# Patient Record
Sex: MALE | Race: WHITE | URBAN - METROPOLITAN AREA
[De-identification: names, ages, dates, MRNs, and addresses within clinical notes are randomized per-mention and may not be internally consistent; named-entity substitution may affect disease eponyms.]

---

## 2017-08-31 ENCOUNTER — INPATIENT (INPATIENT)
Facility: HOSPITAL | Age: 77
LOS: 7 days | Discharge: NURSING, REHAB OR LONG-TERM CARE | End: 2017-09-08
Attending: FAMILY MEDICINE | Admitting: FAMILY MEDICINE
Payer: MEDICARE

## 2017-08-31 VITALS
HEART RATE: 60 BPM | DIASTOLIC BLOOD PRESSURE: 50 MMHG | SYSTOLIC BLOOD PRESSURE: 111 MMHG | OXYGEN SATURATION: 99 % | RESPIRATION RATE: 18 BRPM | WEIGHT: 149.91 LBS | HEIGHT: 68 IN

## 2017-08-31 DIAGNOSIS — E11.9 TYPE 2 DIABETES MELLITUS WITHOUT COMPLICATIONS: ICD-10-CM

## 2017-08-31 DIAGNOSIS — L03.313 CELLULITIS OF CHEST WALL: ICD-10-CM

## 2017-08-31 DIAGNOSIS — J44.9 CHRONIC OBSTRUCTIVE PULMONARY DISEASE, UNSPECIFIED: ICD-10-CM

## 2017-08-31 DIAGNOSIS — M86.60 OTHER CHRONIC OSTEOMYELITIS, UNSPECIFIED SITE: ICD-10-CM

## 2017-08-31 DIAGNOSIS — I10 ESSENTIAL (PRIMARY) HYPERTENSION: ICD-10-CM

## 2017-08-31 LAB
ALBUMIN SERPL ELPH-MCNC: 3.2 G/DL — LOW (ref 3.3–5)
ALP SERPL-CCNC: 84 U/L — SIGNIFICANT CHANGE UP (ref 40–120)
ALT FLD-CCNC: 34 U/L — SIGNIFICANT CHANGE UP (ref 12–78)
ANION GAP SERPL CALC-SCNC: 6 MMOL/L — SIGNIFICANT CHANGE UP (ref 5–17)
APTT BLD: 38.8 SEC — HIGH (ref 27.5–37.4)
AST SERPL-CCNC: 20 U/L — SIGNIFICANT CHANGE UP (ref 15–37)
BASOPHILS # BLD AUTO: 0.1 K/UL — SIGNIFICANT CHANGE UP (ref 0–0.2)
BASOPHILS NFR BLD AUTO: 1.2 % — SIGNIFICANT CHANGE UP (ref 0–2)
BILIRUB SERPL-MCNC: 0.3 MG/DL — SIGNIFICANT CHANGE UP (ref 0.2–1.2)
BUN SERPL-MCNC: 23 MG/DL — SIGNIFICANT CHANGE UP (ref 7–23)
CALCIUM SERPL-MCNC: 9.2 MG/DL — SIGNIFICANT CHANGE UP (ref 8.5–10.1)
CHLORIDE SERPL-SCNC: 105 MMOL/L — SIGNIFICANT CHANGE UP (ref 96–108)
CO2 SERPL-SCNC: 29 MMOL/L — SIGNIFICANT CHANGE UP (ref 22–31)
CREAT SERPL-MCNC: 1.04 MG/DL — SIGNIFICANT CHANGE UP (ref 0.5–1.3)
EOSINOPHIL # BLD AUTO: 0.8 K/UL — HIGH (ref 0–0.5)
EOSINOPHIL NFR BLD AUTO: 8 % — HIGH (ref 0–6)
GLUCOSE SERPL-MCNC: 98 MG/DL — SIGNIFICANT CHANGE UP (ref 70–99)
HCT VFR BLD CALC: 44 % — SIGNIFICANT CHANGE UP (ref 39–50)
HGB BLD-MCNC: 14.1 G/DL — SIGNIFICANT CHANGE UP (ref 13–17)
INR BLD: 2.35 RATIO — HIGH (ref 0.88–1.16)
LACTATE SERPL-SCNC: 1.2 MMOL/L — SIGNIFICANT CHANGE UP (ref 0.7–2)
LYMPHOCYTES # BLD AUTO: 2.2 K/UL — SIGNIFICANT CHANGE UP (ref 1–3.3)
LYMPHOCYTES # BLD AUTO: 23.3 % — SIGNIFICANT CHANGE UP (ref 13–44)
MCHC RBC-ENTMCNC: 24.2 PG — LOW (ref 27–34)
MCHC RBC-ENTMCNC: 32 GM/DL — SIGNIFICANT CHANGE UP (ref 32–36)
MCV RBC AUTO: 75.6 FL — LOW (ref 80–100)
MONOCYTES # BLD AUTO: 0.9 K/UL — SIGNIFICANT CHANGE UP (ref 0–0.9)
MONOCYTES NFR BLD AUTO: 9.9 % — SIGNIFICANT CHANGE UP (ref 2–14)
NEUTROPHILS # BLD AUTO: 5.4 K/UL — SIGNIFICANT CHANGE UP (ref 1.8–7.4)
NEUTROPHILS NFR BLD AUTO: 57.5 % — SIGNIFICANT CHANGE UP (ref 43–77)
PLATELET # BLD AUTO: 223 K/UL — SIGNIFICANT CHANGE UP (ref 150–400)
POTASSIUM SERPL-MCNC: 4.4 MMOL/L — SIGNIFICANT CHANGE UP (ref 3.5–5.3)
POTASSIUM SERPL-SCNC: 4.4 MMOL/L — SIGNIFICANT CHANGE UP (ref 3.5–5.3)
PROT SERPL-MCNC: 7.7 GM/DL — SIGNIFICANT CHANGE UP (ref 6–8.3)
PROTHROM AB SERPL-ACNC: 25.8 SEC — HIGH (ref 9.8–12.7)
RBC # BLD: 5.83 M/UL — HIGH (ref 4.2–5.8)
RBC # FLD: 15.5 % — HIGH (ref 10.3–14.5)
SODIUM SERPL-SCNC: 140 MMOL/L — SIGNIFICANT CHANGE UP (ref 135–145)
WBC # BLD: 9.4 K/UL — SIGNIFICANT CHANGE UP (ref 3.8–10.5)
WBC # FLD AUTO: 9.4 K/UL — SIGNIFICANT CHANGE UP (ref 3.8–10.5)

## 2017-08-31 PROCEDURE — 93010 ELECTROCARDIOGRAM REPORT: CPT

## 2017-08-31 PROCEDURE — 99285 EMERGENCY DEPT VISIT HI MDM: CPT

## 2017-08-31 RX ORDER — SODIUM HYPOCHLORITE 0.125 %
1 SOLUTION, NON-ORAL MISCELLANEOUS DAILY
Qty: 0 | Refills: 0 | Status: DISCONTINUED | OUTPATIENT
Start: 2017-08-31 | End: 2017-09-08

## 2017-08-31 RX ORDER — POLYETHYLENE GLYCOL 3350 17 G/17G
17 POWDER, FOR SOLUTION ORAL DAILY
Qty: 0 | Refills: 0 | Status: DISCONTINUED | OUTPATIENT
Start: 2017-08-31 | End: 2017-09-08

## 2017-08-31 RX ORDER — SENNA PLUS 8.6 MG/1
2 TABLET ORAL AT BEDTIME
Qty: 0 | Refills: 0 | Status: DISCONTINUED | OUTPATIENT
Start: 2017-08-31 | End: 2017-09-08

## 2017-08-31 RX ORDER — PANTOPRAZOLE SODIUM 20 MG/1
40 TABLET, DELAYED RELEASE ORAL
Qty: 0 | Refills: 0 | Status: DISCONTINUED | OUTPATIENT
Start: 2017-08-31 | End: 2017-09-01

## 2017-08-31 RX ORDER — SODIUM CHLORIDE 9 MG/ML
500 INJECTION INTRAMUSCULAR; INTRAVENOUS; SUBCUTANEOUS ONCE
Qty: 0 | Refills: 0 | Status: COMPLETED | OUTPATIENT
Start: 2017-08-31 | End: 2017-08-31

## 2017-08-31 RX ORDER — HEPARIN SODIUM 5000 [USP'U]/ML
5000 INJECTION INTRAVENOUS; SUBCUTANEOUS EVERY 12 HOURS
Qty: 0 | Refills: 0 | Status: DISCONTINUED | OUTPATIENT
Start: 2017-08-31 | End: 2017-08-31

## 2017-08-31 RX ORDER — PIPERACILLIN AND TAZOBACTAM 4; .5 G/20ML; G/20ML
3.38 INJECTION, POWDER, LYOPHILIZED, FOR SOLUTION INTRAVENOUS ONCE
Qty: 0 | Refills: 0 | Status: COMPLETED | OUTPATIENT
Start: 2017-08-31 | End: 2017-08-31

## 2017-08-31 RX ORDER — LACTOBACILLUS ACIDOPHILUS 100MM CELL
1 CAPSULE ORAL DAILY
Qty: 0 | Refills: 0 | Status: DISCONTINUED | OUTPATIENT
Start: 2017-08-31 | End: 2017-09-08

## 2017-08-31 RX ORDER — VANCOMYCIN HCL 1 G
1000 VIAL (EA) INTRAVENOUS ONCE
Qty: 0 | Refills: 0 | Status: COMPLETED | OUTPATIENT
Start: 2017-08-31 | End: 2017-08-31

## 2017-08-31 RX ORDER — WARFARIN SODIUM 2.5 MG/1
5 TABLET ORAL ONCE
Qty: 0 | Refills: 0 | Status: COMPLETED | OUTPATIENT
Start: 2017-08-31 | End: 2017-08-31

## 2017-08-31 RX ORDER — CHOLECALCIFEROL (VITAMIN D3) 125 MCG
2000 CAPSULE ORAL DAILY
Qty: 0 | Refills: 0 | Status: DISCONTINUED | OUTPATIENT
Start: 2017-08-31 | End: 2017-09-08

## 2017-08-31 RX ORDER — VANCOMYCIN HCL 1 G
1000 VIAL (EA) INTRAVENOUS EVERY 12 HOURS
Qty: 0 | Refills: 0 | Status: DISCONTINUED | OUTPATIENT
Start: 2017-08-31 | End: 2017-09-03

## 2017-08-31 RX ORDER — FERROUS SULFATE 325(65) MG
300 TABLET ORAL DAILY
Qty: 0 | Refills: 0 | Status: DISCONTINUED | OUTPATIENT
Start: 2017-08-31 | End: 2017-09-08

## 2017-08-31 RX ORDER — WARFARIN SODIUM 2.5 MG/1
0.5 TABLET ORAL ONCE
Qty: 0 | Refills: 0 | Status: COMPLETED | OUTPATIENT
Start: 2017-08-31 | End: 2017-08-31

## 2017-08-31 RX ORDER — PIPERACILLIN AND TAZOBACTAM 4; .5 G/20ML; G/20ML
3.38 INJECTION, POWDER, LYOPHILIZED, FOR SOLUTION INTRAVENOUS EVERY 8 HOURS
Qty: 0 | Refills: 0 | Status: DISCONTINUED | OUTPATIENT
Start: 2017-08-31 | End: 2017-09-08

## 2017-08-31 RX ADMIN — SODIUM CHLORIDE 500 MILLILITER(S): 9 INJECTION INTRAMUSCULAR; INTRAVENOUS; SUBCUTANEOUS at 13:21

## 2017-08-31 RX ADMIN — WARFARIN SODIUM 5 MILLIGRAM(S): 2.5 TABLET ORAL at 21:16

## 2017-08-31 RX ADMIN — PIPERACILLIN AND TAZOBACTAM 25 GRAM(S): 4; .5 INJECTION, POWDER, LYOPHILIZED, FOR SOLUTION INTRAVENOUS at 21:15

## 2017-08-31 RX ADMIN — PIPERACILLIN AND TAZOBACTAM 200 GRAM(S): 4; .5 INJECTION, POWDER, LYOPHILIZED, FOR SOLUTION INTRAVENOUS at 13:21

## 2017-08-31 RX ADMIN — SENNA PLUS 2 TABLET(S): 8.6 TABLET ORAL at 21:17

## 2017-08-31 RX ADMIN — WARFARIN SODIUM 0.5 MILLIGRAM(S): 2.5 TABLET ORAL at 21:16

## 2017-08-31 RX ADMIN — Medication 250 MILLIGRAM(S): at 13:45

## 2017-08-31 NOTE — H&P ADULT - HISTORY OF PRESENT ILLNESS
77 y/o male with PMHx of PAF, Anemia, GERD, Left Hip Fistula, COPD, NIDDM, PVD, HTN, Dementia, and Chronic Left Hip Osteomyelitis.  Patient is sent from [facility] for acute onset of left chest redness over the site of his Mediport.  Per patient records, the patient had left Dixon catheter placed Jan 2017 after a chronic post surgical left hip osteomyelitis for which he had previously received IV antibiotics for.  Per records the port has not been used since February but has been flushed every month.  Last flush was 8/29/17 and patient was noted to have increasing erythema and warmth over a 4 cm area over the site of the Dixon port.  There have been no reported episodes of fever, chills, or malaise.      ED course: Patient has cbc, lactate, and Vital signs wnl

## 2017-08-31 NOTE — ED ADULT NURSE REASSESSMENT NOTE - NS ED NURSE REASSESS COMMENT FT1
Patient received from Camille SANFORD RN. Patient resting comfortably in bed. No acute signs of distress, reddened area noted to left chest wall, previously outlined with no advancing redness.  All needs meet at this time. Safety and comfort maintained.

## 2017-08-31 NOTE — PROVIDER CONTACT NOTE (OTHER) - SITUATION
Consult made spoke with Jenny from answering service
Consult made spoke with Lurdes from answering service

## 2017-08-31 NOTE — H&P ADULT - PMH
Anemia    COPD (chronic obstructive pulmonary disease)    Dementia    DM (diabetes mellitus)    DVT (deep venous thrombosis)    HTN (hypertension)    Osteoarthritis    Osteomyelitis of left leg    PVD (peripheral vascular disease)

## 2017-08-31 NOTE — H&P ADULT - ASSESSMENT
77 y/o male with PMHx of PAF, Anemia, GERD, Left Hip Fistula, COPD, NIDDM, PVD, HTN, Dementia, and Chronic Left Hip Osteomyelitis that presents with new onset left chest cellulitis    Admit to Med/Surg  OOB to chair with assistance  Vitals per routine

## 2017-08-31 NOTE — H&P ADULT - NSHPPHYSICALEXAM_GEN_ALL_CORE
T(C): 36.8 (08-31-17 @ 15:25), Max: 37.2 (08-31-17 @ 15:23)  HR: 983 (08-31-17 @ 15:25) (60 - 983)  BP: 130/91 (08-31-17 @ 15:25) (110/54 - 130/91)  RR: 18 (08-31-17 @ 15:25) (16 - 18)  SpO2: 100% (08-31-17 @ 15:25) (99% - 100%)

## 2017-08-31 NOTE — ED ADULT NURSE NOTE - OBJECTIVE STATEMENT
Pt presents to ED from [facility] via [facility], pt alert and oriented to self, VSS afebrile, as per EMS pt had his left upper chest wall port accessed yesterday to flush and today the port appeared reddened and swollen with warmth to touch, concern for infection, no pain with palpation, no frainage, vitals stable, safety maintained will continue to monitor pressure ulcer to left hip bandaged by [facility]

## 2017-08-31 NOTE — ED PROVIDER NOTE - OBJECTIVE STATEMENT
75 y/o male with PMHx of dementia, anemia, COPD, DM, HTN, PVD, DVT, osteoarthritis, chronic osteomyelitis of left thigh BIBEMS from [facility] presents to the ED for redness and swelling noted this morning to Dixon catheter to left chest, last flush on 8/29. On 12/22/2016 pt had new medi-port catheter placed. Completed course of Rocephin in January. Unable to obtain hx from pt. Hx obtained from nursing home records.

## 2017-08-31 NOTE — ED PROVIDER NOTE - ATTENDING CONTRIBUTION TO CARE
77 y/o male with multiple medical problems p/w cellulitis around Dixon catheter.  Sepsis protocol initiated, IV abx started and patient to be admitted for sepsis treatement. Govan

## 2017-08-31 NOTE — ED PROVIDER NOTE - SKIN, MLM
+Left lateral upper chest 7cm in diameter of erythema, warm to touch, blanching overlying medi-port. Left lateral hip with chronic ulcer, surrounding erythema and stage 2 sacral ulcer. No discharge noted.

## 2017-08-31 NOTE — ED PROVIDER NOTE - MEDICAL DECISION MAKING DETAILS
77 y/o male with hx of dementia, anemia, COPD, DM, HTN, PVD, DVT, osteoarthritis, chronic osteomyelitis of left thigh with cellulitis over medi-port. Sepsis workup, abx, admit.

## 2017-09-01 LAB
ANION GAP SERPL CALC-SCNC: 5 MMOL/L — SIGNIFICANT CHANGE UP (ref 5–17)
BASOPHILS # BLD AUTO: 0.1 K/UL — SIGNIFICANT CHANGE UP (ref 0–0.2)
BASOPHILS NFR BLD AUTO: 1 % — SIGNIFICANT CHANGE UP (ref 0–2)
BUN SERPL-MCNC: 19 MG/DL — SIGNIFICANT CHANGE UP (ref 7–23)
CALCIUM SERPL-MCNC: 8.6 MG/DL — SIGNIFICANT CHANGE UP (ref 8.5–10.1)
CHLORIDE SERPL-SCNC: 104 MMOL/L — SIGNIFICANT CHANGE UP (ref 96–108)
CO2 SERPL-SCNC: 29 MMOL/L — SIGNIFICANT CHANGE UP (ref 22–31)
CREAT SERPL-MCNC: 0.93 MG/DL — SIGNIFICANT CHANGE UP (ref 0.5–1.3)
EOSINOPHIL # BLD AUTO: 0.6 K/UL — HIGH (ref 0–0.5)
EOSINOPHIL NFR BLD AUTO: 7.3 % — HIGH (ref 0–6)
GLUCOSE SERPL-MCNC: 114 MG/DL — HIGH (ref 70–99)
HCT VFR BLD CALC: 40.2 % — SIGNIFICANT CHANGE UP (ref 39–50)
HGB BLD-MCNC: 13 G/DL — SIGNIFICANT CHANGE UP (ref 13–17)
INR BLD: 2.43 RATIO — HIGH (ref 0.88–1.16)
LYMPHOCYTES # BLD AUTO: 1.7 K/UL — SIGNIFICANT CHANGE UP (ref 1–3.3)
LYMPHOCYTES # BLD AUTO: 22 % — SIGNIFICANT CHANGE UP (ref 13–44)
MAGNESIUM SERPL-MCNC: 2.3 MG/DL — SIGNIFICANT CHANGE UP (ref 1.6–2.6)
MCHC RBC-ENTMCNC: 24.5 PG — LOW (ref 27–34)
MCHC RBC-ENTMCNC: 32.3 GM/DL — SIGNIFICANT CHANGE UP (ref 32–36)
MCV RBC AUTO: 75.9 FL — LOW (ref 80–100)
MONOCYTES # BLD AUTO: 0.6 K/UL — SIGNIFICANT CHANGE UP (ref 0–0.9)
MONOCYTES NFR BLD AUTO: 7.5 % — SIGNIFICANT CHANGE UP (ref 2–14)
NEUTROPHILS # BLD AUTO: 4.8 K/UL — SIGNIFICANT CHANGE UP (ref 1.8–7.4)
NEUTROPHILS NFR BLD AUTO: 62.2 % — SIGNIFICANT CHANGE UP (ref 43–77)
PHOSPHATE SERPL-MCNC: 2.2 MG/DL — LOW (ref 2.5–4.5)
PLATELET # BLD AUTO: 196 K/UL — SIGNIFICANT CHANGE UP (ref 150–400)
POTASSIUM SERPL-MCNC: 3.7 MMOL/L — SIGNIFICANT CHANGE UP (ref 3.5–5.3)
POTASSIUM SERPL-SCNC: 3.7 MMOL/L — SIGNIFICANT CHANGE UP (ref 3.5–5.3)
PROTHROM AB SERPL-ACNC: 26.7 SEC — HIGH (ref 9.8–12.7)
RBC # BLD: 5.3 M/UL — SIGNIFICANT CHANGE UP (ref 4.2–5.8)
RBC # FLD: 15.6 % — HIGH (ref 10.3–14.5)
SODIUM SERPL-SCNC: 138 MMOL/L — SIGNIFICANT CHANGE UP (ref 135–145)
WBC # BLD: 7.7 K/UL — SIGNIFICANT CHANGE UP (ref 3.8–10.5)
WBC # FLD AUTO: 7.7 K/UL — SIGNIFICANT CHANGE UP (ref 3.8–10.5)

## 2017-09-01 RX ORDER — PANTOPRAZOLE SODIUM 20 MG/1
40 TABLET, DELAYED RELEASE ORAL ONCE
Qty: 0 | Refills: 0 | Status: COMPLETED | OUTPATIENT
Start: 2017-09-01 | End: 2017-09-01

## 2017-09-01 RX ADMIN — PIPERACILLIN AND TAZOBACTAM 25 GRAM(S): 4; .5 INJECTION, POWDER, LYOPHILIZED, FOR SOLUTION INTRAVENOUS at 22:29

## 2017-09-01 RX ADMIN — PIPERACILLIN AND TAZOBACTAM 25 GRAM(S): 4; .5 INJECTION, POWDER, LYOPHILIZED, FOR SOLUTION INTRAVENOUS at 06:26

## 2017-09-01 RX ADMIN — Medication 300 MILLIGRAM(S): at 12:21

## 2017-09-01 RX ADMIN — Medication 63.75 MILLIMOLE(S): at 12:19

## 2017-09-01 RX ADMIN — Medication 250 MILLIGRAM(S): at 17:51

## 2017-09-01 RX ADMIN — PIPERACILLIN AND TAZOBACTAM 25 GRAM(S): 4; .5 INJECTION, POWDER, LYOPHILIZED, FOR SOLUTION INTRAVENOUS at 16:14

## 2017-09-01 RX ADMIN — PANTOPRAZOLE SODIUM 40 MILLIGRAM(S): 20 TABLET, DELAYED RELEASE ORAL at 07:25

## 2017-09-01 RX ADMIN — Medication 1 TABLET(S): at 12:21

## 2017-09-01 RX ADMIN — Medication 250 MILLIGRAM(S): at 06:25

## 2017-09-01 RX ADMIN — Medication 2000 UNIT(S): at 12:21

## 2017-09-01 RX ADMIN — Medication 1 APPLICATION(S): at 12:19

## 2017-09-01 RX ADMIN — SENNA PLUS 2 TABLET(S): 8.6 TABLET ORAL at 22:29

## 2017-09-01 NOTE — PROGRESS NOTE ADULT - SUBJECTIVE AND OBJECTIVE BOX
Patient is a 76y old  Male who presents with a chief complaint of left chest erythema (31 Aug 2017 16:22)      HPI:  77 y/o male with PMHx of PAF, Anemia, GERD, Left Hip Fistula, COPD, NIDDM, PVD, HTN, Dementia, and Chronic Left Hip Osteomyelitis.  Patient is sent from [facility] for acute onset of left chest redness over the site of his Mediport.  Per patient records, the patient had left Dixon catheter placed Jan 2017 after a chronic post surgical left hip osteomyelitis for which he had previously received IV antibiotics for.  Per records the port has not been used since February but has been flushed every month.  Last flush was 8/29/17 and patient was noted to have increasing erythema and warmth over a 4 cm area over the site of the Dixon port.  There have been no reported episodes of fever, chills, or malaise.      ED course: Patient has cbc, lactate, and Vital signs wnl (31 Aug 2017 16:22)    9/1: Comfortable, lying in bed. Pleasantly confused. Offers no complaints.      Review of system- Rest of the review of system are normal except mentioned in HPI    Vital Signs Last 24 Hrs  T(C): 36.3 (01 Sep 2017 10:48), Max: 36.6 (31 Aug 2017 17:32)  T(F): 97.4 (01 Sep 2017 10:48), Max: 97.9 (31 Aug 2017 17:32)  HR: 48 (01 Sep 2017 10:48) (48 - 78)  BP: 137/58 (01 Sep 2017 10:48) (111/69 - 137/67)  BP(mean): --  RR: 20 (01 Sep 2017 10:48) (18 - 20)  SpO2: 100% (01 Sep 2017 10:48) (97% - 100%)    PHYSICAL EXAM:    Constitutional: NAD, awake and alert, well-developed  HEENT: PERR, EOMI, Normal Hearing, MMM  Neck: Soft and supple  Respiratory: Breath sounds are clear bilaterally, No wheezing, rales or rhonchi  Cardiovascular: S1 and S2, regular rate and rhythm, no Murmurs, gallops or rubs, chest wall erythematous, distinctly demarcated above chest wall port.  Gastrointestinal: Bowel Sounds present, soft, nontender, nondistended, no guarding, no rebound  Extremities: No peripheral edema  Neurological: A/O x 3, no focal deficits  Skin: No rashes    MEDICATIONS  (STANDING):  Dakins Solution - 1/2 Strength 1 Application(s) Topical daily  ferrous    sulfate Liquid 300 milliGRAM(s) Oral daily  senna 2 Tablet(s) Oral at bedtime  lactobacillus acidophilus 1 Tablet(s) Oral daily  cholecalciferol 2000 Unit(s) Oral daily  vancomycin  IVPB 1000 milliGRAM(s) IV Intermittent every 12 hours  piperacillin/tazobactam IVPB. 3.375 Gram(s) IV Intermittent every 8 hours    MEDICATIONS  (PRN):  polyethylene glycol 3350 17 Gram(s) Oral daily PRN Constipation                              13.0   7.7   )-----------( 196      ( 01 Sep 2017 07:04 )             40.2     09-01    138  |  104  |  19  ----------------------------<  114<H>  3.7   |  29  |  0.93    Ca    8.6      01 Sep 2017 07:04  Phos  2.2     09-01  Mg     2.3     09-01    TPro  7.7  /  Alb  3.2<L>  /  TBili  0.3  /  DBili  x   /  AST  20  /  ALT  34  /  AlkPhos  84  08-31    CAPILLARY BLOOD GLUCOSE        LIVER FUNCTIONS - ( 31 Aug 2017 12:43 )  Alb: 3.2 g/dL / Pro: 7.7 gm/dL / ALK PHOS: 84 U/L / ALT: 34 U/L / AST: 20 U/L / GGT: x           PT/INR - ( 01 Sep 2017 07:04 )   PT: 26.7 sec;   INR: 2.43 ratio         PTT - ( 31 Aug 2017 12:43 )  PTT:38.8 sec    Assessment and Plan: 77y/o male with PMHx of PAF, Anemia, GERD, Left Hip Fistula, COPD, NIDDM, PVD, HTN, Dementia, and Chronic Left Hip Osteomyelitis that presents with new onset left chest cellulitis.      Problem/Plan - 1:  ·  Problem: Cellulitis of chest wall.   -cbc wnl, lactate wnl,   -c/w Vanco/Zosyn per ID  -Surgical Consult for removal of mediport pending.    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   -will need lifelong suppressive abx if prosthetic remains in place    Problem/Plan - 3:  ·  Problem: COPD mixed type.  Plan: stable, no acute management.     Problem/Plan - 4:  ·  Problem: Type 2 diabetes mellitus without complication, without long-term current use of insulin.  Plan: accucheck QAC and HS.     Problem/Plan - 5:  ·  Problem: Essential hypertension.  Plan: hold BP meds for now.       Attending Statement:  40 minutes spent on total encounter; more than 50% of the visit was spent counseling and/or coordinating care by the attending physician.

## 2017-09-01 NOTE — DIETITIAN INITIAL EVALUATION ADULT. - ENERGY NEEDS
Ht.   68   "        Wt.    68    kg               BMI   22.8               IBW    68   kg               Pt is at   100 %  IBW

## 2017-09-01 NOTE — DIETITIAN INITIAL EVALUATION ADULT. - OTHER INFO
Consult for Pressure ulcer st 2 or >.  Pt from [facility], has dementia.  Dx of DM, HTN, COPD.

## 2017-09-01 NOTE — CONSULT NOTE ADULT - ASSESSMENT
75 y/o male with PMHx of PAF, Anemia, GERD, Left Hip Fistula, COPD, NIDDM, PVD, HTN, Dementia, and s/p L hip replacement c/b chronic Left Hip OM/wound s/p long antibiotic course previously sent from [facility] 8/31 for acute onset of left chest redness over the site of his Mediport.  Per patient records, the patient had left Idxon catheter placed Jan 2017 after a chronic post surgical left hip osteomyelitis for which he had previously received IV antibiotics for. Per records the port has not been used since February but has been flushed every month.  Last flush was 8/29/17 and patient was noted to have increasing erythema and warmth over a 4 cm area over the site of the Dixon port.  There have been no reported episodes of fever, chills, or malaise. Here, afebrile, normal wbc ct, was given IV vancomycin/zosyn d/t concern for infection.     1. L chest wall cellulitis/port infection/chronic L hip wound w/ L hip fistula/prosthesis  - agree with IV vancomycin 1gm q12h for mrsa coverage, check trough prior to 4th dose #2  - continue with IV zosyn 3.3758qh for strep/mssa/gnr/anaroebic coverage #2  - f/u cultures  - recommend removal of port   - L hip wound with areas of drainage, does not appear overtly infected, pt has had chronic om/wound and has completed long course of antibiotics in past most recently  12-1/2017, has prosthesis in place  - wound care eval  - continue with antimicrobial coverage  - should be long term suppressive therapy with doxycycline as long as prothesis stays in place  - f/u cbc  - monitor temps  -tolerating abx well so far; no side effects noted  -reason for abx use and side effects reviewed with patient  - supportive care    2. other issues - PAF, Anemia, GERD, Left Hip Fistula, COPD, NIDDM, PVD, HTN, Dementia - care per medicine

## 2017-09-01 NOTE — CONSULT NOTE ADULT - SUBJECTIVE AND OBJECTIVE BOX
Patient is a 76y old  Male who presents with a chief complaint of left chest erythema (31 Aug 2017 16:22)      HPI:  75 y/o male with PMHx of PAF, Anemia, GERD, Left Hip Fistula, COPD, NIDDM, PVD, HTN, Dementia, and Chronic Left Hip Osteomyelitis sent from [facility] 8/31 for acute onset of left chest redness over the site of his Mediport.  Per patient records, the patient had left Dixon catheter placed Jan 2017 after a chronic post surgical left hip osteomyelitis for which he had previously received IV antibiotics for.  Per records the port has not been used since February but has been flushed every month.  Last flush was 8/29/17 and patient was noted to have increasing erythema and warmth over a 4 cm area over the site of the Dixon port.  There have been no reported episodes of fever, chills, or malaise. Here, afebrile, normal wbc ct, was given IV vancomycin/zosyn d/t concern for infection.          PMH: as above    PSH: as above    Meds: per reconciliation sheet, noted below    MEDICATIONS  (STANDING):  Dakins Solution - 1/2 Strength 1 Application(s) Topical daily  ferrous    sulfate Liquid 300 milliGRAM(s) Oral daily  senna 2 Tablet(s) Oral at bedtime  lactobacillus acidophilus 1 Tablet(s) Oral daily  cholecalciferol 2000 Unit(s) Oral daily  vancomycin  IVPB 1000 milliGRAM(s) IV Intermittent every 12 hours  piperacillin/tazobactam IVPB. 3.375 Gram(s) IV Intermittent every 8 hours  sodium phosphate IVPB 15 milliMole(s) IV Intermittent once      Allergies    No Known Allergies    Intolerances        Social: no smoking, no alcohol, no illegal drugs; no recent travel, no exposure to TB    Family history:  No pertinent family history in first degree relatives      ROS: unable to obtain d/t medical condition    Vital Signs Last 24 Hrs  T(C): 36.6 (31 Aug 2017 21:23), Max: 37.2 (31 Aug 2017 15:23)  T(F): 97.9 (31 Aug 2017 21:23), Max: 98.9 (31 Aug 2017 15:23)  HR: 67 (01 Sep 2017 05:32) (60 - 983)  BP: 111/69 (01 Sep 2017 05:32) (110/54 - 137/67)  BP(mean): --  RR: 18 (01 Sep 2017 05:32) (16 - 19)  SpO2: 97% (31 Aug 2017 21:23) (97% - 100%)      PE:  Constitutional: frail looking  HEENT: NC/AT, EOMI, PERRLA  Neck: supple  Back: no tenderness  Respiratory: decreased breath sounds  Cardiovascular: S1S2 regular, no murmurs  Abdomen: soft, not tender, not distended, positive BS  Genitourinary: deferred  Rectal: deferred  Musculoskeletal: no muscle tenderness, no joint swelling or tenderness  Extremities: no pedal edema  Neurological: no focal deficits  Skin: L chest wall with erythema, edema, warmth surrounding port, no drainage  L hip wound with some thick drainage along posterior/inferior incision sites, no erythema, no fluctuance    Labs:                        13.0   7.7   )-----------( 196      ( 01 Sep 2017 07:04 )             40.2     09-01    138  |  104  |  19  ----------------------------<  114<H>  3.7   |  29  |  0.93    Ca    8.6      01 Sep 2017 07:04  Phos  2.2     09-01  Mg     2.3     09-01    TPro  7.7  /  Alb  3.2<L>  /  TBili  0.3  /  DBili  x   /  AST  20  /  ALT  34  /  AlkPhos  84  08-31     LIVER FUNCTIONS - ( 31 Aug 2017 12:43 )  Alb: 3.2 g/dL / Pro: 7.7 gm/dL / ALK PHOS: 84 U/L / ALT: 34 U/L / AST: 20 U/L / GGT: x                   Radiology:   reviewed  Advanced directives addressed: full resuscitation

## 2017-09-02 LAB
INR BLD: 2.41 RATIO — HIGH (ref 0.88–1.16)
PROTHROM AB SERPL-ACNC: 26.5 SEC — HIGH (ref 9.8–12.7)
VANCOMYCIN TROUGH SERPL-MCNC: 19.9 UG/ML — SIGNIFICANT CHANGE UP (ref 10–20)

## 2017-09-02 RX ORDER — PHYTONADIONE (VIT K1) 5 MG
2.5 TABLET ORAL ONCE
Qty: 0 | Refills: 0 | Status: COMPLETED | OUTPATIENT
Start: 2017-09-02 | End: 2017-09-02

## 2017-09-02 RX ADMIN — Medication 1 APPLICATION(S): at 12:09

## 2017-09-02 RX ADMIN — Medication 250 MILLIGRAM(S): at 18:49

## 2017-09-02 RX ADMIN — SENNA PLUS 2 TABLET(S): 8.6 TABLET ORAL at 21:17

## 2017-09-02 RX ADMIN — Medication 2.5 MILLIGRAM(S): at 14:49

## 2017-09-02 RX ADMIN — PIPERACILLIN AND TAZOBACTAM 25 GRAM(S): 4; .5 INJECTION, POWDER, LYOPHILIZED, FOR SOLUTION INTRAVENOUS at 21:17

## 2017-09-02 RX ADMIN — Medication 1 TABLET(S): at 12:09

## 2017-09-02 RX ADMIN — PIPERACILLIN AND TAZOBACTAM 25 GRAM(S): 4; .5 INJECTION, POWDER, LYOPHILIZED, FOR SOLUTION INTRAVENOUS at 14:49

## 2017-09-02 RX ADMIN — Medication 250 MILLIGRAM(S): at 05:11

## 2017-09-02 RX ADMIN — PIPERACILLIN AND TAZOBACTAM 25 GRAM(S): 4; .5 INJECTION, POWDER, LYOPHILIZED, FOR SOLUTION INTRAVENOUS at 05:10

## 2017-09-02 RX ADMIN — Medication 2000 UNIT(S): at 12:09

## 2017-09-02 RX ADMIN — Medication 300 MILLIGRAM(S): at 12:09

## 2017-09-02 NOTE — PROGRESS NOTE ADULT - SUBJECTIVE AND OBJECTIVE BOX
Pt and case discussed with Dr Lynch    Consult called in error, vascular surgery consultation pending. Pt is coagulopathic, advise reversal of coagulopathy for any planned surgical intervention of mediport removal as warranted    Medical care per primary service  Reconsult prn needs

## 2017-09-02 NOTE — CONSULT NOTE ADULT - SUBJECTIVE AND OBJECTIVE BOX
Reqested by Dr. Paulino to see the pt coagulopathy if INR is <1.5 then Vascular Sx will remove the catheter. Case discussed with Dr. Lynch and will reverse INR tomorrow. Will Follow.

## 2017-09-02 NOTE — PROGRESS NOTE ADULT - SUBJECTIVE AND OBJECTIVE BOX
Patient is a 76y old  Male who presents with a chief complaint of left chest erythema (31 Aug 2017 16:22)      HPI:  77 y/o male with PMHx of PAF, Anemia, GERD, Left Hip Fistula, COPD, NIDDM, PVD, HTN, Dementia, and Chronic Left Hip Osteomyelitis.  Patient is sent from [facility] for acute onset of left chest redness over the site of his Mediport.  Per patient records, the patient had left Dixon catheter placed Jan 2017 after a chronic post surgical left hip osteomyelitis for which he had previously received IV antibiotics for.  Per records the port has not been used since February but has been flushed every month.  Last flush was 8/29/17 and patient was noted to have increasing erythema and warmth over a 4 cm area over the site of the Dixon port.  There have been no reported episodes of fever, chills, or malaise.      ED course: Patient has cbc, lactate, and Vital signs wnl (31 Aug 2017 16:22)    9/1: Comfortable, lying in bed. Pleasantly confused. Offers no complaints.  9/2: No complaints, status quo.     Review of system: Unable to obtain secondary to dementia.    Vital Signs Last 24 Hrs  T(C): 36.8 (02 Sep 2017 10:51), Max: 36.9 (02 Sep 2017 05:40)  T(F): 98.3 (02 Sep 2017 10:51), Max: 98.4 (02 Sep 2017 05:40)  HR: 65 (02 Sep 2017 10:51) (62 - 70)  BP: 129/54 (02 Sep 2017 10:51) (96/78 - 132/64)  BP(mean): --  RR: 18 (02 Sep 2017 10:51) (18 - 20)  SpO2: 95% (02 Sep 2017 10:51) (95% - 100%)    PHYSICAL EXAM:    Constitutional: NAD, awake and alert, well-developed  HEENT: PERR, EOMI, Normal Hearing, MMM  Neck: Soft and supple  Respiratory: Breath sounds are clear bilaterally, No wheezing, rales or rhonchi  Cardiovascular: S1 and S2, regular rate and rhythm, no Murmurs, gallops or rubs, chest wall erythematous, distinctly demarcated above chest wall port.  Gastrointestinal: Bowel Sounds present, soft, nontender, nondistended, no guarding, no rebound  Extremities: No peripheral edema  Neurological: A/O x 3, no focal deficits  Skin: No rashes    MEDICATIONS  (STANDING):  Dakins Solution - 1/2 Strength 1 Application(s) Topical daily  ferrous    sulfate Liquid 300 milliGRAM(s) Oral daily  senna 2 Tablet(s) Oral at bedtime  lactobacillus acidophilus 1 Tablet(s) Oral daily  cholecalciferol 2000 Unit(s) Oral daily  vancomycin  IVPB 1000 milliGRAM(s) IV Intermittent every 12 hours  piperacillin/tazobactam IVPB. 3.375 Gram(s) IV Intermittent every 8 hours  phytonadione   Solution 2.5 milliGRAM(s) Oral once    MEDICATIONS  (PRN):  polyethylene glycol 3350 17 Gram(s) Oral daily PRN Constipation                              13.0   7.7   )-----------( 196      ( 01 Sep 2017 07:04 )             40.2     09-01    138  |  104  |  19  ----------------------------<  114<H>  3.7   |  29  |  0.93    Ca    8.6      01 Sep 2017 07:04  Phos  2.2     09-01  Mg     2.3     09-01      CAPILLARY BLOOD GLUCOSE          PT/INR - ( 02 Sep 2017 06:16 )   PT: 26.5 sec;   INR: 2.41 ratio        Assessment and Plan: 77y/o male with PMHx of PAF, Anemia, GERD, Left Hip Fistula, COPD, NIDDM, PVD, HTN, Dementia, and Chronic Left Hip Osteomyelitis that presents with new onset left chest cellulitis.      Problem/Plan - 1:  ·  Problem: Cellulitis of chest wall.   -cbc wnl, lactate wnl,   -c/w Vanco/Zosyn per ID  -Surgical Consult for removal of mediport once INR < 2. Will give vitamin K 2.5mg x 1 today and repeat INR in AM.    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   -will need lifelong suppressive abx if prosthetic remains in place    Problem/Plan - 3:  ·  Problem: COPD mixed type.  Plan: stable, no acute management.     Problem/Plan - 4:  ·  Problem: Type 2 diabetes mellitus without complication, without long-term current use of insulin.  Plan: accucheck QAC and HS.     Problem/Plan - 5:  ·  Problem: Essential hypertension.  Plan: hold BP meds for now.     PAF: STABLE  -on coumadin for goal INR 2-3, currently therapeutic but will need it to be < 2 for surgical removal of port.      Attending Statement:  40 minutes spent on total encounter; more than 50% of the visit was spent counseling and/or coordinating care by the attending physician.

## 2017-09-03 LAB
APTT BLD: 32.3 SEC — SIGNIFICANT CHANGE UP (ref 27.5–37.4)
INR BLD: 1.45 RATIO — HIGH (ref 0.88–1.16)
INR BLD: 1.69 RATIO — HIGH (ref 0.88–1.16)
PROTHROM AB SERPL-ACNC: 15.8 SEC — HIGH (ref 9.8–12.7)
PROTHROM AB SERPL-ACNC: 18.5 SEC — HIGH (ref 9.8–12.7)

## 2017-09-03 RX ORDER — HEPARIN SODIUM 5000 [USP'U]/ML
5000 INJECTION INTRAVENOUS; SUBCUTANEOUS EVERY 12 HOURS
Qty: 0 | Refills: 0 | Status: DISCONTINUED | OUTPATIENT
Start: 2017-09-03 | End: 2017-09-08

## 2017-09-03 RX ORDER — PHYTONADIONE (VIT K1) 5 MG
2.5 TABLET ORAL ONCE
Qty: 0 | Refills: 0 | Status: COMPLETED | OUTPATIENT
Start: 2017-09-03 | End: 2017-09-03

## 2017-09-03 RX ORDER — VANCOMYCIN HCL 1 G
750 VIAL (EA) INTRAVENOUS EVERY 12 HOURS
Qty: 0 | Refills: 0 | Status: DISCONTINUED | OUTPATIENT
Start: 2017-09-03 | End: 2017-09-08

## 2017-09-03 RX ADMIN — PIPERACILLIN AND TAZOBACTAM 25 GRAM(S): 4; .5 INJECTION, POWDER, LYOPHILIZED, FOR SOLUTION INTRAVENOUS at 06:39

## 2017-09-03 RX ADMIN — PIPERACILLIN AND TAZOBACTAM 25 GRAM(S): 4; .5 INJECTION, POWDER, LYOPHILIZED, FOR SOLUTION INTRAVENOUS at 12:42

## 2017-09-03 RX ADMIN — HEPARIN SODIUM 5000 UNIT(S): 5000 INJECTION INTRAVENOUS; SUBCUTANEOUS at 17:41

## 2017-09-03 RX ADMIN — Medication 2.5 MILLIGRAM(S): at 10:03

## 2017-09-03 RX ADMIN — PIPERACILLIN AND TAZOBACTAM 25 GRAM(S): 4; .5 INJECTION, POWDER, LYOPHILIZED, FOR SOLUTION INTRAVENOUS at 21:02

## 2017-09-03 RX ADMIN — Medication 1 APPLICATION(S): at 10:06

## 2017-09-03 RX ADMIN — SENNA PLUS 2 TABLET(S): 8.6 TABLET ORAL at 21:02

## 2017-09-03 RX ADMIN — Medication 250 MILLIGRAM(S): at 05:02

## 2017-09-03 RX ADMIN — Medication 150 MILLIGRAM(S): at 17:41

## 2017-09-03 NOTE — PROGRESS NOTE ADULT - ASSESSMENT
77 y/o male with PMHx of PAF, Anemia, GERD, Left Hip Fistula, COPD, NIDDM, PVD, HTN, Dementia, and s/p L hip replacement c/b chronic Left Hip OM/wound s/p long antibiotic course previously sent from [facility] 8/31 for acute onset of left chest redness over the site of his Mediport.  Per patient records, the patient had left Dixon catheter placed Jan 2017 after a chronic post surgical left hip osteomyelitis for which he had previously received IV antibiotics for. Per records the port has not been used since February but has been flushed every month.  Last flush was 8/29/17 and patient was noted to have increasing erythema and warmth over a 4 cm area over the site of the Dixon port.  There have been no reported episodes of fever, chills, or malaise. Here, afebrile, normal wbc ct, was given IV vancomycin/zosyn d/t concern for infection.     1. L chest wall cellulitis/port infection/chronic L hip wound w/ L hip fistula/prosthesis  - improving  - plan for removal of port today  - continue with IV vancomycin, trough 19.9, decrease dose to [id number] #4  - continue with IV zosyn 3.3758qh for strep/mssa/gnr/anaroebic coverage #4  - blood cx no growth, wound cx with proteus, f/u final cx  - L hip wound with areas of drainage, does not appear overtly infected, pt has had chronic om/wound and has completed long course of antibiotics in past most recently  12-1/2017, has prosthesis in place  - wound care f/u  - continue with antimicrobial coverage  - should be long term suppressive therapy with doxycycline as long as prothesis stays in place  - f/u cbc  - monitor temps  -tolerating abx well so far; no side effects noted  -reason for abx use and side effects reviewed with patient  - supportive care    2. other issues - PAF, Anemia, GERD, Left Hip Fistula, COPD, NIDDM, PVD, HTN, Dementia - care per medicine

## 2017-09-03 NOTE — PROGRESS NOTE ADULT - SUBJECTIVE AND OBJECTIVE BOX
75 y/o male with PMHx of PAF, Anemia, GERD, Left Hip Fistula, COPD, NIDDM, PVD, HTN, Dementia, and Chronic Left Hip Osteomyelitis sent from [facility] 8/31 for acute onset of left chest redness over the site of his Mediport.  Per patient records, the patient had left Dixon catheter placed Jan 2017 after a chronic post surgical left hip osteomyelitis for which he had previously received IV antibiotics for.  Per records the port has not been used since February but has been flushed every month.  Last flush was 8/29/17 and patient was noted to have increasing erythema and warmth over a 4 cm area over the site of the Dixon port.  There have been no reported episodes of fever, chills, or malaise. Here, afebrile, normal wbc ct, was given IV vancomycin/zosyn d/t concern for infection.      resting   no fevers  no overnight events      Vital Signs Last 24 Hrs  T(C): 36.3 (03 Sep 2017 10:53), Max: 36.7 (02 Sep 2017 16:49)  T(F): 97.3 (03 Sep 2017 10:53), Max: 98.1 (02 Sep 2017 16:49)  HR: 70 (03 Sep 2017 10:53) (70 - 83)  BP: 120/54 (03 Sep 2017 10:53) (115/67 - 152/92)  BP(mean): --  RR: 18 (03 Sep 2017 10:53) (17 - 18)  SpO2: 100% (03 Sep 2017 10:53) (96% - 100%)      PE:  Constitutional: frail looking  HEENT: NC/AT, EOMI, PERRLA  Neck: supple  Back: no tenderness  Respiratory: decreased breath sounds  Cardiovascular: S1S2 regular, no murmurs  Abdomen: soft, not tender, not distended, positive BS  Genitourinary: deferred  Rectal: deferred  Musculoskeletal: no muscle tenderness, no joint swelling or tenderness  Extremities: no pedal edema  Neurological: no focal deficits  Skin: L chest wall with decreased erythema, edema, warmth surrounding port, no drainage  L hip wound with some decreased drainage along posterior/inferior incision sites, no erythema, no fluctuance    Labs:             reviewed     hip cx - proteus  blood cx no growth            Radiology:   reviewed  Advanced directives addressed: full resuscitation

## 2017-09-03 NOTE — PROGRESS NOTE ADULT - SUBJECTIVE AND OBJECTIVE BOX
Patient is a 76y old  Male who presents with a chief complaint of left chest erythema (31 Aug 2017 16:22)      HPI:  75 y/o male with PMHx of PAF, Anemia, GERD, Left Hip Fistula, COPD, NIDDM, PVD, HTN, Dementia, and Chronic Left Hip Osteomyelitis.  Patient is sent from [facility] for acute onset of left chest redness over the site of his Mediport.  Per patient records, the patient had left Dixon catheter placed Jan 2017 after a chronic post surgical left hip osteomyelitis for which he had previously received IV antibiotics for.  Per records the port has not been used since February but has been flushed every month.  Last flush was 8/29/17 and patient was noted to have increasing erythema and warmth over a 4 cm area over the site of the Dixon port.  There have been no reported episodes of fever, chills, or malaise.      ED course: Patient has cbc, lactate, and Vital signs wnl (31 Aug 2017 16:22)    9/1: Comfortable, lying in bed. Pleasantly confused. Offers no complaints.  9/2: No complaints, status quo.   9/3: Lying in bed, pleasantly confused. Remains HD stable.    Review of system: Unable to obtain secondary to dementia.    Vital Signs Last 24 Hrs  T(C): 36.3 (03 Sep 2017 10:53), Max: 36.7 (02 Sep 2017 16:49)  T(F): 97.3 (03 Sep 2017 10:53), Max: 98.1 (02 Sep 2017 16:49)  HR: 70 (03 Sep 2017 10:53) (70 - 83)  BP: 120/54 (03 Sep 2017 10:53) (115/67 - 152/92)  BP(mean): --  RR: 18 (03 Sep 2017 10:53) (17 - 18)  SpO2: 100% (03 Sep 2017 10:53) (96% - 100%)    PHYSICAL EXAM:    Constitutional: NAD, awake and alert, well-developed  HEENT: PERR, EOMI, Normal Hearing, MMM  Neck: Soft and supple  Respiratory: Breath sounds are clear bilaterally, No wheezing, rales or rhonchi  Cardiovascular: S1 and S2, regular rate and rhythm, no Murmurs, gallops or rubs, chest wall erythematous, distinctly demarcated above chest wall port.  Gastrointestinal: Bowel Sounds present, soft, nontender, nondistended, no guarding, no rebound  Extremities: No peripheral edema  Neurological: A/O x 3, no focal deficits  Skin: No rashes    MEDICATIONS  (STANDING):  Dakins Solution - 1/2 Strength 1 Application(s) Topical daily  ferrous    sulfate Liquid 300 milliGRAM(s) Oral daily  senna 2 Tablet(s) Oral at bedtime  lactobacillus acidophilus 1 Tablet(s) Oral daily  cholecalciferol 2000 Unit(s) Oral daily  piperacillin/tazobactam IVPB. 3.375 Gram(s) IV Intermittent every 8 hours  vancomycin  IVPB 750 milliGRAM(s) IV Intermittent every 12 hours    MEDICATIONS  (PRN):  polyethylene glycol 3350 17 Gram(s) Oral daily PRN Constipation                CAPILLARY BLOOD GLUCOSE          PT/INR - ( 03 Sep 2017 07:07 )   PT: 18.5 sec;   INR: 1.69 ratio                     Assessment and Plan: 75y/o male with PMHx of PAF, Anemia, GERD, Left Hip Fistula, COPD, NIDDM, PVD, HTN, Dementia, and Chronic Left Hip Osteomyelitis that presents with new onset left chest cellulitis.      Problem/Plan - 1:  ·  Problem: Cellulitis of chest wall.   -cbc wnl, lactate wnl,   -c/w Vanco/Zosyn per ID  -Surgical Consult for removal of mediport once INR < 1/5. s/p 2 doses of vit k 2.5.  To go for removal later today after another INR check.    Problem/Plan - 2:  ·  Problem: Chronic L hip wound w/ L hip fistula/OM/prosthesis    -ID consult for ABX therapy  -culture of wound shows rare proteus mirabilis.  -c/w Dakin's solution.   -will need lifelong suppressive abx if prosthetic remains in place    Problem/Plan - 3:  ·  Problem: COPD mixed type.  Plan: stable, no acute management.     Problem/Plan - 4:  ·  Problem: Type 2 diabetes mellitus without complication, without long-term current use of insulin.  Plan: accucheck QAC and HS.     Problem/Plan - 5:  ·  Problem: Essential hypertension.  Plan: hold BP meds for now.     PAF: STABLE  -hold coumadin until after surgical procedure then restart for goal INR 2-3.      Attending Statement:  40 minutes spent on total encounter; more than 50% of the visit was spent counseling and/or coordinating care by the attending physician.

## 2017-09-04 LAB
-  AMIKACIN: SIGNIFICANT CHANGE UP
-  AMIKACIN: SIGNIFICANT CHANGE UP
-  AMPICILLIN/SULBACTAM: SIGNIFICANT CHANGE UP
-  AMPICILLIN/SULBACTAM: SIGNIFICANT CHANGE UP
-  AMPICILLIN: SIGNIFICANT CHANGE UP
-  AMPICILLIN: SIGNIFICANT CHANGE UP
-  AZTREONAM: SIGNIFICANT CHANGE UP
-  AZTREONAM: SIGNIFICANT CHANGE UP
-  CEFAZOLIN: SIGNIFICANT CHANGE UP
-  CEFAZOLIN: SIGNIFICANT CHANGE UP
-  CEFEPIME: SIGNIFICANT CHANGE UP
-  CEFEPIME: SIGNIFICANT CHANGE UP
-  CEFOXITIN: SIGNIFICANT CHANGE UP
-  CEFOXITIN: SIGNIFICANT CHANGE UP
-  CEFTAZIDIME: SIGNIFICANT CHANGE UP
-  CEFTAZIDIME: SIGNIFICANT CHANGE UP
-  CEFTRIAXONE: SIGNIFICANT CHANGE UP
-  CEFTRIAXONE: SIGNIFICANT CHANGE UP
-  CIPROFLOXACIN: SIGNIFICANT CHANGE UP
-  CIPROFLOXACIN: SIGNIFICANT CHANGE UP
-  ERTAPENEM: SIGNIFICANT CHANGE UP
-  ERTAPENEM: SIGNIFICANT CHANGE UP
-  GENTAMICIN: SIGNIFICANT CHANGE UP
-  GENTAMICIN: SIGNIFICANT CHANGE UP
-  IMIPENEM: SIGNIFICANT CHANGE UP
-  LEVOFLOXACIN: SIGNIFICANT CHANGE UP
-  LEVOFLOXACIN: SIGNIFICANT CHANGE UP
-  MEROPENEM: SIGNIFICANT CHANGE UP
-  MEROPENEM: SIGNIFICANT CHANGE UP
-  PIPERACILLIN/TAZOBACTAM: SIGNIFICANT CHANGE UP
-  PIPERACILLIN/TAZOBACTAM: SIGNIFICANT CHANGE UP
-  TOBRAMYCIN: SIGNIFICANT CHANGE UP
-  TOBRAMYCIN: SIGNIFICANT CHANGE UP
-  TRIMETHOPRIM/SULFAMETHOXAZOLE: SIGNIFICANT CHANGE UP
-  TRIMETHOPRIM/SULFAMETHOXAZOLE: SIGNIFICANT CHANGE UP
CULTURE RESULTS: SIGNIFICANT CHANGE UP
CULTURE RESULTS: SIGNIFICANT CHANGE UP
INR BLD: 1.35 RATIO — HIGH (ref 0.88–1.16)
METHOD TYPE: SIGNIFICANT CHANGE UP
METHOD TYPE: SIGNIFICANT CHANGE UP
ORGANISM # SPEC MICROSCOPIC CNT: SIGNIFICANT CHANGE UP
PROTHROM AB SERPL-ACNC: 14.7 SEC — HIGH (ref 9.8–12.7)
SPECIMEN SOURCE: SIGNIFICANT CHANGE UP
SPECIMEN SOURCE: SIGNIFICANT CHANGE UP

## 2017-09-04 RX ORDER — WARFARIN SODIUM 2.5 MG/1
5 TABLET ORAL DAILY
Qty: 0 | Refills: 0 | Status: COMPLETED | OUTPATIENT
Start: 2017-09-04 | End: 2017-09-06

## 2017-09-04 RX ADMIN — PIPERACILLIN AND TAZOBACTAM 25 GRAM(S): 4; .5 INJECTION, POWDER, LYOPHILIZED, FOR SOLUTION INTRAVENOUS at 13:22

## 2017-09-04 RX ADMIN — PIPERACILLIN AND TAZOBACTAM 25 GRAM(S): 4; .5 INJECTION, POWDER, LYOPHILIZED, FOR SOLUTION INTRAVENOUS at 06:12

## 2017-09-04 RX ADMIN — Medication 1 TABLET(S): at 12:39

## 2017-09-04 RX ADMIN — Medication 2000 UNIT(S): at 12:39

## 2017-09-04 RX ADMIN — HEPARIN SODIUM 5000 UNIT(S): 5000 INJECTION INTRAVENOUS; SUBCUTANEOUS at 18:01

## 2017-09-04 RX ADMIN — WARFARIN SODIUM 5 MILLIGRAM(S): 2.5 TABLET ORAL at 21:26

## 2017-09-04 RX ADMIN — Medication 300 MILLIGRAM(S): at 12:39

## 2017-09-04 RX ADMIN — PIPERACILLIN AND TAZOBACTAM 25 GRAM(S): 4; .5 INJECTION, POWDER, LYOPHILIZED, FOR SOLUTION INTRAVENOUS at 22:10

## 2017-09-04 RX ADMIN — SENNA PLUS 2 TABLET(S): 8.6 TABLET ORAL at 21:26

## 2017-09-04 RX ADMIN — Medication 150 MILLIGRAM(S): at 05:07

## 2017-09-04 RX ADMIN — Medication 150 MILLIGRAM(S): at 18:00

## 2017-09-04 RX ADMIN — HEPARIN SODIUM 5000 UNIT(S): 5000 INJECTION INTRAVENOUS; SUBCUTANEOUS at 05:07

## 2017-09-04 RX ADMIN — Medication 1 APPLICATION(S): at 12:39

## 2017-09-04 NOTE — CONSULT NOTE ADULT - SUBJECTIVE AND OBJECTIVE BOX
Consult Note:   · Provider Specialty	Infectious Disease	    Referral/Consultation:   Initial Consult:  · Requested by Name:	Jl Simental	  · Date/Time:	01-Sep-2017 10:33	  · Reason for Referral/Consultation:	port infection/ chest wall cellulitis/hip wound	      · Subjective and Objective: 	  Patient is a 76y old  Male who presents with a chief complaint of left chest erythema (31 Aug 2017 16:22)      HPI:  77 y/o male with PMHx of PAF, Anemia, GERD, Left Hip Fistula, COPD, NIDDM, PVD, HTN, Dementia, and Chronic Left Hip Osteomyelitis sent from [facility] 8/31 for acute onset of left chest redness over the site of his Mediport.  Per patient records, the patient had left Dixon catheter placed Jan 2017 after a chronic post surgical left hip osteomyelitis for which he had previously received IV antibiotics for.  Per records the port has not been used since February but has been flushed every month.  Last flush was 8/29/17 and patient was noted to have increasing erythema and warmth over a 4 cm area over the site of the Dixon port.  There have been no reported episodes of fever, chills, or malaise. Here, afebrile, normal wbc ct, was given IV vancomycin/zosyn d/t concern for infection.      infected port L chest    PMH: as above    PSH: as above    Meds: per reconciliation sheet, noted below    MEDICATIONS  (STANDING):  Dakins Solution - 1/2 Strength 1 Application(s) Topical daily  ferrous    sulfate Liquid 300 milliGRAM(s) Oral daily  senna 2 Tablet(s) Oral at bedtime  lactobacillus acidophilus 1 Tablet(s) Oral daily  cholecalciferol 2000 Unit(s) Oral daily  vancomycin  IVPB 1000 milliGRAM(s) IV Intermittent every 12 hours  piperacillin/tazobactam IVPB. 3.375 Gram(s) IV Intermittent every 8 hours  sodium phosphate IVPB 15 milliMole(s) IV Intermittent once      Allergies    No Known Allergies    Intolerances        Social: no smoking, no alcohol, no illegal drugs; no recent travel, no exposure to TB    Family history:  No pertinent family history in first degree relatives      ROS: unable to obtain d/t medical condition    Vital Signs Last 24 Hrs  T(C): 36.6 (31 Aug 2017 21:23), Max: 37.2 (31 Aug 2017 15:23)  T(F): 97.9 (31 Aug 2017 21:23), Max: 98.9 (31 Aug 2017 15:23)  HR: 67 (01 Sep 2017 05:32) (60 - 983)  BP: 111/69 (01 Sep 2017 05:32) (110/54 - 137/67)  BP(mean): --  RR: 18 (01 Sep 2017 05:32) (16 - 19)  SpO2: 97% (31 Aug 2017 21:23) (97% - 100%)      PE:  Constitutional: frail looking  HEENT: NC/AT, EOMI, PERRLA  Neck: supple  Back: no tenderness  Respiratory: decreased breath sounds  Cardiovascular: S1S2 regular, no murmurs  Abdomen: soft, not tender, not distended, positive BS  Genitourinary: deferred  Rectal: deferred  Musculoskeletal: no muscle tenderness, no joint swelling or tenderness  Extremities: no pedal edema  Neurological: no focal deficits  Skin: L chest wall with erythema, edema, warmth surrounding port, no drainage  L hip wound with some thick drainage along posterior/inferior incision sites, no erythema, no fluctuance    Labs:                        13.0   7.7   )-----------( 196      ( 01 Sep 2017 07:04 )             40.2     09-01    138  |  104  |  19  ----------------------------<  114<H>  3.7   |  29  |  0.93    Ca    8.6      01 Sep 2017 07:04  Phos  2.2     09-01  Mg     2.3     09-01    TPro  7.7  /  Alb  3.2<L>  /  TBili  0.3  /  DBili  x   /  AST  20  /  ALT  34  /  AlkPhos  84  08-31     LIVER FUNCTIONS - ( 31 Aug 2017 12:43 )  Alb: 3.2 g/dL / Pro: 7.7 gm/dL / ALK PHOS: 84 U/L / ALT: 34 U/L / AST: 20 U/L / GGT: x                   Radiology:   reviewed  Advanced directives addressed: full resuscitation    Assessment and Recommendation:   · Assessment		  77 y/o male with PMHx of PAF, Anemia, GERD, Left Hip Fistula, COPD, NIDDM, PVD, HTN, Dementia, and s/p L hip replacement c/b chronic Left Hip OM/wound s/p long antibiotic course previously sent from [facility] 8/31 for acute onset of left chest redness over the site of his Mediport.  Per patient records, the patient had left Dixon catheter placed Jan 2017 after a chronic post surgical left hip osteomyelitis for which he had previously received IV antibiotics for. Per records the port has not been used since February but has been flushed every month.  Last flush was 8/29/17 and patient was noted to have increasing erythema and warmth over a 4 cm area over the site of the Dixon port.  There have been no reported episodes of fever, chills, or malaise. Here, afebrile, normal wbc ct, was given IV vancomycin/zosyn d/t concern for infection.     1. L chest wall cellulitis/port infection/chronic L hip wound w/ L hip fistula/prosthesis  - agree with IV vancomycin 1gm q12h for mrsa coverage, check trough prior to 4th dose #2  - continue with IV zosyn 3.3758qh for strep/mssa/gnr/anaroebic coverage #2  - f/u cultures  - recommend removal of port   - L hip wound with areas of drainage, does not appear overtly infected, pt has had chronic om/wound and has completed long course of antibiotics in past most recently  12-1/2017, has prosthesis in place  - wound care eval  - continue with antimicrobial coverage  - should be long term suppressive therapy with doxycycline as long as prothesis stays in place  - f/u cbc  - monitor temps  -tolerating abx well so far; no side effects noted  -reason for abx use and side effects reviewed with patient  - supportive care    2. other issues - PAF, Anemia, GERD, Left Hip Fistula, COPD, NIDDM, PVD, HTN, Dementia - care per medicine

## 2017-09-04 NOTE — PROGRESS NOTE ADULT - SUBJECTIVE AND OBJECTIVE BOX
Patient is a 76y old  Male who presents with a chief complaint of left chest erythema (31 Aug 2017 16:22)      HPI:  77 y/o male with PMHx of PAF, Anemia, GERD, Left Hip Fistula, COPD, NIDDM, PVD, HTN, Dementia, and Chronic Left Hip Osteomyelitis.  Patient is sent from [facility] for acute onset of left chest redness over the site of his Mediport.  Per patient records, the patient had left Dixon catheter placed Jan 2017 after a chronic post surgical left hip osteomyelitis for which he had previously received IV antibiotics for.  Per records the port has not been used since February but has been flushed every month.  Last flush was 8/29/17 and patient was noted to have increasing erythema and warmth over a 4 cm area over the site of the Dixon port.  There have been no reported episodes of fever, chills, or malaise.      ED course: Patient has cbc, lactate, and Vital signs wnl (31 Aug 2017 16:22)    9/1: Comfortable, lying in bed. Pleasantly confused. Offers no complaints.  9/2: No complaints, status quo.   9/3: Lying in bed, pleasantly confused. Remains HD stable.  9/4: s/p port removal. Tolerated procedure well. No complaints at bedside.    Review of system: Unable to obtain secondary to dementia.    Vital Signs Last 24 Hrs  T(C): 36.9 (04 Sep 2017 05:34), Max: 36.9 (04 Sep 2017 05:34)  T(F): 98.4 (04 Sep 2017 05:34), Max: 98.4 (04 Sep 2017 05:34)  HR: 59 (04 Sep 2017 05:34) (59 - 85)  BP: 131/49 (04 Sep 2017 05:34) (111/50 - 131/49)  BP(mean): --  RR: 17 (03 Sep 2017 16:38) (17 - 17)  SpO2: 100% (04 Sep 2017 05:34) (96% - 100%)    PHYSICAL EXAM:    Constitutional: NAD, awake and alert, well-developed  HEENT: PERR, EOMI, Normal Hearing, MMM  Neck: Soft and supple  Respiratory: Breath sounds are clear bilaterally, No wheezing, rales or rhonchi  Cardiovascular: S1 and S2, regular rate and rhythm, no Murmurs, gallops or rubs, chest wall erythematous s/p port removal with bandage in place.  Gastrointestinal: Bowel Sounds present, soft, nontender, nondistended, no guarding, no rebound  Extremities: No peripheral edema  Neurological: A/O x 3, no focal deficits  Skin: No rashes    MEDICATIONS  (STANDING):  Dakins Solution - 1/2 Strength 1 Application(s) Topical daily  ferrous    sulfate Liquid 300 milliGRAM(s) Oral daily  senna 2 Tablet(s) Oral at bedtime  lactobacillus acidophilus 1 Tablet(s) Oral daily  cholecalciferol 2000 Unit(s) Oral daily  piperacillin/tazobactam IVPB. 3.375 Gram(s) IV Intermittent every 8 hours  vancomycin  IVPB 750 milliGRAM(s) IV Intermittent every 12 hours  lidocaine 2%/EPINEPHrine 1:100,000 Inj 20 milliLiter(s) Local Injection once  heparin  Injectable 5000 Unit(s) SubCutaneous every 12 hours  warfarin 5 milliGRAM(s) Oral daily    MEDICATIONS  (PRN):  polyethylene glycol 3350 17 Gram(s) Oral daily PRN Constipation                CAPILLARY BLOOD GLUCOSE          PT/INR - ( 04 Sep 2017 06:49 )   PT: 14.7 sec;   INR: 1.35 ratio         PTT - ( 03 Sep 2017 14:44 )  PTT:32.3 sec                Assessment and Plan: 77y/o male with PMHx of PAF, Anemia, GERD, Left Hip Fistula, COPD, NIDDM, PVD, HTN, Dementia, and Chronic Left Hip Osteomyelitis that presents with new onset left chest cellulitis.      Problem/Plan - 1:  ·  Problem: Cellulitis of chest wall secondary to infected mediport.  -s/p removal of port day # 0  -cbc wnl, lactate wnl,   -c/w Vanco/Zosyn per ID    Problem/Plan - 2:  ·  Problem: Chronic L hip wound w/ L hip fistula/OM/prosthesis    -ID consult for ABX therapy  -culture of wound shows mixed microorganisms.  -c/w Dakin's solution.   -will need lifelong/prolonged suppressive abx if prosthetic remains in place    Problem/Plan - 3:  ·  Problem: COPD mixed type.  Plan: stable, no acute management.     Problem/Plan - 4:  ·  Problem: Type 2 diabetes mellitus without complication, without long-term current use of insulin.  Plan: accucheck QAC and HS.     Problem/Plan - 5:  ·  Problem: Essential hypertension.  Plan: hold BP meds for now.     PAF: STABLE  -restart coumadin for goal INR 2-3. subc heparin in the meantime.      Attending Statement:  40 minutes spent on total encounter; more than 50% of the visit was spent counseling and/or coordinating care by the attending physician.

## 2017-09-05 LAB
ANION GAP SERPL CALC-SCNC: 7 MMOL/L — SIGNIFICANT CHANGE UP (ref 5–17)
BUN SERPL-MCNC: 13 MG/DL — SIGNIFICANT CHANGE UP (ref 7–23)
CALCIUM SERPL-MCNC: 8.7 MG/DL — SIGNIFICANT CHANGE UP (ref 8.5–10.1)
CHLORIDE SERPL-SCNC: 104 MMOL/L — SIGNIFICANT CHANGE UP (ref 96–108)
CO2 SERPL-SCNC: 26 MMOL/L — SIGNIFICANT CHANGE UP (ref 22–31)
CREAT SERPL-MCNC: 1.12 MG/DL — SIGNIFICANT CHANGE UP (ref 0.5–1.3)
CULTURE RESULTS: SIGNIFICANT CHANGE UP
CULTURE RESULTS: SIGNIFICANT CHANGE UP
GLUCOSE SERPL-MCNC: 113 MG/DL — HIGH (ref 70–99)
HCT VFR BLD CALC: 42.4 % — SIGNIFICANT CHANGE UP (ref 39–50)
HGB BLD-MCNC: 13.6 G/DL — SIGNIFICANT CHANGE UP (ref 13–17)
INR BLD: 1.24 RATIO — HIGH (ref 0.88–1.16)
MCHC RBC-ENTMCNC: 24.4 PG — LOW (ref 27–34)
MCHC RBC-ENTMCNC: 32.1 GM/DL — SIGNIFICANT CHANGE UP (ref 32–36)
MCV RBC AUTO: 76 FL — LOW (ref 80–100)
PLATELET # BLD AUTO: 223 K/UL — SIGNIFICANT CHANGE UP (ref 150–400)
POTASSIUM SERPL-MCNC: 3.7 MMOL/L — SIGNIFICANT CHANGE UP (ref 3.5–5.3)
POTASSIUM SERPL-SCNC: 3.7 MMOL/L — SIGNIFICANT CHANGE UP (ref 3.5–5.3)
PROTHROM AB SERPL-ACNC: 13.5 SEC — HIGH (ref 9.8–12.7)
RBC # BLD: 5.57 M/UL — SIGNIFICANT CHANGE UP (ref 4.2–5.8)
RBC # FLD: 15.8 % — HIGH (ref 10.3–14.5)
SODIUM SERPL-SCNC: 137 MMOL/L — SIGNIFICANT CHANGE UP (ref 135–145)
SPECIMEN SOURCE: SIGNIFICANT CHANGE UP
SPECIMEN SOURCE: SIGNIFICANT CHANGE UP
WBC # BLD: 10 K/UL — SIGNIFICANT CHANGE UP (ref 3.8–10.5)
WBC # FLD AUTO: 10 K/UL — SIGNIFICANT CHANGE UP (ref 3.8–10.5)

## 2017-09-05 RX ADMIN — Medication 1 APPLICATION(S): at 13:06

## 2017-09-05 RX ADMIN — Medication 2000 UNIT(S): at 13:04

## 2017-09-05 RX ADMIN — HEPARIN SODIUM 5000 UNIT(S): 5000 INJECTION INTRAVENOUS; SUBCUTANEOUS at 05:02

## 2017-09-05 RX ADMIN — Medication 150 MILLIGRAM(S): at 17:12

## 2017-09-05 RX ADMIN — PIPERACILLIN AND TAZOBACTAM 25 GRAM(S): 4; .5 INJECTION, POWDER, LYOPHILIZED, FOR SOLUTION INTRAVENOUS at 21:04

## 2017-09-05 RX ADMIN — WARFARIN SODIUM 5 MILLIGRAM(S): 2.5 TABLET ORAL at 21:04

## 2017-09-05 RX ADMIN — HEPARIN SODIUM 5000 UNIT(S): 5000 INJECTION INTRAVENOUS; SUBCUTANEOUS at 17:12

## 2017-09-05 RX ADMIN — Medication 300 MILLIGRAM(S): at 13:04

## 2017-09-05 RX ADMIN — Medication 150 MILLIGRAM(S): at 05:01

## 2017-09-05 RX ADMIN — Medication 1 TABLET(S): at 13:05

## 2017-09-05 RX ADMIN — PIPERACILLIN AND TAZOBACTAM 25 GRAM(S): 4; .5 INJECTION, POWDER, LYOPHILIZED, FOR SOLUTION INTRAVENOUS at 13:04

## 2017-09-05 RX ADMIN — PIPERACILLIN AND TAZOBACTAM 25 GRAM(S): 4; .5 INJECTION, POWDER, LYOPHILIZED, FOR SOLUTION INTRAVENOUS at 05:02

## 2017-09-05 NOTE — PROGRESS NOTE ADULT - ASSESSMENT
75 y/o male with PMHx of PAF, Anemia, GERD, Left Hip Fistula, COPD, NIDDM, PVD, HTN, Dementia, and s/p L hip replacement c/b chronic Left Hip OM/wound s/p long antibiotic course previously sent from [facility] 8/31 for acute onset of left chest redness over the site of his Mediport.  Per patient records, the patient had left Dixon catheter placed Jan 2017 after a chronic post surgical left hip osteomyelitis for which he had previously received IV antibiotics for. Per records the port has not been used since February but has been flushed every month.  Last flush was 8/29/17 and patient was noted to have increasing erythema and warmth over a 4 cm area over the site of the Dixon port.  There have been no reported episodes of fever, chills, or malaise. Here, afebrile, normal wbc ct, was given IV vancomycin/zosyn d/t concern for infection.     1. L chest wall cellulitis/port infection/chronic L hip wound w/ L hip fistula/prosthesis  - improving  - s/p port removal  - continue with IV vancomycin [id number] #6  - continue with IV zosyn 3.3758qh for strep/mssa/gnr/anaroebic coverage #6  - blood cx no growth, wound cx with proteus, morganella, corynebacterium skin contaminant/colonization  - L hip wound with areas of drainage, does not appear overtly infected, pt has had chronic om/wound and has completed long course of antibiotics in past most recently  12-1/2017, has prosthesis in place  - wound care f/u  - continue with antimicrobial coverage  - once improves further in next 1-2 days, will switch to po regimen i.e doxy  - should be long term suppressive therapy with doxycycline as long as prothesis stays in place  - f/u cbc  - monitor temps  -tolerating abx well so far; no side effects noted  -reason for abx use and side effects reviewed with patient  - supportive care    2. other issues - PAF, Anemia, GERD, Left Hip Fistula, COPD, NIDDM, PVD, HTN, Dementia - care per medicine

## 2017-09-05 NOTE — PROGRESS NOTE ADULT - SUBJECTIVE AND OBJECTIVE BOX
Patient is a 76y old  Male who presents with a chief complaint of left chest erythema (31 Aug 2017 16:22)      HPI:  75 y/o male with PMHx of PAF, Anemia, GERD, Left Hip Fistula, COPD, NIDDM, PVD, HTN, Dementia, and Chronic Left Hip Osteomyelitis.  Patient is sent from [facility] for acute onset of left chest redness over the site of his Mediport.  Per patient records, the patient had left Dixon catheter placed Jan 2017 after a chronic post surgical left hip osteomyelitis for which he had previously received IV antibiotics for.  Per records the port has not been used since February but has been flushed every month.  Last flush was 8/29/17 and patient was noted to have increasing erythema and warmth over a 4 cm area over the site of the Dixon port.  There have been no reported episodes of fever, chills, or malaise.      ED course: Patient has cbc, lactate, and Vital signs wnl (31 Aug 2017 16:22)    9/1: Comfortable, lying in bed. Pleasantly confused. Offers no complaints.  9/2: No complaints, status quo.   9/3: Lying in bed, pleasantly confused. Remains HD stable.  9/4: s/p port removal. Tolerated procedure well. No complaints at bedside.  9/5: Status quo. HD stable. Site of port erythematous.    Review of system: Unable to obtain secondary to dementia.    Vital Signs Last 24 Hrs  T(C): 36.7 (05 Sep 2017 05:55), Max: 37.1 (04 Sep 2017 21:29)  T(F): 98 (05 Sep 2017 05:55), Max: 98.7 (04 Sep 2017 21:29)  HR: 76 (05 Sep 2017 05:55) (58 - 76)  BP: 130/98 (05 Sep 2017 05:55) (110/80 - 130/98)  BP(mean): --  RR: 16 (04 Sep 2017 21:29) (16 - 16)  SpO2: 98% (05 Sep 2017 05:55) (95% - 100%)    MEDICATIONS  (STANDING):  Dakins Solution - 1/2 Strength 1 Application(s) Topical daily  ferrous    sulfate Liquid 300 milliGRAM(s) Oral daily  senna 2 Tablet(s) Oral at bedtime  lactobacillus acidophilus 1 Tablet(s) Oral daily  cholecalciferol 2000 Unit(s) Oral daily  piperacillin/tazobactam IVPB. 3.375 Gram(s) IV Intermittent every 8 hours  vancomycin  IVPB 750 milliGRAM(s) IV Intermittent every 12 hours  heparin  Injectable 5000 Unit(s) SubCutaneous every 12 hours  warfarin 5 milliGRAM(s) Oral daily    MEDICATIONS  (PRN):  polyethylene glycol 3350 17 Gram(s) Oral daily PRN Constipation    PHYSICAL EXAM:    Constitutional: NAD, awake and alert, well-developed  HEENT: PERR, EOMI, Normal Hearing, MMM  Neck: Soft and supple  Respiratory: Breath sounds are clear bilaterally, No wheezing, rales or rhonchi  Cardiovascular: S1 and S2, regular rate and rhythm, no Murmurs, gallops or rubs, chest wall erythematous s/p port removal with bandage in place.  Gastrointestinal: Bowel Sounds present, soft, nontender, nondistended, no guarding, no rebound  Extremities: No peripheral edema  Neurological: A/O x 3, no focal deficits  Skin: No rashes                          13.6   10.0  )-----------( 223      ( 05 Sep 2017 06:14 )             42.4     09-05    137  |  104  |  13  ----------------------------<  113<H>  3.7   |  26  |  1.12    Ca    8.7      05 Sep 2017 06:13      CAPILLARY BLOOD GLUCOSE          PT/INR - ( 05 Sep 2017 06:13 )   PT: 13.5 sec;   INR: 1.24 ratio         PTT - ( 03 Sep 2017 14:44 )  PTT:32.3 sec      Assessment and Plan: 75y/o male with PMHx of PAF, Anemia, GERD, Left Hip Fistula, COPD, NIDDM, PVD, HTN, Dementia, and Chronic Left Hip Osteomyelitis that presents with new onset left chest cellulitis.      Problem/Plan - 1:  ·  Problem: Cellulitis of chest wall secondary to infected mediport.  -s/p removal of port day # 1  -cbc wnl, lactate wnl,   -c/w Vanco/Zosyn per ID    Problem/Plan - 2:  ·  Problem: Chronic L hip wound w/ L hip fistula/OM/prosthesis    -ID consult for ABX therapy  -culture of wound shows mixed microorganisms.  -c/w Dakin's solution.   -will need lifelong/prolonged suppressive abx if prosthetic remains in place    Problem/Plan - 3:  ·  Problem: COPD mixed type.  Plan: stable, no acute management.     Problem/Plan - 4:  ·  Problem: Type 2 diabetes mellitus without complication, without long-term current use of insulin.  Plan: accucheck QAC and HS.     Problem/Plan - 5:  ·  Problem: Essential hypertension.  Plan: hold BP meds for now.     PAF: STABLE  -continue coumadin for goal INR 2-3. subc heparin in the meantime.      Attending Statement:  40 minutes spent on total encounter; more than 50% of the visit was spent counseling and/or coordinating care by the attending physician.

## 2017-09-05 NOTE — PROGRESS NOTE ADULT - SUBJECTIVE AND OBJECTIVE BOX
75 y/o male with PMHx of PAF, Anemia, GERD, Left Hip Fistula, COPD, NIDDM, PVD, HTN, Dementia, and Chronic Left Hip Osteomyelitis sent from [facility] 8/31 for acute onset of left chest redness over the site of his Mediport.  Per patient records, the patient had left Dixon catheter placed Jan 2017 after a chronic post surgical left hip osteomyelitis for which he had previously received IV antibiotics for.  Per records the port has not been used since February but has been flushed every month.  Last flush was 8/29/17 and patient was noted to have increasing erythema and warmth over a 4 cm area over the site of the Dixon port.  There have been no reported episodes of fever, chills, or malaise. Here, afebrile, normal wbc ct, was given IV vancomycin/zosyn d/t concern for infection.      resting   no fevers  no overnight events      MEDICATIONS  (STANDING):  Dakins Solution - 1/2 Strength 1 Application(s) Topical daily  ferrous    sulfate Liquid 300 milliGRAM(s) Oral daily  senna 2 Tablet(s) Oral at bedtime  lactobacillus acidophilus 1 Tablet(s) Oral daily  cholecalciferol 2000 Unit(s) Oral daily  piperacillin/tazobactam IVPB. 3.375 Gram(s) IV Intermittent every 8 hours  vancomycin  IVPB 750 milliGRAM(s) IV Intermittent every 12 hours  heparin  Injectable 5000 Unit(s) SubCutaneous every 12 hours  warfarin 5 milliGRAM(s) Oral daily      Vital Signs Last 24 Hrs  T(C): 36.2 (05 Sep 2017 11:10), Max: 37.1 (04 Sep 2017 21:29)  T(F): 97.2 (05 Sep 2017 11:10), Max: 98.7 (04 Sep 2017 21:29)  HR: 73 (05 Sep 2017 11:10) (58 - 76)  BP: 135/98 (05 Sep 2017 11:10) (110/80 - 135/98)  BP(mean): --  RR: 16 (04 Sep 2017 21:29) (16 - 16)  SpO2: 100% (05 Sep 2017 11:10) (95% - 100%)          PE:  Constitutional: frail looking  HEENT: NC/AT, EOMI, PERRLA  Neck: supple  Back: no tenderness  Respiratory: decreased breath sounds  Cardiovascular: S1S2 regular, no murmurs  Abdomen: soft, not tender, not distended, positive BS  Genitourinary: deferred  Rectal: deferred  Musculoskeletal: no muscle tenderness, no joint swelling or tenderness  Extremities: no pedal edema  Neurological: no focal deficits  Skin: L chest wall with decreased erythema, edema, warmth, port removed, no drainage  L hip wound with some decreased drainage along posterior/inferior incision sites, no erythema, no fluctuance    Labs:             reviewed     Culture - Other (09.01.17 @ 12:55)    -  Gentamicin: S <=4    -  Levofloxacin: R >4    -  Trimethoprim/Sulfamethoxazole: R >2/38    -  Cefazolin: R >16    -  Cefepime: S <=4    -  Ertapenem: S <=1    -  Imipenem: S <=1    -  Meropenem: S <=1    -  Tobramycin: S <=4    -  Amikacin: S <=16    -  Aztreonam: S <=4    -  Cefoxitin: S <=8    -  Ceftazidime: S 4    -  Ceftriaxone: S <=1    -  Ciprofloxacin: R >2    -  Ampicillin: R >16    -  Ampicillin/Sulbactam: R >16/8    -  Piperacillin/Tazobactam: S <=16    Specimen Source: .Other Superior Left Hip    Culture Results:   Rare Corynebacterium species  Rare Morganella morganii    Organism Identification: Morganella morganii    Organism: Morganella morganii    Method Type: HOWIE      blood cx no growth            Radiology:   reviewed  Advanced directives addressed: full resuscitation

## 2017-09-06 LAB
INR BLD: 1.33 RATIO — HIGH (ref 0.88–1.16)
PROTHROM AB SERPL-ACNC: 14.5 SEC — HIGH (ref 9.8–12.7)

## 2017-09-06 RX ADMIN — PIPERACILLIN AND TAZOBACTAM 25 GRAM(S): 4; .5 INJECTION, POWDER, LYOPHILIZED, FOR SOLUTION INTRAVENOUS at 23:01

## 2017-09-06 RX ADMIN — SENNA PLUS 2 TABLET(S): 8.6 TABLET ORAL at 23:01

## 2017-09-06 RX ADMIN — Medication 150 MILLIGRAM(S): at 17:48

## 2017-09-06 RX ADMIN — Medication 1 APPLICATION(S): at 13:00

## 2017-09-06 RX ADMIN — Medication 300 MILLIGRAM(S): at 13:00

## 2017-09-06 RX ADMIN — Medication 1 TABLET(S): at 12:59

## 2017-09-06 RX ADMIN — Medication 2000 UNIT(S): at 12:59

## 2017-09-06 RX ADMIN — WARFARIN SODIUM 5 MILLIGRAM(S): 2.5 TABLET ORAL at 23:01

## 2017-09-06 RX ADMIN — HEPARIN SODIUM 5000 UNIT(S): 5000 INJECTION INTRAVENOUS; SUBCUTANEOUS at 05:21

## 2017-09-06 RX ADMIN — PIPERACILLIN AND TAZOBACTAM 25 GRAM(S): 4; .5 INJECTION, POWDER, LYOPHILIZED, FOR SOLUTION INTRAVENOUS at 13:00

## 2017-09-06 RX ADMIN — PIPERACILLIN AND TAZOBACTAM 25 GRAM(S): 4; .5 INJECTION, POWDER, LYOPHILIZED, FOR SOLUTION INTRAVENOUS at 06:21

## 2017-09-06 RX ADMIN — Medication 150 MILLIGRAM(S): at 05:21

## 2017-09-06 RX ADMIN — HEPARIN SODIUM 5000 UNIT(S): 5000 INJECTION INTRAVENOUS; SUBCUTANEOUS at 17:49

## 2017-09-06 NOTE — PROGRESS NOTE ADULT - ATTENDING COMMENTS
Pt. seen and examined with DENNISE Palm. Agree with assessment and plan as above. Changes made where appropriate.

## 2017-09-06 NOTE — PROGRESS NOTE ADULT - SUBJECTIVE AND OBJECTIVE BOX
afebrile, comfortable    MEDICATIONS  (STANDING):  Dakins Solution - 1/2 Strength 1 Application(s) Topical daily  ferrous    sulfate Liquid 300 milliGRAM(s) Oral daily  senna 2 Tablet(s) Oral at bedtime  lactobacillus acidophilus 1 Tablet(s) Oral daily  cholecalciferol 2000 Unit(s) Oral daily  piperacillin/tazobactam IVPB. 3.375 Gram(s) IV Intermittent every 8 hours  vancomycin  IVPB 750 milliGRAM(s) IV Intermittent every 12 hours  heparin  Injectable 5000 Unit(s) SubCutaneous every 12 hours  warfarin 5 milliGRAM(s) Oral daily    MEDICATIONS  (PRN):  polyethylene glycol 3350 17 Gram(s) Oral daily PRN Constipation      Allergies    No Known Allergies    Intolerances        Flatus: [ ] YES [ ] NO             Bowel Movement: [ ] YES [ ] NO  Pain (0-10):            Pain Control Adequate: [ ] YES [ ] NO  Nausea: [ ] YES [ ] NO            Vomiting: [ ] YES [ ] NO  Diarrhea: [ ] YES [ ] NO         Constipation: [ ] YES [ ] NO     Chest Pain: [ ] YES [ ] NO    SOB:  [ ] YES [ ] NO    Vital Signs Last 24 Hrs  T(C): 36.8 (06 Sep 2017 05:29), Max: 36.8 (06 Sep 2017 05:29)  T(F): 98.3 (06 Sep 2017 05:29), Max: 98.3 (06 Sep 2017 05:29)  HR: 67 (06 Sep 2017 05:29) (61 - 73)  BP: 110/44 (06 Sep 2017 05:29) (110/44 - 135/98)  BP(mean): --  RR: 18 (06 Sep 2017 05:29) (18 - 18)  SpO2: 98% (06 Sep 2017 05:29) (98% - 100%)    I&O's Summary    05 Sep 2017 07:01  -  06 Sep 2017 07:00  --------------------------------------------------------  IN: 100 mL / OUT: 0 mL / NET: 100 mL        Physical Exam:  General: NAD, resting comfortably  Pulmonary: normal resp effort, CTA-B  Cardiovascular: NSR  Abdominal: soft, NT/ND  Extremities: WWP, normal strength  Neuro: A/O x 3, CNs II-XII grossly intact, normal motor/sensation, no focal deficits  Pulses:   Right:                                                                          Left:  FEM [ ]2+ [ ]1+ [ ]doppler                                             FEM [ ]2+ [ ]1+ [ ]doppler    POP [ ]2+ [ ]1+ [ ]doppler                                             POP [ ]2+ [ ]1+ [ ]doppler    DP [ ]2+ [ ]1+ [ ]doppler                                                DP [ ]2+ [ ]1+ [ ]doppler  PT[ ]2+ [ ]1+ [ ]doppler                                                  PT [ ]2+ [ ]1+ [ ]doppler    port pocket wound opened; no gross purulence    LABS:                        13.6   10.0  )-----------( 223      ( 05 Sep 2017 06:14 )             42.4     09-05    137  |  104  |  13  ----------------------------<  113<H>  3.7   |  26  |  1.12    Ca    8.7      05 Sep 2017 06:13      PT/INR - ( 06 Sep 2017 07:32 )   PT: 14.5 sec;   INR: 1.33 ratio               CAPILLARY BLOOD GLUCOSE          RADIOLOGY & ADDITIONAL TESTS:

## 2017-09-07 LAB
ANION GAP SERPL CALC-SCNC: 5 MMOL/L — SIGNIFICANT CHANGE UP (ref 5–17)
BUN SERPL-MCNC: 11 MG/DL — SIGNIFICANT CHANGE UP (ref 7–23)
CALCIUM SERPL-MCNC: 8.5 MG/DL — SIGNIFICANT CHANGE UP (ref 8.5–10.1)
CHLORIDE SERPL-SCNC: 107 MMOL/L — SIGNIFICANT CHANGE UP (ref 96–108)
CO2 SERPL-SCNC: 28 MMOL/L — SIGNIFICANT CHANGE UP (ref 22–31)
CREAT SERPL-MCNC: 1.11 MG/DL — SIGNIFICANT CHANGE UP (ref 0.5–1.3)
GLUCOSE SERPL-MCNC: 90 MG/DL — SIGNIFICANT CHANGE UP (ref 70–99)
HCT VFR BLD CALC: 38.6 % — LOW (ref 39–50)
HGB BLD-MCNC: 12.6 G/DL — LOW (ref 13–17)
INR BLD: 1.53 RATIO — HIGH (ref 0.88–1.16)
MCHC RBC-ENTMCNC: 24.4 PG — LOW (ref 27–34)
MCHC RBC-ENTMCNC: 32.8 GM/DL — SIGNIFICANT CHANGE UP (ref 32–36)
MCV RBC AUTO: 74.3 FL — LOW (ref 80–100)
PLATELET # BLD AUTO: 207 K/UL — SIGNIFICANT CHANGE UP (ref 150–400)
POTASSIUM SERPL-MCNC: 3.9 MMOL/L — SIGNIFICANT CHANGE UP (ref 3.5–5.3)
POTASSIUM SERPL-SCNC: 3.9 MMOL/L — SIGNIFICANT CHANGE UP (ref 3.5–5.3)
PROTHROM AB SERPL-ACNC: 16.7 SEC — HIGH (ref 9.8–12.7)
RBC # BLD: 5.19 M/UL — SIGNIFICANT CHANGE UP (ref 4.2–5.8)
RBC # FLD: 16.5 % — HIGH (ref 10.3–14.5)
SODIUM SERPL-SCNC: 140 MMOL/L — SIGNIFICANT CHANGE UP (ref 135–145)
WBC # BLD: 9.6 K/UL — SIGNIFICANT CHANGE UP (ref 3.8–10.5)
WBC # FLD AUTO: 9.6 K/UL — SIGNIFICANT CHANGE UP (ref 3.8–10.5)

## 2017-09-07 RX ORDER — WARFARIN SODIUM 2.5 MG/1
5 TABLET ORAL DAILY
Qty: 0 | Refills: 0 | Status: DISCONTINUED | OUTPATIENT
Start: 2017-09-07 | End: 2017-09-08

## 2017-09-07 RX ADMIN — HEPARIN SODIUM 5000 UNIT(S): 5000 INJECTION INTRAVENOUS; SUBCUTANEOUS at 17:20

## 2017-09-07 RX ADMIN — Medication 2000 UNIT(S): at 11:39

## 2017-09-07 RX ADMIN — PIPERACILLIN AND TAZOBACTAM 25 GRAM(S): 4; .5 INJECTION, POWDER, LYOPHILIZED, FOR SOLUTION INTRAVENOUS at 23:01

## 2017-09-07 RX ADMIN — SENNA PLUS 2 TABLET(S): 8.6 TABLET ORAL at 23:00

## 2017-09-07 RX ADMIN — Medication 150 MILLIGRAM(S): at 06:04

## 2017-09-07 RX ADMIN — Medication 1 APPLICATION(S): at 11:40

## 2017-09-07 RX ADMIN — Medication 1 TABLET(S): at 11:39

## 2017-09-07 RX ADMIN — HEPARIN SODIUM 5000 UNIT(S): 5000 INJECTION INTRAVENOUS; SUBCUTANEOUS at 06:11

## 2017-09-07 RX ADMIN — PIPERACILLIN AND TAZOBACTAM 25 GRAM(S): 4; .5 INJECTION, POWDER, LYOPHILIZED, FOR SOLUTION INTRAVENOUS at 13:47

## 2017-09-07 RX ADMIN — Medication 150 MILLIGRAM(S): at 18:57

## 2017-09-07 RX ADMIN — Medication 300 MILLIGRAM(S): at 11:39

## 2017-09-07 RX ADMIN — PIPERACILLIN AND TAZOBACTAM 25 GRAM(S): 4; .5 INJECTION, POWDER, LYOPHILIZED, FOR SOLUTION INTRAVENOUS at 07:37

## 2017-09-07 RX ADMIN — WARFARIN SODIUM 5 MILLIGRAM(S): 2.5 TABLET ORAL at 23:00

## 2017-09-07 NOTE — PROGRESS NOTE ADULT - SUBJECTIVE AND OBJECTIVE BOX
77 y/o male with PMHx of PAF, Anemia, GERD, Left Hip Fistula, COPD, NIDDM, PVD, HTN, Dementia and Chronic Left Hip Osteomyelitis. Patient is sent from [facility] for acute onset of left chest redness over the site of his Mediport. Per patient records, the patient had left Dixon catheter placed Jan 2017 after a chronic post surgical left hip osteomyelitis for which he had previously received IV antibiotics for.  Per records the port has not been used since February but has been flushed every month.  Last flush was 8/29/17 and patient was noted to have increasing erythema and warmth over a 4 cm area over the site of the Dixon port.  There have been no reported episodes of fever, chills, or malaise.      9/1: Comfortable, lying in bed. Pleasantly confused. Offers no complaints.  9/2: No complaints, status quo.   9/3: Lying in bed, pleasantly confused. Remains HD stable.  9/4: s/p port removal. Tolerated procedure well. No complaints at bedside.  9/5: Status quo. HD stable. Site of port erythematous.  9/6 : Status quo. HD stable. Dr. Paulino changed port removal dressing/packing.  9/7 - Status quo. Pleasantly confused, NAD.    REVIEW OF SYSTEMS: all negative except for comments above.    Vital Signs Last 24 Hrs  T(C): 36.5 (07 Sep 2017 05:30), Max: 36.9 (06 Sep 2017 17:18)  T(F): 97.7 (07 Sep 2017 05:30), Max: 98.4 (06 Sep 2017 17:18)  HR: 93 (07 Sep 2017 05:30) (78 - 93)  BP: 117/91 (07 Sep 2017 05:30) (117/91 - 142/63)  BP(mean): --  RR: 18 (07 Sep 2017 05:30) (17 - 18)  SpO2: 99% (07 Sep 2017 05:30) (99% - 100%)    PHYSICAL EXAM:    Constitutional: confused, NAD, awake and alert, well-developed  HEENT: PERR, EOMI, Normal Hearing, MMM  Neck: Soft and supple  Respiratory: Breath sounds are clear bilaterally, No wheezing, rales or rhonchi  Cardiovascular: S1 and S2, regular rate and rhythm, no Murmurs, gallops or rubs  Gastrointestinal: Bowel Sounds present, soft, nontender, nondistended, no guarding, no rebound  Extremities: No peripheral edema  Neurological: A/O x 3, no focal deficits  Skin: No rashes, L chest wall s/p port removal with bandage in place, clean and dry.                          12.6   9.6   )-----------( 207      ( 07 Sep 2017 07:37 )             38.6   09-07    140  |  107  |  11  ----------------------------<  90  3.9   |  28  |  1.11    Ca    8.5      07 Sep 2017 07:37    PT/INR - ( 07 Sep 2017 07:37 )   PT: 16.7 sec;   INR: 1.53 ratio         MEDICATIONS  (STANDING):  Dakins Solution - 1/2 Strength 1 Application(s) Topical daily  ferrous    sulfate Liquid 300 milliGRAM(s) Oral daily  senna 2 Tablet(s) Oral at bedtime  lactobacillus acidophilus 1 Tablet(s) Oral daily  cholecalciferol 2000 Unit(s) Oral daily  piperacillin/tazobactam IVPB. 3.375 Gram(s) IV Intermittent every 8 hours  vancomycin  IVPB 750 milliGRAM(s) IV Intermittent every 12 hours  heparin  Injectable 5000 Unit(s) SubCutaneous every 12 hours  warfarin 5 milliGRAM(s) Oral daily    MEDICATIONS  (PRN):  polyethylene glycol 3350 17 Gram(s) Oral daily PRN Constipation       Assessment and Plan:     77y/o male with PMHx as above that presents with new onset left chest cellulitis.      # Cellulitis of chest wall secondary to infected mediport.  - s/p removal of port day #3  - dressing changed by Dr. 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  - c/w Dakin's solution, wound care  - will need lifelong/prolonged suppressive abx if prosthetic remains in place    # COPD  - stable, no acute management.     # DM2  - accucheck QAC and HS     # Essential hypertension: STABLE  - hold BP meds for now.     # PAF: STABLE  - continue coumadin for goal INR 2-3  - subc heparin 77 y/o male with PMHx of PAF, Anemia, GERD, Left Hip Fistula, COPD, NIDDM, PVD, HTN, Dementia and Chronic Left Hip Osteomyelitis. Patient is sent from [facility] for acute onset of left chest redness over the site of his Mediport. Per patient records, the patient had left Dixon catheter placed Jan 2017 after a chronic post surgical left hip osteomyelitis for which he had previously received IV antibiotics for.  Per records the port has not been used since February but has been flushed every month.  Last flush was 8/29/17 and patient was noted to have increasing erythema and warmth over a 4 cm area over the site of the Dixon port.  There have been no reported episodes of fever, chills, or malaise.      9/1: Comfortable, lying in bed. Pleasantly confused. Offers no complaints.  9/2: No complaints, status quo.   9/3: Lying in bed, pleasantly confused. Remains HD stable.  9/4: s/p port removal. Tolerated procedure well. No complaints at bedside.  9/5: Status quo. HD stable. Site of port erythematous.  9/6 : Status quo. HD stable. Dr. Paulino changed port removal dressing/packing.  9/7 - Status quo. Pleasantly confused, NAD.    REVIEW OF SYSTEMS: all negative except for comments above.    Vital Signs Last 24 Hrs  T(C): 36.5 (07 Sep 2017 05:30), Max: 36.9 (06 Sep 2017 17:18)  T(F): 97.7 (07 Sep 2017 05:30), Max: 98.4 (06 Sep 2017 17:18)  HR: 93 (07 Sep 2017 05:30) (78 - 93)  BP: 117/91 (07 Sep 2017 05:30) (117/91 - 142/63)  BP(mean): --  RR: 18 (07 Sep 2017 05:30) (17 - 18)  SpO2: 99% (07 Sep 2017 05:30) (99% - 100%)    PHYSICAL EXAM:    Constitutional: confused, NAD, awake and alert, well-developed  HEENT: PERR, EOMI, Normal Hearing, MMM  Neck: Soft and supple  Respiratory: Breath sounds are clear bilaterally, No wheezing, rales or rhonchi  Cardiovascular: S1 and S2, regular rate and rhythm, no Murmurs, gallops or rubs  Gastrointestinal: Bowel Sounds present, soft, nontender, nondistended, no guarding, no rebound  Extremities: No peripheral edema  Neurological: A/O x 3, no focal deficits  Skin: No rashes, L chest wall s/p port removal with bandage in place, clean and dry.                          12.6   9.6   )-----------( 207      ( 07 Sep 2017 07:37 )             38.6   09-07    140  |  107  |  11  ----------------------------<  90  3.9   |  28  |  1.11    Ca    8.5      07 Sep 2017 07:37    PT/INR - ( 07 Sep 2017 07:37 )   PT: 16.7 sec;   INR: 1.53 ratio         MEDICATIONS  (STANDING):  Dakins Solution - 1/2 Strength 1 Application(s) Topical daily  ferrous    sulfate Liquid 300 milliGRAM(s) Oral daily  senna 2 Tablet(s) Oral at bedtime  lactobacillus acidophilus 1 Tablet(s) Oral daily  cholecalciferol 2000 Unit(s) Oral daily  piperacillin/tazobactam IVPB. 3.375 Gram(s) IV Intermittent every 8 hours  vancomycin  IVPB 750 milliGRAM(s) IV Intermittent every 12 hours  heparin  Injectable 5000 Unit(s) SubCutaneous every 12 hours  warfarin 5 milliGRAM(s) Oral daily    MEDICATIONS  (PRN):  polyethylene glycol 3350 17 Gram(s) Oral daily PRN Constipation       Assessment and Plan:     77y/o male with PMHx as above that presents with new onset left chest cellulitis.      # Cellulitis of chest wall secondary to infected mediport - Improving.  - s/p removal of port day #3  - dressing changed by Dr. 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  - c/w Dakin's solution, wound care  - will need lifelong/prolonged suppressive abx if prosthetic remains in place    # COPD  - stable, no acute management.     # DM2  - accucheck QAC and HS     # Essential hypertension: STABLE  - hold BP meds for now.     # PAF: STABLE  - continue coumadin for goal INR 2-3  - subc heparin

## 2017-09-07 NOTE — PROGRESS NOTE ADULT - SUBJECTIVE AND OBJECTIVE BOX
75 y/o male with PMHx of PAF, Anemia, GERD, Left Hip Fistula, COPD, NIDDM, PVD, HTN, Dementia, and Chronic Left Hip Osteomyelitis sent from [facility] 8/31 for acute onset of left chest redness over the site of his Mediport.  Per patient records, the patient had left Dixon catheter placed Jan 2017 after a chronic post surgical left hip osteomyelitis for which he had previously received IV antibiotics for.  Per records the port has not been used since February but has been flushed every month.  Last flush was 8/29/17 and patient was noted to have increasing erythema and warmth over a 4 cm area over the site of the Dixon port.  There have been no reported episodes of fever, chills, or malaise. Here, afebrile, normal wbc ct, was given IV vancomycin/zosyn d/t concern for infection.      resting   no fevers  no overnight events      MEDICATIONS  (STANDING):  Dakins Solution - 1/2 Strength 1 Application(s) Topical daily  ferrous    sulfate Liquid 300 milliGRAM(s) Oral daily  senna 2 Tablet(s) Oral at bedtime  lactobacillus acidophilus 1 Tablet(s) Oral daily  cholecalciferol 2000 Unit(s) Oral daily  piperacillin/tazobactam IVPB. 3.375 Gram(s) IV Intermittent every 8 hours  vancomycin  IVPB 750 milliGRAM(s) IV Intermittent every 12 hours  heparin  Injectable 5000 Unit(s) SubCutaneous every 12 hours  warfarin 5 milliGRAM(s) Oral daily      Vital Signs Last 24 Hrs  T(C): 36.9 (07 Sep 2017 10:45), Max: 36.9 (06 Sep 2017 17:18)  T(F): 98.4 (07 Sep 2017 10:45), Max: 98.4 (06 Sep 2017 17:18)  HR: 65 (07 Sep 2017 10:45) (65 - 93)  BP: 109/53 (07 Sep 2017 10:45) (109/53 - 128/60)  BP(mean): --  RR: 18 (07 Sep 2017 05:30) (17 - 18)  SpO2: 100% (07 Sep 2017 10:45) (99% - 100%)          PE:  Constitutional: frail looking  HEENT: NC/AT, EOMI, PERRLA  Neck: supple  Back: no tenderness  Respiratory: decreased breath sounds  Cardiovascular: S1S2 regular, no murmurs  Abdomen: soft, not tender, not distended, positive BS  Genitourinary: deferred  Rectal: deferred  Musculoskeletal: no muscle tenderness, no joint swelling or tenderness  Extremities: no pedal edema  Neurological: no focal deficits  Skin: L chest wall with decreased erythema, edema, warmth, port removed, no drainage  L hip wound with some decreased drainage along posterior/inferior incision sites, no erythema, no fluctuance    Labs:             reviewed     Culture - Other (09.01.17 @ 12:55)    -  Gentamicin: S <=4    -  Levofloxacin: R >4    -  Trimethoprim/Sulfamethoxazole: R >2/38    -  Cefazolin: R >16    -  Cefepime: S <=4    -  Ertapenem: S <=1    -  Imipenem: S <=1    -  Meropenem: S <=1    -  Tobramycin: S <=4    -  Amikacin: S <=16    -  Aztreonam: S <=4    -  Cefoxitin: S <=8    -  Ceftazidime: S 4    -  Ceftriaxone: S <=1    -  Ciprofloxacin: R >2    -  Ampicillin: R >16    -  Ampicillin/Sulbactam: R >16/8    -  Piperacillin/Tazobactam: S <=16    Specimen Source: .Other Superior Left Hip    Culture Results:   Rare Corynebacterium species  Rare Morganella morganii    Organism Identification: Morganella morganii    Organism: Morganella morganii    Method Type: HOWIE      blood cx no growth            Radiology:   reviewed  Advanced directives addressed: full resuscitation

## 2017-09-07 NOTE — PROGRESS NOTE ADULT - ASSESSMENT
77 y/o male with PMHx of PAF, Anemia, GERD, Left Hip Fistula, COPD, NIDDM, PVD, HTN, Dementia, and s/p L hip replacement c/b chronic Left Hip OM/wound s/p long antibiotic course previously sent from [facility] 8/31 for acute onset of left chest redness over the site of his Mediport.  Per patient records, the patient had left Dixon catheter placed Jan 2017 after a chronic post surgical left hip osteomyelitis for which he had previously received IV antibiotics for. Per records the port has not been used since February but has been flushed every month.  Last flush was 8/29/17 and patient was noted to have increasing erythema and warmth over a 4 cm area over the site of the Dixon port.  There have been no reported episodes of fever, chills, or malaise. Here, afebrile, normal wbc ct, was given IV vancomycin/zosyn d/t concern for infection.     1. L chest wall cellulitis/port infection/chronic L hip wound w/ L hip fistula/prosthesis  - improving  - s/p port removal 9/4  - continue with IV vancomycin [id number] #8  - continue with IV zosyn [id number] for strep/mssa/gnr/anaroebic coverage #8  - blood cx no growth, wound cx with proteus, morganella, corynebacterium skin contaminant/colonization  - L hip wound with areas of drainage, does not appear overtly infected, pt has had chronic om/wound and has completed long course of antibiotics in past most recently  12-1/2017, has prosthesis in place  - wound care f/u  - continue with antimicrobial coverage  - once improves further in next 24hrs, will switch to po regimen i.e doxy  - should be long term suppressive therapy with doxycycline as long as prothesis stays in place  - f/u cbc  - monitor temps  -tolerating abx well so far; no side effects noted  -reason for abx use and side effects reviewed with patient  - supportive care    2. other issues - PAF, Anemia, GERD, Left Hip Fistula, COPD, NIDDM, PVD, HTN, Dementia - care per medicine

## 2017-09-08 ENCOUNTER — TRANSCRIPTION ENCOUNTER (OUTPATIENT)
Age: 77
End: 2017-09-08

## 2017-09-08 VITALS
SYSTOLIC BLOOD PRESSURE: 146 MMHG | DIASTOLIC BLOOD PRESSURE: 49 MMHG | RESPIRATION RATE: 18 BRPM | TEMPERATURE: 98 F | HEART RATE: 84 BPM

## 2017-09-08 LAB
INR BLD: 1.83 RATIO — HIGH (ref 0.88–1.16)
PROTHROM AB SERPL-ACNC: 20 SEC — HIGH (ref 9.8–12.7)

## 2017-09-08 RX ORDER — WARFARIN SODIUM 2.5 MG/1
1 TABLET ORAL
Qty: 0 | Refills: 0 | COMMUNITY
Start: 2017-09-08

## 2017-09-08 RX ORDER — WARFARIN SODIUM 2.5 MG/1
1 TABLET ORAL
Qty: 0 | Refills: 0 | COMMUNITY

## 2017-09-08 RX ORDER — WARFARIN SODIUM 2.5 MG/1
0.5 TABLET ORAL
Qty: 0 | Refills: 0 | COMMUNITY

## 2017-09-08 RX ADMIN — HEPARIN SODIUM 5000 UNIT(S): 5000 INJECTION INTRAVENOUS; SUBCUTANEOUS at 05:59

## 2017-09-08 RX ADMIN — Medication 300 MILLIGRAM(S): at 11:09

## 2017-09-08 RX ADMIN — Medication 150 MILLIGRAM(S): at 05:59

## 2017-09-08 RX ADMIN — Medication 1 TABLET(S): at 11:08

## 2017-09-08 RX ADMIN — Medication 1 APPLICATION(S): at 11:10

## 2017-09-08 RX ADMIN — PIPERACILLIN AND TAZOBACTAM 25 GRAM(S): 4; .5 INJECTION, POWDER, LYOPHILIZED, FOR SOLUTION INTRAVENOUS at 06:51

## 2017-09-08 RX ADMIN — Medication 2000 UNIT(S): at 11:09

## 2017-09-08 NOTE — DISCHARGE NOTE ADULT - PATIENT PORTAL LINK FT
“You can access the [facility] Patient Portal, offered by [facility], by registering with the following website: [email or web address]”

## 2017-09-08 NOTE — DISCHARGE NOTE ADULT - MEDICATION SUMMARY - MEDICATIONS TO TAKE
I will START or STAY ON the medications listed below when I get home from the hospital:    acetaminophen 500 mg oral tablet  -- 1 tab(s) by mouth every 4 hours, As Needed for pain/elevated temp  -- Indication: For for pain    calcium carbonate 450 mg oral gum  -- 5 milliliter(s) by mouth once a day for supplement  -- Indication: For vitamin    warfarin 3 mg oral tablet  -- 1 tab(s) by mouth once a day  -- Indication: For Anticoagulant    doxycycline hyclate 100 mg oral capsule  -- 1 cap(s) by mouth 2 times a day  -- Indication: For Antibioitcs    Dakins Half Strength 0.25% topical solution  -- Apply on skin to left hip posterior and distal incisions once per day shift  -- Indication: For for wound dressing    Desitin Rapid Relief 13% topical cream  -- Apply on skin to both buttocks every shift for impaired skin  -- Indication: For for buttocks    ferrous sulfate 220 mg/5 mL (44 mg elemental iron) oral elixir  -- 5 milliliter(s) by mouth once a day for anemia  -- Indication: For iron    polyethylene glycol 3350 oral powder for reconstitution  -- 17 gram(s) by mouth once a day for constipation  -- Indication: For laxative    Senna Plus 50 mg-8.6 mg oral tablet  -- 2 tab(s) by mouth once a day (at bedtime) for constipation  -- Indication: For for constipation    Florastor 250 mg oral capsule  -- 1 cap(s) by mouth once a day for prevention of diarrhea  -- Indication: For Probiotic    omeprazole 40 mg oral delayed release capsule  -- 1 cap(s) by mouth once a day for GERD **Open and give in applesauce**  -- Indication: For for your stomach    cholecalciferol 2000 intl units oral tablet  -- 1 tab(s) by mouth once a day for supplement  -- Indication: For vitamin

## 2017-09-08 NOTE — PROGRESS NOTE ADULT - PROVIDER SPECIALTY LIST ADULT
Hospitalist
Infectious Disease
Surgery
Vascular Surgery
Hospitalist

## 2017-09-08 NOTE — DISCHARGE NOTE ADULT - HOSPITAL COURSE
77 y/o male with PMHx of PAF, Anemia, GERD, Left Hip Fistula, COPD, NIDDM, PVD, HTN, Dementia and Chronic Left Hip Osteomyelitis. Patient is sent from [facility] for acute onset of left chest redness over the site of his Mediport. Per patient records, the patient had left Dixon catheter placed Jan 2017 after a chronic post surgical left hip osteomyelitis for which he had previously received IV antibiotics for.  Per records the port has not been used since February but has been flushed every month.  Last flush was 8/29/17 and patient was noted to have increasing erythema and warmth over a 4 cm area over the site of the Dixon port.  There have been no reported episodes of fever, chills, or malaise.  Pt. had port removed on 9/4 by Dr. Paulino. Pt. treated with IV Vanco/Zosyn per ID. Site erythema improved, afebrile, WBCs normal. Will be discharged on Doxycycline 100mg PO BID. Pt. has chronic L hip wound w/ L hip fistula/OM/prosthesis and will need lifelong ABX therapy if prosthetic stays in place. Left hip dressing changes daily with Dakins solution. Patient will be discharged today back to [facility] on Coumadin 3mg PO. Patient will have INR check in 1-2 days for INR goal of 2-3.    Vital Signs Last 24 Hrs  T(C): 36.9 (08 Sep 2017 11:07), Max: 36.9 (08 Sep 2017 11:07)  T(F): 98.4 (08 Sep 2017 11:07), Max: 98.4 (08 Sep 2017 11:07)  HR: 74 (08 Sep 2017 11:07) (69 - 74)  BP: 134/57 (08 Sep 2017 11:07) (119/86 - 135/49)  BP(mean): --  RR: 17 (08 Sep 2017 11:07) (17 - 17)  SpO2: 100% (08 Sep 2017 11:07) (100% - 100%)    PHYSICAL EXAM:    Constitutional: confused, NAD, awake and alert, well-developed  HEENT: PERR, EOMI, Normal Hearing, MMM  Neck: Soft and supple  Respiratory: Breath sounds are clear bilaterally, No wheezing, rales or rhonchi  Cardiovascular: S1 and S2, regular rate and rhythm, no Murmurs, gallops or rubs  Gastrointestinal: Bowel Sounds present, soft, nontender, nondistended, no guarding, no rebound  Extremities: No peripheral edema  Neurological: A/O x 3, no focal deficits  Skin: No rashes, L chest wall s/p port removal with bandage in place, clean and dry. Left hip dressing in place, C/D/I. 77 y/o male with PMHx of PAF, Anemia, GERD, Left Hip Fistula, COPD, NIDDM, PVD, HTN, Dementia and Chronic Left Hip Osteomyelitis. Patient is sent from [facility] for acute onset of left chest redness over the site of his Mediport. Per patient records, the patient had left Dixon catheter placed Jan 2017 after a chronic post surgical left hip osteomyelitis for which he had previously received IV antibiotics for.  Per records the port has not been used since February but has been flushed every month.  Last flush was 8/29/17 and patient was noted to have increasing erythema and warmth over a 4 cm area over the site of the Dixon port.  There have been no reported episodes of fever, chills, or malaise.  Pt. had port removed on 9/4 by Dr. Paulino. Pt. treated with IV Vanco/Zosyn per ID. Site erythema improved, afebrile, WBCs normal. Will be discharged on Doxycycline 100mg PO BID. Pt. has chronic L hip wound w/ L hip fistula/OM/prosthesis and will need lifelong ABX therapy if prosthetic stays in place. Left hip dressing changes daily with Dakins solution. Patient will be discharged today back to [facility] on Coumadin 3mg PO. Patient will have INR check in 1-2 days for INR goal of 2-3. Please adjust coumadin accordingly.    Vital Signs Last 24 Hrs  T(C): 36.9 (08 Sep 2017 11:07), Max: 36.9 (08 Sep 2017 11:07)  T(F): 98.4 (08 Sep 2017 11:07), Max: 98.4 (08 Sep 2017 11:07)  HR: 74 (08 Sep 2017 11:07) (69 - 74)  BP: 134/57 (08 Sep 2017 11:07) (119/86 - 135/49)  BP(mean): --  RR: 17 (08 Sep 2017 11:07) (17 - 17)  SpO2: 100% (08 Sep 2017 11:07) (100% - 100%)    PHYSICAL EXAM:    Constitutional: confused, NAD, awake and alert, well-developed  HEENT: PERR, EOMI, Normal Hearing, MMM  Neck: Soft and supple  Respiratory: Breath sounds are clear bilaterally, No wheezing, rales or rhonchi  Cardiovascular: S1 and S2, regular rate and rhythm, no Murmurs, gallops or rubs  Gastrointestinal: Bowel Sounds present, soft, nontender, nondistended, no guarding, no rebound  Extremities: No peripheral edema  Neurological: A/O x 3, no focal deficits  Skin: No rashes, L chest wall s/p port removal with bandage in place, clean and dry. Left hip dressing in place, C/D/I.

## 2017-09-08 NOTE — PROGRESS NOTE ADULT - ASSESSMENT
77 y/o male with PMHx of PAF, Anemia, GERD, Left Hip Fistula, COPD, NIDDM, PVD, HTN, Dementia, and s/p L hip replacement c/b chronic Left Hip OM/wound s/p long antibiotic course previously sent from [facility] 8/31 for acute onset of left chest redness over the site of his Mediport.  Per patient records, the patient had left Dixon catheter placed Jan 2017 after a chronic post surgical left hip osteomyelitis for which he had previously received IV antibiotics for. Per records the port has not been used since February but has been flushed every month.  Last flush was 8/29/17 and patient was noted to have increasing erythema and warmth over a 4 cm area over the site of the Dixon port.  There have been no reported episodes of fever, chills, or malaise. Here, afebrile, normal wbc ct, was given IV vancomycin/zosyn d/t concern for infection.     1. L chest wall cellulitis/port infection/chronic L hip wound w/ L hip fistula/prosthesis  - improving  - s/p port removal 9/4  - completed 8 days of IV vancomycin/zosyn  - blood cx no growth, wound cx with proteus, morganella, corynebacterium skin contaminant/colonization  - L hip wound with areas of drainage, does not appear overtly infected, pt has had chronic om/wound and has completed long course of antibiotics in past most recently  12-1/2017, has prosthesis in place  - wound care f/u  - continue with antimicrobial coverage  - switch to po regimen i.e doxycycline 100mg BID   - should be long term suppressive therapy with doxycycline as long as prothesis stays in place  - f/u cbc  - monitor temps  -tolerating abx well so far; no side effects noted  -reason for abx use and side effects reviewed with patient  - supportive care    2. other issues - PAF, Anemia, GERD, Left Hip Fistula, COPD, NIDDM, PVD, HTN, Dementia - care per medicine

## 2017-09-08 NOTE — DISCHARGE NOTE ADULT - PLAN OF CARE
To continue with course of oral antibiotics - Doxycycline 100mg BID. - Patient will need INR check in 1-2 days for goal of INR 2-3.  - Pt. needs Left hip dressing changed once a day. Dakins solution and clean dressing on top.  - Pt. needs Left chest wall port dressing changed once a day. NS soaked gauze to pack and clean dressing on top.

## 2017-09-08 NOTE — DISCHARGE NOTE ADULT - CARE PLAN
Principal Discharge DX:	Cellulitis of chest wall  Goal:	To continue with course of oral antibiotics - Doxycycline 100mg BID.  Instructions for follow-up, activity and diet:	- Patient will need INR check in 1-2 days for goal of INR 2-3.  - Pt. needs Left hip dressing changed once a day. Dakins solution and clean dressing on top.  - Pt. needs Left chest wall port dressing changed once a day. NS soaked gauze to pack and clean dressing on top.

## 2017-09-08 NOTE — DISCHARGE NOTE ADULT - MEDICATION SUMMARY - MEDICATIONS TO STOP TAKING
I will STOP taking the medications listed below when I get home from the hospital:    doxycycline 25 mg/5 mL oral liquid  -- 20 milliliter(s) by mouth every 12 hours for prophylaxis    warfarin 1 mg oral tablet  -- 0.5 tab(s) by mouth once a day (at bedtime) **Total daily dose 5.5mg**    warfarin 5 mg oral tablet  -- 1 tab(s) by mouth once a day (at bedtime) **Total daily dose 5.5mg**

## 2017-09-13 DIAGNOSIS — K21.9 GASTRO-ESOPHAGEAL REFLUX DISEASE WITHOUT ESOPHAGITIS: ICD-10-CM

## 2017-09-13 DIAGNOSIS — J44.9 CHRONIC OBSTRUCTIVE PULMONARY DISEASE, UNSPECIFIED: ICD-10-CM

## 2017-09-13 DIAGNOSIS — I48.0 PAROXYSMAL ATRIAL FIBRILLATION: ICD-10-CM

## 2017-09-13 DIAGNOSIS — D64.9 ANEMIA, UNSPECIFIED: ICD-10-CM

## 2017-09-13 DIAGNOSIS — M86.652 OTHER CHRONIC OSTEOMYELITIS, LEFT THIGH: ICD-10-CM

## 2017-09-13 DIAGNOSIS — F03.90 UNSPECIFIED DEMENTIA WITHOUT BEHAVIORAL DISTURBANCE: ICD-10-CM

## 2017-09-13 DIAGNOSIS — I10 ESSENTIAL (PRIMARY) HYPERTENSION: ICD-10-CM

## 2017-09-13 DIAGNOSIS — E11.9 TYPE 2 DIABETES MELLITUS WITHOUT COMPLICATIONS: ICD-10-CM

## 2017-09-13 DIAGNOSIS — Z79.899 OTHER LONG TERM (CURRENT) DRUG THERAPY: ICD-10-CM

## 2017-09-13 DIAGNOSIS — T80.218A OTHER INFECTION DUE TO CENTRAL VENOUS CATHETER, INITIAL ENCOUNTER: ICD-10-CM

## 2017-09-13 DIAGNOSIS — I73.9 PERIPHERAL VASCULAR DISEASE, UNSPECIFIED: ICD-10-CM
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
## 2017-09-13 DIAGNOSIS — L03.313 CELLULITIS OF CHEST WALL: ICD-10-CM

## 2017-09-13 DIAGNOSIS — Z79.01 LONG TERM (CURRENT) USE OF ANTICOAGULANTS: ICD-10-CM

## 2017-09-13 DIAGNOSIS — L53.9 ERYTHEMATOUS CONDITION, UNSPECIFIED: ICD-10-CM

## 2018-04-27 NOTE — DISCHARGE NOTE ADULT - BLOOD LEVEL. WHEN WARFARIN/COUMADIN IS TAKEN WITH OTHER MEDICINES IT CAN CHANGE THE WAY OTHER MEDICINES WORK. OTHER MEDICINES CAN ALSO CHANGE THE WAY WARFARIN/COUMADIN WORKS. IT IS VERY
Problem: Safety  Goal: Will remain free from falls  Outcome: PROGRESSING AS EXPECTED  Has not fallen this hospital stay    Problem: Bowel/Gastric:  Goal: Normal bowel function is maintained or improved  Outcome: PROGRESSING AS EXPECTED  [facility] 4/26       Statement Selected

## 2019-04-22 ENCOUNTER — INPATIENT (INPATIENT)
Facility: HOSPITAL | Age: 79
LOS: 17 days | Discharge: HOME | End: 2019-05-10
Attending: HOSPITALIST | Admitting: HOSPITALIST
Payer: MEDICARE

## 2019-04-22 VITALS
HEART RATE: 122 BPM | RESPIRATION RATE: 30 BRPM | HEIGHT: 68 IN | TEMPERATURE: 101 F | DIASTOLIC BLOOD PRESSURE: 59 MMHG | WEIGHT: 130.07 LBS | SYSTOLIC BLOOD PRESSURE: 93 MMHG

## 2019-04-22 LAB
ALBUMIN SERPL ELPH-MCNC: 2.1 G/DL — LOW (ref 3.3–5)
ALP SERPL-CCNC: 110 U/L — SIGNIFICANT CHANGE UP (ref 40–120)
ALT FLD-CCNC: 25 U/L — SIGNIFICANT CHANGE UP (ref 12–78)
ANION GAP SERPL CALC-SCNC: 8 MMOL/L — SIGNIFICANT CHANGE UP (ref 5–17)
ANISOCYTOSIS BLD QL: SIGNIFICANT CHANGE UP
APPEARANCE UR: ABNORMAL
APTT BLD: 38.9 SEC — HIGH (ref 27.5–36.3)
AST SERPL-CCNC: 22 U/L — SIGNIFICANT CHANGE UP (ref 15–37)
BACTERIA # UR AUTO: ABNORMAL
BASOPHILS # BLD AUTO: 0.01 K/UL — SIGNIFICANT CHANGE UP (ref 0–0.2)
BASOPHILS NFR BLD AUTO: 0.2 % — SIGNIFICANT CHANGE UP (ref 0–2)
BILIRUB SERPL-MCNC: 0.4 MG/DL — SIGNIFICANT CHANGE UP (ref 0.2–1.2)
BILIRUB UR-MCNC: NEGATIVE — SIGNIFICANT CHANGE UP
BUN SERPL-MCNC: 42 MG/DL — HIGH (ref 7–23)
CALCIUM SERPL-MCNC: 8.2 MG/DL — LOW (ref 8.5–10.1)
CHLORIDE SERPL-SCNC: 124 MMOL/L — HIGH (ref 96–108)
CO2 SERPL-SCNC: 19 MMOL/L — LOW (ref 22–31)
COLOR SPEC: YELLOW — SIGNIFICANT CHANGE UP
CREAT SERPL-MCNC: 1.49 MG/DL — HIGH (ref 0.5–1.3)
DIFF PNL FLD: ABNORMAL
EOSINOPHIL # BLD AUTO: 0.02 K/UL — SIGNIFICANT CHANGE UP (ref 0–0.5)
EOSINOPHIL NFR BLD AUTO: 0.4 % — SIGNIFICANT CHANGE UP (ref 0–6)
EPI CELLS # UR: SIGNIFICANT CHANGE UP
GLUCOSE SERPL-MCNC: 138 MG/DL — HIGH (ref 70–99)
GLUCOSE UR QL: NEGATIVE MG/DL — SIGNIFICANT CHANGE UP
HCT VFR BLD CALC: 34.2 % — LOW (ref 39–50)
HGB BLD-MCNC: 10.1 G/DL — LOW (ref 13–17)
IMM GRANULOCYTES NFR BLD AUTO: 0.2 % — SIGNIFICANT CHANGE UP (ref 0–1.5)
INR BLD: 4.25 RATIO — HIGH (ref 0.88–1.16)
KETONES UR-MCNC: NEGATIVE — SIGNIFICANT CHANGE UP
LACTATE SERPL-SCNC: 2.1 MMOL/L — HIGH (ref 0.7–2)
LACTATE SERPL-SCNC: 3.4 MMOL/L — HIGH (ref 0.7–2)
LEUKOCYTE ESTERASE UR-ACNC: ABNORMAL
LYMPHOCYTES # BLD AUTO: 0.46 K/UL — LOW (ref 1–3.3)
LYMPHOCYTES # BLD AUTO: 8.5 % — LOW (ref 13–44)
MANUAL SMEAR VERIFICATION: SIGNIFICANT CHANGE UP
MCHC RBC-ENTMCNC: 23.8 PG — LOW (ref 27–34)
MCHC RBC-ENTMCNC: 29.5 GM/DL — LOW (ref 32–36)
MCV RBC AUTO: 80.5 FL — SIGNIFICANT CHANGE UP (ref 80–100)
MICROCYTES BLD QL: SLIGHT — SIGNIFICANT CHANGE UP
MONOCYTES # BLD AUTO: 0.22 K/UL — SIGNIFICANT CHANGE UP (ref 0–0.9)
MONOCYTES NFR BLD AUTO: 4.1 % — SIGNIFICANT CHANGE UP (ref 2–14)
NEUTROPHILS # BLD AUTO: 4.67 K/UL — SIGNIFICANT CHANGE UP (ref 1.8–7.4)
NEUTROPHILS NFR BLD AUTO: 86.6 % — HIGH (ref 43–77)
NITRITE UR-MCNC: POSITIVE
NRBC # BLD: 0 /100 WBCS — SIGNIFICANT CHANGE UP (ref 0–0)
PH UR: 5 — SIGNIFICANT CHANGE UP (ref 5–8)
PLAT MORPH BLD: NORMAL — SIGNIFICANT CHANGE UP
PLATELET # BLD AUTO: 143 K/UL — LOW (ref 150–400)
POTASSIUM SERPL-MCNC: 4 MMOL/L — SIGNIFICANT CHANGE UP (ref 3.5–5.3)
POTASSIUM SERPL-SCNC: 4 MMOL/L — SIGNIFICANT CHANGE UP (ref 3.5–5.3)
PROT SERPL-MCNC: 6.4 GM/DL — SIGNIFICANT CHANGE UP (ref 6–8.3)
PROT UR-MCNC: 15 MG/DL
PROTHROM AB SERPL-ACNC: 49.3 SEC — HIGH (ref 10–12.9)
RAPID RVP RESULT: SIGNIFICANT CHANGE UP
RBC # BLD: 4.25 M/UL — SIGNIFICANT CHANGE UP (ref 4.2–5.8)
RBC # FLD: 21 % — HIGH (ref 10.3–14.5)
RBC BLD AUTO: ABNORMAL
RBC CASTS # UR COMP ASSIST: ABNORMAL /HPF (ref 0–4)
SODIUM SERPL-SCNC: 151 MMOL/L — HIGH (ref 135–145)
SP GR SPEC: 1.02 — SIGNIFICANT CHANGE UP (ref 1.01–1.02)
UROBILINOGEN FLD QL: NEGATIVE MG/DL — SIGNIFICANT CHANGE UP
WBC # BLD: 5.39 K/UL — SIGNIFICANT CHANGE UP (ref 3.8–10.5)
WBC # FLD AUTO: 5.39 K/UL — SIGNIFICANT CHANGE UP (ref 3.8–10.5)
WBC UR QL: ABNORMAL

## 2019-04-22 PROCEDURE — 93010 ELECTROCARDIOGRAM REPORT: CPT

## 2019-04-22 PROCEDURE — 74177 CT ABD & PELVIS W/CONTRAST: CPT | Mod: 26

## 2019-04-22 PROCEDURE — 71045 X-RAY EXAM CHEST 1 VIEW: CPT | Mod: 26

## 2019-04-22 PROCEDURE — 99291 CRITICAL CARE FIRST HOUR: CPT

## 2019-04-22 RX ORDER — GLUCAGON INJECTION, SOLUTION 0.5 MG/.1ML
1 INJECTION, SOLUTION SUBCUTANEOUS ONCE
Qty: 0 | Refills: 0 | Status: DISCONTINUED | OUTPATIENT
Start: 2019-04-22 | End: 2019-05-10

## 2019-04-22 RX ORDER — POLYETHYLENE GLYCOL 3350 17 G/17G
17 POWDER, FOR SOLUTION ORAL DAILY
Qty: 0 | Refills: 0 | Status: DISCONTINUED | OUTPATIENT
Start: 2019-04-22 | End: 2019-05-10

## 2019-04-22 RX ORDER — INSULIN LISPRO 100/ML
VIAL (ML) SUBCUTANEOUS
Qty: 0 | Refills: 0 | Status: DISCONTINUED | OUTPATIENT
Start: 2019-04-22 | End: 2019-04-23

## 2019-04-22 RX ORDER — CEFEPIME 1 G/1
1000 INJECTION, POWDER, FOR SOLUTION INTRAMUSCULAR; INTRAVENOUS EVERY 12 HOURS
Qty: 0 | Refills: 0 | Status: DISCONTINUED | OUTPATIENT
Start: 2019-04-23 | End: 2019-04-23

## 2019-04-22 RX ORDER — DEXTROSE 50 % IN WATER 50 %
15 SYRINGE (ML) INTRAVENOUS ONCE
Qty: 0 | Refills: 0 | Status: DISCONTINUED | OUTPATIENT
Start: 2019-04-22 | End: 2019-04-28

## 2019-04-22 RX ORDER — ZINC OXIDE 200 MG/G
1 OINTMENT TOPICAL DAILY
Qty: 0 | Refills: 0 | Status: DISCONTINUED | OUTPATIENT
Start: 2019-04-22 | End: 2019-05-10

## 2019-04-22 RX ORDER — SODIUM HYPOCHLORITE 0.125 %
1 SOLUTION, NON-ORAL MISCELLANEOUS DAILY
Qty: 0 | Refills: 0 | Status: DISCONTINUED | OUTPATIENT
Start: 2019-04-22 | End: 2019-05-10

## 2019-04-22 RX ORDER — SODIUM CHLORIDE 9 MG/ML
1000 INJECTION, SOLUTION INTRAVENOUS
Qty: 0 | Refills: 0 | Status: DISCONTINUED | OUTPATIENT
Start: 2019-04-22 | End: 2019-04-23

## 2019-04-22 RX ORDER — FERROUS SULFATE 325(65) MG
300 TABLET ORAL DAILY
Qty: 0 | Refills: 0 | Status: DISCONTINUED | OUTPATIENT
Start: 2019-04-22 | End: 2019-05-10

## 2019-04-22 RX ORDER — DEXTROSE 50 % IN WATER 50 %
25 SYRINGE (ML) INTRAVENOUS ONCE
Qty: 0 | Refills: 0 | Status: DISCONTINUED | OUTPATIENT
Start: 2019-04-22 | End: 2019-04-28

## 2019-04-22 RX ORDER — CEFEPIME 1 G/1
2000 INJECTION, POWDER, FOR SOLUTION INTRAMUSCULAR; INTRAVENOUS ONCE
Qty: 0 | Refills: 0 | Status: COMPLETED | OUTPATIENT
Start: 2019-04-22 | End: 2019-04-22

## 2019-04-22 RX ORDER — SODIUM CHLORIDE 9 MG/ML
1850 INJECTION INTRAMUSCULAR; INTRAVENOUS; SUBCUTANEOUS ONCE
Qty: 0 | Refills: 0 | Status: COMPLETED | OUTPATIENT
Start: 2019-04-22 | End: 2019-04-22

## 2019-04-22 RX ORDER — SENNA PLUS 8.6 MG/1
2 TABLET ORAL AT BEDTIME
Qty: 0 | Refills: 0 | Status: DISCONTINUED | OUTPATIENT
Start: 2019-04-22 | End: 2019-05-10

## 2019-04-22 RX ORDER — SODIUM CHLORIDE 9 MG/ML
1000 INJECTION INTRAMUSCULAR; INTRAVENOUS; SUBCUTANEOUS ONCE
Qty: 0 | Refills: 0 | Status: COMPLETED | OUTPATIENT
Start: 2019-04-22 | End: 2019-04-22

## 2019-04-22 RX ORDER — CEFEPIME 1 G/1
1000 INJECTION, POWDER, FOR SOLUTION INTRAMUSCULAR; INTRAVENOUS EVERY 12 HOURS
Qty: 0 | Refills: 0 | Status: DISCONTINUED | OUTPATIENT
Start: 2019-04-22 | End: 2019-04-22

## 2019-04-22 RX ORDER — CALCIUM CARBONATE 500(1250)
1 TABLET ORAL DAILY
Qty: 0 | Refills: 0 | Status: DISCONTINUED | OUTPATIENT
Start: 2019-04-22 | End: 2019-05-10

## 2019-04-22 RX ORDER — SACCHAROMYCES BOULARDII 250 MG
250 POWDER IN PACKET (EA) ORAL ONCE
Qty: 0 | Refills: 0 | Status: COMPLETED | OUTPATIENT
Start: 2019-04-22 | End: 2019-04-22

## 2019-04-22 RX ORDER — ACETAMINOPHEN 500 MG
500 TABLET ORAL EVERY 4 HOURS
Qty: 0 | Refills: 0 | Status: DISCONTINUED | OUTPATIENT
Start: 2019-04-22 | End: 2019-04-23

## 2019-04-22 RX ORDER — CHOLECALCIFEROL (VITAMIN D3) 125 MCG
2000 CAPSULE ORAL DAILY
Qty: 0 | Refills: 0 | Status: DISCONTINUED | OUTPATIENT
Start: 2019-04-22 | End: 2019-05-10

## 2019-04-22 RX ORDER — VANCOMYCIN HCL 1 G
1000 VIAL (EA) INTRAVENOUS ONCE
Qty: 0 | Refills: 0 | Status: COMPLETED | OUTPATIENT
Start: 2019-04-22 | End: 2019-04-22

## 2019-04-22 RX ORDER — ACETAMINOPHEN 500 MG
650 TABLET ORAL ONCE
Qty: 0 | Refills: 0 | Status: COMPLETED | OUTPATIENT
Start: 2019-04-22 | End: 2019-04-22

## 2019-04-22 RX ORDER — SODIUM CHLORIDE 9 MG/ML
2000 INJECTION INTRAMUSCULAR; INTRAVENOUS; SUBCUTANEOUS
Qty: 0 | Refills: 0 | Status: DISCONTINUED | OUTPATIENT
Start: 2019-04-22 | End: 2019-04-23

## 2019-04-22 RX ORDER — DEXTROSE 50 % IN WATER 50 %
12.5 SYRINGE (ML) INTRAVENOUS ONCE
Qty: 0 | Refills: 0 | Status: DISCONTINUED | OUTPATIENT
Start: 2019-04-22 | End: 2019-04-28

## 2019-04-22 RX ORDER — PANTOPRAZOLE SODIUM 20 MG/1
40 TABLET, DELAYED RELEASE ORAL ONCE
Qty: 0 | Refills: 0 | Status: COMPLETED | OUTPATIENT
Start: 2019-04-22 | End: 2019-04-22

## 2019-04-22 RX ORDER — PANTOPRAZOLE SODIUM 20 MG/1
8 TABLET, DELAYED RELEASE ORAL
Qty: 80 | Refills: 0 | Status: DISCONTINUED | OUTPATIENT
Start: 2019-04-22 | End: 2019-04-23

## 2019-04-22 RX ADMIN — CEFEPIME 2000 MILLIGRAM(S): 1 INJECTION, POWDER, FOR SOLUTION INTRAMUSCULAR; INTRAVENOUS at 20:10

## 2019-04-22 RX ADMIN — SODIUM CHLORIDE 1850 MILLILITER(S): 9 INJECTION INTRAMUSCULAR; INTRAVENOUS; SUBCUTANEOUS at 17:18

## 2019-04-22 RX ADMIN — Medication 650 MILLIGRAM(S): at 17:18

## 2019-04-22 RX ADMIN — SODIUM CHLORIDE 1850 MILLILITER(S): 9 INJECTION INTRAMUSCULAR; INTRAVENOUS; SUBCUTANEOUS at 18:49

## 2019-04-22 RX ADMIN — SODIUM CHLORIDE 100 MILLILITER(S): 9 INJECTION INTRAMUSCULAR; INTRAVENOUS; SUBCUTANEOUS at 23:29

## 2019-04-22 RX ADMIN — Medication 250 MILLIGRAM(S): at 17:44

## 2019-04-22 RX ADMIN — Medication 1000 MILLIGRAM(S): at 18:45

## 2019-04-22 RX ADMIN — Medication 650 MILLIGRAM(S): at 18:15

## 2019-04-22 RX ADMIN — SODIUM CHLORIDE 1000 MILLILITER(S): 9 INJECTION INTRAMUSCULAR; INTRAVENOUS; SUBCUTANEOUS at 22:45

## 2019-04-22 RX ADMIN — CEFEPIME 100 MILLIGRAM(S): 1 INJECTION, POWDER, FOR SOLUTION INTRAMUSCULAR; INTRAVENOUS at 19:40

## 2019-04-22 RX ADMIN — SODIUM CHLORIDE 1000 MILLILITER(S): 9 INJECTION INTRAMUSCULAR; INTRAVENOUS; SUBCUTANEOUS at 21:47

## 2019-04-22 RX ADMIN — PANTOPRAZOLE SODIUM 40 MILLIGRAM(S): 20 TABLET, DELAYED RELEASE ORAL at 17:44

## 2019-04-22 RX ADMIN — PANTOPRAZOLE SODIUM 10 MG/HR: 20 TABLET, DELAYED RELEASE ORAL at 17:44

## 2019-04-22 NOTE — ED ADULT NURSE REASSESSMENT NOTE - NS ED NURSE REASSESS COMMENT FT1
pt's brief changed, hygiene provided, area of blanchable redness noted upon cleaning, vinay cream applied. Cardaic monitoring remains in place, siderails remain up, awaiting report to be given to SD unit.

## 2019-04-22 NOTE — H&P ADULT - NSHPPHYSICALEXAM_GEN_ALL_CORE
Vital Signs Last 24 Hrs  T(C): 38.7 (22 Apr 2019 18:00), Max: 39.3 (22 Apr 2019 17:04)  T(F): 101.7 (22 Apr 2019 18:00), Max: 102.8 (22 Apr 2019 17:04)  HR: 83 (22 Apr 2019 21:30) (74 - 122)  BP: 100/61 (22 Apr 2019 21:30) (82/63 - 111/74)  BP(mean): --  RR: 27 (22 Apr 2019 21:30) (24 - 35)  SpO2: 99% (22 Apr 2019 21:30) (94% - 100%)

## 2019-04-22 NOTE — ED PROVIDER NOTE - CARDIAC, MLM
tachycardia, regular rhythm.  Heart sounds S1, S2.  No murmurs, rubs or gallops. tachycardia, regular rhythm.  Heart sounds S1, S2.  No murmurs, rubs or gallops. no LE edema

## 2019-04-22 NOTE — H&P ADULT - ATTENDING COMMENTS
Patient seen and examined with resident physician Randell Tellez. I personally had a face-to-face encounter with the patient, examined the patient myself and reviewed the plan of care with the patient and Resident Randell Tellez. I agree with the assessment and plan of Resident Randell Tellez as stated and discussed.    This is a 78 y.o. male with PMH anemia, COOPD, dementia, DM2, hx DVT on AC, OA, PVD presents with hematemeiss.    #severe sepsis due to UTI, pyelitis, PNA  -admit to hospitalist service  -cont vanco, cefepime  -f/u cultures  -iv hydration  -ID consult    #GI bleed, hematemesis  #anemia  -iv PPI drip  -NPO except med  -GI consult  -serial H/H    #MIGUEL  -iv fluid    #hx DVT on AC, AFib  -hold coumadin  -daily INR check    #DM2  -fingerstick monitoring and ISS

## 2019-04-22 NOTE — ED ADULT NURSE REASSESSMENT NOTE - NS ED NURSE REASSESS COMMENT FT1
BL 22 g PIVs placed to hands. unable to draw blood samples. phlebotomy and IV team (for 20g placement for CT) at bedside now.

## 2019-04-22 NOTE — ED ADULT NURSE NOTE - OBJECTIVE STATEMENT
[facility] fe[facility]. pt tachycardic to 120's, tachypneic to 40's, 96% on NRB. rectal temp 102.8.

## 2019-04-22 NOTE — ED PROVIDER NOTE - OBJECTIVE STATEMENT
Pt is a 77 y/o M, with PMHx of anemia, COPD, dementia, DM, HVT, HTN, OA, PVD, BIBEMS from [facility] for coffee ground emesis. Reportedly began having sxs today and sent to the ED for eval. Pt found to be febrile in the ED to 103F rectal. Unable to obtain further hx or ROS from patient given hx of dementia and pt condition.

## 2019-04-22 NOTE — ED PROVIDER NOTE - CLINICAL SUMMARY MEDICAL DECISION MAKING FREE TEXT BOX
Pt with hx of dementia, here with occult fecal vomiting, appears coffee ground. Labs, xray, saline, abx, CT scan. Pt with hx of dementia, here with occult fecal vomiting, appears coffee ground. Labs, xray, saline, abx, CT scan. Also with hem positive stools. Will start Protonix drip and rule out obstruction.

## 2019-04-22 NOTE — ED ADULT NURSE REASSESSMENT NOTE - NS ED NURSE REASSESS COMMENT FT1
pt oxygenating well on NRB, trialed on venti @ 8L 40% O2. maintaining O2 at 98% on venti. incontinence care provided. pt turned and repositioned in bed. pending CT.

## 2019-04-22 NOTE — H&P ADULT - HISTORY OF PRESENT ILLNESS
78 year old male patient with pertinent history of Afib on coumadin, Dementia and anemia  presents to ED from [facility] for coffee ground emesis and tmax of 100.8.  Unable to illicit any history of present illness or Review of systems from patient secondary to baseline mental status.    In the ED patient was found to be febrile, tachycardic, tachypneic and had a positive guaiac. Patient hydrated per sepsis protocol and received IV vanco and cefepime.

## 2019-04-22 NOTE — ED PROVIDER NOTE - CARE PLAN
Principal Discharge DX:	UGI bleed  Secondary Diagnosis:	Sepsis, due to unspecified organism  Secondary Diagnosis:	UTI (urinary tract infection)

## 2019-04-22 NOTE — H&P ADULT - NSICDXPASTMEDICALHX_GEN_ALL_CORE_FT
PAST MEDICAL HISTORY:  Anemia     COPD (chronic obstructive pulmonary disease)     Dementia     DM (diabetes mellitus)     DVT (deep venous thrombosis)     HTN (hypertension)     Osteoarthritis     Osteomyelitis of left leg     PVD (peripheral vascular disease)

## 2019-04-22 NOTE — H&P ADULT - ASSESSMENT
78 year old male with sepsis and superimposed GI bleed    -admit to Monitored unit      #Sepsis  -UA noted for bacteria, blood, LE and Nitrite  -CT abd/pelvis noted for pyelitis and bilateral patchy infiltrates  -s/p IV bolus in ED  -s/p vanc and cefepime  -will continue vanco and cefepime  -ID consult    #GI Bleed  - coffee ground emesis noted at NH and positive guaiac in ED  - NPO  -Protonix infusion  -2 large bore IV access  -type and cross  -GI consult    #Anemia  -normocytic subtype  -positive GI bleed  -f/u cbc  -GI to evaluate pt    #MIGUEL  -likely pre-renal  -f/u morning BMP after hydration    #Supratherapeutic INR  -INR 4.2  -Hold Coumadin  -no active bleeding  -consider reversing if pt has active bleeding    #Atrial Fibrillation   -CHADS2 Vasc score of 5  -on coumadin but holding in setting of elevated INR  -rate controlled    #Hypernatremia  -IVF    #HTN  -stable    #COPD  -stable  -duoneb prn    #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 Will sign out to day team to contact DWAYNEK 78 year old male with sepsis and superimposed GI bleed    -admit to Monitored unit      #Sepsis  -UA noted for bacteria, blood, LE and Nitrite  -CT abd/pelvis noted for pyelitis and bilateral patchy infiltrates  -s/p IV bolus in ED  -s/p vanc and cefepime  -will continue vanco and cefepime  -ID consult    #GI Bleed  - coffee ground emesis noted at NH and positive guaiac in ED  - NPO  -Protonix infusion  -2 large bore IV access  -type and cross  -GI consult    #Anemia  -normocytic subtype  -positive GI bleed  -f/u cbc  -GI to evaluate pt    #MIGUEL  -likely pre-renal  -f/u morning BMP after hydration    #Supratherapeutic INR  -INR 4.2  -Hold Coumadin  -no active bleeding  -consider reversing if pt has active bleeding    #Atrial Fibrillation   -CHADS2 Vasc score of 5  -on coumadin but holding in setting of elevated INR  -rate controlled    #Hypernatremia  -IVF    #HTN  -stable    #COPD  -stable  -duoneb prn    #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 Will sign out to day team to contact NOK    #DVT PPX  -INR currently supratherapeutic   IMPROVE VTE Individual Risk Assessment    RISK                                                                Points    [  ] Previous VTE                                                  3    [  ] Thrombophilia                                               2    [  ] Lower limb paralysis                                      2        (unable to hold up >15 seconds)      [  ] Current Cancer                                              2         (within 6 months)    [  ] Immobilization > 24 hrs                                1    [  ] ICU/CCU stay > 24 hours                              1    [  ] Age > 60                                                      1    IMPROVE VTE Score _____2____    IMPROVE Score 0-1: Low Risk, No VTE prophylaxis required for most patients, encourage ambulation.   IMPROVE Score 2-3: At risk, pharmacologic VTE prophylaxis is indicated for most patients (in the absence of a contraindication)  IMPROVE Score > or = 4: High Risk, pharmacologic VTE prophylaxis is indicated for most patients (in the absence of a contraindication)

## 2019-04-23 PROBLEM — M86.9 OSTEOMYELITIS, UNSPECIFIED: Chronic | Status: ACTIVE | Noted: 2017-09-06

## 2019-04-23 PROBLEM — M19.90 UNSPECIFIED OSTEOARTHRITIS, UNSPECIFIED SITE: Chronic | Status: ACTIVE | Noted: 2017-09-06

## 2019-04-23 PROBLEM — I82.409 ACUTE EMBOLISM AND THROMBOSIS OF UNSPECIFIED DEEP VEINS OF UNSPECIFIED LOWER EXTREMITY: Chronic | Status: ACTIVE | Noted: 2017-09-06

## 2019-04-23 PROBLEM — E11.9 TYPE 2 DIABETES MELLITUS WITHOUT COMPLICATIONS: Chronic | Status: ACTIVE | Noted: 2017-09-06

## 2019-04-23 PROBLEM — I73.9 PERIPHERAL VASCULAR DISEASE, UNSPECIFIED: Chronic | Status: ACTIVE | Noted: 2017-09-06

## 2019-04-23 PROBLEM — J44.9 CHRONIC OBSTRUCTIVE PULMONARY DISEASE, UNSPECIFIED: Chronic | Status: ACTIVE | Noted: 2017-09-06

## 2019-04-23 PROBLEM — D64.9 ANEMIA, UNSPECIFIED: Chronic | Status: ACTIVE | Noted: 2017-09-06

## 2019-04-23 PROBLEM — F03.90 UNSPECIFIED DEMENTIA WITHOUT BEHAVIORAL DISTURBANCE: Chronic | Status: ACTIVE | Noted: 2017-09-06

## 2019-04-23 PROBLEM — I10 ESSENTIAL (PRIMARY) HYPERTENSION: Chronic | Status: ACTIVE | Noted: 2017-09-06

## 2019-04-23 LAB
ALBUMIN SERPL ELPH-MCNC: 2 G/DL — LOW (ref 3.3–5)
ALP SERPL-CCNC: 97 U/L — SIGNIFICANT CHANGE UP (ref 40–120)
ALT FLD-CCNC: 23 U/L — SIGNIFICANT CHANGE UP (ref 12–78)
ANION GAP SERPL CALC-SCNC: 8 MMOL/L — SIGNIFICANT CHANGE UP (ref 5–17)
APTT BLD: 36.6 SEC — HIGH (ref 27.5–36.3)
AST SERPL-CCNC: 17 U/L — SIGNIFICANT CHANGE UP (ref 15–37)
BASOPHILS # BLD AUTO: 0.05 K/UL — SIGNIFICANT CHANGE UP (ref 0–0.2)
BASOPHILS NFR BLD AUTO: 0.5 % — SIGNIFICANT CHANGE UP (ref 0–2)
BILIRUB SERPL-MCNC: 0.7 MG/DL — SIGNIFICANT CHANGE UP (ref 0.2–1.2)
BLD GP AB SCN SERPL QL: SIGNIFICANT CHANGE UP
BUN SERPL-MCNC: 32 MG/DL — HIGH (ref 7–23)
CALCIUM SERPL-MCNC: 7.6 MG/DL — LOW (ref 8.5–10.1)
CHLORIDE SERPL-SCNC: 129 MMOL/L — HIGH (ref 96–108)
CO2 SERPL-SCNC: 22 MMOL/L — SIGNIFICANT CHANGE UP (ref 22–31)
CREAT SERPL-MCNC: 1.35 MG/DL — HIGH (ref 0.5–1.3)
EOSINOPHIL # BLD AUTO: 0.05 K/UL — SIGNIFICANT CHANGE UP (ref 0–0.5)
EOSINOPHIL NFR BLD AUTO: 0.5 % — SIGNIFICANT CHANGE UP (ref 0–6)
GLUCOSE BLDC GLUCOMTR-MCNC: 84 MG/DL — SIGNIFICANT CHANGE UP (ref 70–99)
GLUCOSE BLDC GLUCOMTR-MCNC: 91 MG/DL — SIGNIFICANT CHANGE UP (ref 70–99)
GLUCOSE BLDC GLUCOMTR-MCNC: 96 MG/DL — SIGNIFICANT CHANGE UP (ref 70–99)
GLUCOSE BLDC GLUCOMTR-MCNC: 96 MG/DL — SIGNIFICANT CHANGE UP (ref 70–99)
GLUCOSE SERPL-MCNC: 103 MG/DL — HIGH (ref 70–99)
HBA1C BLD-MCNC: 5.8 % — HIGH (ref 4–5.6)
HCT VFR BLD CALC: 35.2 % — LOW (ref 39–50)
HCT VFR BLD CALC: 35.2 % — LOW (ref 39–50)
HCT VFR BLD CALC: 38.8 % — LOW (ref 39–50)
HGB BLD-MCNC: 10.2 G/DL — LOW (ref 13–17)
HGB BLD-MCNC: 10.3 G/DL — LOW (ref 13–17)
HGB BLD-MCNC: 11 G/DL — LOW (ref 13–17)
IMM GRANULOCYTES NFR BLD AUTO: 0.3 % — SIGNIFICANT CHANGE UP (ref 0–1.5)
INR BLD: 3.14 RATIO — HIGH (ref 0.88–1.16)
LACTATE SERPL-SCNC: 2.1 MMOL/L — HIGH (ref 0.7–2)
LACTATE SERPL-SCNC: 7 MMOL/L — CRITICAL HIGH (ref 0.7–2)
LYMPHOCYTES # BLD AUTO: 1.95 K/UL — SIGNIFICANT CHANGE UP (ref 1–3.3)
LYMPHOCYTES # BLD AUTO: 18.6 % — SIGNIFICANT CHANGE UP (ref 13–44)
MANUAL SMEAR VERIFICATION: SIGNIFICANT CHANGE UP
MCHC RBC-ENTMCNC: 23.5 PG — LOW (ref 27–34)
MCHC RBC-ENTMCNC: 23.6 PG — LOW (ref 27–34)
MCHC RBC-ENTMCNC: 23.6 PG — LOW (ref 27–34)
MCHC RBC-ENTMCNC: 28.4 GM/DL — LOW (ref 32–36)
MCHC RBC-ENTMCNC: 29 GM/DL — LOW (ref 32–36)
MCHC RBC-ENTMCNC: 29.3 GM/DL — LOW (ref 32–36)
MCV RBC AUTO: 80.2 FL — SIGNIFICANT CHANGE UP (ref 80–100)
MCV RBC AUTO: 81.5 FL — SIGNIFICANT CHANGE UP (ref 80–100)
MCV RBC AUTO: 83.3 FL — SIGNIFICANT CHANGE UP (ref 80–100)
MONOCYTES # BLD AUTO: 0.59 K/UL — SIGNIFICANT CHANGE UP (ref 0–0.9)
MONOCYTES NFR BLD AUTO: 5.6 % — SIGNIFICANT CHANGE UP (ref 2–14)
NEUTROPHILS # BLD AUTO: 7.83 K/UL — HIGH (ref 1.8–7.4)
NEUTROPHILS NFR BLD AUTO: 74.5 % — SIGNIFICANT CHANGE UP (ref 43–77)
NRBC # BLD: 0 /100 WBCS — SIGNIFICANT CHANGE UP (ref 0–0)
NRBC # BLD: 0 /100 WBCS — SIGNIFICANT CHANGE UP (ref 0–0)
PLAT MORPH BLD: NORMAL — SIGNIFICANT CHANGE UP
PLATELET # BLD AUTO: 157 K/UL — SIGNIFICANT CHANGE UP (ref 150–400)
PLATELET # BLD AUTO: 163 K/UL — SIGNIFICANT CHANGE UP (ref 150–400)
PLATELET # BLD AUTO: 180 K/UL — SIGNIFICANT CHANGE UP (ref 150–400)
POTASSIUM SERPL-MCNC: 4.6 MMOL/L — SIGNIFICANT CHANGE UP (ref 3.5–5.3)
POTASSIUM SERPL-SCNC: 4.6 MMOL/L — SIGNIFICANT CHANGE UP (ref 3.5–5.3)
PROCALCITONIN SERPL-MCNC: 1.26 NG/ML — HIGH (ref 0.02–0.1)
PROT SERPL-MCNC: 6.1 GM/DL — SIGNIFICANT CHANGE UP (ref 6–8.3)
PROTHROM AB SERPL-ACNC: 36.1 SEC — HIGH (ref 10–12.9)
RBC # BLD: 4.32 M/UL — SIGNIFICANT CHANGE UP (ref 4.2–5.8)
RBC # BLD: 4.39 M/UL — SIGNIFICANT CHANGE UP (ref 4.2–5.8)
RBC # BLD: 4.66 M/UL — SIGNIFICANT CHANGE UP (ref 4.2–5.8)
RBC # FLD: 21.2 % — HIGH (ref 10.3–14.5)
RBC # FLD: 21.3 % — HIGH (ref 10.3–14.5)
RBC # FLD: 21.6 % — HIGH (ref 10.3–14.5)
RBC BLD AUTO: SIGNIFICANT CHANGE UP
SODIUM SERPL-SCNC: 159 MMOL/L — HIGH (ref 135–145)
TYPE + AB SCN PNL BLD: SIGNIFICANT CHANGE UP
WBC # BLD: 10.5 K/UL — SIGNIFICANT CHANGE UP (ref 3.8–10.5)
WBC # BLD: 11.2 K/UL — HIGH (ref 3.8–10.5)
WBC # BLD: 8.59 K/UL — SIGNIFICANT CHANGE UP (ref 3.8–10.5)
WBC # FLD AUTO: 10.5 K/UL — SIGNIFICANT CHANGE UP (ref 3.8–10.5)
WBC # FLD AUTO: 11.2 K/UL — HIGH (ref 3.8–10.5)
WBC # FLD AUTO: 8.59 K/UL — SIGNIFICANT CHANGE UP (ref 3.8–10.5)

## 2019-04-23 RX ORDER — POLYETHYLENE GLYCOL 3350 17 G/17G
17 POWDER, FOR SOLUTION ORAL
Qty: 0 | Refills: 0 | COMMUNITY

## 2019-04-23 RX ORDER — ACETAMINOPHEN 500 MG
650 TABLET ORAL EVERY 6 HOURS
Qty: 0 | Refills: 0 | Status: DISCONTINUED | OUTPATIENT
Start: 2019-04-23 | End: 2019-04-26

## 2019-04-23 RX ORDER — PHENYLEPHRINE HYDROCHLORIDE 10 MG/ML
1 INJECTION INTRAVENOUS
Qty: 40 | Refills: 0 | Status: DISCONTINUED | OUTPATIENT
Start: 2019-04-23 | End: 2019-04-23

## 2019-04-23 RX ORDER — INSULIN LISPRO 100/ML
VIAL (ML) SUBCUTANEOUS EVERY 6 HOURS
Qty: 0 | Refills: 0 | Status: DISCONTINUED | OUTPATIENT
Start: 2019-04-23 | End: 2019-04-28

## 2019-04-23 RX ORDER — CALCIUM CARBONATE 500(1250)
5 TABLET ORAL
Qty: 0 | Refills: 0 | COMMUNITY

## 2019-04-23 RX ORDER — OMEPRAZOLE 10 MG/1
1 CAPSULE, DELAYED RELEASE ORAL
Qty: 0 | Refills: 0 | COMMUNITY

## 2019-04-23 RX ORDER — FERROUS SULFATE 325(65) MG
5 TABLET ORAL
Qty: 0 | Refills: 0 | COMMUNITY

## 2019-04-23 RX ORDER — VANCOMYCIN HCL 1 G
750 VIAL (EA) INTRAVENOUS EVERY 12 HOURS
Qty: 0 | Refills: 0 | Status: COMPLETED | OUTPATIENT
Start: 2019-04-23 | End: 2019-04-29

## 2019-04-23 RX ORDER — SENNOSIDES/DOCUSATE SODIUM 8.6MG-50MG
2 TABLET ORAL
Qty: 0 | Refills: 0 | COMMUNITY

## 2019-04-23 RX ORDER — PIPERACILLIN AND TAZOBACTAM 4; .5 G/20ML; G/20ML
3.38 INJECTION, POWDER, LYOPHILIZED, FOR SOLUTION INTRAVENOUS EVERY 8 HOURS
Qty: 0 | Refills: 0 | Status: DISCONTINUED | OUTPATIENT
Start: 2019-04-23 | End: 2019-04-24

## 2019-04-23 RX ORDER — SACCHAROMYCES BOULARDII 250 MG
1 POWDER IN PACKET (EA) ORAL
Qty: 0 | Refills: 0 | COMMUNITY

## 2019-04-23 RX ORDER — CHOLECALCIFEROL (VITAMIN D3) 125 MCG
1 CAPSULE ORAL
Qty: 0 | Refills: 0 | COMMUNITY

## 2019-04-23 RX ORDER — SODIUM CHLORIDE 9 MG/ML
1000 INJECTION, SOLUTION INTRAVENOUS
Qty: 0 | Refills: 0 | Status: DISCONTINUED | OUTPATIENT
Start: 2019-04-23 | End: 2019-04-23

## 2019-04-23 RX ORDER — SODIUM CHLORIDE 9 MG/ML
1000 INJECTION, SOLUTION INTRAVENOUS
Qty: 0 | Refills: 0 | Status: DISCONTINUED | OUTPATIENT
Start: 2019-04-23 | End: 2019-04-25

## 2019-04-23 RX ORDER — PHYTONADIONE (VIT K1) 5 MG
10 TABLET ORAL ONCE
Qty: 0 | Refills: 0 | Status: COMPLETED | OUTPATIENT
Start: 2019-04-23 | End: 2019-04-23

## 2019-04-23 RX ORDER — ZINC OXIDE 200 MG/G
1 OINTMENT TOPICAL
Qty: 0 | Refills: 0 | COMMUNITY

## 2019-04-23 RX ORDER — PANTOPRAZOLE SODIUM 20 MG/1
40 TABLET, DELAYED RELEASE ORAL
Qty: 0 | Refills: 0 | Status: DISCONTINUED | OUTPATIENT
Start: 2019-04-23 | End: 2019-05-08

## 2019-04-23 RX ORDER — PHENYLEPHRINE HYDROCHLORIDE 10 MG/ML
0.5 INJECTION INTRAVENOUS
Qty: 40 | Refills: 0 | Status: DISCONTINUED | OUTPATIENT
Start: 2019-04-23 | End: 2019-04-27

## 2019-04-23 RX ORDER — SODIUM CHLORIDE 9 MG/ML
1000 INJECTION INTRAMUSCULAR; INTRAVENOUS; SUBCUTANEOUS ONCE
Qty: 0 | Refills: 0 | Status: COMPLETED | OUTPATIENT
Start: 2019-04-23 | End: 2019-04-23

## 2019-04-23 RX ORDER — SODIUM CHLORIDE 9 MG/ML
500 INJECTION INTRAMUSCULAR; INTRAVENOUS; SUBCUTANEOUS ONCE
Qty: 0 | Refills: 0 | Status: COMPLETED | OUTPATIENT
Start: 2019-04-23 | End: 2019-04-23

## 2019-04-23 RX ORDER — SODIUM HYPOCHLORITE 0.125 %
1 SOLUTION, NON-ORAL MISCELLANEOUS
Qty: 0 | Refills: 0 | COMMUNITY

## 2019-04-23 RX ORDER — SODIUM CHLORIDE 9 MG/ML
1000 INJECTION, SOLUTION INTRAVENOUS ONCE
Qty: 0 | Refills: 0 | Status: COMPLETED | OUTPATIENT
Start: 2019-04-23 | End: 2019-04-23

## 2019-04-23 RX ADMIN — PHENYLEPHRINE HYDROCHLORIDE 22.12 MICROGRAM(S)/KG/MIN: 10 INJECTION INTRAVENOUS at 06:56

## 2019-04-23 RX ADMIN — CEFEPIME 1000 MILLIGRAM(S): 1 INJECTION, POWDER, FOR SOLUTION INTRAMUSCULAR; INTRAVENOUS at 05:38

## 2019-04-23 RX ADMIN — PIPERACILLIN AND TAZOBACTAM 25 GRAM(S): 4; .5 INJECTION, POWDER, LYOPHILIZED, FOR SOLUTION INTRAVENOUS at 23:25

## 2019-04-23 RX ADMIN — PIPERACILLIN AND TAZOBACTAM 25 GRAM(S): 4; .5 INJECTION, POWDER, LYOPHILIZED, FOR SOLUTION INTRAVENOUS at 13:16

## 2019-04-23 RX ADMIN — PANTOPRAZOLE SODIUM 40 MILLIGRAM(S): 20 TABLET, DELAYED RELEASE ORAL at 17:01

## 2019-04-23 RX ADMIN — ZINC OXIDE 1 APPLICATION(S): 200 OINTMENT TOPICAL at 12:00

## 2019-04-23 RX ADMIN — SODIUM CHLORIDE 100 MILLILITER(S): 9 INJECTION, SOLUTION INTRAVENOUS at 06:32

## 2019-04-23 RX ADMIN — SODIUM CHLORIDE 1000 MILLILITER(S): 9 INJECTION, SOLUTION INTRAVENOUS at 06:16

## 2019-04-23 RX ADMIN — Medication 250 MILLIGRAM(S): at 17:01

## 2019-04-23 RX ADMIN — PIPERACILLIN AND TAZOBACTAM 25 GRAM(S): 4; .5 INJECTION, POWDER, LYOPHILIZED, FOR SOLUTION INTRAVENOUS at 07:41

## 2019-04-23 RX ADMIN — SODIUM CHLORIDE 1000 MILLILITER(S): 9 INJECTION INTRAMUSCULAR; INTRAVENOUS; SUBCUTANEOUS at 03:02

## 2019-04-23 RX ADMIN — Medication 1 APPLICATION(S): at 12:01

## 2019-04-23 RX ADMIN — PHENYLEPHRINE HYDROCHLORIDE 13.89 MICROGRAM(S)/KG/MIN: 10 INJECTION INTRAVENOUS at 17:01

## 2019-04-23 RX ADMIN — Medication 102 MILLIGRAM(S): at 06:31

## 2019-04-23 RX ADMIN — PANTOPRAZOLE SODIUM 10 MG/HR: 20 TABLET, DELAYED RELEASE ORAL at 05:40

## 2019-04-23 RX ADMIN — SODIUM CHLORIDE 1000 MILLILITER(S): 9 INJECTION INTRAMUSCULAR; INTRAVENOUS; SUBCUTANEOUS at 04:28

## 2019-04-23 RX ADMIN — SODIUM CHLORIDE 125 MILLILITER(S): 9 INJECTION, SOLUTION INTRAVENOUS at 11:00

## 2019-04-23 NOTE — PATIENT PROFILE ADULT - VISION (WITH CORRECTIVE LENSES IF THE PATIENT USUALLY WEARS THEM):
unable to assess, pt confused/Partially impaired: cannot see medication labels or newsprint, but can see obstacles in path, and the surrounding layout; can count fingers at arm's length

## 2019-04-23 NOTE — SWALLOW BEDSIDE ASSESSMENT ADULT - PHARYNGEAL PHASE
Swallow trigger was timely to mildly latent and laryngeal lift on palpation during swallowing trials was prominently reduced. Post prandial moist coughing was demonstrated after intakes suspect for aspiration.

## 2019-04-23 NOTE — SWALLOW BEDSIDE ASSESSMENT ADULT - SWALLOW EVAL: DIAGNOSIS
1) The pt exhibits periodically reduced alertness for/orientation to feeding atop prominent Oropharyngeal Dysphagia with post prandial aspiration signs with pureed food which is the most conservative food texture. Above in setting of multifactorial acute medical deconditioning(i.e sepsis, PNA, UTI, GIB, colitis, hypernatremia, etc), underlying COPD and advanced known Dementia.  2)  The pt is arousable but fatigued, communicatively passive, internally distractible and restless fidgety. Joint attention was fleeting. Unable to direct to structured communication tasks but the pt occasionally moaned without clear intent or spontaneously produced single words/brief phrases that were contextually irrelevant to given situation.  Cognitive Dysfunction from Dementia is evident which is suspectedly heightened by metabolic encephalopathy features related to acute illness. 1) The pt exhibits periodically reduced alertness for/orientation to feeding atop prominent Oropharyngeal Dysphagia with post prandial aspiration signs with pureed food which is the most conservative food texture. Above in setting of multifactorial acute medical deconditioning(i.e sepsis, PNA, UTI, GIB, colitis, hypernatremia, etc), underlying COPD and advanced known Dementia.  2)  The pt is arousable but fatigued, communicatively passive, internally distractible and restless/fidgety. Joint attention was fleeting. Unable to direct to structured communication tasks but the pt occasionally moaned without clear intent or spontaneously produced single words/brief phrases that were contextually irrelevant to given situation.  Cognitive Dysfunction from Dementia is evident which is suspectedly heightened by metabolic encephalopathy features related to acute illness.

## 2019-04-23 NOTE — SWALLOW BEDSIDE ASSESSMENT ADULT - SLP GENERAL OBSERVATIONS
On encounter, the pt was deconditioned in appearance. Dyskinetic oral motor movements were noted at rest. He was somewhat congested.    The pt is arousable but fatigued, communicatively passive, internally distractible and restless fidgety. Joint attention was fleeting. Unable to direct to structured communication tasks but the pt occasionally moaned without clear intent or spontaneously produced single words/brief phrases that were contextually irrelevant to given situation.  Cognitive Dysfunction from Dementia is evident which is suspectedly heightened by metabolic encephalopathy features related to acute illness. On encounter, the pt was deconditioned in appearance. Dyskinetic oral motor movements were noted at rest. He was somewhat congested.    The pt is arousable but fatigued, communicatively passive, internally distractible and restless/fidgety. Joint attention was fleeting. Unable to direct to structured communication tasks but the pt occasionally moaned without clear intent or spontaneously produced single words/brief phrases that were contextually irrelevant to given situation.  Cognitive Dysfunction from Dementia is evident which is suspectedly heightened by metabolic encephalopathy features related to acute illness.

## 2019-04-23 NOTE — SWALLOW BEDSIDE ASSESSMENT ADULT - ASR SWALLOW DENTITION
Notable for presence of natural dentition with several missing teeth and some remaining teeth in compromised condition.

## 2019-04-23 NOTE — PATIENT PROFILE ADULT - FALL HARM RISK
coagulation(Bleeding disorder R/T clinical cond/anti-coags)/bones(Osteoporosis,prev fx,steroid use,metastatic bone ca)/other

## 2019-04-23 NOTE — SWALLOW BEDSIDE ASSESSMENT ADULT - SWALLOW EVAL: SECRETION MANAGEMENT
Limited probes reveal that his volitional cough is somewhat moist and produced with moderately decreased strength,

## 2019-04-23 NOTE — PROGRESS NOTE ADULT - SUBJECTIVE AND OBJECTIVE BOX
CC: Coffee ground emesis and fever    HPI:  77 y/o male w/ PMH significant for Afib (coumadin), Dementia, DM, COPD, DVT presented to ED from [facility] for coffee ground emesis and T. Max of 102.8. Unable to illicit any HPI or ROS 2/2  altered mental status. In the ED, patient found to have supra therapeutic INR 4.25, Lactate 2.1, MIGUEL, UA (+), RVP negative, CXR negative, Guaiac stool (+), CT abdomen with possible PNA, sterocoral colitis, non-obstructing R-sided renal stone and Right sided pyelitis and uteritis. Pt. given IV fluids per sepsis protocol, IV Vanco and cefepime. Pt. remained hypotensive and transferred to ICU for pressor support.     Interval HPI:   - Patient seen and examined. Repeat lactate 3.4-->7.0, worsening hypernatremia. Pressor support and Protonix gtt. continues      Review of Systems: Unable to obtain 2/2 altered mental status    PHYSICAL EXAM:  Vital Signs Last 24 Hrs  T(C): 36.1 (2019 06:00), Max: 39.3 (2019 17:04)  T(F): 97 (2019 06:00), Max: 102.8 (2019 17:04)  HR: 67 (2019 09:00) (63 - 122)  BP: 98/38 (2019 09:00) (63/36 - 117/56)  BP(mean): 57 (2019 09:00) (42 - 76)  RR: 27 (2019 09:00) (19 - 35)  SpO2: 93% (2019 09:00) (68% - 100%)    GENERAL: Comfortable   HEENOT: Atraumatic, Normocephalic, conjunctiva and sclera clear, moist mucous membranes  NECK: Supple, No JVD  NERVOUS SYSTEM:  Disoriented x4 unable to follow commands or answer questions appropriately  CHEST/LUNG: Clear to auscultation bilaterally; No rales, rhonchi, wheezing, or rubs  HEART: S1 S2 normal, no murmur  ABDOMEN: Soft, Nontender, Nondistended; Bowel sounds present  GENITOURINARY: Bautista in place   EXTREMITIES:  2+ Peripheral Pulses, No clubbing, cyanosis, or edema  SKIN: No rash, no lesion      LABS:                        11.0   10.50 )-----------( 157      ( 2019 04:53 )             38.8         159<H>  |  129<H>  |  32<H>  ----------------------------<  103<H>  4.6   |  22  |  1.35<H>    Ca    7.6<L>      2019 04:53    TPro  6.1  /  Alb  2.0<L>  /  TBili  0.7  /  DBili  x   /  AST  17  /  ALT  23  /  AlkPhos  97      PT/INR - ( 2019 04:53 )   PT: 36.1 sec;   INR: 3.14 ratio         PTT - ( 2019 04:53 )  PTT:36.6 sec  Urinalysis Basic - ( 2019 17:34 )    Color: Yellow / Appearance: very cloudy / S.020 / pH: x  Gluc: x / Ketone: Negative  / Bili: Negative / Urobili: Negative mg/dL   Blood: x / Protein: 15 mg/dL / Nitrite: Positive   Leuk Esterase: Moderate / RBC: 11-25 /HPF / WBC 26-50   Sq Epi: x / Non Sq Epi: Occasional / Bacteria: Many        CAPILLARY BLOOD GLUCOSE      POCT Blood Glucose.: 84 mg/dL (2019 08:03)    MEDICATIONS  (STANDING):  calcium carbonate    500 mG (Tums) Chewable 1 Tablet(s) Chew daily  cholecalciferol Oral Tab/Cap - Peds 2000 Unit(s) Oral daily  Dakins Solution - 1/2 Strength 1 Application(s) Topical daily  dextrose 5%. 1000 milliLiter(s) (125 mL/Hr) IV Continuous <Continuous>  dextrose 50% Injectable 12.5 Gram(s) IV Push once  dextrose 50% Injectable 25 Gram(s) IV Push once  dextrose 50% Injectable 25 Gram(s) IV Push once  doxycycline hyclate Capsule 100 milliGRAM(s) Oral every 12 hours  ferrous    sulfate Liquid 300 milliGRAM(s) Oral daily  insulin lispro (HumaLOG) corrective regimen sliding scale   SubCutaneous every 6 hours  pantoprazole  Injectable 40 milliGRAM(s) IV Push two times a day  phenylephrine    Infusion 0.5 MICROgram(s)/kG/Min (13.894 mL/Hr) IV Continuous <Continuous>  piperacillin/tazobactam IVPB. 3.375 Gram(s) IV Intermittent every 8 hours  polyethylene glycol 3350 17 Gram(s) Oral daily  saccharomyces boulardii 250 milliGRAM(s) Oral once  senna 2 Tablet(s) Oral at bedtime  zinc oxide 20% Ointment 1 Application(s) Topical daily    MEDICATIONS  (PRN):  acetaminophen   Tablet .. 500 milliGRAM(s) Oral every 4 hours PRN Temp greater or equal to 38C (100.4F)  dextrose 40% Gel 15 Gram(s) Oral once PRN Blood Glucose LESS THAN 70 milliGRAM(s)/deciliter  glucagon  Injectable 1 milliGRAM(s) IntraMuscular once PRN Glucose LESS THAN 70 milligrams/deciliter CC: Coffee ground emesis and fever    HPI:  77 y/o male w/ PMH significant for Afib (coumadin), Dementia, DM, DVT presented to ED from [facility] for coffee ground emesis and T. Max of 102.8. Unable to illicit any HPI or ROS 2/2  altered mental status. In the ED, patient found to have supra therapeutic INR 4.25, Lactate 2.1, MIGUEL, UA (+), RVP negative, CXR negative, Guaiac stool (+), CT abdomen with possible PNA, sterocoral colitis, non-obstructing R-sided renal stone and Right sided pyelitis and uteritis. Pt. given IV fluids per sepsis protocol, IV Vanco and cefepime. Pt. remained hypotensive and transferred to ICU for pressor support.     Interval HPI:   - Patient seen and examined. Repeat lactate 3.4-->7.0, worsening hypernatremia. Pressor support and Protonix gtt. continues      Review of Systems: Unable to obtain 2/2 altered mental status    PHYSICAL EXAM:  Vital Signs Last 24 Hrs  T(C): 36.1 (2019 06:00), Max: 39.3 (2019 17:04)  T(F): 97 (2019 06:00), Max: 102.8 (2019 17:04)  HR: 67 (2019 09:00) (63 - 122)  BP: 98/38 (2019 09:00) (63/36 - 117/56)  BP(mean): 57 (2019 09:00) (42 - 76)  RR: 27 (2019 09:00) (19 - 35)  SpO2: 93% (2019 09:00) (68% - 100%)    GENERAL: Comfortable   HEENOT: Atraumatic, Normocephalic, conjunctiva and sclera clear, moist mucous membranes  NECK: Supple, No JVD  NERVOUS SYSTEM:  Disoriented x4 unable to follow commands or answer questions appropriately  CHEST/LUNG: Clear to auscultation bilaterally; No rales, rhonchi, wheezing, or rubs  HEART: S1 S2 normal, no murmur  ABDOMEN: Soft, Nontender, Nondistended; Bowel sounds present  GENITOURINARY: Bautista in place   EXTREMITIES:  2+ Peripheral Pulses, No clubbing, cyanosis, or edema  SKIN: No rash, no lesion      LABS:                        11.0   10.50 )-----------( 157      ( 2019 04:53 )             38.8         159<H>  |  129<H>  |  32<H>  ----------------------------<  103<H>  4.6   |  22  |  1.35<H>    Ca    7.6<L>      2019 04:53    TPro  6.1  /  Alb  2.0<L>  /  TBili  0.7  /  DBili  x   /  AST  17  /  ALT  23  /  AlkPhos  97      PT/INR - ( 2019 04:53 )   PT: 36.1 sec;   INR: 3.14 ratio         PTT - ( 2019 04:53 )  PTT:36.6 sec  Urinalysis Basic - ( 2019 17:34 )    Color: Yellow / Appearance: very cloudy / S.020 / pH: x  Gluc: x / Ketone: Negative  / Bili: Negative / Urobili: Negative mg/dL   Blood: x / Protein: 15 mg/dL / Nitrite: Positive   Leuk Esterase: Moderate / RBC: 11-25 /HPF / WBC 26-50   Sq Epi: x / Non Sq Epi: Occasional / Bacteria: Many        CAPILLARY BLOOD GLUCOSE      POCT Blood Glucose.: 84 mg/dL (2019 08:03)    MEDICATIONS  (STANDING):  calcium carbonate    500 mG (Tums) Chewable 1 Tablet(s) Chew daily  cholecalciferol Oral Tab/Cap - Peds 2000 Unit(s) Oral daily  Dakins Solution - 1/2 Strength 1 Application(s) Topical daily  dextrose 5%. 1000 milliLiter(s) (125 mL/Hr) IV Continuous <Continuous>  dextrose 50% Injectable 12.5 Gram(s) IV Push once  dextrose 50% Injectable 25 Gram(s) IV Push once  dextrose 50% Injectable 25 Gram(s) IV Push once  doxycycline hyclate Capsule 100 milliGRAM(s) Oral every 12 hours  ferrous    sulfate Liquid 300 milliGRAM(s) Oral daily  insulin lispro (HumaLOG) corrective regimen sliding scale   SubCutaneous every 6 hours  pantoprazole  Injectable 40 milliGRAM(s) IV Push two times a day  phenylephrine    Infusion 0.5 MICROgram(s)/kG/Min (13.894 mL/Hr) IV Continuous <Continuous>  piperacillin/tazobactam IVPB. 3.375 Gram(s) IV Intermittent every 8 hours  polyethylene glycol 3350 17 Gram(s) Oral daily  saccharomyces boulardii 250 milliGRAM(s) Oral once  senna 2 Tablet(s) Oral at bedtime  zinc oxide 20% Ointment 1 Application(s) Topical daily    MEDICATIONS  (PRN):  acetaminophen   Tablet .. 500 milliGRAM(s) Oral every 4 hours PRN Temp greater or equal to 38C (100.4F)  dextrose 40% Gel 15 Gram(s) Oral once PRN Blood Glucose LESS THAN 70 milliGRAM(s)/deciliter  glucagon  Injectable 1 milliGRAM(s) IntraMuscular once PRN Glucose LESS THAN 70 milligrams/deciliter

## 2019-04-23 NOTE — PROGRESS NOTE ADULT - SUBJECTIVE AND OBJECTIVE BOX
CC:  Sepsis    HPI:    79 y/o male with advance dementia and AF on coumadin admitted on  to SDU for possible GIB and UTI sepsis--found to have R non obst renal stone and pyelo/possible Aspiration PNA and colitis.  He received 3L IVF while in SD--remain hypotensive--Tx to ICU for pressors.    :  ICU D # 1.  Pt seen and examined in ICU--lethargic--arousable--On [place] 1.  PPI gtt.  .  Na 159.  INR 3.1  Hb 11.        PMH:  As above.     PSH:  As above.     FH: Non Contributory other than those listed in HPI    Social History:      MEDICATIONS  (STANDING):  calcium carbonate    500 mG (Tums) Chewable 1 Tablet(s) Chew daily  cholecalciferol Oral Tab/Cap - Peds 2000 Unit(s) Oral daily  Dakins Solution - 1/2 Strength 1 Application(s) Topical daily  dextrose 5%. 1000 milliLiter(s) (125 mL/Hr) IV Continuous <Continuous>  dextrose 50% Injectable 12.5 Gram(s) IV Push once  dextrose 50% Injectable 25 Gram(s) IV Push once  dextrose 50% Injectable 25 Gram(s) IV Push once  doxycycline hyclate Capsule 100 milliGRAM(s) Oral every 12 hours  ferrous    sulfate Liquid 300 milliGRAM(s) Oral daily  insulin lispro (HumaLOG) corrective regimen sliding scale   SubCutaneous three times a day before meals  pantoprazole Infusion 8 mG/Hr (10 mL/Hr) IV Continuous <Continuous>  phenylephrine    Infusion 1 MICROgram(s)/kG/Min (22.125 mL/Hr) IV Continuous <Continuous>  piperacillin/tazobactam IVPB. 3.375 Gram(s) IV Intermittent every 8 hours  polyethylene glycol 3350 17 Gram(s) Oral daily  saccharomyces boulardii 250 milliGRAM(s) Oral once  senna 2 Tablet(s) Oral at bedtime  zinc oxide 20% Ointment 1 Application(s) Topical daily    MEDICATIONS  (PRN):  acetaminophen   Tablet .. 500 milliGRAM(s) Oral every 4 hours PRN Temp greater or equal to 38C (100.4F)  dextrose 40% Gel 15 Gram(s) Oral once PRN Blood Glucose LESS THAN 70 milliGRAM(s)/deciliter  glucagon  Injectable 1 milliGRAM(s) IntraMuscular once PRN Glucose LESS THAN 70 milligrams/deciliter      Allergies: NKDA    ROS:  SEE BELOW    Height (cm): 172.72 ( @ 16:25)  Weight (kg): 74.1 ( @ 06:00)  BMI (kg/m2): 24.8 ( @ 06:00)    ICU Vital Signs Last 24 Hrs  T(C): 36.1 (2019 06:00), Max: 39.3 (2019 17:04)  T(F): 97 (2019 06:00), Max: 102.8 (2019 17:04)  HR: 63 (2019 07:15) (63 - 122)  BP: 78/65 (2019 07:15) (63/36 - 117/56)  BP(mean): 71 (2019 07:15) (42 - 76)  ABP: --  ABP(mean): --  RR: 27 (2019 07:15) (19 - 35)  SpO2: 96% (2019 07:15) (68% - 100%)          I&O's Summary    2019 07:01  -  2019 07:00  --------------------------------------------------------  IN: 1050 mL / OUT: 330 mL / NET: 720 mL        Physical Exam:  SEE BELOW                          11.0   10.50 )-----------( 157      ( 2019 04:53 )             38.8           159<H>  |  129<H>  |  32<H>  ----------------------------<  103<H>  4.6   |  22  |  1.35<H>    Ca    7.6<L>      2019 04:53    TPro  6.1  /  Alb  2.0<L>  /  TBili  0.7  /  DBili  x   /  AST  17  /  ALT  23  /  AlkPhos  97  0423                Urinalysis Basic - ( 2019 17:34 )    Color: Yellow / Appearance: very cloudy / S.020 / pH: x  Gluc: x / Ketone: Negative  / Bili: Negative / Urobili: Negative mg/dL   Blood: x / Protein: 15 mg/dL / Nitrite: Positive   Leuk Esterase: Moderate / RBC: 11-25 /HPF / WBC 26-50   Sq Epi: x / Non Sq Epi: Occasional / Bacteria: Many        DVT Prophylaxis:                                                            Contraindication:     Advanced Directives:    Discussed with:    Visit Information:  Time spent excluding procedure:      ** Time is exclusive of billed procedures and/or teaching and/or routine family updates.

## 2019-04-23 NOTE — CHART NOTE - NSCHARTNOTEFT_GEN_A_CORE
Spoke with HCP Abena Hernandez at length on the phone regarding hospital course and long-term wishes. At this time he wants full code.

## 2019-04-23 NOTE — SWALLOW BEDSIDE ASSESSMENT ADULT - COMMENTS
The pt was admitted to  from [facility]. Hospital course is notable for sepsis, GIB, colitis, pneumonia, UTI, presence of kidney stone/pyelo on imaging, hypernatremia and altered alertness/mentation. This profile is superimposed upon a prior history of Dementia, oropharyngeal Dysphagia, HTN, COPD, cardiac arrythmia, cataracts, DM, PVD, past DVT, and prior osteomyelitis of left leg. The pt was admitted to  from [facility]. Hospital course is notable for sepsis, GIB, colitis, pneumonia, UTI, presence of kidney stone/pyelo on imaging, hypernatremia and altered alertness/mentation. This profile is superimposed upon a prior history of Dementia, Oropharyngeal Dysphagia, HTN, COPD, cardiac arrythmia, cataracts, DM, PVD, past DVT, and prior osteomyelitis of left leg.

## 2019-04-23 NOTE — SWALLOW BEDSIDE ASSESSMENT ADULT - SWALLOW EVAL: RECOMMENDED DIET
SUGGEST NPO RESTRICTION AT THIS TIME. PT AT HIGH RISK TO ASPIRATE ALL PO REGARDLESS OF CONSISTENCY AT THIS TIME.

## 2019-04-23 NOTE — SWALLOW BEDSIDE ASSESSMENT ADULT - ADDITIONAL RECOMMENDATIONS
1) Suggest NPO restriction and maintain non orally at discretion of attendings.     2) Nutrition follow up. Pt is at nutrition risk.     3) Consider the potential benefit of a Palliative Care consult.

## 2019-04-23 NOTE — SWALLOW BEDSIDE ASSESSMENT ADULT - NS SPL SWALLOW CLINIC TRIAL FT
Solids and liquids were not offered given the severity of his Dysphagia. The patient was not stimulable for use of compensatory swallowing maneuvers given his altered mentation.

## 2019-04-23 NOTE — SWALLOW BEDSIDE ASSESSMENT ADULT - ASR SWALLOW LINGUAL MOBILITY
Unable to assess due to altered alertness/mentation with reduced active participation/comprehension.

## 2019-04-23 NOTE — SEPSIS NOTE - ASSESSMENT
see a/p This is a 78 y.o. male with PMH anemia, COPD, dementia, DM2, hx DVT on AC, OA, PVD presents with hematemeiss.    #severe sepsis due to UTI, pyelitis, PNA  -admit to hospitalist service  -cont vanco, cefepime  -f/u cultures  -iv hydration  -ID consult

## 2019-04-23 NOTE — SWALLOW BEDSIDE ASSESSMENT ADULT - ORAL PREPARATORY PHASE
Pt was variably fatigued, confused and distractible when PO was offered. Dyskinetic labial movements were noted when PO entered oral cavity.

## 2019-04-23 NOTE — PROGRESS NOTE ADULT - ASSESSMENT
IMP:    79 y/o male with advance dementia and AF on coumadin admitted on 4/22 to SDU for possible GIB and UTI sepsis--found to have R non obst renal stone and pyelo/possible Aspiration PNA and colitis.  Tx to ICU for septic shock, Hypernatremia and MIGUEL (KDIGO 1)    Plan:    Titrate phenyl to SBP > 90  NPO  HOB > 30  Aspiration precautions   Change PPI to q12 and stop gtt  Serial H/H  Will need speech eval   Change LR to D5W gtt 125--follow Na level  Hold coumadin and follow INR--will not fully reverse given stable H/H and no clinical evidence of bleeding   DVT prophy  Keep espinoza in for 24 hrs    ICU care--d/w ICU staff

## 2019-04-23 NOTE — CHART NOTE - NSCHARTNOTEFT_GEN_A_CORE
initially called by rn about pt with pt with low bp despite IVF challenge. Pt given additional bolus of fluids and Intensivist consult placed. stat labs revealed elevated lactate - pt to be transferred to ICU for vasopressor support.

## 2019-04-23 NOTE — SWALLOW BEDSIDE ASSESSMENT ADULT - ORAL PHASE
Bolus formation/transfer were achieved via moderately prolonged disorganized lingual pumping actions. Piecemeal deglutition was evident. Mild tongue debris was noted with pureed foods.

## 2019-04-24 LAB
-  AMIKACIN: SIGNIFICANT CHANGE UP
-  AMPICILLIN/SULBACTAM: SIGNIFICANT CHANGE UP
-  AMPICILLIN: SIGNIFICANT CHANGE UP
-  AZTREONAM: SIGNIFICANT CHANGE UP
-  CEFAZOLIN: SIGNIFICANT CHANGE UP
-  CEFEPIME: SIGNIFICANT CHANGE UP
-  CEFOXITIN: SIGNIFICANT CHANGE UP
-  CEFTRIAXONE: SIGNIFICANT CHANGE UP
-  CIPROFLOXACIN: SIGNIFICANT CHANGE UP
-  ERTAPENEM: SIGNIFICANT CHANGE UP
-  GENTAMICIN: SIGNIFICANT CHANGE UP
-  IMIPENEM: SIGNIFICANT CHANGE UP
-  LEVOFLOXACIN: SIGNIFICANT CHANGE UP
-  MEROPENEM: SIGNIFICANT CHANGE UP
-  NITROFURANTOIN: SIGNIFICANT CHANGE UP
-  PIPERACILLIN/TAZOBACTAM: SIGNIFICANT CHANGE UP
-  TIGECYCLINE: SIGNIFICANT CHANGE UP
-  TOBRAMYCIN: SIGNIFICANT CHANGE UP
-  TRIMETHOPRIM/SULFAMETHOXAZOLE: SIGNIFICANT CHANGE UP
ANION GAP SERPL CALC-SCNC: 9 MMOL/L — SIGNIFICANT CHANGE UP (ref 5–17)
BUN SERPL-MCNC: 18 MG/DL — SIGNIFICANT CHANGE UP (ref 7–23)
CALCIUM SERPL-MCNC: 7.4 MG/DL — LOW (ref 8.5–10.1)
CHLORIDE SERPL-SCNC: 118 MMOL/L — HIGH (ref 96–108)
CO2 SERPL-SCNC: 21 MMOL/L — LOW (ref 22–31)
CREAT SERPL-MCNC: 1.34 MG/DL — HIGH (ref 0.5–1.3)
CULTURE RESULTS: SIGNIFICANT CHANGE UP
GLUCOSE BLDC GLUCOMTR-MCNC: 105 MG/DL — HIGH (ref 70–99)
GLUCOSE BLDC GLUCOMTR-MCNC: 106 MG/DL — HIGH (ref 70–99)
GLUCOSE BLDC GLUCOMTR-MCNC: 114 MG/DL — HIGH (ref 70–99)
GLUCOSE BLDC GLUCOMTR-MCNC: 117 MG/DL — HIGH (ref 70–99)
GLUCOSE BLDC GLUCOMTR-MCNC: 74 MG/DL — SIGNIFICANT CHANGE UP (ref 70–99)
GLUCOSE SERPL-MCNC: 170 MG/DL — HIGH (ref 70–99)
HBA1C BLD-MCNC: 5.4 % — SIGNIFICANT CHANGE UP (ref 4–5.6)
HCT VFR BLD CALC: 33.7 % — LOW (ref 39–50)
HCT VFR BLD CALC: 33.8 % — LOW (ref 39–50)
HGB BLD-MCNC: 10 G/DL — LOW (ref 13–17)
HGB BLD-MCNC: 10.2 G/DL — LOW (ref 13–17)
MAGNESIUM SERPL-MCNC: 1.8 MG/DL — SIGNIFICANT CHANGE UP (ref 1.6–2.6)
MCHC RBC-ENTMCNC: 23.8 PG — LOW (ref 27–34)
MCHC RBC-ENTMCNC: 24.1 PG — LOW (ref 27–34)
MCHC RBC-ENTMCNC: 29.7 GM/DL — LOW (ref 32–36)
MCHC RBC-ENTMCNC: 30.2 GM/DL — LOW (ref 32–36)
MCV RBC AUTO: 79.7 FL — LOW (ref 80–100)
MCV RBC AUTO: 80.2 FL — SIGNIFICANT CHANGE UP (ref 80–100)
METHOD TYPE: SIGNIFICANT CHANGE UP
NRBC # BLD: 0 /100 WBCS — SIGNIFICANT CHANGE UP (ref 0–0)
NRBC # BLD: 0 /100 WBCS — SIGNIFICANT CHANGE UP (ref 0–0)
ORGANISM # SPEC MICROSCOPIC CNT: SIGNIFICANT CHANGE UP
ORGANISM # SPEC MICROSCOPIC CNT: SIGNIFICANT CHANGE UP
PHOSPHATE SERPL-MCNC: 2.2 MG/DL — LOW (ref 2.5–4.5)
PLATELET # BLD AUTO: 152 K/UL — SIGNIFICANT CHANGE UP (ref 150–400)
PLATELET # BLD AUTO: 174 K/UL — SIGNIFICANT CHANGE UP (ref 150–400)
POTASSIUM SERPL-MCNC: 3.2 MMOL/L — LOW (ref 3.5–5.3)
POTASSIUM SERPL-SCNC: 3.2 MMOL/L — LOW (ref 3.5–5.3)
RBC # BLD: 4.2 M/UL — SIGNIFICANT CHANGE UP (ref 4.2–5.8)
RBC # BLD: 4.24 M/UL — SIGNIFICANT CHANGE UP (ref 4.2–5.8)
RBC # FLD: 21.2 % — HIGH (ref 10.3–14.5)
RBC # FLD: 21.2 % — HIGH (ref 10.3–14.5)
SODIUM SERPL-SCNC: 148 MMOL/L — HIGH (ref 135–145)
SPECIMEN SOURCE: SIGNIFICANT CHANGE UP
WBC # BLD: 7.31 K/UL — SIGNIFICANT CHANGE UP (ref 3.8–10.5)
WBC # BLD: 9.58 K/UL — SIGNIFICANT CHANGE UP (ref 3.8–10.5)
WBC # FLD AUTO: 7.31 K/UL — SIGNIFICANT CHANGE UP (ref 3.8–10.5)
WBC # FLD AUTO: 9.58 K/UL — SIGNIFICANT CHANGE UP (ref 3.8–10.5)

## 2019-04-24 RX ORDER — DEXTROSE 50 % IN WATER 50 %
25 SYRINGE (ML) INTRAVENOUS ONCE
Qty: 0 | Refills: 0 | Status: COMPLETED | OUTPATIENT
Start: 2019-04-24 | End: 2019-04-24

## 2019-04-24 RX ORDER — MEROPENEM 1 G/30ML
500 INJECTION INTRAVENOUS ONCE
Qty: 0 | Refills: 0 | Status: COMPLETED | OUTPATIENT
Start: 2019-04-24 | End: 2019-04-24

## 2019-04-24 RX ORDER — POTASSIUM PHOSPHATE, MONOBASIC POTASSIUM PHOSPHATE, DIBASIC 236; 224 MG/ML; MG/ML
15 INJECTION, SOLUTION INTRAVENOUS ONCE
Qty: 0 | Refills: 0 | Status: COMPLETED | OUTPATIENT
Start: 2019-04-24 | End: 2019-04-24

## 2019-04-24 RX ORDER — MEROPENEM 1 G/30ML
500 INJECTION INTRAVENOUS EVERY 8 HOURS
Qty: 0 | Refills: 0 | Status: COMPLETED | OUTPATIENT
Start: 2019-04-25 | End: 2019-04-29

## 2019-04-24 RX ORDER — MEROPENEM 1 G/30ML
INJECTION INTRAVENOUS
Qty: 0 | Refills: 0 | Status: COMPLETED | OUTPATIENT
Start: 2019-04-24 | End: 2019-04-29

## 2019-04-24 RX ADMIN — Medication 25 GRAM(S): at 05:54

## 2019-04-24 RX ADMIN — PIPERACILLIN AND TAZOBACTAM 25 GRAM(S): 4; .5 INJECTION, POWDER, LYOPHILIZED, FOR SOLUTION INTRAVENOUS at 16:20

## 2019-04-24 RX ADMIN — PANTOPRAZOLE SODIUM 40 MILLIGRAM(S): 20 TABLET, DELAYED RELEASE ORAL at 18:38

## 2019-04-24 RX ADMIN — Medication 250 MILLIGRAM(S): at 05:36

## 2019-04-24 RX ADMIN — POTASSIUM PHOSPHATE, MONOBASIC POTASSIUM PHOSPHATE, DIBASIC 62.5 MILLIMOLE(S): 236; 224 INJECTION, SOLUTION INTRAVENOUS at 11:11

## 2019-04-24 RX ADMIN — SODIUM CHLORIDE 125 MILLILITER(S): 9 INJECTION, SOLUTION INTRAVENOUS at 22:24

## 2019-04-24 RX ADMIN — PIPERACILLIN AND TAZOBACTAM 25 GRAM(S): 4; .5 INJECTION, POWDER, LYOPHILIZED, FOR SOLUTION INTRAVENOUS at 05:36

## 2019-04-24 RX ADMIN — ZINC OXIDE 1 APPLICATION(S): 200 OINTMENT TOPICAL at 11:15

## 2019-04-24 RX ADMIN — Medication 250 MILLIGRAM(S): at 18:38

## 2019-04-24 RX ADMIN — Medication 1 APPLICATION(S): at 11:14

## 2019-04-24 RX ADMIN — PANTOPRAZOLE SODIUM 40 MILLIGRAM(S): 20 TABLET, DELAYED RELEASE ORAL at 05:36

## 2019-04-24 RX ADMIN — MEROPENEM 100 MILLIGRAM(S): 1 INJECTION INTRAVENOUS at 21:04

## 2019-04-24 NOTE — CDI QUERY NOTE - NSCDIOTHERTXTBX2_GEN_ALL_CORE_FT
Documentation on chart of cachetic    Albumin level 3.2    BMI 24.8    Supplement orders: ( i.e. Parenteral nutrition, TPN, Multivitamins)  cholecalciferol Oral Tab/Cap - Peds 2000 Unit(s) Oral daily  dextrose 5%. 1000 milliLiter(s) IV Continuous <Continuous>  ferrous    sulfate Liquid 300 milliGRAM(s) Oral daily  potassium phosphate IVPB 15 milliMole(s) IV Intermittent once      Clinical evidence indicates that the patient may have a more specific nutritional diagnosis.      Please clarify a diagnosis.     Mild protein calorie malnutrition/ 1st degree    Moderate protein calorie malnutrition/ 2nd degree    Severe protein calorie malnutrition/ 3rd degree    Other (specify)    Unknown

## 2019-04-24 NOTE — PROGRESS NOTE ADULT - SUBJECTIVE AND OBJECTIVE BOX
Patient is a 78y old  Male who presents with a chief complaint of Sepsis, GI bleed (23 Apr 2019 10:34)    Date of service: 04-24-19 @ 15:06      Patient intermittently agitated; on oxygen via face mask      ROS unable to obtain secondary to patient medical condition     MEDICATIONS  (STANDING):  calcium carbonate    500 mG (Tums) Chewable 1 Tablet(s) Chew daily  cholecalciferol Oral Tab/Cap - Peds 2000 Unit(s) Oral daily  Dakins Solution - 1/2 Strength 1 Application(s) Topical daily  dextrose 5%. 1000 milliLiter(s) (125 mL/Hr) IV Continuous <Continuous>  dextrose 50% Injectable 12.5 Gram(s) IV Push once  dextrose 50% Injectable 25 Gram(s) IV Push once  dextrose 50% Injectable 25 Gram(s) IV Push once  ferrous    sulfate Liquid 300 milliGRAM(s) Oral daily  insulin lispro (HumaLOG) corrective regimen sliding scale   SubCutaneous every 6 hours  pantoprazole  Injectable 40 milliGRAM(s) IV Push two times a day  phenylephrine    Infusion 0.5 MICROgram(s)/kG/Min (13.894 mL/Hr) IV Continuous <Continuous>  piperacillin/tazobactam IVPB. 3.375 Gram(s) IV Intermittent every 8 hours  polyethylene glycol 3350 17 Gram(s) Oral daily  senna 2 Tablet(s) Oral at bedtime  vancomycin  IVPB 750 milliGRAM(s) IV Intermittent every 12 hours  zinc oxide 20% Ointment 1 Application(s) Topical daily    MEDICATIONS  (PRN):  acetaminophen  Suppository .. 650 milliGRAM(s) Rectal every 6 hours PRN Temp greater or equal to 38C (100.4F)  dextrose 40% Gel 15 Gram(s) Oral once PRN Blood Glucose LESS THAN 70 milliGRAM(s)/deciliter  glucagon  Injectable 1 milliGRAM(s) IntraMuscular once PRN Glucose LESS THAN 70 milligrams/deciliter      Vital Signs Last 24 Hrs  T(C): 37.6 (24 Apr 2019 09:35), Max: 38.8 (23 Apr 2019 20:00)  T(F): 99.6 (24 Apr 2019 09:35), Max: 101.9 (23 Apr 2019 20:00)  HR: 59 (24 Apr 2019 13:00) (45 - 96)  BP: 115/42 (24 Apr 2019 13:00) (75/58 - 139/125)  BP(mean): 60 (24 Apr 2019 13:00) (56 - 132)  RR: 32 (24 Apr 2019 13:00) (17 - 32)  SpO2: 100% (24 Apr 2019 13:00) (88% - 100%)    Physical Exam:            PE:    Constitutional: frail looking  HEENT: NC/AT, EOMI, PERRLA, conjunctivae clear; ears and nose atraumatic; caked blood in oropharynx  Neck: supple; thyroid not palpable  Respiratory: respiratory effort normal; scattered coarse breath sounds  Cardiovascular: S1S2 regular, no murmurs  Abdomen: soft, not tender, not distended, positive BS; no liver or spleen organomegaly  Genitourinary: no suprapubic tenderness  Musculoskeletal: no muscle tenderness, left hip with yellow- green drainage in linear skin breakdown with two distal circular lesions with brown drainage  Neurological/ Psychiatric: AxOx3, judgement and insight normal;  moving all extremities  Skin: no rashes; no palpable lesions    Labs: all available labs reviewed                         Labs:                        10.0   7.31  )-----------( 152      ( 24 Apr 2019 06:10 )             33.7     04-24    148<H>  |  118<H>  |  18  ----------------------------<  170<H>  3.2<L>   |  21<L>  |  1.34<H>    Ca    7.4<L>      24 Apr 2019 06:10  Phos  2.2     04-24  Mg     1.8     04-24    TPro  6.1  /  Alb  2.0<L>  /  TBili  0.7  /  DBili  x   /  AST  17  /  ALT  23  /  AlkPhos  97  04-23           Cultures:       Culture - Urine (collected 04-22-19 @ 17:34)  Source: .Urine Clean Catch (Midstream)  Preliminary Report (04-23-19 @ 18:34):    >100,000 CFU/ml Gram Negative Rods    Culture - Blood (collected 04-22-19 @ 17:34)  Source: .Blood Blood-Peripheral  Preliminary Report (04-24-19 @ 01:03):    No growth to date.    Culture - Blood (collected 04-22-19 @ 17:34)  Source: .Blood Blood-Peripheral  Preliminary Report (04-24-19 @ 01:03):    No growth to date.        < from: CT Abdomen and Pelvis w/ IV Cont (04.22.19 @ 21:32) >    EXAM:  CT ABDOMEN AND PELVIS IC                            PROCEDURE DATE:  04/22/2019          INTERPRETATION:  .    CLINICAL INFORMATION: Vomiting coffee grounds.    TECHNIQUE: Multiple axial CT images of the head were obtained without   contrast. Sagittal and coronal reconstructed images were acquired from   the source data.    COMPARISON: Prior CT examination of the abdomen and pelvis from 12/9/2016.    FINDINGS: A nonspecific subcentimeter prevascular lymph node is notable   within the mediastinum. The heart size is normal. There are   atherosclerotic calcifications of the imaged coronary arteries, aorta,   and branch vessels. The imaged portions of the aorta are normal in   caliber. An infrarenal IVC filter is redemonstrated.    Patchy confluent opacities are noted within the bilateral lower lobes.    Scattered liver cysts appear unchanged when compared to the prior CT   exam. Cholelithiasis is noted. There is no gallbladder wall thickening.    The pancreas, spleen, and adrenal glands appear unremarkable.    Multiple stones are again noted within the right renal collecting system,   the largest of which measures up to 1.5 cm in the upper pole. There is   right-sided urothelial enhancement and right-sided periureteral fat   stranding. There is no right-sided hydronephrosis. There is minimal   nonspecific left-sided perinephric stranding.    The urinary bladder is underdistended, limiting evaluation.    There are multiple scattered nonspecific subcentimeter retroperitoneal   and mesenteric lymph nodes.    There is a small hiatal hernia. The stomach is underdistended. There is   no small bowel obstruction. A very large fecalith is notable within the   rectum. There is mild rectal wall thickening and presacral edema. An   additional moderate amount of stool is notable throughout the large   bowel. A few colonic diverticula are notable. The appendix is not clearly   visualized.    The prostate gland and seminal vesicles appear unremarkable. A small   fat-containing left-sided inguinal hernia is noted.    A left-sided total hip arthroplasty is again noted. There is extensive   adjacent heterotopic ossification. The hip prosthesis generates streak   and beam hardening artifact limiting evaluation of the adjacent   structures. There is fatty atrophy of the left adductor compartment.    There is generalized osteopenia. Multilevel degenerative changes are   noted within the imaged potions of the spine.    < from: CT Abdomen and Pelvis w/ IV Cont (04.22.19 @ 21:32) >  MPRESSION:    1. Bilateral patchy consolidation within the lower lobes which may   reflect pneumonia. Aspiration can be considered, given clinical history.    2. Large fecalith within the rectal vault with rectal wall thickening and   presacral edema for which stercoral colitis can be considered.    3. Nonobstructing right-sided stones within the renal collecting system.   Right-sided urothelial enhancement and proximal ureteral enhancement   compatible with right-sided pyelitis and ureteritis. Correlate with   urinalysis.    4. Other findings, as discussed.    < end of copied text >    < end of copied text >      Radiology: all available radiological tests reviewed    Advanced directives addressed: full resuscitation

## 2019-04-24 NOTE — PROGRESS NOTE ADULT - SUBJECTIVE AND OBJECTIVE BOX
GI    sleeping  no n/v or evidence of bleeding per RN at bedside  no melena    ROS: not able to obtain    Vital Signs Last 24 Hrs  Vital Signs Last 24 Hrs  T(C): 37.6 (24 Apr 2019 09:35), Max: 38.8 (23 Apr 2019 20:00)  T(F): 99.6 (24 Apr 2019 09:35), Max: 101.9 (23 Apr 2019 20:00)  HR: 65 (24 Apr 2019 12:00) (45 - 96)  BP: 113/50 (24 Apr 2019 12:00) (75/58 - 139/125)  BP(mean): 68 (24 Apr 2019 12:00) (52 - 132)  RR: 27 (24 Apr 2019 12:00) (17 - 29)  SpO2: 100% (24 Apr 2019 12:00) (88% - 100%)  Physical Exam:  · Constitutional	detailed exam  · Constitutional Details	cachectic  · Eyes	detailed exam  · Eyes Details	PERRL  · ENMT	detailed exam  · Neck	detailed exam  · Neck Details	normal  · Back	No deformity or limitation of movement  · Respiratory	detailed exam  · Respiratory Details	respirations labored; rhonchi  -GI: NTND no mass, +BS normal  · Neurological	detailed exam  · Neurological Details	responds to pain  · Skin	detailed exam  · Skin Details	warm and dry  · Musculoskeletal	No joint pain, swelling or deformity; no limitation of movement                                   10.0   7.31  )-----------( 152      ( 24 Apr 2019 06:10 )             33.7

## 2019-04-24 NOTE — PROGRESS NOTE ADULT - ASSESSMENT
78 year old male patient with pertinent history of Afib on coumadin, Dementia and anemia admitted on 4/22 from snf for evaluation of vomiting coffee ground emesis and fever to 100.8; patient was transferred to icu given hypotension and required pressor support. History per medical record and patient unable to provide history.   1. Patient admitted with septic shock, unclear etiology, possibly due to urinary origin versus pneumonia, versus soft tissue infection of left hip  - follow up cultures   - iv hydration and supportive care   - on pressor support per icu  - serial cbc and monitor temperature   - reviewed prior medical records to evaluate for resistant or atypical pathogens   - day #2 vancomycin and zosyn  - tolerating antibiotics without rashes or side effects   - check vancomycin trough prior to fourth dose   - wound care of hip  2. other issues; Afib on coumadin, Dementia and anemia  - per medicine

## 2019-04-24 NOTE — PROGRESS NOTE ADULT - ASSESSMENT
IMP:    79 y/o male with advance dementia and AF on coumadin admitted on 4/22 to SDU for possible GIB and UTI sepsis--found to have R non obst renal stone and pyelo/possible Aspiration PNA and colitis.  Tx to ICU for septic shock, Hypernatremia and MIGUEL (KDIGO 1)  Does not appear he has GIB  Chronic L hip OM on doxy as outpt    Plan:    Titrate phenyl to SBP > 90  IV Vanco/ pip/mayra (2)  Follow GNR in UCx  NPO  HOB > 30  May need FT if unable to marin PO over next 24hra  Aspiration precautions   PPI q12  D5W gtt 125--follow Na level  Hold coumadin and follow INR--will not fully reverse given stable H/H and no clinical evidence of bleeding   DVT isaiah espinoza    ICU care--d/w ICU staff IMP:    77 y/o male with advance dementia and AF on coumadin admitted on 4/22 to SDU for possible GIB and UTI sepsis--found to have R non obst renal stone and pyelo/possible Aspiration PNA and colitis.  Tx to ICU for septic shock, Hypernatremia and MIGUEL (KDIGO 1) with baseline Cr 0.9   Does not appear he has GIB  Chronic L hip OM on doxy as outpt    Plan:    Titrate phenyl to SBP > 90  IV Vanco/ pip/mayra (2)  Follow GNR in UCx  NPO  HOB > 30  May need FT if unable to marin PO over next 24hra  Aspiration precautions   PPI q12  D5W gtt 125--follow Na level  Hold coumadin and follow INR--will not fully reverse given stable H/H and no clinical evidence of bleeding   DVT isaiah espinoza    ICU care--d/w ICU staff

## 2019-04-24 NOTE — PROGRESS NOTE ADULT - ASSESSMENT
78-year-old man admitted with sepsis due to urinary tract infection, and found to have coffee-ground emesis, guaiac positive stool, and anemia.  There is likely a presentation of GI blood loss here however the primary issue seems to be the pyelonephritis with GI bleeding as a secondary problem.  This may be induced by stress gastritis or ulcer, esophagitis or other sources.  Also found to have fecal impaction on CAT scan, now spontaneously moving bowels.  No further bleeding.     Recommendations:    -Monitor hemoglobin.  Watch for signs of bleeding  -Continue Protonix IV for now  -Continue bowel regimen  -PO as tolerated  -Treatment of kidney infection per primary team.  -no current need for EGD  -please call if needed/if rebleeds. will see as needed.   -d/w RHODA

## 2019-04-24 NOTE — PROGRESS NOTE ADULT - SUBJECTIVE AND OBJECTIVE BOX
CC: Coffee ground emesis and fever    HPI:  79 y/o male w/ PMH significant for Afib (coumadin), Dementia, DM, DVT presented to ED from [facility] for coffee ground emesis and T. Max of 102.8. Unable to illicit any HPI or ROS 2/2  altered mental status. In the ED, patient found to have supra therapeutic INR 4.25, Lactate 2.1, MIGUEL, UA (+), RVP negative, CXR negative, Guaiac stool (+), CT abdomen with possible PNA, sterocoral colitis, non-obstructing R-sided renal stone and Right sided pyelitis and uteritis. Pt. given IV fluids per sepsis protocol, IV Vanco and cefepime. Pt. remained hypotensive and transferred to ICU for pressor support.     Interval HPI:   - Patient seen and examined. Unable to provide history due to mental status. Sill intubated. Management per ICU.    Review of Systems: Unable to obtain 2/2 altered mental status    PHYSICAL EXAM:  Vital Signs (24 Hrs):  T(C): 37.6 (19 @ 09:35), Max: 38.8 (19 @ 20:00)  HR: 65 (19 @ 12:00) (45 - 96)  BP: 113/50 (19 @ 12:00) (75/58 - 139/125)  RR: 27 (19 @ 12:00) (17 - 29)  SpO2: 100% (19 @ 12:00) (88% - 100%)  Wt(kg): --  Daily     Daily Weight in k (2019 05:00)    I&O's Summary    2019 07:01  -  2019 07:00  --------------------------------------------------------  IN: 4248.1 mL / OUT: 1600 mL / NET: 2648.1 mL      GENERAL: Comfortable   HEENOT: Atraumatic, Normocephalic, conjunctiva and sclera clear, moist mucous membranes  NECK: Supple, No JVD  NERVOUS SYSTEM:  Disoriented x4 unable to follow commands or answer questions appropriately  CHEST/LUNG: Clear to auscultation bilaterally; No rales, rhonchi, wheezing, or rubs  HEART: S1 S2 normal, no murmur  ABDOMEN: Soft, Nontender, Nondistended; Bowel sounds present  GENITOURINARY: Bautista in place   EXTREMITIES:  2+ Peripheral Pulses, No clubbing, cyanosis, or edema  SKIN: No rash, no lesion      LABS:                        10.0   7.31  )-----------( 152      ( 2019 06:10 )             33.7     2019 06:10    148    |  118    |  18     ----------------------------<  170    3.2     |  21     |  1.34     Ca    7.4        2019 06:10  Phos  2.2       2019 06:10  Mg     1.8       2019 06:10      PT/INR - ( 2019 04:53 )   PT: 36.1 sec;   INR: 3.14 ratio         PTT - ( 2019 04:53 )  PTT:36.6 sec  Urinalysis Basic - ( 2019 17:34 )    Color: Yellow / Appearance: very cloudy / S.020 / pH: x  Gluc: x / Ketone: Negative  / Bili: Negative / Urobili: Negative mg/dL   Blood: x / Protein: 15 mg/dL / Nitrite: Positive   Leuk Esterase: Moderate / RBC: 11-25 /HPF / WBC 26-50   Sq Epi: x / Non Sq Epi: Occasional / Bacteria: Many        CAPILLARY BLOOD GLUCOSE      POCT Blood Glucose.: 84 mg/dL (2019 08:03)    MEDICATIONS  (STANDING):  calcium carbonate    500 mG (Tums) Chewable 1 Tablet(s) Chew daily  cholecalciferol Oral Tab/Cap - Peds 2000 Unit(s) Oral daily  Dakins Solution - 1/2 Strength 1 Application(s) Topical daily  dextrose 5%. 1000 milliLiter(s) (125 mL/Hr) IV Continuous <Continuous>  dextrose 50% Injectable 12.5 Gram(s) IV Push once  dextrose 50% Injectable 25 Gram(s) IV Push once  dextrose 50% Injectable 25 Gram(s) IV Push once  ferrous    sulfate Liquid 300 milliGRAM(s) Oral daily  insulin lispro (HumaLOG) corrective regimen sliding scale   SubCutaneous every 6 hours  pantoprazole  Injectable 40 milliGRAM(s) IV Push two times a day  phenylephrine    Infusion 0.5 MICROgram(s)/kG/Min (13.894 mL/Hr) IV Continuous <Continuous>  piperacillin/tazobactam IVPB. 3.375 Gram(s) IV Intermittent every 8 hours  polyethylene glycol 3350 17 Gram(s) Oral daily  senna 2 Tablet(s) Oral at bedtime  vancomycin  IVPB 750 milliGRAM(s) IV Intermittent every 12 hours  zinc oxide 20% Ointment 1 Application(s) Topical daily    MEDICATIONS  (PRN):  acetaminophen  Suppository .. 650 milliGRAM(s) Rectal every 6 hours PRN Temp greater or equal to 38C (100.4F)  dextrose 40% Gel 15 Gram(s) Oral once PRN Blood Glucose LESS THAN 70 milliGRAM(s)/deciliter  glucagon  Injectable 1 milliGRAM(s) IntraMuscular once PRN Glucose LESS THAN 70 milligrams/deciliter CC: Coffee ground emesis and fever    HPI:  77 y/o male w/ PMH significant for Afib (coumadin), Dementia, DM, DVT presented to ED from [facility] for coffee ground emesis and T. Max of 102.8. Unable to illicit any HPI or ROS 2/2  altered mental status. In the ED, patient found to have supra therapeutic INR 4.25, Lactate 2.1, MIGUEL, UA (+), RVP negative, CXR negative, Guaiac stool (+), CT abdomen with possible PNA, sterocoral colitis, non-obstructing R-sided renal stone and Right sided pyelitis and uteritis. Pt. given IV fluids per sepsis protocol, IV Vanco and cefepime. Pt. remained hypotensive and transferred to ICU for pressor support.     Interval HPI:   - Patient seen and examined. Unable to provide history due to mental status. Management per ICU.    Review of Systems: Unable to obtain 2/2 altered mental status    PHYSICAL EXAM:  Vital Signs (24 Hrs):  T(C): 37.6 (19 @ 09:35), Max: 38.8 (19 @ 20:00)  HR: 65 (19 @ 12:00) (45 - 96)  BP: 113/50 (19 @ 12:00) (75/58 - 139/125)  RR: 27 (19 @ 12:00) (17 - 29)  SpO2: 100% (19 @ 12:00) (88% - 100%)  Wt(kg): --  Daily     Daily Weight in k (2019 05:00)    I&O's Summary    2019 07:01  -  2019 07:00  --------------------------------------------------------  IN: 4248.1 mL / OUT: 1600 mL / NET: 2648.1 mL      GENERAL: Comfortable   HEENOT: Atraumatic, Normocephalic, conjunctiva and sclera clear, moist mucous membranes  NECK: Supple, No JVD  NERVOUS SYSTEM:  Disoriented x4 unable to follow commands or answer questions appropriately  CHEST/LUNG: Clear to auscultation bilaterally; No rales, rhonchi, wheezing, or rubs  HEART: S1 S2 normal, no murmur  ABDOMEN: Soft, Nontender, Nondistended; Bowel sounds present  GENITOURINARY: Bautista in place   EXTREMITIES:  2+ Peripheral Pulses, No clubbing, cyanosis, or edema  SKIN: No rash, no lesion      LABS:                        10.0   7.31  )-----------( 152      ( 2019 06:10 )             33.7     2019 06:10    148    |  118    |  18     ----------------------------<  170    3.2     |  21     |  1.34     Ca    7.4        2019 06:10  Phos  2.2       2019 06:10  Mg     1.8       2019 06:10      PT/INR - ( 2019 04:53 )   PT: 36.1 sec;   INR: 3.14 ratio         PTT - ( 2019 04:53 )  PTT:36.6 sec  Urinalysis Basic - ( 2019 17:34 )    Color: Yellow / Appearance: very cloudy / S.020 / pH: x  Gluc: x / Ketone: Negative  / Bili: Negative / Urobili: Negative mg/dL   Blood: x / Protein: 15 mg/dL / Nitrite: Positive   Leuk Esterase: Moderate / RBC: 11-25 /HPF / WBC 26-50   Sq Epi: x / Non Sq Epi: Occasional / Bacteria: Many        CAPILLARY BLOOD GLUCOSE      POCT Blood Glucose.: 84 mg/dL (2019 08:03)    MEDICATIONS  (STANDING):  calcium carbonate    500 mG (Tums) Chewable 1 Tablet(s) Chew daily  cholecalciferol Oral Tab/Cap - Peds 2000 Unit(s) Oral daily  Dakins Solution - 1/2 Strength 1 Application(s) Topical daily  dextrose 5%. 1000 milliLiter(s) (125 mL/Hr) IV Continuous <Continuous>  dextrose 50% Injectable 12.5 Gram(s) IV Push once  dextrose 50% Injectable 25 Gram(s) IV Push once  dextrose 50% Injectable 25 Gram(s) IV Push once  ferrous    sulfate Liquid 300 milliGRAM(s) Oral daily  insulin lispro (HumaLOG) corrective regimen sliding scale   SubCutaneous every 6 hours  pantoprazole  Injectable 40 milliGRAM(s) IV Push two times a day  phenylephrine    Infusion 0.5 MICROgram(s)/kG/Min (13.894 mL/Hr) IV Continuous <Continuous>  piperacillin/tazobactam IVPB. 3.375 Gram(s) IV Intermittent every 8 hours  polyethylene glycol 3350 17 Gram(s) Oral daily  senna 2 Tablet(s) Oral at bedtime  vancomycin  IVPB 750 milliGRAM(s) IV Intermittent every 12 hours  zinc oxide 20% Ointment 1 Application(s) Topical daily    MEDICATIONS  (PRN):  acetaminophen  Suppository .. 650 milliGRAM(s) Rectal every 6 hours PRN Temp greater or equal to 38C (100.4F)  dextrose 40% Gel 15 Gram(s) Oral once PRN Blood Glucose LESS THAN 70 milliGRAM(s)/deciliter  glucagon  Injectable 1 milliGRAM(s) IntraMuscular once PRN Glucose LESS THAN 70 milligrams/deciliter CC: Coffee ground emesis and fever    HPI:  79 y/o male w/ PMH significant for Afib (coumadin), Dementia, DM, DVT presented to ED from [facility] for coffee ground emesis and T. Max of 102.8. Unable to illicit any HPI or ROS 2/2  altered mental status. In the ED, patient found to have supra therapeutic INR 4.25, Lactate 2.1, MIGUEL, UA (+), RVP negative, CXR negative, Guaiac stool (+), CT abdomen with possible PNA, sterocoral colitis, non-obstructing R-sided renal stone and Right sided pyelitis and uteritis. Pt. given IV fluids per sepsis protocol, IV Vanco and cefepime. Pt. remained hypotensive and transferred to ICU for pressor support.     Interval HPI:  Patient seen and examined. Unable to provide history due to mental status. Management per ICU.    Review of Systems: Unable to obtain 2/2 altered mental status    PHYSICAL EXAM:  Vital Signs (24 Hrs):  T(C): 37.6 (19 @ 09:35), Max: 38.8 (19 @ 20:00)  HR: 65 (19 @ 12:00) (45 - 96)  BP: 113/50 (19 @ 12:00) (75/58 - 139/125)  RR: 27 (19 @ 12:00) (17 - 29)  SpO2: 100% (19 @ 12:00) (88% - 100%)  Wt(kg): --  Daily     Daily Weight in k (2019 05:00)    I&O's Summary    2019 07:01  -  2019 07:00  --------------------------------------------------------  IN: 4248.1 mL / OUT: 1600 mL / NET: 2648.1 mL      GENERAL: Comfortable   HEENOT: Atraumatic, Normocephalic, conjunctiva and sclera clear, moist mucous membranes  NECK: Supple, No JVD  NERVOUS SYSTEM:  Disoriented x4 unable to follow commands or answer questions appropriately  CHEST/LUNG: Clear to auscultation bilaterally; No rales, rhonchi, wheezing, or rubs  HEART: S1 S2 normal, no murmur  ABDOMEN: Soft, Nontender, Nondistended; Bowel sounds present  GENITOURINARY: Bautista in place   EXTREMITIES:  2+ Peripheral Pulses, No clubbing, cyanosis, or edema  SKIN: No rash, no lesion      LABS:                        10.0   7.31  )-----------( 152      ( 2019 06:10 )             33.7     2019 06:10    148    |  118    |  18     ----------------------------<  170    3.2     |  21     |  1.34     Ca    7.4        2019 06:10  Phos  2.2       2019 06:10  Mg     1.8       2019 06:10      PT/INR - ( 2019 04:53 )   PT: 36.1 sec;   INR: 3.14 ratio         PTT - ( 2019 04:53 )  PTT:36.6 sec  Urinalysis Basic - ( 2019 17:34 )    Color: Yellow / Appearance: very cloudy / S.020 / pH: x  Gluc: x / Ketone: Negative  / Bili: Negative / Urobili: Negative mg/dL   Blood: x / Protein: 15 mg/dL / Nitrite: Positive   Leuk Esterase: Moderate / RBC: 11-25 /HPF / WBC 26-50   Sq Epi: x / Non Sq Epi: Occasional / Bacteria: Many        CAPILLARY BLOOD GLUCOSE      POCT Blood Glucose.: 84 mg/dL (2019 08:03)    MEDICATIONS  (STANDING):  calcium carbonate    500 mG (Tums) Chewable 1 Tablet(s) Chew daily  cholecalciferol Oral Tab/Cap - Peds 2000 Unit(s) Oral daily  Dakins Solution - 1/2 Strength 1 Application(s) Topical daily  dextrose 5%. 1000 milliLiter(s) (125 mL/Hr) IV Continuous <Continuous>  dextrose 50% Injectable 12.5 Gram(s) IV Push once  dextrose 50% Injectable 25 Gram(s) IV Push once  dextrose 50% Injectable 25 Gram(s) IV Push once  ferrous    sulfate Liquid 300 milliGRAM(s) Oral daily  insulin lispro (HumaLOG) corrective regimen sliding scale   SubCutaneous every 6 hours  pantoprazole  Injectable 40 milliGRAM(s) IV Push two times a day  phenylephrine    Infusion 0.5 MICROgram(s)/kG/Min (13.894 mL/Hr) IV Continuous <Continuous>  piperacillin/tazobactam IVPB. 3.375 Gram(s) IV Intermittent every 8 hours  polyethylene glycol 3350 17 Gram(s) Oral daily  senna 2 Tablet(s) Oral at bedtime  vancomycin  IVPB 750 milliGRAM(s) IV Intermittent every 12 hours  zinc oxide 20% Ointment 1 Application(s) Topical daily    MEDICATIONS  (PRN):  acetaminophen  Suppository .. 650 milliGRAM(s) Rectal every 6 hours PRN Temp greater or equal to 38C (100.4F)  dextrose 40% Gel 15 Gram(s) Oral once PRN Blood Glucose LESS THAN 70 milliGRAM(s)/deciliter  glucagon  Injectable 1 milliGRAM(s) IntraMuscular once PRN Glucose LESS THAN 70 milligrams/deciliter

## 2019-04-24 NOTE — PROGRESS NOTE ADULT - ASSESSMENT
79 y/o male w/ septic shock and superimposed GI bleed requiring pressor support    Septic Shock   - UA (+), RVP (-)  - Blood cultures NGTD  - Urine culture->+gram negative species; will follow up sensitivities  - CT abd/pelvis noted possible PNA (likely aspiration), sterocoral colitis, non-obstructing R-sided renal stone and Right sided pyelitis and uteritis  - Pressors support continues   - s/p doxycycline and cefepime  - continue vanco/zosyn  - ID consult appreciated  - Speech/Swallow recommendation apprecaited  - Bautista - Strict I&O  - Aspiration Precautions     GI Bleed  - Coffee ground emesis noted at NH and positive guaiac in ED  - Monitor H&H  - NPO  - Protonix IV BID  - GI consult appreciated   - No active bleeding at this time    MIGUEL  - likely prerenal  - continue IV fluids   - continue to monitor     Hypernatremia  - continue D5W @125  - improving    Atrial Fibrillation   - CHADS2 Vasc score of 6  - Coumadin Held  - INR 4.2 --> 3.14 s/p Vitamin K  - Rate Controlled   - No active bleeding at this time    DM2  - Non-insulin dependent   - A1C 5.8  - Cont. 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 dakins solution    Advanced Directives  - Nephew/HCP Anshu Hernandez [phone]  - spoke with HCP at length on the phone 4/23 who wants full code

## 2019-04-24 NOTE — CDI QUERY NOTE - NSCDIOTHERTXTBX_GEN_ALL_CORE_HH
This patient is documented with MIGUEL/acute renal failure.    DOCUMENTATION:      Creatinine Trend:  Creatinine, Serum: 1.34 mg/dL <H> [0.50 - 1.30] (04-24-19)  Creatinine, Serum: 1.35 mg/dL <H> [0.50 - 1.30] (04-23-19)  Creatinine, Serum: 1.49 mg/dL <H> [0.50 - 1.30] (04-22-19)    Creatinine 1.1 9/17         [facility] policy based on KDIGO guidelines defines MIGUEL (applicable to both adult and pediatric patients) as any of the following;  •	Increase Creatinine = 0.3 mg/dl from baseline within 48 hours; or  •	Increase in Creatinine level to = 1.5x baseline, which is known or presumed to have occurred within the prior 7 days; or      According to clinical guidelines, clinical criteria must support documented clinical diagnoses.    Can you please clarify the clinical indicators supporting the diagnosis of MIGUEL or clarify that MIGUEL has been ruled out?    Please document baseline creatinine.

## 2019-04-24 NOTE — PROGRESS NOTE ADULT - SUBJECTIVE AND OBJECTIVE BOX
CC:  Sepsis     HPI:    77 y/o male with advance dementia and AF on coumadin admitted on  to SDU for possible GIB and UTI sepsis--found to have R non obst renal stone and pyelo/possible Aspiration PNA and colitis.  He received 3L IVF while in SD--remain hypotensive--Tx to ICU for pressors.    :  ICU D # 1.  Pt seen and examined in ICU--lethargic--arousable--On [place] 1.  PPI gtt.  .  Na 159.  INR 3.1  Hb 11.      :  ICU D # 2.  Pt seen and examined in ICU--seen by lavon rollins--not safe to eat.  Remains on phenyl gtt 0.7  Na 148.  Stable H/H      PMH:  As above.     PSH:  As above.     FH: Non Contributory other than those listed in HPI    Social History:      MEDICATIONS  (STANDING):  calcium carbonate    500 mG (Tums) Chewable 1 Tablet(s) Chew daily  cholecalciferol Oral Tab/Cap - Peds 2000 Unit(s) Oral daily  Dakins Solution - 1/2 Strength 1 Application(s) Topical daily  dextrose 5%. 1000 milliLiter(s) (125 mL/Hr) IV Continuous <Continuous>  dextrose 50% Injectable 12.5 Gram(s) IV Push once  dextrose 50% Injectable 25 Gram(s) IV Push once  dextrose 50% Injectable 25 Gram(s) IV Push once  ferrous    sulfate Liquid 300 milliGRAM(s) Oral daily  insulin lispro (HumaLOG) corrective regimen sliding scale   SubCutaneous every 6 hours  pantoprazole  Injectable 40 milliGRAM(s) IV Push two times a day  phenylephrine    Infusion 0.5 MICROgram(s)/kG/Min (13.894 mL/Hr) IV Continuous <Continuous>  piperacillin/tazobactam IVPB. 3.375 Gram(s) IV Intermittent every 8 hours  polyethylene glycol 3350 17 Gram(s) Oral daily  potassium phosphate IVPB 15 milliMole(s) IV Intermittent once  senna 2 Tablet(s) Oral at bedtime  vancomycin  IVPB 750 milliGRAM(s) IV Intermittent every 12 hours  zinc oxide 20% Ointment 1 Application(s) Topical daily    MEDICATIONS  (PRN):  acetaminophen  Suppository .. 650 milliGRAM(s) Rectal every 6 hours PRN Temp greater or equal to 38C (100.4F)  dextrose 40% Gel 15 Gram(s) Oral once PRN Blood Glucose LESS THAN 70 milliGRAM(s)/deciliter  glucagon  Injectable 1 milliGRAM(s) IntraMuscular once PRN Glucose LESS THAN 70 milligrams/deciliter      Allergies: NKDA    ROS:  SEE BELOW        ICU Vital Signs Last 24 Hrs  T(C): 38.2 (2019 06:00), Max: 38.8 (2019 20:00)  T(F): 100.8 (2019 06:00), Max: 101.9 (2019 20:00)  HR: 65 (2019 06:00) (50 - 96)  BP: 95/45 (2019 06:00) (75/58 - 139/125)  BP(mean): 60 (2019 06:00) (52 - 132)  ABP: --  ABP(mean): --  RR: 23 (2019 06:00) (17 - 28)  SpO2: 99% (2019 06:00) (88% - 100%)          I&O's Summary    2019 07:01  -  2019 07:00  --------------------------------------------------------  IN: 4248.1 mL / OUT: 1600 mL / NET: 2648.1 mL        Physical Exam:  SEE BELOW                          10.0   7.31  )-----------( 152      ( 2019 06:10 )             33.7           148<H>  |  118<H>  |  18  ----------------------------<  170<H>  3.2<L>   |  21<L>  |  1.34<H>    Ca    7.4<L>      2019 06:10  Phos  2.2     -24  Mg     1.8         TPro  6.1  /  Alb  2.0<L>  /  TBili  0.7  /  DBili  x   /  AST  17  /  ALT  23  /  AlkPhos  97                  Urinalysis Basic - ( 2019 17:34 )    Color: Yellow / Appearance: very cloudy / S.020 / pH: x  Gluc: x / Ketone: Negative  / Bili: Negative / Urobili: Negative mg/dL   Blood: x / Protein: 15 mg/dL / Nitrite: Positive   Leuk Esterase: Moderate / RBC: 11-25 /HPF / WBC 26-50   Sq Epi: x / Non Sq Epi: Occasional / Bacteria: Many        DVT Prophylaxis:                                                            Contraindication:     Advanced Directives:    Discussed with:    Visit Information:  Time spent excluding procedure:  45 cc mins    ** Time is exclusive of billed procedures and/or teaching and/or routine family updates.

## 2019-04-25 LAB
ANION GAP SERPL CALC-SCNC: 6 MMOL/L — SIGNIFICANT CHANGE UP (ref 5–17)
APTT BLD: 29.4 SEC — SIGNIFICANT CHANGE UP (ref 27.5–36.3)
BUN SERPL-MCNC: 11 MG/DL — SIGNIFICANT CHANGE UP (ref 7–23)
CALCIUM SERPL-MCNC: 7.9 MG/DL — LOW (ref 8.5–10.1)
CHLORIDE SERPL-SCNC: 117 MMOL/L — HIGH (ref 96–108)
CO2 SERPL-SCNC: 25 MMOL/L — SIGNIFICANT CHANGE UP (ref 22–31)
CREAT SERPL-MCNC: 1.13 MG/DL — SIGNIFICANT CHANGE UP (ref 0.5–1.3)
GLUCOSE BLDC GLUCOMTR-MCNC: 103 MG/DL — HIGH (ref 70–99)
GLUCOSE BLDC GLUCOMTR-MCNC: 104 MG/DL — HIGH (ref 70–99)
GLUCOSE BLDC GLUCOMTR-MCNC: 104 MG/DL — HIGH (ref 70–99)
GLUCOSE SERPL-MCNC: 103 MG/DL — HIGH (ref 70–99)
HCT VFR BLD CALC: 34.8 % — LOW (ref 39–50)
HGB BLD-MCNC: 10.7 G/DL — LOW (ref 13–17)
INR BLD: 1.49 RATIO — HIGH (ref 0.88–1.16)
MAGNESIUM SERPL-MCNC: 1.9 MG/DL — SIGNIFICANT CHANGE UP (ref 1.6–2.6)
MCHC RBC-ENTMCNC: 23.6 PG — LOW (ref 27–34)
MCHC RBC-ENTMCNC: 30.7 GM/DL — LOW (ref 32–36)
MCV RBC AUTO: 76.7 FL — LOW (ref 80–100)
NRBC # BLD: 0 /100 WBCS — SIGNIFICANT CHANGE UP (ref 0–0)
PHOSPHATE SERPL-MCNC: 2.2 MG/DL — LOW (ref 2.5–4.5)
PLATELET # BLD AUTO: 161 K/UL — SIGNIFICANT CHANGE UP (ref 150–400)
POTASSIUM SERPL-MCNC: 2.8 MMOL/L — CRITICAL LOW (ref 3.5–5.3)
POTASSIUM SERPL-SCNC: 2.8 MMOL/L — CRITICAL LOW (ref 3.5–5.3)
PROTHROM AB SERPL-ACNC: 16.8 SEC — HIGH (ref 10–12.9)
RBC # BLD: 4.54 M/UL — SIGNIFICANT CHANGE UP (ref 4.2–5.8)
RBC # FLD: 21.1 % — HIGH (ref 10.3–14.5)
SODIUM SERPL-SCNC: 148 MMOL/L — HIGH (ref 135–145)
VANCOMYCIN TROUGH SERPL-MCNC: 15.9 UG/ML — SIGNIFICANT CHANGE UP (ref 10–20)
WBC # BLD: 7.52 K/UL — SIGNIFICANT CHANGE UP (ref 3.8–10.5)
WBC # FLD AUTO: 7.52 K/UL — SIGNIFICANT CHANGE UP (ref 3.8–10.5)

## 2019-04-25 PROCEDURE — 74018 RADEX ABDOMEN 1 VIEW: CPT | Mod: 26

## 2019-04-25 RX ORDER — POTASSIUM CHLORIDE 20 MEQ
10 PACKET (EA) ORAL
Qty: 0 | Refills: 0 | Status: COMPLETED | OUTPATIENT
Start: 2019-04-25 | End: 2019-04-25

## 2019-04-25 RX ORDER — POTASSIUM CHLORIDE 20 MEQ
10 PACKET (EA) ORAL
Qty: 0 | Refills: 0 | Status: DISCONTINUED | OUTPATIENT
Start: 2019-04-25 | End: 2019-04-25

## 2019-04-25 RX ORDER — SODIUM,POTASSIUM PHOSPHATES 278-250MG
2 POWDER IN PACKET (EA) ORAL EVERY 6 HOURS
Qty: 0 | Refills: 0 | Status: COMPLETED | OUTPATIENT
Start: 2019-04-25 | End: 2019-04-25

## 2019-04-25 RX ORDER — WARFARIN SODIUM 2.5 MG/1
2.5 TABLET ORAL ONCE
Qty: 0 | Refills: 0 | Status: COMPLETED | OUTPATIENT
Start: 2019-04-25 | End: 2019-04-25

## 2019-04-25 RX ADMIN — MEROPENEM 100 MILLIGRAM(S): 1 INJECTION INTRAVENOUS at 21:22

## 2019-04-25 RX ADMIN — Medication 100 MILLIEQUIVALENT(S): at 08:05

## 2019-04-25 RX ADMIN — MEROPENEM 100 MILLIGRAM(S): 1 INJECTION INTRAVENOUS at 06:03

## 2019-04-25 RX ADMIN — WARFARIN SODIUM 2.5 MILLIGRAM(S): 2.5 TABLET ORAL at 21:23

## 2019-04-25 RX ADMIN — PHENYLEPHRINE HYDROCHLORIDE 13.89 MICROGRAM(S)/KG/MIN: 10 INJECTION INTRAVENOUS at 06:04

## 2019-04-25 RX ADMIN — Medication 100 MILLIEQUIVALENT(S): at 08:47

## 2019-04-25 RX ADMIN — PANTOPRAZOLE SODIUM 40 MILLIGRAM(S): 20 TABLET, DELAYED RELEASE ORAL at 06:02

## 2019-04-25 RX ADMIN — Medication 250 MILLIGRAM(S): at 18:15

## 2019-04-25 RX ADMIN — Medication 2000 UNIT(S): at 11:36

## 2019-04-25 RX ADMIN — POLYETHYLENE GLYCOL 3350 17 GRAM(S): 17 POWDER, FOR SOLUTION ORAL at 11:36

## 2019-04-25 RX ADMIN — Medication 2 PACKET(S): at 16:07

## 2019-04-25 RX ADMIN — ZINC OXIDE 1 APPLICATION(S): 200 OINTMENT TOPICAL at 11:37

## 2019-04-25 RX ADMIN — PANTOPRAZOLE SODIUM 40 MILLIGRAM(S): 20 TABLET, DELAYED RELEASE ORAL at 18:15

## 2019-04-25 RX ADMIN — MEROPENEM 100 MILLIGRAM(S): 1 INJECTION INTRAVENOUS at 13:02

## 2019-04-25 RX ADMIN — Medication 300 MILLIGRAM(S): at 11:36

## 2019-04-25 RX ADMIN — SENNA PLUS 2 TABLET(S): 8.6 TABLET ORAL at 21:22

## 2019-04-25 RX ADMIN — Medication 2 PACKET(S): at 10:43

## 2019-04-25 RX ADMIN — Medication 250 MILLIGRAM(S): at 08:51

## 2019-04-25 RX ADMIN — Medication 100 MILLIEQUIVALENT(S): at 09:48

## 2019-04-25 RX ADMIN — Medication 1 APPLICATION(S): at 11:37

## 2019-04-25 NOTE — CHART NOTE - NSCHARTNOTEFT_GEN_A_CORE
Upon Nutritional Assessment by the Registered Dietitian your patient was determined to meet criteria / has evidence of the following diagnosis/diagnoses:          [ ]  Mild Protein Calorie Malnutrition        [x]  Moderate Protein Calorie Malnutrition        [ ] Severe Protein Calorie Malnutrition        [ ] Unspecified Protein Calorie Malnutrition        [ ] Underweight / BMI <19        [ ] Morbid Obesity / BMI > 40      Findings:  Upon visit, pt appears overweight; NFPE significant for severe muscle wasting; temporal, thigh, and calf.  moderate muscle wasting; clavicle.  moderate fat wasting; orbital.  mild fat wasting; rib.  Pt unable to provide any additional information 2/2 dementia.  Left message at NH to obtain information.  Pt has now been NPO x 3 days with SLP eval x 2; both times SLP rec'd NPO status continued.  d/w MD, pending Korpack placement today.  (+) mild generalized and Lt arm edema.  BM (+) 4/24, on bowel regimen and iron supplementation.  Danilo score of 12, no PU noted.      ***Based on above, pt meets criteria for moderate malnutrition in chronic illness (severe muscle/mod fat wasting; mild edema; NPO x 3 days). ***    Findings as based on:  •  Comprehensive nutrition assessment and consultation  •  Calorie counts (nutrient intake analysis)  •  Food acceptance and intake status from observations by staff  •  Follow up  •  Patient education  •  Intervention secondary to interdisciplinary rounds  •   concerns      Treatment:    The following diet has been recommended:  1) when feasible, start TF with Glucerna 1.5 @ 20mL/hr and titrate with tolerance, slowly by 10mL q6hrs to goal rate of 55mL/hr (1815Kcal, 100g protein, and 918mL free water)   2) consider free water flushes of 40mL/hr (=880mL with total free water of (TF+flushes) 1798mL)   3) monitor TF tolerance, keep back of bed > 35 degrees   4) monitor lytes/mins; replete/correct prn    5) biweekly wt checks    PROVIDER Section:     By signing this assessment you are acknowledging and agree with the diagnosis/diagnoses assigned by the Registered Dietitian    Comments: Upon Nutritional Assessment by the Registered Dietitian your patient was determined to meet criteria / has evidence of the following diagnosis/diagnoses:          [ ]  Mild Protein Calorie Malnutrition        [ ]  Moderate Protein Calorie Malnutrition        [x] Severe Protein Calorie Malnutrition        [ ] Unspecified Protein Calorie Malnutrition        [ ] Underweight / BMI <19        [ ] Morbid Obesity / BMI > 40      Findings:  Upon visit, pt appears overweight; NFPE significant for severe muscle wasting; temporal, thigh, and calf.  moderate muscle wasting; clavicle.  moderate fat wasting; orbital.  mild fat wasting; rib.  Pt unable to provide any additional information 2/2 dementia.  Left message at NH to obtain information. {addendum: received call back from NH; pt with fair PO intake, meeting <75% of estimated nutr needs chronically with 7# wt loss in past 6 months; 5% wt loss, not clinically significant)  Pt has now been NPO x 3 days with SLP eval x 2; both times SLP rec'd NPO status continued.  d/w MD, pending Korpack placement today.  (+) mild generalized and Lt arm edema.  BM (+) 4/24, on bowel regimen and iron supplementation.  Danilo score of 12, no PU noted.      ***Based on above, pt meets criteria for severe malnutrition in chronic illness (severe muscle/mod fat wasting; mild edema; NPO x 3 days with meeting <75% of estimated nutr needs chronically per NH). ***    Findings as based on:  •  Comprehensive nutrition assessment and consultation  •  Calorie counts (nutrient intake analysis)  •  Food acceptance and intake status from observations by staff  •  Follow up  •  Patient education  •  Intervention secondary to interdisciplinary rounds  •   concerns      Treatment:    The following diet has been recommended:  1) when feasible, start TF with Glucerna 1.5 @ 20mL/hr and titrate with tolerance, slowly by 10mL q6hrs to goal rate of 55mL/hr (1815Kcal, 100g protein, and 918mL free water)   2) consider free water flushes of 40mL/hr (=880mL with total free water of (TF+flushes) 1798mL)   3) monitor TF tolerance, keep back of bed > 35 degrees   4) monitor lytes/mins; replete/correct prn    5) biweekly wt checks    PROVIDER Section:     By signing this assessment you are acknowledging and agree with the diagnosis/diagnoses assigned by the Registered Dietitian    Comments:

## 2019-04-25 NOTE — PROGRESS NOTE ADULT - SUBJECTIVE AND OBJECTIVE BOX
Patient is a 78y old  Male who presents with a chief complaint of Sepsis, GI bleed (23 Apr 2019 10:34)    Date of service: 04-25-19 @ 13:00      Patient moaning in bed; afebrile          ROS unable to obtain secondary to patient medical condition     MEDICATIONS  (STANDING):  calcium carbonate    500 mG (Tums) Chewable 1 Tablet(s) Chew daily  cholecalciferol Oral Tab/Cap - Peds 2000 Unit(s) Oral daily  Dakins Solution - 1/2 Strength 1 Application(s) Topical daily  dextrose 50% Injectable 12.5 Gram(s) IV Push once  dextrose 50% Injectable 25 Gram(s) IV Push once  dextrose 50% Injectable 25 Gram(s) IV Push once  ferrous    sulfate Liquid 300 milliGRAM(s) Oral daily  insulin lispro (HumaLOG) corrective regimen sliding scale   SubCutaneous every 6 hours  meropenem  IVPB      meropenem  IVPB 500 milliGRAM(s) IV Intermittent every 8 hours  pantoprazole  Injectable 40 milliGRAM(s) IV Push two times a day  phenylephrine    Infusion 0.5 MICROgram(s)/kG/Min (13.894 mL/Hr) IV Continuous <Continuous>  polyethylene glycol 3350 17 Gram(s) Oral daily  potassium phosphate / sodium phosphate powder 2 Packet(s) Oral every 6 hours  senna 2 Tablet(s) Oral at bedtime  vancomycin  IVPB 750 milliGRAM(s) IV Intermittent every 12 hours  warfarin 5 milliGRAM(s) Oral once  zinc oxide 20% Ointment 1 Application(s) Topical daily    MEDICATIONS  (PRN):  acetaminophen  Suppository .. 650 milliGRAM(s) Rectal every 6 hours PRN Temp greater or equal to 38C (100.4F)  dextrose 40% Gel 15 Gram(s) Oral once PRN Blood Glucose LESS THAN 70 milliGRAM(s)/deciliter  glucagon  Injectable 1 milliGRAM(s) IntraMuscular once PRN Glucose LESS THAN 70 milligrams/deciliter      Vital Signs Last 24 Hrs  T(C): 37.4 (25 Apr 2019 10:00), Max: 37.4 (25 Apr 2019 00:00)  T(F): 99.4 (25 Apr 2019 10:00), Max: 99.4 (25 Apr 2019 10:00)  HR: 76 (25 Apr 2019 13:00) (51 - 96)  BP: 119/45 (25 Apr 2019 12:00) (72/53 - 132/77)  BP(mean): 64 (25 Apr 2019 12:00) (53 - 118)  RR: 29 (25 Apr 2019 13:00) (18 - 36)  SpO2: 100% (25 Apr 2019 13:00) (98% - 100%)    Physical Exam:            PE:    Constitutional: frail looking  HEENT: NC/AT, EOMI, PERRLA, conjunctivae clear; ears and nose atraumatic; caked blood in oropharynx  Neck: supple; thyroid not palpable  Respiratory: respiratory effort normal; scattered coarse breath sounds  Cardiovascular: S1S2 regular, no murmurs  Abdomen: soft, not tender, not distended, positive BS; no liver or spleen organomegaly  Genitourinary: no suprapubic tenderness  Musculoskeletal: no muscle tenderness, left hip with yellow- green drainage in linear skin breakdown with two distal circular lesions with brown drainage  Neurological/ Psychiatric: AxOx3, judgement and insight normal;  moving all extremities  Skin: no rashes; no palpable lesions    Labs: all available labs reviewed                         Labs:                        Labs:                        10.7   7.52  )-----------( 161      ( 25 Apr 2019 05:31 )             34.8     04-25    148<H>  |  117<H>  |  11  ----------------------------<  103<H>  2.8<LL>   |  25  |  1.13    Ca    7.9<L>      25 Apr 2019 05:31  Phos  2.2     04-25  Mg     1.9     04-25         Vancomycin Level, Trough: 15.9 ug/mL (04-25 @ 05:31)      Cultures:       Culture - Urine (collected 04-22-19 @ 17:34)  Source: .Urine Clean Catch (Midstream)  Final Report (04-24-19 @ 17:37):    >100,000 CFU/ml Escherichia coli ESBL  Organism: Escherichia coli ESBL (04-24-19 @ 17:37)  Organism: Escherichia coli ESBL (04-24-19 @ 17:37)      -  Amikacin: S <=16      -  Ampicillin: R >16 These ampicillin results predict results for amoxicillin      -  Ampicillin/Sulbactam: R <=8/4      -  Aztreonam: R >16      -  Cefazolin: R >16 For uncomplicated UTI with K. pneumoniae, E. coli, or P. mirablis: HOWIE <=16 is sensitive and HOWIE >=32 is resistant. This also predicts results for oral agents cefaclor, cefdinir, cefpodoxime, cefprozil, cefuroxime axetil, cephalexin and locarbef for uncomplicated UTI. Note that some isolates may be susceptible to these agents while testing resistant to cefazolin.      -  Cefepime: R >16      -  Cefoxitin: R >16      -  Ceftriaxone: R >32 Enterobacter, Citrobacter, and Serratia may develop resistance during prolonged therapy      -  Ciprofloxacin: R >2      -  Ertapenem: S <=1      -  Gentamicin: S <=4      -  Imipenem: S <=1      -  Levofloxacin: R >4      -  Meropenem: S <=1      -  Nitrofurantoin: S <=32 Should not be used to treat pyelonephritis      -  Piperacillin/Tazobactam: R <=16      -  Tigecycline: S <=2      -  Tobramycin: S <=4      -  Trimethoprim/Sulfamethoxazole: R >2/38      Method Type: HOWIE    Culture - Blood (collected 04-22-19 @ 17:34)  Source: .Blood Blood-Peripheral  Preliminary Report (04-24-19 @ 01:03):    No growth to date.    Culture - Blood (collected 04-22-19 @ 17:34)  Source: .Blood Blood-Peripheral  Preliminary Report (04-24-19 @ 01:03):    No growth to date.              < from: CT Abdomen and Pelvis w/ IV Cont (04.22.19 @ 21:32) >    EXAM:  CT ABDOMEN AND PELVIS IC                            PROCEDURE DATE:  04/22/2019          INTERPRETATION:  .    CLINICAL INFORMATION: Vomiting coffee grounds.    TECHNIQUE: Multiple axial CT images of the head were obtained without   contrast. Sagittal and coronal reconstructed images were acquired from   the source data.    COMPARISON: Prior CT examination of the abdomen and pelvis from 12/9/2016.    FINDINGS: A nonspecific subcentimeter prevascular lymph node is notable   within the mediastinum. The heart size is normal. There are   atherosclerotic calcifications of the imaged coronary arteries, aorta,   and branch vessels. The imaged portions of the aorta are normal in   caliber. An infrarenal IVC filter is redemonstrated.    Patchy confluent opacities are noted within the bilateral lower lobes.    Scattered liver cysts appear unchanged when compared to the prior CT   exam. Cholelithiasis is noted. There is no gallbladder wall thickening.    The pancreas, spleen, and adrenal glands appear unremarkable.    Multiple stones are again noted within the right renal collecting system,   the largest of which measures up to 1.5 cm in the upper pole. There is   right-sided urothelial enhancement and right-sided periureteral fat   stranding. There is no right-sided hydronephrosis. There is minimal   nonspecific left-sided perinephric stranding.    The urinary bladder is underdistended, limiting evaluation.    There are multiple scattered nonspecific subcentimeter retroperitoneal   and mesenteric lymph nodes.    There is a small hiatal hernia. The stomach is underdistended. There is   no small bowel obstruction. A very large fecalith is notable within the   rectum. There is mild rectal wall thickening and presacral edema. An   additional moderate amount of stool is notable throughout the large   bowel. A few colonic diverticula are notable. The appendix is not clearly   visualized.    The prostate gland and seminal vesicles appear unremarkable. A small   fat-containing left-sided inguinal hernia is noted.    A left-sided total hip arthroplasty is again noted. There is extensive   adjacent heterotopic ossification. The hip prosthesis generates streak   and beam hardening artifact limiting evaluation of the adjacent   structures. There is fatty atrophy of the left adductor compartment.    There is generalized osteopenia. Multilevel degenerative changes are   noted within the imaged potions of the spine.    < from: CT Abdomen and Pelvis w/ IV Cont (04.22.19 @ 21:32) >  MPRESSION:    1. Bilateral patchy consolidation within the lower lobes which may   reflect pneumonia. Aspiration can be considered, given clinical history.    2. Large fecalith within the rectal vault with rectal wall thickening and   presacral edema for which stercoral colitis can be considered.    3. Nonobstructing right-sided stones within the renal collecting system.   Right-sided urothelial enhancement and proximal ureteral enhancement   compatible with right-sided pyelitis and ureteritis. Correlate with   urinalysis.    4. Other findings, as discussed.    < end of copied text >    < end of copied text >      Radiology: all available radiological tests reviewed    Advanced directives addressed: full resuscitation

## 2019-04-25 NOTE — DIETITIAN INITIAL EVALUATION ADULT. - NS AS NUTRI INTERV ENTERAL NUTRITION
1) when feasible, start TF with Glucerna 1.5 @ 20mL/hr and titrate with tolerance, slowly by 10mL q6hrs to goal rate of 55mL/hr (1815Kcal, 100g protein, and 918mL free water) 2) consider free water flushes of 40mL/hr (=880mL with total free water of (TF+flushes) 1798mL)/Concentration/Rate/Composition

## 2019-04-25 NOTE — PROGRESS NOTE ADULT - SUBJECTIVE AND OBJECTIVE BOX
CC: Coffee ground emesis and fever    HPI:  77 y/o male w/ PMH significant for Afib (coumadin), Dementia, DM, DVT presented to ED from [facility] for coffee ground emesis and T. Max of 102.8. Unable to illicit any HPI or ROS 2/2  altered mental status. In the ED, patient found to have supra therapeutic INR 4.25, Lactate 2.1, MIGUEL, UA (+), RVP negative, CXR negative, Guaiac stool (+), CT abdomen with possible PNA, sterocoral colitis, non-obstructing R-sided renal stone and Right sided pyelitis and uteritis. Pt. given IV fluids per sepsis protocol, IV Vanco and cefepime. Pt. remained hypotensive and transferred to ICU for pressor support.     Interval HPI:  Patient seen and examined. Unable to provide history due to mental status. Management per ICU.    Review of Systems: Unable to obtain 2/2 altered mental status    MEDICATIONS  (STANDING):  calcium carbonate    500 mG (Tums) Chewable 1 Tablet(s) Chew daily  cholecalciferol Oral Tab/Cap - Peds 2000 Unit(s) Oral daily  Dakins Solution - 1/2 Strength 1 Application(s) Topical daily  dextrose 50% Injectable 12.5 Gram(s) IV Push once  dextrose 50% Injectable 25 Gram(s) IV Push once  dextrose 50% Injectable 25 Gram(s) IV Push once  ferrous    sulfate Liquid 300 milliGRAM(s) Oral daily  insulin lispro (HumaLOG) corrective regimen sliding scale   SubCutaneous every 6 hours  meropenem  IVPB      meropenem  IVPB 500 milliGRAM(s) IV Intermittent every 8 hours  pantoprazole  Injectable 40 milliGRAM(s) IV Push two times a day  phenylephrine    Infusion 0.5 MICROgram(s)/kG/Min (13.894 mL/Hr) IV Continuous <Continuous>  polyethylene glycol 3350 17 Gram(s) Oral daily  potassium phosphate / sodium phosphate powder 2 Packet(s) Oral every 6 hours  senna 2 Tablet(s) Oral at bedtime  vancomycin  IVPB 750 milliGRAM(s) IV Intermittent every 12 hours  warfarin 2.5 milliGRAM(s) Oral once  zinc oxide 20% Ointment 1 Application(s) Topical daily    MEDICATIONS  (PRN):  acetaminophen  Suppository .. 650 milliGRAM(s) Rectal every 6 hours PRN Temp greater or equal to 38C (100.4F)  dextrose 40% Gel 15 Gram(s) Oral once PRN Blood Glucose LESS THAN 70 milliGRAM(s)/deciliter  glucagon  Injectable 1 milliGRAM(s) IntraMuscular once PRN Glucose LESS THAN 70 milligrams/deciliter    Vital Signs (24 Hrs):  T(C): 37.2 (19 @ 14:00), Max: 37.4 (19 @ 00:00)  HR: 76 (19 @ 13:00) (51 - 96)  BP: 106/47 (19 @ 13:00) (72/53 - 132/77)  RR: 29 (19 @ 13:00) (18 - 36)  SpO2: 100% (19 @ 13:00) (98% - 100%)  Wt(kg): --  Daily     Daily Weight in k (2019 08:32)    I&O's Summary    2019 07:  -  2019 07:00  --------------------------------------------------------  IN: 3255 mL / OUT: 2940 mL / NET: 315 mL    2019 07:  -  2019 14:05  --------------------------------------------------------  IN: 610 mL / OUT: 550 mL / NET: 60 mL      GENERAL: NAD   HEENOT: Atraumatic, Normocephalic, conjunctiva and sclera clear, moist mucous membranes  NECK: Supple, No JVD  NERVOUS SYSTEM:  Disoriented x4 unable to follow commands or answer questions appropriately  CHEST/LUNG: Clear to auscultation bilaterally; No rales, rhonchi, wheezing, or rubs  HEART: S1 S2 normal, no murmur  ABDOMEN: Soft, Nontender, Nondistended; Bowel sounds present  GENITOURINARY: Bautista in place   EXTREMITIES:  2+ Peripheral Pulses, No clubbing, cyanosis, or edema  SKIN: No rash, no lesion    LABS:                        10.7   7.52  )-----------( 161      ( 2019 05:31 )             34.8     2019 05:31    148    |  117    |  11     ----------------------------<  103    2.8     |  25     |  1.13     Ca    7.9        2019 05:31  Phos  2.2       2019 05:31  Mg     1.9       2019 05:31      PT/INR - ( 2019 05:31 )   PT: 16.8 sec;   INR: 1.49 ratio         PTT - ( 2019 05:31 )  PTT:29.4 sec    Color: Yellow / Appearance: very cloudy / S.020 / pH: x  Gluc: x / Ketone: Negative  / Bili: Negative / Urobili: Negative mg/dL   Blood: x / Protein: 15 mg/dL / Nitrite: Positive   Leuk Esterase: Moderate / RBC: 11-25 /HPF / WBC 26-50   Sq Epi: x / Non Sq Epi: Occasional / Bacteria: Many      < from: Xray Kidney Ureter Bladder (19 @ 10:35) >  IMPRESSION:   Enteric tube with tip overlying the stomach. Nonspecific bowel gas   pattern. Moderate stool burden in the rectum. Degenerative changes of the   spine. Status post left hip arthroplasty with adjacent heterotopic   ossification.    < end of copied text >      < from: CT Abdomen and Pelvis w/ IV Cont (19 @ 21:32) >  MPRESSION:    1. Bilateral patchy consolidation within the lower lobes which may   reflect pneumonia. Aspiration can be considered, given clinical history.    2. Large fecalith within the rectal vault with rectal wall thickening and   presacral edema for which stercoral colitis can be considered.    3. Nonobstructing right-sided stones within the renal collecting system.   Right-sided urothelial enhancement and proximal ureteral enhancement   compatible with right-sided pyelitis and ureteritis. Correlate with   urinalysis.    4. Other findings, as discussed.    < end of copied text >

## 2019-04-25 NOTE — PROGRESS NOTE ADULT - ASSESSMENT
IMP:    79 y/o male with advance dementia and AF on coumadin admitted on 4/22 to SDU for possible GIB and UTI sepsis--found to have R non obst renal stone and pyelo/possible Aspiration PNA and colitis.  Tx to ICU for septic shock, Hypernatremia and MIGUEL (KDIGO 1) with baseline Cr 0.9   Does not appear he has GIB  Chronic L hip OM on doxy as outpt    Plan:    Titrate phenyl to SBP > 90  IV Vanco/ pip/mayra (2 Stopped)--Merro (2)  NPO--Corpak placed via R nasal passage  HOB > 30  Aspiration precautions   PPI q12  DC IVF today--start on TF and Free water  Coumadin 5 tonight   DVT prophy  Restraint to prevent self harm and removal of medical devices    ICU care--d/w ICU staff

## 2019-04-25 NOTE — PROGRESS NOTE ADULT - SUBJECTIVE AND OBJECTIVE BOX
CC:  Sepsis     HPI:    77 y/o male with advance dementia and AF on coumadin admitted on 4/22 to SDU for possible GIB and UTI sepsis--found to have R non obst renal stone and pyelo/possible Aspiration PNA and colitis.  He received 3L IVF while in SD--remain hypotensive--Tx to ICU for pressors.    4/23:  ICU D # 1.  Pt seen and examined in ICU--lethargic--arousable--On Santana 1.  PPI gtt.  .  Na 159.  INR 3.1  Hb 11.      4/24:  ICU D # 2.  Pt seen and examined in ICU--seen by lavon rollins--not safe to eat.  Remains on phenyl gtt 0.7  Na 148.  Stable H/H    4/25:  ICU D # 3.  Pt seen and examined in ICU--ESBL in UCx--very confused and unable to marin PO.  Off pressors at this time.  Na 148      PMH:  As above.     PSH:  As above.     FH: Non Contributory other than those listed in HPI    Social History:      MEDICATIONS  (STANDING):  calcium carbonate    500 mG (Tums) Chewable 1 Tablet(s) Chew daily  cholecalciferol Oral Tab/Cap - Peds 2000 Unit(s) Oral daily  Dakins Solution - 1/2 Strength 1 Application(s) Topical daily  dextrose 5%. 1000 milliLiter(s) (125 mL/Hr) IV Continuous <Continuous>  dextrose 50% Injectable 12.5 Gram(s) IV Push once  dextrose 50% Injectable 25 Gram(s) IV Push once  dextrose 50% Injectable 25 Gram(s) IV Push once  ferrous    sulfate Liquid 300 milliGRAM(s) Oral daily  insulin lispro (HumaLOG) corrective regimen sliding scale   SubCutaneous every 6 hours  meropenem  IVPB      meropenem  IVPB 500 milliGRAM(s) IV Intermittent every 8 hours  pantoprazole  Injectable 40 milliGRAM(s) IV Push two times a day  phenylephrine    Infusion 0.5 MICROgram(s)/kG/Min (13.894 mL/Hr) IV Continuous <Continuous>  polyethylene glycol 3350 17 Gram(s) Oral daily  potassium chloride  10 mEq/100 mL IVPB 10 milliEquivalent(s) IV Intermittent every 1 hour  senna 2 Tablet(s) Oral at bedtime  vancomycin  IVPB 750 milliGRAM(s) IV Intermittent every 12 hours  zinc oxide 20% Ointment 1 Application(s) Topical daily    MEDICATIONS  (PRN):  acetaminophen  Suppository .. 650 milliGRAM(s) Rectal every 6 hours PRN Temp greater or equal to 38C (100.4F)  dextrose 40% Gel 15 Gram(s) Oral once PRN Blood Glucose LESS THAN 70 milliGRAM(s)/deciliter  glucagon  Injectable 1 milliGRAM(s) IntraMuscular once PRN Glucose LESS THAN 70 milligrams/deciliter      Allergies: NKDA    ROS:  SEE BELOW        ICU Vital Signs Last 24 Hrs  T(C): 37.2 (25 Apr 2019 04:00), Max: 37.6 (24 Apr 2019 09:35)  T(F): 99 (25 Apr 2019 04:00), Max: 99.6 (24 Apr 2019 09:35)  HR: 89 (25 Apr 2019 08:00) (45 - 96)  BP: 132/77 (25 Apr 2019 08:00) (72/53 - 132/77)  BP(mean): 95 (25 Apr 2019 08:00) (53 - 118)  ABP: --  ABP(mean): --  RR: 27 (25 Apr 2019 08:00) (18 - 36)  SpO2: 100% (25 Apr 2019 08:00) (96% - 100%)          I&O's Summary    24 Apr 2019 07:01  -  25 Apr 2019 07:00  --------------------------------------------------------  IN: 3255 mL / OUT: 2940 mL / NET: 315 mL    25 Apr 2019 07:01  -  25 Apr 2019 08:43  --------------------------------------------------------  IN: 100 mL / OUT: 0 mL / NET: 100 mL        Physical Exam:  SEE BELOW                          10.7   7.52  )-----------( 161      ( 25 Apr 2019 05:31 )             34.8       04-25    148<H>  |  117<H>  |  11  ----------------------------<  103<H>  2.8<LL>   |  25  |  1.13    Ca    7.9<L>      25 Apr 2019 05:31  Phos  2.2     04-25  Mg     1.9     04-25                      DVT Prophylaxis:                                                            Contraindication:     Advanced Directives:    Discussed with:    Visit Information:  Time spent excluding procedure:  30 cc mins    ** Time is exclusive of billed procedures and/or teaching and/or routine family updates.

## 2019-04-25 NOTE — DIETITIAN INITIAL EVALUATION ADULT. - PHYSICAL APPEARANCE
other (specify)/NFPE significant for severe muscle wasting; temporal, thigh, and calf.  moderate muscle wasting; clavicle.  moderate fat wasting; orbital.  mild fat wasting; rib/overweight

## 2019-04-25 NOTE — PROGRESS NOTE ADULT - ASSESSMENT
78 year old male patient with pertinent history of Afib on coumadin, Dementia and anemia admitted on 4/22 from snf for evaluation of vomiting coffee ground emesis and fever to 100.8; patient was transferred to icu given hypotension and required pressor support. History per medical record and patient unable to provide history.   1. Patient admitted with septic shock, unclear etiology, possibly due to urinary origin versus pneumonia, versus soft tissue infection of left hip  - follow up cultures ---- found to have ESBL E coli in urine  - iv hydration and supportive care   - on pressor support per icu  - serial cbc and monitor temperature   - reviewed prior medical records to evaluate for resistant or atypical pathogens   - day #3 vancomycin  - tolerating antibiotics without rashes or side effects   - vancomycin trough is appropriate  - zosyn changed to meropenem given urine culture results  - wound care of hip  2. other issues; Afib on coumadin, Dementia and anemia  - per medicine

## 2019-04-25 NOTE — DIETITIAN INITIAL EVALUATION ADULT. - PERTINENT MEDS FT
MEDICATIONS  (STANDING):  calcium carbonate    500 mG (Tums) Chewable 1 Tablet(s) Chew daily  cholecalciferol Oral Tab/Cap - Peds 2000 Unit(s) Oral daily  Dakins Solution - 1/2 Strength 1 Application(s) Topical daily  dextrose 5%. 1000 milliLiter(s) (125 mL/Hr) IV Continuous <Continuous>  dextrose 50% Injectable 12.5 Gram(s) IV Push once  dextrose 50% Injectable 25 Gram(s) IV Push once  dextrose 50% Injectable 25 Gram(s) IV Push once  ferrous    sulfate Liquid 300 milliGRAM(s) Oral daily  insulin lispro (HumaLOG) corrective regimen sliding scale   SubCutaneous every 6 hours  meropenem  IVPB      meropenem  IVPB 500 milliGRAM(s) IV Intermittent every 8 hours  pantoprazole  Injectable 40 milliGRAM(s) IV Push two times a day  phenylephrine    Infusion 0.5 MICROgram(s)/kG/Min (13.894 mL/Hr) IV Continuous <Continuous>  polyethylene glycol 3350 17 Gram(s) Oral daily  potassium chloride  10 mEq/100 mL IVPB 10 milliEquivalent(s) IV Intermittent every 1 hour  senna 2 Tablet(s) Oral at bedtime  vancomycin  IVPB 750 milliGRAM(s) IV Intermittent every 12 hours  zinc oxide 20% Ointment 1 Application(s) Topical daily    MEDICATIONS  (PRN):  acetaminophen  Suppository .. 650 milliGRAM(s) Rectal every 6 hours PRN Temp greater or equal to 38C (100.4F)  dextrose 40% Gel 15 Gram(s) Oral once PRN Blood Glucose LESS THAN 70 milliGRAM(s)/deciliter  glucagon  Injectable 1 milliGRAM(s) IntraMuscular once PRN Glucose LESS THAN 70 milligrams/deciliter

## 2019-04-25 NOTE — DIETITIAN INITIAL EVALUATION ADULT. - PERTINENT LABORATORY DATA
04-25 Na148 mmol/L<H> Glu 103 mg/dL<H> K+ 2.8 mmol/L<LL> Cr  1.13 mg/dL BUN 11 mg/dL Phos 2.2 mg/dL<L> Alb n/a   PAB n/a

## 2019-04-25 NOTE — PROGRESS NOTE ADULT - ASSESSMENT
79 y/o male w/ septic shock and superimposed GI bleed requiring pressor support    Septic Shock 2/2 UTI and or aspiration PNA  - UA (+), RVP (-)  - Blood cultures NGTD  - Urine culture->+E.Coli which is resistant to zosyn   - discontinue zosyn  - will start meropenum  - continue vancomycin  - s/p doxycycline, cefepime and zosyn  - CT abd/pelvis noted possible PNA (likely aspiration), sterocoral colitis, non-obstructing R-sided renal stone and Right sided pyelitis and uteritis  - Pressors support continues   - ID consult appreciated  - Speech/Swallow recommendation appreciated  - Corpak placed  - Bautista - Strict I&O  - Aspiration Precautions     GI Bleed  - Coffee ground emesis noted at NH and positive guaiac in ED  - Monitor H&H which is currently stable  - NPO  - Protonix IV BID  - GI consult appreciated   - No active bleeding at this time    MIGUEL  - likely prerenal  - continue IV fluids   - improving  - continue to monitor     Hypernatremia  - s/p IV fluids  - improving  - will monitor    Atrial Fibrillation   - CHADS2 Vasc score of 6  - Coumadin Held initially due to supratherapeutic INR      - INR 4.2 --> 3.14 s/p Vitamin K  - INR now 1.49 will receive 2.5 mg of coumadin tonight  - Rate Controlled   - No active bleeding at this time    DM2  - Non-insulin dependent   - A1C 5.8  - Cont. 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 dakins solution    Advanced Directives  - Nephew/HCP Anshu Hernandez [phone]  - spoke with HCP at length on the phone 4/23 who wants full code dyspnea on exertion

## 2019-04-25 NOTE — DIETITIAN INITIAL EVALUATION ADULT. - OTHER INFO
received consult for assessment and TF: 77yo male with PMH significant for dementia, COPD, DM, and HTN p/w coffee ground emesis; pt found to have septic shock 2/2 UTI and possible PNA with positive guaiac in ED.  Pt seen by GI; pt with primary issue of pyelonephritis with possible stress gastitis or ulcer, esophagitis.  Pt (+) fecal impaction on CAT scan, now spontaneously moving bowels.  No further signs of GI bleeding noted by GI MD or RN. Pt on pressor with MAP >60. Upon visit, pt appears overweight; NFPE significant for severe muscle wasting; temporal, thigh, and calf.  moderate muscle wasting; clavicle.  moderate fat wasting; orbital.  mild fat wasting; rib.  Pt unable to provide any additional information 2/2 dementia.  Left message at NH to obtain information.  Pt has now been NPO x 3 days with SLP eval x 2; both times SLP rec'd NPO status continued.  d/w MD, pending Korpack placement today.  (+) mild generalized and Lt arm edema.  BM (+) 4/24, on bowel regimen and iron supplementation.  Danilo score of 12, no PU noted.  Based on above, pt meets criteria for moderate malnutrition in chronic illness (severe muscle/mod fat wasting; mild edema; NPO x 3 days).    labs reviewed; hypernatremia, hypokalemia, hypophosphatemia, and A1C of 5.4.  Pt with repletion for potassium.  No phos repletion currently ordered.  RECOMMENDATIONS: 1) when feasible, start TF with Glucerna 1.5 @ 20mL/hr and titrate with tolerance, slowly by 10mL q6hrs to goal rate of 55mL/hr (1815Kcal, 100g protein, and 918mL free water) 2) consider free water flushes of 40mL/hr (=880mL with total free water of (TF+flushes) 1798mL) 3) monitor TF tolerance, keep back of bed > 35 degrees 4) monitor lytes/mins; replete/correct prn  5) biweekly wt checks received consult for assessment and TF: 77yo male with PMH significant for dementia, COPD, DM, and HTN p/w coffee ground emesis; pt found to have septic shock 2/2 UTI and possible PNA with positive guaiac in ED.  Pt seen by GI; pt with primary issue of pyelonephritis with possible stress gastitis or ulcer, esophagitis.  Pt (+) fecal impaction on CAT scan, now spontaneously moving bowels.  No further signs of GI bleeding noted by GI MD or RN. Pt on pressor with MAP >60. Upon visit, pt appears overweight; NFPE significant for severe muscle wasting; temporal, thigh, and calf.  moderate muscle wasting; clavicle.  moderate fat wasting; orbital.  mild fat wasting; rib.  Pt unable to provide any additional information 2/2 dementia.  Left message at NH to obtain information. . {addendum: received call back from NH; pt with fair PO intake, meeting <75% of estimated nutr needs chronically with 7# wt loss in past 6 months; 5% wt loss, not clinically significant)  Pt has now been NPO x 3 days with SLP eval x 2; both times SLP rec'd NPO status continued.  d/w MD, pending Korpack placement today.  (+) mild generalized and Lt arm edema.  BM (+) 4/24, on bowel regimen and iron supplementation.  Danilo score of 12, no PU noted.  Based on above, pt meets criteria for severe malnutrition in chronic illness (severe muscle/mod fat wasting; mild edema; NPO x 3 days; meeting <75% of ENN chronically).    labs reviewed; hypernatremia, hypokalemia, hypophosphatemia, and A1C of 5.4.  Pt with repletion for potassium.  No phos repletion currently ordered.  RECOMMENDATIONS: 1) when feasible, start TF with Glucerna 1.5 @ 20mL/hr and titrate with tolerance, slowly by 10mL q6hrs to goal rate of 55mL/hr (1815Kcal, 100g protein, and 918mL free water) 2) consider free water flushes of 40mL/hr (=880mL with total free water of (TF+flushes) 1798mL) 3) monitor TF tolerance, keep back of bed > 35 degrees 4) monitor lytes/mins; replete/correct prn  5) biweekly wt checks

## 2019-04-26 LAB
ANION GAP SERPL CALC-SCNC: 8 MMOL/L — SIGNIFICANT CHANGE UP (ref 5–17)
APTT BLD: 30.7 SEC — SIGNIFICANT CHANGE UP (ref 27.5–36.3)
BUN SERPL-MCNC: 13 MG/DL — SIGNIFICANT CHANGE UP (ref 7–23)
CALCIUM SERPL-MCNC: 8 MG/DL — LOW (ref 8.5–10.1)
CHLORIDE SERPL-SCNC: 115 MMOL/L — HIGH (ref 96–108)
CO2 SERPL-SCNC: 25 MMOL/L — SIGNIFICANT CHANGE UP (ref 22–31)
CREAT SERPL-MCNC: 1.02 MG/DL — SIGNIFICANT CHANGE UP (ref 0.5–1.3)
GLUCOSE BLDC GLUCOMTR-MCNC: 117 MG/DL — HIGH (ref 70–99)
GLUCOSE BLDC GLUCOMTR-MCNC: 125 MG/DL — HIGH (ref 70–99)
GLUCOSE BLDC GLUCOMTR-MCNC: 139 MG/DL — HIGH (ref 70–99)
GLUCOSE SERPL-MCNC: 115 MG/DL — HIGH (ref 70–99)
HCT VFR BLD CALC: 34.4 % — LOW (ref 39–50)
HGB BLD-MCNC: 10.4 G/DL — LOW (ref 13–17)
INR BLD: 1.37 RATIO — HIGH (ref 0.88–1.16)
MAGNESIUM SERPL-MCNC: 2.2 MG/DL — SIGNIFICANT CHANGE UP (ref 1.6–2.6)
MCHC RBC-ENTMCNC: 23.5 PG — LOW (ref 27–34)
MCHC RBC-ENTMCNC: 30.2 GM/DL — LOW (ref 32–36)
MCV RBC AUTO: 77.8 FL — LOW (ref 80–100)
NRBC # BLD: 0 /100 WBCS — SIGNIFICANT CHANGE UP (ref 0–0)
PHOSPHATE SERPL-MCNC: 2.5 MG/DL — SIGNIFICANT CHANGE UP (ref 2.5–4.5)
PLATELET # BLD AUTO: 157 K/UL — SIGNIFICANT CHANGE UP (ref 150–400)
POTASSIUM SERPL-MCNC: 3.4 MMOL/L — LOW (ref 3.5–5.3)
POTASSIUM SERPL-SCNC: 3.4 MMOL/L — LOW (ref 3.5–5.3)
PROTHROM AB SERPL-ACNC: 15.3 SEC — HIGH (ref 10–12.9)
RBC # BLD: 4.42 M/UL — SIGNIFICANT CHANGE UP (ref 4.2–5.8)
RBC # FLD: 21.3 % — HIGH (ref 10.3–14.5)
SODIUM SERPL-SCNC: 148 MMOL/L — HIGH (ref 135–145)
WBC # BLD: 8.13 K/UL — SIGNIFICANT CHANGE UP (ref 3.8–10.5)
WBC # FLD AUTO: 8.13 K/UL — SIGNIFICANT CHANGE UP (ref 3.8–10.5)

## 2019-04-26 RX ORDER — WARFARIN SODIUM 2.5 MG/1
3 TABLET ORAL ONCE
Qty: 0 | Refills: 0 | Status: COMPLETED | OUTPATIENT
Start: 2019-04-26 | End: 2019-04-26

## 2019-04-26 RX ORDER — ACETAMINOPHEN 500 MG
650 TABLET ORAL EVERY 6 HOURS
Qty: 0 | Refills: 0 | Status: DISCONTINUED | OUTPATIENT
Start: 2019-04-26 | End: 2019-05-10

## 2019-04-26 RX ORDER — SODIUM,POTASSIUM PHOSPHATES 278-250MG
2 POWDER IN PACKET (EA) ORAL EVERY 6 HOURS
Qty: 0 | Refills: 0 | Status: COMPLETED | OUTPATIENT
Start: 2019-04-26 | End: 2019-04-26

## 2019-04-26 RX ADMIN — MEROPENEM 100 MILLIGRAM(S): 1 INJECTION INTRAVENOUS at 15:33

## 2019-04-26 RX ADMIN — MEROPENEM 100 MILLIGRAM(S): 1 INJECTION INTRAVENOUS at 05:38

## 2019-04-26 RX ADMIN — Medication 1 TABLET(S): at 13:47

## 2019-04-26 RX ADMIN — MEROPENEM 100 MILLIGRAM(S): 1 INJECTION INTRAVENOUS at 22:06

## 2019-04-26 RX ADMIN — WARFARIN SODIUM 3 MILLIGRAM(S): 2.5 TABLET ORAL at 22:06

## 2019-04-26 RX ADMIN — SENNA PLUS 2 TABLET(S): 8.6 TABLET ORAL at 22:06

## 2019-04-26 RX ADMIN — PANTOPRAZOLE SODIUM 40 MILLIGRAM(S): 20 TABLET, DELAYED RELEASE ORAL at 17:14

## 2019-04-26 RX ADMIN — ZINC OXIDE 1 APPLICATION(S): 200 OINTMENT TOPICAL at 13:49

## 2019-04-26 RX ADMIN — PANTOPRAZOLE SODIUM 40 MILLIGRAM(S): 20 TABLET, DELAYED RELEASE ORAL at 05:37

## 2019-04-26 RX ADMIN — Medication 2000 UNIT(S): at 13:47

## 2019-04-26 RX ADMIN — Medication 250 MILLIGRAM(S): at 17:14

## 2019-04-26 RX ADMIN — Medication 300 MILLIGRAM(S): at 13:47

## 2019-04-26 RX ADMIN — Medication 250 MILLIGRAM(S): at 05:38

## 2019-04-26 RX ADMIN — Medication 1 APPLICATION(S): at 13:48

## 2019-04-26 RX ADMIN — Medication 2 PACKET(S): at 13:46

## 2019-04-26 RX ADMIN — Medication 2 PACKET(S): at 08:33

## 2019-04-26 NOTE — PROGRESS NOTE ADULT - SUBJECTIVE AND OBJECTIVE BOX
CC: Coffee ground emesis and fever    HPI:  79 y/o male w/ PMH significant for Afib (coumadin), Dementia, DM, DVT presented to ED from [facility] for coffee ground emesis and T. Max of 102.8. Unable to illicit any HPI or ROS 2/2  altered mental status. In the ED, patient found to have supra therapeutic INR 4.25, Lactate 2.1, MIGUEL, UA (+), RVP negative, CXR negative, Guaiac stool (+), CT abdomen with possible PNA, sterocoral colitis, non-obstructing R-sided renal stone and Right sided pyelitis and uteritis. Pt. given IV fluids per sepsis protocol, IV Vanco and cefepime. Pt. remained hypotensive and transferred to ICU for pressor support.     Interval HPI:  Patient seen and examined. Unable to provide history due to mental status. Currently off pressors. Management per ICU.    Review of Systems: Unable to obtain 2/2 altered mental status    MEDICATIONS  (STANDING):  calcium carbonate    500 mG (Tums) Chewable 1 Tablet(s) Chew daily  cholecalciferol Oral Tab/Cap - Peds 2000 Unit(s) Oral daily  Dakins Solution - 1/2 Strength 1 Application(s) Topical daily  dextrose 50% Injectable 12.5 Gram(s) IV Push once  dextrose 50% Injectable 25 Gram(s) IV Push once  dextrose 50% Injectable 25 Gram(s) IV Push once  ferrous    sulfate Liquid 300 milliGRAM(s) Oral daily  insulin lispro (HumaLOG) corrective regimen sliding scale   SubCutaneous every 6 hours  meropenem  IVPB      meropenem  IVPB 500 milliGRAM(s) IV Intermittent every 8 hours  pantoprazole  Injectable 40 milliGRAM(s) IV Push two times a day  phenylephrine    Infusion 0.5 MICROgram(s)/kG/Min (13.894 mL/Hr) IV Continuous <Continuous>  polyethylene glycol 3350 17 Gram(s) Oral daily  senna 2 Tablet(s) Oral at bedtime  vancomycin  IVPB 750 milliGRAM(s) IV Intermittent every 12 hours  warfarin 3 milliGRAM(s) Oral once  zinc oxide 20% Ointment 1 Application(s) Topical daily    MEDICATIONS  (PRN):  acetaminophen   Tablet .. 650 milliGRAM(s) Oral every 6 hours PRN Temp greater or equal to 38.5C (101.3F), Moderate Pain (4 - 6)  dextrose 40% Gel 15 Gram(s) Oral once PRN Blood Glucose LESS THAN 70 milliGRAM(s)/deciliter  glucagon  Injectable 1 milliGRAM(s) IntraMuscular once PRN Glucose LESS THAN 70 milligrams/deciliter      Vital Signs (24 Hrs):  T(C): 37 (19 @ 10:00), Max: 37.4 (19 @ 18:00)  HR: 91 (19 @ 15:00) (81 - 102)  BP: 100/58 (19 @ 14:00) (82/54 - 122/84)  RR: 33 (19 @ 15:00) (18 - 37)  SpO2: 95% (19 @ 15:00) (92% - 100%)  Wt(kg): --  Daily     Daily     I&O's Summary    2019 07:  -  2019 07:00  --------------------------------------------------------  IN: 2245 mL / OUT: 1300 mL / NET: 945 mL    2019 07:  -  2019 16:12  --------------------------------------------------------  IN: 0 mL / OUT: 300 mL / NET: -300 mL        GENERAL: NAD   NECK: Supple, No JVD  NERVOUS SYSTEM:  Disoriented x4 unable to follow commands or answer questions appropriately  CHEST/LUNG: Clear to auscultation bilaterally; No rales, rhonchi, wheezing, or rubs  HEART: S1 S2 normal, no murmur  ABDOMEN: Soft, Nontender, Nondistended; Bowel sounds present  GENITOURINARY: Bautista in place   EXTREMITIES:  2+ Peripheral Pulses, No clubbing, cyanosis, or edema  SKIN: No rash, no lesion    LABS:                        10.4   8.13  )-----------( 157      ( 2019 06:23 )             34.4     2019 06:23    148    |  115    |  13     ----------------------------<  115    3.4     |  25     |  1.02     Ca    8.0        2019 06:23  Phos  2.5       2019 06:23  Mg     2.2       2019 06:23      PT/INR - ( 2019 06:23 )   PT: 15.3 sec;   INR: 1.37 ratio         PTT - ( 2019 06:23 )  PTT:30.7 sec    Color: Yellow / Appearance: very cloudy / S.020 / pH: x  Gluc: x / Ketone: Negative  / Bili: Negative / Urobili: Negative mg/dL   Blood: x / Protein: 15 mg/dL / Nitrite: Positive   Leuk Esterase: Moderate / RBC: 11-25 /HPF / WBC 26-50   Sq Epi: x / Non Sq Epi: Occasional / Bacteria: Many      < from: Xray Kidney Ureter Bladder (19 @ 10:35) >  IMPRESSION:   Enteric tube with tip overlying the stomach. Nonspecific bowel gas   pattern. Moderate stool burden in the rectum. Degenerative changes of the   spine. Status post left hip arthroplasty with adjacent heterotopic   ossification.    < end of copied text >      < from: CT Abdomen and Pelvis w/ IV Cont (19 @ 21:32) >  MPRESSION:    1. Bilateral patchy consolidation within the lower lobes which may   reflect pneumonia. Aspiration can be considered, given clinical history.    2. Large fecalith within the rectal vault with rectal wall thickening and   presacral edema for which stercoral colitis can be considered.    3. Nonobstructing right-sided stones within the renal collecting system.   Right-sided urothelial enhancement and proximal ureteral enhancement   compatible with right-sided pyelitis and ureteritis. Correlate with   urinalysis.    4. Other findings, as discussed.    < end of copied text >

## 2019-04-26 NOTE — PROGRESS NOTE ADULT - ASSESSMENT
78 year old male patient with pertinent history of Afib on coumadin, Dementia and anemia admitted on 4/22 from snf for evaluation of vomiting coffee ground emesis and fever to 100.8; patient was transferred to icu given hypotension and required pressor support. History per medical record and patient unable to provide history.   1. Patient admitted with septic shock, unclear etiology, possibly due to urinary origin versus pneumonia, versus soft tissue infection of left hip  - follow up cultures ---- found to have ESBL E coli in urine  - iv hydration and supportive care   - on pressor support per icu  - serial cbc and monitor temperature   - reviewed prior medical records to evaluate for resistant or atypical pathogens   - day #4 vancomycin  - tolerating antibiotics without rashes or side effects   - vancomycin trough is appropriate  - day #2 meropenem  - wound care of hip  2. other issues; Afib on coumadin, Dementia and anemia  - per medicine

## 2019-04-26 NOTE — PROGRESS NOTE ADULT - ASSESSMENT
77 y/o male w/ septic shock and superimposed GI bleed requiring pressor support    Septic Shock 2/2 UTI and or aspiration PNA  - UA (+), RVP (-)  - Blood cultures NGTD  - Urine culture->+E.Coli- ESBL which is resistant to zosyn   - discontinue zosyn  - continue meropenum  - continue vancomycin  - s/p doxycycline, cefepime and zosyn  - CT abd/pelvis noted possible PNA (likely aspiration), sterocoral colitis, non-obstructing R-sided renal stone and Right sided pyelitis and uteritis  - currently being monitored off pressors  - ID following  - Speech/Swallow recommendation appreciated  - Corpak placed  - Bautista - Strict I&O  - Aspiration Precautions     GI Bleed  - Coffee ground emesis noted at NH and positive guaiac in ED  - Monitor H&H which is currently stable  - NPO  - Protonix IV BID  - GI consult appreciated   - No active bleeding at this time    MIGUEL  - likely prerenal  - continue IV fluids   - improving  - continue to monitor     Hypernatremia  - s/p IV fluids  - improving  - will monitor    Atrial Fibrillation   - CHADS2 Vasc score of 6  - Coumadin Held initially due to supratherapeutic INR      - INR 4.2 --> 3.14 s/p Vitamin K  - INR now 1.49 will receive 3 mg of coumadin tonight  - Rate Controlled   - No active bleeding at this time    DM2  - Non-insulin dependent   - A1C 5.8  - Cont. 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 dakins solution    Advanced Directives  - Nephew/HCP Anshu Hernandez [phone]  - spoke with HCP at length on the phone 4/23 who wants full code 79 y/o male w/ septic shock and superimposed GI bleed requiring pressor support    Septic Shock 2/2 UTI and or aspiration PNA  - UA (+), RVP (-)  - Blood cultures NGTD  - Urine culture->+E.Coli - ESBL which is resistant to zosyn   - continue meropenum  - continue vancomycin  - s/p doxycycline, cefepime and zosyn  - CT abd/pelvis noted possible PNA (likely aspiration), sterocoral colitis, non-obstructing R-sided renal stone and Right sided pyelitis and uteritis  - currently being monitored off pressors  - ID following  - Speech/Swallow recommendation appreciated  - Corpak placed  - Bautista - Strict I&O  - Aspiration Precautions     GI Bleed  - Coffee ground emesis noted at NH and positive guaiac in ED  - Monitor H&H which is currently stable  - NPO  - Protonix IV BID  - GI consult appreciated   - No active bleeding at this time    MIGUEL  - likely prerenal  - continue IV fluids   - improving  - continue to monitor     Hypernatremia  - s/p IV fluids  - improving  - will monitor    Atrial Fibrillation   - CHADS2 Vasc score of 6  - Coumadin Held initially due to supratherapeutic INR      - INR 4.2 --> 3.14 s/p Vitamin K  - INR now 1.37 will receive 3 mg of coumadin tonight  - Rate Controlled   - No active bleeding at this time    DM2  - Non-insulin dependent   - A1C 5.8  - Cont. 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 dakins solution    Advanced Directives  - Nephew/HCP Anshu Hernandez [phone]  - spoke with HCP at length on the phone 4/23 who wants full code

## 2019-04-26 NOTE — PROGRESS NOTE ADULT - ASSESSMENT
IMP:    79 y/o male with advance dementia and AF on coumadin admitted on 4/22 to SDU for possible GIB and UTI sepsis--found to have R non obst renal stone and ESBL UTI pyelo/possible Aspiration PNA and colitis.  Tx to ICU for septic shock, Hypernatremia and MIGUEL (KDIGO 1) with baseline Cr 0.9   Does not appear he has GIB  Chronic L hip OM on doxy as outpt    Plan:    Observe off pressors  IV Vanco/ pip/mayra (2 Stopped)--Merro (3)  NPO--TF to goal--increase free water to 60/hr  HOB > 30  Aspiration precautions   PPI q12  Coumadin 3 tonight   DVT prophy  Restraint to prevent self harm and removal of medical devices  Replete lytes as needed    ICU care--d/w ICU staff

## 2019-04-26 NOTE — PROGRESS NOTE ADULT - SUBJECTIVE AND OBJECTIVE BOX
Patient is a 78y old  Male who presents with a chief complaint of Sepsis, GI bleed (23 Apr 2019 10:34)    Date of service: 04-26-19 @ 13:07    Patient lying in bed; afebrile    ROS unable to obtain secondary to patient medical condition     MEDICATIONS  (STANDING):  calcium carbonate    500 mG (Tums) Chewable 1 Tablet(s) Chew daily  cholecalciferol Oral Tab/Cap - Peds 2000 Unit(s) Oral daily  Dakins Solution - 1/2 Strength 1 Application(s) Topical daily  dextrose 50% Injectable 12.5 Gram(s) IV Push once  dextrose 50% Injectable 25 Gram(s) IV Push once  dextrose 50% Injectable 25 Gram(s) IV Push once  ferrous    sulfate Liquid 300 milliGRAM(s) Oral daily  insulin lispro (HumaLOG) corrective regimen sliding scale   SubCutaneous every 6 hours  meropenem  IVPB      meropenem  IVPB 500 milliGRAM(s) IV Intermittent every 8 hours  pantoprazole  Injectable 40 milliGRAM(s) IV Push two times a day  phenylephrine    Infusion 0.5 MICROgram(s)/kG/Min (13.894 mL/Hr) IV Continuous <Continuous>  polyethylene glycol 3350 17 Gram(s) Oral daily  potassium phosphate / sodium phosphate powder 2 Packet(s) Oral every 6 hours  senna 2 Tablet(s) Oral at bedtime  vancomycin  IVPB 750 milliGRAM(s) IV Intermittent every 12 hours  warfarin 3 milliGRAM(s) Oral once  zinc oxide 20% Ointment 1 Application(s) Topical daily    MEDICATIONS  (PRN):  acetaminophen   Tablet .. 650 milliGRAM(s) Oral every 6 hours PRN Temp greater or equal to 38.5C (101.3F), Moderate Pain (4 - 6)  dextrose 40% Gel 15 Gram(s) Oral once PRN Blood Glucose LESS THAN 70 milliGRAM(s)/deciliter  glucagon  Injectable 1 milliGRAM(s) IntraMuscular once PRN Glucose LESS THAN 70 milligrams/deciliter      Vital Signs Last 24 Hrs  T(C): 37 (26 Apr 2019 10:00), Max: 37.4 (25 Apr 2019 18:00)  T(F): 98.6 (26 Apr 2019 10:00), Max: 99.4 (25 Apr 2019 18:00)  HR: 81 (26 Apr 2019 10:00) (78 - 102)  BP: 93/53 (26 Apr 2019 10:00) (84/64 - 122/84)  BP(mean): 64 (26 Apr 2019 10:00) (55 - 102)  RR: 22 (26 Apr 2019 10:00) (18 - 37)  SpO2: 97% (26 Apr 2019 10:00) (92% - 100%)    Physical Exam:            PE:    Constitutional: frail looking  HEENT: NC/AT, EOMI, PERRLA, conjunctivae clear; ears and nose atraumatic; caked blood in oropharynx  Neck: supple; thyroid not palpable  Respiratory: respiratory effort normal; scattered coarse breath sounds  Cardiovascular: S1S2 regular, no murmurs  Abdomen: soft, not tender, not distended, positive BS; no liver or spleen organomegaly  Genitourinary: no suprapubic tenderness  Musculoskeletal: no muscle tenderness, left hip with yellow- green drainage in linear skin breakdown with two distal circular lesions with brown drainage  Neurological/ Psychiatric: AxOx3, judgement and insight normal;  moving all extremities  Skin: no rashes; no palpable lesions    Labs: all available labs reviewed              Labs:                        10.4   8.13  )-----------( 157      ( 26 Apr 2019 06:23 )             34.4     04-26    148<H>  |  115<H>  |  13  ----------------------------<  115<H>  3.4<L>   |  25  |  1.02    Ca    8.0<L>      26 Apr 2019 06:23  Phos  2.5     04-26  Mg     2.2     04-26         Vancomycin Level, Trough: 15.9 ug/mL (04-25 @ 05:31)      Cultures:       Culture - Urine (collected 04-22-19 @ 17:34)  Source: .Urine Clean Catch (Midstream)  Final Report (04-24-19 @ 17:37):    >100,000 CFU/ml Escherichia coli ESBL  Organism: Escherichia coli ESBL (04-24-19 @ 17:37)  Organism: Escherichia coli ESBL (04-24-19 @ 17:37)      -  Amikacin: S <=16      -  Ampicillin: R >16 These ampicillin results predict results for amoxicillin      -  Ampicillin/Sulbactam: R <=8/4      -  Aztreonam: R >16      -  Cefazolin: R >16 For uncomplicated UTI with K. pneumoniae, E. coli, or P. mirablis: HOWIE <=16 is sensitive and HOWIE >=32 is resistant. This also predicts results for oral agents cefaclor, cefdinir, cefpodoxime, cefprozil, cefuroxime axetil, cephalexin and locarbef for uncomplicated UTI. Note that some isolates may be susceptible to these agents while testing resistant to cefazolin.      -  Cefepime: R >16      -  Cefoxitin: R >16      -  Ceftriaxone: R >32 Enterobacter, Citrobacter, and Serratia may develop resistance during prolonged therapy      -  Ciprofloxacin: R >2      -  Ertapenem: S <=1      -  Gentamicin: S <=4      -  Imipenem: S <=1      -  Levofloxacin: R >4      -  Meropenem: S <=1      -  Nitrofurantoin: S <=32 Should not be used to treat pyelonephritis      -  Piperacillin/Tazobactam: R <=16      -  Tigecycline: S <=2      -  Tobramycin: S <=4      -  Trimethoprim/Sulfamethoxazole: R >2/38      Method Type: HOWIE    Culture - Blood (collected 04-22-19 @ 17:34)  Source: .Blood Blood-Peripheral  Preliminary Report (04-24-19 @ 01:03):    No growth to date.    Culture - Blood (collected 04-22-19 @ 17:34)  Source: .Blood Blood-Peripheral  Preliminary Report (04-24-19 @ 01:03):    No growth to date.              < from: CT Abdomen and Pelvis w/ IV Cont (04.22.19 @ 21:32) >    EXAM:  CT ABDOMEN AND PELVIS IC                            PROCEDURE DATE:  04/22/2019          INTERPRETATION:  .    CLINICAL INFORMATION: Vomiting coffee grounds.    TECHNIQUE: Multiple axial CT images of the head were obtained without   contrast. Sagittal and coronal reconstructed images were acquired from   the source data.    COMPARISON: Prior CT examination of the abdomen and pelvis from 12/9/2016.    FINDINGS: A nonspecific subcentimeter prevascular lymph node is notable   within the mediastinum. The heart size is normal. There are   atherosclerotic calcifications of the imaged coronary arteries, aorta,   and branch vessels. The imaged portions of the aorta are normal in   caliber. An infrarenal IVC filter is redemonstrated.    Patchy confluent opacities are noted within the bilateral lower lobes.    Scattered liver cysts appear unchanged when compared to the prior CT   exam. Cholelithiasis is noted. There is no gallbladder wall thickening.    The pancreas, spleen, and adrenal glands appear unremarkable.    Multiple stones are again noted within the right renal collecting system,   the largest of which measures up to 1.5 cm in the upper pole. There is   right-sided urothelial enhancement and right-sided periureteral fat   stranding. There is no right-sided hydronephrosis. There is minimal   nonspecific left-sided perinephric stranding.    The urinary bladder is underdistended, limiting evaluation.    There are multiple scattered nonspecific subcentimeter retroperitoneal   and mesenteric lymph nodes.    There is a small hiatal hernia. The stomach is underdistended. There is   no small bowel obstruction. A very large fecalith is notable within the   rectum. There is mild rectal wall thickening and presacral edema. An   additional moderate amount of stool is notable throughout the large   bowel. A few colonic diverticula are notable. The appendix is not clearly   visualized.    The prostate gland and seminal vesicles appear unremarkable. A small   fat-containing left-sided inguinal hernia is noted.    A left-sided total hip arthroplasty is again noted. There is extensive   adjacent heterotopic ossification. The hip prosthesis generates streak   and beam hardening artifact limiting evaluation of the adjacent   structures. There is fatty atrophy of the left adductor compartment.    There is generalized osteopenia. Multilevel degenerative changes are   noted within the imaged potions of the spine.    < from: CT Abdomen and Pelvis w/ IV Cont (04.22.19 @ 21:32) >  MPRESSION:    1. Bilateral patchy consolidation within the lower lobes which may   reflect pneumonia. Aspiration can be considered, given clinical history.    2. Large fecalith within the rectal vault with rectal wall thickening and   presacral edema for which stercoral colitis can be considered.    3. Nonobstructing right-sided stones within the renal collecting system.   Right-sided urothelial enhancement and proximal ureteral enhancement   compatible with right-sided pyelitis and ureteritis. Correlate with   urinalysis.    4. Other findings, as discussed.    < end of copied text >    < end of copied text >      Radiology: all available radiological tests reviewed    Advanced directives addressed: full resuscitation

## 2019-04-26 NOTE — PROGRESS NOTE ADULT - SUBJECTIVE AND OBJECTIVE BOX
CC:  Sepsis     HPI:    77 y/o male with advance dementia and AF on coumadin admitted on 4/22 to SDU for possible GIB and UTI sepsis--found to have R non obst renal stone and pyelo/possible Aspiration PNA and colitis.  He received 3L IVF while in SD--remain hypotensive--Tx to ICU for pressors.    4/23:  ICU D # 1.  Pt seen and examined in ICU--lethargic--arousable--On Santana 1.  PPI gtt.  .  Na 159.  INR 3.1  Hb 11.      4/24:  ICU D # 2.  Pt seen and examined in ICU--seen by lavon rollins--not safe to eat.  Remains on phenyl gtt 0.7  Na 148.  Stable H/H    4/25:  ICU D # 3.  Pt seen and examined in ICU--ESBL in UCx--very confused and unable to marin PO.  Off pressors at this time.  Na 148    4/26:  ICU D # 4.  Pt seen and examined in ICU--Off pressors--Marin TF--Na 148.  Remains very confused       PMH:  As above.     PSH:  As above.     FH: Non Contributory other than those listed in HPI    Social History:      MEDICATIONS  (STANDING):  calcium carbonate    500 mG (Tums) Chewable 1 Tablet(s) Chew daily  cholecalciferol Oral Tab/Cap - Peds 2000 Unit(s) Oral daily  Dakins Solution - 1/2 Strength 1 Application(s) Topical daily  dextrose 50% Injectable 12.5 Gram(s) IV Push once  dextrose 50% Injectable 25 Gram(s) IV Push once  dextrose 50% Injectable 25 Gram(s) IV Push once  ferrous    sulfate Liquid 300 milliGRAM(s) Oral daily  insulin lispro (HumaLOG) corrective regimen sliding scale   SubCutaneous every 6 hours  meropenem  IVPB      meropenem  IVPB 500 milliGRAM(s) IV Intermittent every 8 hours  pantoprazole  Injectable 40 milliGRAM(s) IV Push two times a day  phenylephrine    Infusion 0.5 MICROgram(s)/kG/Min (13.894 mL/Hr) IV Continuous <Continuous>  polyethylene glycol 3350 17 Gram(s) Oral daily  potassium phosphate / sodium phosphate powder 2 Packet(s) Oral every 6 hours  senna 2 Tablet(s) Oral at bedtime  vancomycin  IVPB 750 milliGRAM(s) IV Intermittent every 12 hours  warfarin 3 milliGRAM(s) Oral once  zinc oxide 20% Ointment 1 Application(s) Topical daily    MEDICATIONS  (PRN):  acetaminophen   Tablet .. 650 milliGRAM(s) Oral every 6 hours PRN Temp greater or equal to 38.5C (101.3F), Moderate Pain (4 - 6)  dextrose 40% Gel 15 Gram(s) Oral once PRN Blood Glucose LESS THAN 70 milliGRAM(s)/deciliter  glucagon  Injectable 1 milliGRAM(s) IntraMuscular once PRN Glucose LESS THAN 70 milligrams/deciliter      Allergies: NKDA    ROS:  SEE BELOW        ICU Vital Signs Last 24 Hrs  T(C): 36.9 (26 Apr 2019 04:00), Max: 37.4 (25 Apr 2019 10:00)  T(F): 98.5 (26 Apr 2019 04:00), Max: 99.4 (25 Apr 2019 10:00)  HR: 102 (26 Apr 2019 07:00) (75 - 102)  BP: 120/58 (26 Apr 2019 07:00) (84/64 - 129/83)  BP(mean): 75 (26 Apr 2019 07:00) (55 - 102)  ABP: --  ABP(mean): --  RR: 37 (26 Apr 2019 07:00) (18 - 37)  SpO2: 95% (26 Apr 2019 07:00) (95% - 100%)          I&O's Summary    25 Apr 2019 07:01  -  26 Apr 2019 07:00  --------------------------------------------------------  IN: 2245 mL / OUT: 1300 mL / NET: 945 mL        Physical Exam:  SEE BELOW                          10.4   8.13  )-----------( 157      ( 26 Apr 2019 06:23 )             34.4       04-26    148<H>  |  115<H>  |  13  ----------------------------<  115<H>  3.4<L>   |  25  |  1.02    Ca    8.0<L>      26 Apr 2019 06:23  Phos  2.5     04-26  Mg     2.2     04-26                      DVT Prophylaxis:                                                            Contraindication:     Advanced Directives:    Discussed with:    Visit Information:  Time spent excluding procedure:  45 cc mins    ** Time is exclusive of billed procedures and/or teaching and/or routine family updates.

## 2019-04-27 LAB
ANION GAP SERPL CALC-SCNC: 7 MMOL/L — SIGNIFICANT CHANGE UP (ref 5–17)
APTT BLD: 30.4 SEC — SIGNIFICANT CHANGE UP (ref 27.5–36.3)
BUN SERPL-MCNC: 14 MG/DL — SIGNIFICANT CHANGE UP (ref 7–23)
CALCIUM SERPL-MCNC: 7.7 MG/DL — LOW (ref 8.5–10.1)
CHLORIDE SERPL-SCNC: 114 MMOL/L — HIGH (ref 96–108)
CO2 SERPL-SCNC: 26 MMOL/L — SIGNIFICANT CHANGE UP (ref 22–31)
CREAT SERPL-MCNC: 0.92 MG/DL — SIGNIFICANT CHANGE UP (ref 0.5–1.3)
GLUCOSE BLDC GLUCOMTR-MCNC: 107 MG/DL — HIGH (ref 70–99)
GLUCOSE BLDC GLUCOMTR-MCNC: 114 MG/DL — HIGH (ref 70–99)
GLUCOSE BLDC GLUCOMTR-MCNC: 126 MG/DL — HIGH (ref 70–99)
GLUCOSE BLDC GLUCOMTR-MCNC: 126 MG/DL — HIGH (ref 70–99)
GLUCOSE SERPL-MCNC: 113 MG/DL — HIGH (ref 70–99)
HCT VFR BLD CALC: 33.8 % — LOW (ref 39–50)
HGB BLD-MCNC: 10 G/DL — LOW (ref 13–17)
INR BLD: 1.37 RATIO — HIGH (ref 0.88–1.16)
MAGNESIUM SERPL-MCNC: 2.2 MG/DL — SIGNIFICANT CHANGE UP (ref 1.6–2.6)
MCHC RBC-ENTMCNC: 23.4 PG — LOW (ref 27–34)
MCHC RBC-ENTMCNC: 29.6 GM/DL — LOW (ref 32–36)
MCV RBC AUTO: 79 FL — LOW (ref 80–100)
NRBC # BLD: 0 /100 WBCS — SIGNIFICANT CHANGE UP (ref 0–0)
PHOSPHATE SERPL-MCNC: 3.3 MG/DL — SIGNIFICANT CHANGE UP (ref 2.5–4.5)
PLATELET # BLD AUTO: 167 K/UL — SIGNIFICANT CHANGE UP (ref 150–400)
POTASSIUM SERPL-MCNC: 4.3 MMOL/L — SIGNIFICANT CHANGE UP (ref 3.5–5.3)
POTASSIUM SERPL-SCNC: 4.3 MMOL/L — SIGNIFICANT CHANGE UP (ref 3.5–5.3)
PROTHROM AB SERPL-ACNC: 15.4 SEC — HIGH (ref 10–12.9)
RBC # BLD: 4.28 M/UL — SIGNIFICANT CHANGE UP (ref 4.2–5.8)
RBC # FLD: 21.1 % — HIGH (ref 10.3–14.5)
SODIUM SERPL-SCNC: 147 MMOL/L — HIGH (ref 135–145)
WBC # BLD: 8.05 K/UL — SIGNIFICANT CHANGE UP (ref 3.8–10.5)
WBC # FLD AUTO: 8.05 K/UL — SIGNIFICANT CHANGE UP (ref 3.8–10.5)

## 2019-04-27 RX ORDER — WARFARIN SODIUM 2.5 MG/1
3 TABLET ORAL ONCE
Qty: 0 | Refills: 0 | Status: COMPLETED | OUTPATIENT
Start: 2019-04-27 | End: 2019-04-27

## 2019-04-27 RX ADMIN — Medication 2000 UNIT(S): at 12:41

## 2019-04-27 RX ADMIN — Medication 300 MILLIGRAM(S): at 12:41

## 2019-04-27 RX ADMIN — MEROPENEM 100 MILLIGRAM(S): 1 INJECTION INTRAVENOUS at 22:05

## 2019-04-27 RX ADMIN — MEROPENEM 100 MILLIGRAM(S): 1 INJECTION INTRAVENOUS at 15:12

## 2019-04-27 RX ADMIN — Medication 1 APPLICATION(S): at 12:44

## 2019-04-27 RX ADMIN — WARFARIN SODIUM 3 MILLIGRAM(S): 2.5 TABLET ORAL at 22:05

## 2019-04-27 RX ADMIN — Medication 250 MILLIGRAM(S): at 17:19

## 2019-04-27 RX ADMIN — Medication 250 MILLIGRAM(S): at 05:35

## 2019-04-27 RX ADMIN — PANTOPRAZOLE SODIUM 40 MILLIGRAM(S): 20 TABLET, DELAYED RELEASE ORAL at 05:34

## 2019-04-27 RX ADMIN — Medication 1 TABLET(S): at 12:41

## 2019-04-27 RX ADMIN — PANTOPRAZOLE SODIUM 40 MILLIGRAM(S): 20 TABLET, DELAYED RELEASE ORAL at 17:19

## 2019-04-27 RX ADMIN — ZINC OXIDE 1 APPLICATION(S): 200 OINTMENT TOPICAL at 12:45

## 2019-04-27 RX ADMIN — MEROPENEM 100 MILLIGRAM(S): 1 INJECTION INTRAVENOUS at 05:34

## 2019-04-27 NOTE — PROGRESS NOTE ADULT - ASSESSMENT
77 y/o male w/ septic shock and superimposed GI bleed requiring pressor support    Septic Shock 2/2 UTI and or aspiration PNA  - UA (+), RVP (-)  - Blood cultures NGTD  - Urine culture->+E.Coli - ESBL which is resistant to zosyn   - continue meropenum  - continue vancomycin  - s/p doxycycline, cefepime and zosyn  - CT abd/pelvis noted possible PNA (likely aspiration), sterocoral colitis, non-obstructing R-sided renal stone and Right sided pyelitis and uteritis  - currently being monitored off pressors  - ID following  - Speech/Swallow recommendation appreciated  - Bautista - Strict I&O  - Aspiration Precautions     GI Bleed  - Coffee ground emesis noted at NH and positive guaiac in ED  - Monitor H&H which is currently stable  - NPO  - Protonix IV BID  - GI consult appreciated   - No active bleeding at this time    MIGUEL  - likely prerenal  - continue IV fluids   - improving  - continue to monitor     Hypernatremia  - s/p IV fluids  - improving  - will monitor    Atrial Fibrillation   - CHADS2 Vasc score of 6  - Coumadin Held initially due to supratherapeutic INR      - INR 4.2 --> 3.14 s/p Vitamin K  - INR now 1.37 will receive 3 mg of coumadin tonight  - Rate Controlled   - No active bleeding at this time    DM2  - Non-insulin dependent   - A1C 5.8  - Cont. 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 dakins solution    Advanced Directives  - Nephew/HCP Anshu Hernandez [phone]  - spoke with HCP at length on the phone 4/23 who wants full code

## 2019-04-27 NOTE — PROGRESS NOTE ADULT - SUBJECTIVE AND OBJECTIVE BOX
Patient is a 78y old  Male who presents with a chief complaint of Sepsis, GI bleed (23 Apr 2019 10:34)    Date of service: 04-27-19 @ 10:51      Patient sitting in bed, groaning      ROS unable to obtain secondary to patient medical condition     MEDICATIONS  (STANDING):  calcium carbonate    500 mG (Tums) Chewable 1 Tablet(s) Chew daily  cholecalciferol Oral Tab/Cap - Peds 2000 Unit(s) Oral daily  Dakins Solution - 1/2 Strength 1 Application(s) Topical daily  dextrose 50% Injectable 12.5 Gram(s) IV Push once  dextrose 50% Injectable 25 Gram(s) IV Push once  dextrose 50% Injectable 25 Gram(s) IV Push once  ferrous    sulfate Liquid 300 milliGRAM(s) Oral daily  insulin lispro (HumaLOG) corrective regimen sliding scale   SubCutaneous every 6 hours  meropenem  IVPB      meropenem  IVPB 500 milliGRAM(s) IV Intermittent every 8 hours  pantoprazole  Injectable 40 milliGRAM(s) IV Push two times a day  polyethylene glycol 3350 17 Gram(s) Oral daily  senna 2 Tablet(s) Oral at bedtime  vancomycin  IVPB 750 milliGRAM(s) IV Intermittent every 12 hours  warfarin 3 milliGRAM(s) Oral once  zinc oxide 20% Ointment 1 Application(s) Topical daily    MEDICATIONS  (PRN):  acetaminophen   Tablet .. 650 milliGRAM(s) Oral every 6 hours PRN Temp greater or equal to 38.5C (101.3F), Moderate Pain (4 - 6)  dextrose 40% Gel 15 Gram(s) Oral once PRN Blood Glucose LESS THAN 70 milliGRAM(s)/deciliter  glucagon  Injectable 1 milliGRAM(s) IntraMuscular once PRN Glucose LESS THAN 70 milligrams/deciliter      Vital Signs Last 24 Hrs  T(C): 37 (27 Apr 2019 08:00), Max: 37.6 (27 Apr 2019 00:00)  T(F): 98.6 (27 Apr 2019 08:00), Max: 99.6 (27 Apr 2019 00:00)  HR: 63 (27 Apr 2019 10:00) (63 - 100)  BP: 85/54 (27 Apr 2019 10:00) (82/54 - 117/54)  BP(mean): 65 (27 Apr 2019 10:00) (59 - 91)  RR: 30 (27 Apr 2019 10:00) (18 - 37)  SpO2: 97% (27 Apr 2019 10:00) (95% - 100%)    Physical Exam:          PE:    Constitutional: frail looking  HEENT: NC/AT, EOMI, PERRLA, conjunctivae clear; ears and nose atraumatic; caked blood in oropharynx  Neck: supple; thyroid not palpable  Respiratory: respiratory effort normal; scattered coarse breath sounds  Cardiovascular: S1S2 regular, no murmurs  Abdomen: soft, not tender, not distended, positive BS; no liver or spleen organomegaly  Genitourinary: no suprapubic tenderness  Musculoskeletal: no muscle tenderness, left hip with yellow- green drainage in linear skin breakdown with two distal circular lesions with brown drainage  Neurological/ Psychiatric: AxOx3, judgement and insight normal;  moving all extremities  Skin: no rashes; no palpable lesions    Labs: all available labs reviewed              Labs:                          Labs:                        10.0   8.05  )-----------( 167      ( 27 Apr 2019 06:54 )             33.8     04-27    147<H>  |  114<H>  |  14  ----------------------------<  113<H>  4.3   |  26  |  0.92    Ca    7.7<L>      27 Apr 2019 06:54  Phos  3.3     04-27  Mg     2.2     04-27             Cultures:       Culture - Urine (collected 04-22-19 @ 17:34)  Source: .Urine Clean Catch (Midstream)  Final Report (04-24-19 @ 17:37):    >100,000 CFU/ml Escherichia coli ESBL  Organism: Escherichia coli ESBL (04-24-19 @ 17:37)  Organism: Escherichia coli ESBL (04-24-19 @ 17:37)      -  Amikacin: S <=16      -  Ampicillin: R >16 These ampicillin results predict results for amoxicillin      -  Ampicillin/Sulbactam: R <=8/4      -  Aztreonam: R >16      -  Cefazolin: R >16 For uncomplicated UTI with K. pneumoniae, E. coli, or P. mirablis: HOWIE <=16 is sensitive and HOWIE >=32 is resistant. This also predicts results for oral agents cefaclor, cefdinir, cefpodoxime, cefprozil, cefuroxime axetil, cephalexin and locarbef for uncomplicated UTI. Note that some isolates may be susceptible to these agents while testing resistant to cefazolin.      -  Cefepime: R >16      -  Cefoxitin: R >16      -  Ceftriaxone: R >32 Enterobacter, Citrobacter, and Serratia may develop resistance during prolonged therapy      -  Ciprofloxacin: R >2      -  Ertapenem: S <=1      -  Gentamicin: S <=4      -  Imipenem: S <=1      -  Levofloxacin: R >4      -  Meropenem: S <=1      -  Nitrofurantoin: S <=32 Should not be used to treat pyelonephritis      -  Piperacillin/Tazobactam: R <=16      -  Tigecycline: S <=2      -  Tobramycin: S <=4      -  Trimethoprim/Sulfamethoxazole: R >2/38      Method Type: HOWIE    Culture - Blood (collected 04-22-19 @ 17:34)  Source: .Blood Blood-Peripheral  Preliminary Report (04-24-19 @ 01:03):    No growth to date.    Culture - Blood (collected 04-22-19 @ 17:34)  Source: .Blood Blood-Peripheral  Preliminary Report (04-24-19 @ 01:03):    No growth to date.            < from: CT Abdomen and Pelvis w/ IV Cont (04.22.19 @ 21:32) >    EXAM:  CT ABDOMEN AND PELVIS IC                            PROCEDURE DATE:  04/22/2019          INTERPRETATION:  .    CLINICAL INFORMATION: Vomiting coffee grounds.    TECHNIQUE: Multiple axial CT images of the head were obtained without   contrast. Sagittal and coronal reconstructed images were acquired from   the source data.    COMPARISON: Prior CT examination of the abdomen and pelvis from 12/9/2016.    FINDINGS: A nonspecific subcentimeter prevascular lymph node is notable   within the mediastinum. The heart size is normal. There are   atherosclerotic calcifications of the imaged coronary arteries, aorta,   and branch vessels. The imaged portions of the aorta are normal in   caliber. An infrarenal IVC filter is redemonstrated.    Patchy confluent opacities are noted within the bilateral lower lobes.    Scattered liver cysts appear unchanged when compared to the prior CT   exam. Cholelithiasis is noted. There is no gallbladder wall thickening.    The pancreas, spleen, and adrenal glands appear unremarkable.    Multiple stones are again noted within the right renal collecting system,   the largest of which measures up to 1.5 cm in the upper pole. There is   right-sided urothelial enhancement and right-sided periureteral fat   stranding. There is no right-sided hydronephrosis. There is minimal   nonspecific left-sided perinephric stranding.    The urinary bladder is underdistended, limiting evaluation.    There are multiple scattered nonspecific subcentimeter retroperitoneal   and mesenteric lymph nodes.    There is a small hiatal hernia. The stomach is underdistended. There is   no small bowel obstruction. A very large fecalith is notable within the   rectum. There is mild rectal wall thickening and presacral edema. An   additional moderate amount of stool is notable throughout the large   bowel. A few colonic diverticula are notable. The appendix is not clearly   visualized.    The prostate gland and seminal vesicles appear unremarkable. A small   fat-containing left-sided inguinal hernia is noted.    A left-sided total hip arthroplasty is again noted. There is extensive   adjacent heterotopic ossification. The hip prosthesis generates streak   and beam hardening artifact limiting evaluation of the adjacent   structures. There is fatty atrophy of the left adductor compartment.    There is generalized osteopenia. Multilevel degenerative changes are   noted within the imaged potions of the spine.    < from: CT Abdomen and Pelvis w/ IV Cont (04.22.19 @ 21:32) >  MPRESSION:    1. Bilateral patchy consolidation within the lower lobes which may   reflect pneumonia. Aspiration can be considered, given clinical history.    2. Large fecalith within the rectal vault with rectal wall thickening and   presacral edema for which stercoral colitis can be considered.    3. Nonobstructing right-sided stones within the renal collecting system.   Right-sided urothelial enhancement and proximal ureteral enhancement   compatible with right-sided pyelitis and ureteritis. Correlate with   urinalysis.    4. Other findings, as discussed.    < end of copied text >    < end of copied text >      Radiology: all available radiological tests reviewed    Advanced directives addressed: full resuscitation

## 2019-04-27 NOTE — PROGRESS NOTE ADULT - SUBJECTIVE AND OBJECTIVE BOX
CC: Coffee ground emesis and fever    HPI:  79 y/o male w/ PMH significant for Afib (coumadin), Dementia, DM, DVT presented to ED from [facility] for coffee ground emesis and T. Max of 102.8. Unable to illicit any HPI or ROS 2/2  altered mental status. In the ED, patient found to have supra therapeutic INR 4.25, Lactate 2.1, MIGUEL, UA (+), RVP negative, CXR negative, Guaiac stool (+), CT abdomen with possible PNA, sterocoral colitis, non-obstructing R-sided renal stone and Right sided pyelitis and uteritis. Pt. given IV fluids per sepsis protocol, IV Vanco and cefepime. Pt. remained hypotensive and transferred to ICU for pressor support.     Interval HPI:  Patient seen and examined. Unable to provide history due to mental status. Currently off pressors. Management per ICU.    Review of Systems: Unable to obtain 2/2 altered mental status    MEDICATIONS  (STANDING):  calcium carbonate    500 mG (Tums) Chewable 1 Tablet(s) Chew daily  cholecalciferol Oral Tab/Cap - Peds 2000 Unit(s) Oral daily  Dakins Solution - 1/2 Strength 1 Application(s) Topical daily  dextrose 50% Injectable 12.5 Gram(s) IV Push once  dextrose 50% Injectable 25 Gram(s) IV Push once  dextrose 50% Injectable 25 Gram(s) IV Push once  ferrous    sulfate Liquid 300 milliGRAM(s) Oral daily  insulin lispro (HumaLOG) corrective regimen sliding scale   SubCutaneous every 6 hours  meropenem  IVPB      meropenem  IVPB 500 milliGRAM(s) IV Intermittent every 8 hours  pantoprazole  Injectable 40 milliGRAM(s) IV Push two times a day  polyethylene glycol 3350 17 Gram(s) Oral daily  senna 2 Tablet(s) Oral at bedtime  vancomycin  IVPB 750 milliGRAM(s) IV Intermittent every 12 hours  warfarin 3 milliGRAM(s) Oral once  zinc oxide 20% Ointment 1 Application(s) Topical daily    MEDICATIONS  (PRN):  acetaminophen   Tablet .. 650 milliGRAM(s) Oral every 6 hours PRN Temp greater or equal to 38.5C (101.3F), Moderate Pain (4 - 6)  dextrose 40% Gel 15 Gram(s) Oral once PRN Blood Glucose LESS THAN 70 milliGRAM(s)/deciliter  glucagon  Injectable 1 milliGRAM(s) IntraMuscular once PRN Glucose LESS THAN 70 milligrams/deciliter    Vital Signs (24 Hrs):  T(C): 37 (04-27-19 @ 08:00), Max: 37.6 (04-27-19 @ 00:00)  HR: 67 (04-27-19 @ 11:00) (63 - 100)  BP: 85/51 (04-27-19 @ 11:00) (82/54 - 117/54)  RR: 34 (04-27-19 @ 11:00) (18 - 37)  SpO2: 100% (04-27-19 @ 11:00) (95% - 100%)  Wt(kg): --  Daily     Daily     I&O's Summary    26 Apr 2019 07:01  -  27 Apr 2019 07:00  --------------------------------------------------------  IN: 0 mL / OUT: 300 mL / NET: -300 mL      GENERAL: NAD, resting comfortably  NECK: Supple, No JVD  NERVOUS SYSTEM:  Disoriented x4 unable to follow commands or answer questions appropriately  CHEST/LUNG: diffuse coarse breath sounds  HEART: S1 S2 normal, no murmur  ABDOMEN: Soft, Nontender, Nondistended; Bowel sounds present  GENITOURINARY: Bautista in place   EXTREMITIES:  2+ Peripheral Pulses, No clubbing, cyanosis, or edema  SKIN: No rash, no lesion    LABS:                        10.0   8.05  )-----------( 167      ( 27 Apr 2019 06:54 )             33.8     27 Apr 2019 06:54    147    |  114    |  14     ----------------------------<  113    4.3     |  26     |  0.92     Ca    7.7        27 Apr 2019 06:54  Phos  3.3       27 Apr 2019 06:54  Mg     2.2       27 Apr 2019 06:54      PT/INR - ( 27 Apr 2019 06:54 )   PT: 15.4 sec;   INR: 1.37 ratio         PTT - ( 27 Apr 2019 06:54 )  PTT:30.4 sec      < from: Xray Kidney Ureter Bladder (04.25.19 @ 10:35) >  IMPRESSION:   Enteric tube with tip overlying the stomach. Nonspecific bowel gas   pattern. Moderate stool burden in the rectum. Degenerative changes of the   spine. Status post left hip arthroplasty with adjacent heterotopic   ossification.    < end of copied text >      < from: CT Abdomen and Pelvis w/ IV Cont (04.22.19 @ 21:32) >  MPRESSION:    1. Bilateral patchy consolidation within the lower lobes which may   reflect pneumonia. Aspiration can be considered, given clinical history.    2. Large fecalith within the rectal vault with rectal wall thickening and   presacral edema for which stercoral colitis can be considered.    3. Nonobstructing right-sided stones within the renal collecting system.   Right-sided urothelial enhancement and proximal ureteral enhancement   compatible with right-sided pyelitis and ureteritis. Correlate with   urinalysis.    4. Other findings, as discussed.    < end of copied text >

## 2019-04-27 NOTE — PROGRESS NOTE ADULT - ASSESSMENT
78 year old male patient with pertinent history of Afib on coumadin, Dementia and anemia admitted on 4/22 from snf for evaluation of vomiting coffee ground emesis and fever to 100.8; patient was transferred to icu given hypotension and required pressor support. History per medical record and patient unable to provide history.   1. Patient admitted with septic shock, unclear etiology, possibly due to urinary origin versus pneumonia, versus soft tissue infection of left hip  - follow up cultures ---- found to have ESBL E coli in urine  - iv hydration and supportive care   - on pressor support per icu  - serial cbc and monitor temperature   - reviewed prior medical records to evaluate for resistant or atypical pathogens   - day #5 vancomycin  - tolerating antibiotics without rashes or side effects   - vancomycin trough is appropriate  - day #3 meropenem  - wound care of hip  - eventually will need peg tube  2. other issues; Afib on coumadin, Dementia and anemia  - per medicine

## 2019-04-27 NOTE — PROGRESS NOTE ADULT - SUBJECTIVE AND OBJECTIVE BOX
CC:  Sepsis     HPI:    77 y/o male with advance dementia and AF on coumadin admitted on 4/22 to SDU for possible GIB and UTI sepsis--found to have R non obst renal stone and pyelo/possible Aspiration PNA and colitis.  He received 3L IVF while in SD--remain hypotensive--Tx to ICU for pressors.    4/23:  ICU D # 1.  Pt seen and examined in ICU--lethargic--arousable--On Santana 1.  PPI gtt.  .  Na 159.  INR 3.1  Hb 11.      4/24:  ICU D # 2.  Pt seen and examined in ICU--seen by lavon rollins--not safe to eat.  Remains on phenyl gtt 0.7  Na 148.  Stable H/H    4/25:  ICU D # 3.  Pt seen and examined in ICU--ESBL in UCx--very confused and unable to marin PO.  Off pressors at this time.  Na 148    4/26:  ICU D # 4.  Pt seen and examined in ICU--Off pressors--Marin TF--Na 148.  Remains very confused     4/27:  ICU D # 5.  Pt seen and examined in ICU--Remains off pressors--mentation not better at all--Marin TF.  Na 148      PMH:  As above.     PSH:  As above.     FH: Non Contributory other than those listed in HPI    Social History:      MEDICATIONS  (STANDING):  calcium carbonate    500 mG (Tums) Chewable 1 Tablet(s) Chew daily  cholecalciferol Oral Tab/Cap - Peds 2000 Unit(s) Oral daily  Dakins Solution - 1/2 Strength 1 Application(s) Topical daily  dextrose 50% Injectable 12.5 Gram(s) IV Push once  dextrose 50% Injectable 25 Gram(s) IV Push once  dextrose 50% Injectable 25 Gram(s) IV Push once  ferrous    sulfate Liquid 300 milliGRAM(s) Oral daily  insulin lispro (HumaLOG) corrective regimen sliding scale   SubCutaneous every 6 hours  meropenem  IVPB      meropenem  IVPB 500 milliGRAM(s) IV Intermittent every 8 hours  pantoprazole  Injectable 40 milliGRAM(s) IV Push two times a day  polyethylene glycol 3350 17 Gram(s) Oral daily  senna 2 Tablet(s) Oral at bedtime  vancomycin  IVPB 750 milliGRAM(s) IV Intermittent every 12 hours  zinc oxide 20% Ointment 1 Application(s) Topical daily    MEDICATIONS  (PRN):  acetaminophen   Tablet .. 650 milliGRAM(s) Oral every 6 hours PRN Temp greater or equal to 38.5C (101.3F), Moderate Pain (4 - 6)  dextrose 40% Gel 15 Gram(s) Oral once PRN Blood Glucose LESS THAN 70 milliGRAM(s)/deciliter  glucagon  Injectable 1 milliGRAM(s) IntraMuscular once PRN Glucose LESS THAN 70 milligrams/deciliter      Allergies: NKDA    ROS:  SEE BELOW        ICU Vital Signs Last 24 Hrs  T(C): 37.2 (27 Apr 2019 04:00), Max: 37.6 (27 Apr 2019 00:00)  T(F): 99 (27 Apr 2019 04:00), Max: 99.6 (27 Apr 2019 00:00)  HR: 78 (27 Apr 2019 08:00) (78 - 100)  BP: 93/59 (27 Apr 2019 08:00) (82/54 - 117/54)  BP(mean): 71 (27 Apr 2019 08:00) (59 - 91)  ABP: --  ABP(mean): --  RR: 29 (27 Apr 2019 08:00) (18 - 37)  SpO2: 100% (27 Apr 2019 08:00) (95% - 100%)          I&O's Summary    26 Apr 2019 07:01  -  27 Apr 2019 07:00  --------------------------------------------------------  IN: 0 mL / OUT: 300 mL / NET: -300 mL        Physical Exam:  SEE BELOW                          10.0   8.05  )-----------( 167      ( 27 Apr 2019 06:54 )             33.8       04-27    147<H>  |  114<H>  |  14  ----------------------------<  113<H>  4.3   |  26  |  0.92    Ca    7.7<L>      27 Apr 2019 06:54  Phos  3.3     04-27  Mg     2.2     04-27                      DVT Prophylaxis:                                                            Contraindication:     Advanced Directives:    Discussed with:    Visit Information:  Time spent excluding procedure:  45 cc mins    ** Time is exclusive of billed procedures and/or teaching and/or routine family updates.

## 2019-04-27 NOTE — PROGRESS NOTE ADULT - ASSESSMENT
IMP:    79 y/o male with advance dementia and AF on coumadin admitted on 4/22 to SDU for possible GIB and UTI sepsis--found to have R non obst renal stone and ESBL UTI pyelo/possible Aspiration PNA and colitis.  Tx to ICU for septic shock, Hypernatremia and MIGUEL (KDIGO 1) with baseline Cr 0.9     Does not appear he has active GIB  Chronic L hip OM on doxy as outpt  I suspect he will need PEG     Plan:    Observe off pressors  IV Vanco/ pip/mayra (2 Stopped)--Merro (4)  NPO--TF to goal--increase free water to 60/hr  HOB > 30  Aspiration precautions   PPI q12  Coumadin 3 tonight   DVT prophy  Restraint to prevent self harm and removal of medical devices  Replete lytes as needed    ICU care--d/w ICU staff

## 2019-04-28 LAB
ANION GAP SERPL CALC-SCNC: 4 MMOL/L — LOW (ref 5–17)
APTT BLD: 31.3 SEC — SIGNIFICANT CHANGE UP (ref 27.5–36.3)
BUN SERPL-MCNC: 15 MG/DL — SIGNIFICANT CHANGE UP (ref 7–23)
CALCIUM SERPL-MCNC: 7.7 MG/DL — LOW (ref 8.5–10.1)
CHLORIDE SERPL-SCNC: 110 MMOL/L — HIGH (ref 96–108)
CO2 SERPL-SCNC: 31 MMOL/L — SIGNIFICANT CHANGE UP (ref 22–31)
CREAT SERPL-MCNC: 0.85 MG/DL — SIGNIFICANT CHANGE UP (ref 0.5–1.3)
CULTURE RESULTS: SIGNIFICANT CHANGE UP
CULTURE RESULTS: SIGNIFICANT CHANGE UP
GLUCOSE BLDC GLUCOMTR-MCNC: 112 MG/DL — HIGH (ref 70–99)
GLUCOSE SERPL-MCNC: 107 MG/DL — HIGH (ref 70–99)
HCT VFR BLD CALC: 34.7 % — LOW (ref 39–50)
HGB BLD-MCNC: 10.5 G/DL — LOW (ref 13–17)
INR BLD: 1.49 RATIO — HIGH (ref 0.88–1.16)
MAGNESIUM SERPL-MCNC: 2.3 MG/DL — SIGNIFICANT CHANGE UP (ref 1.6–2.6)
MCHC RBC-ENTMCNC: 23.4 PG — LOW (ref 27–34)
MCHC RBC-ENTMCNC: 30.3 GM/DL — LOW (ref 32–36)
MCV RBC AUTO: 77.5 FL — LOW (ref 80–100)
NRBC # BLD: 0 /100 WBCS — SIGNIFICANT CHANGE UP (ref 0–0)
PHOSPHATE SERPL-MCNC: 2.3 MG/DL — LOW (ref 2.5–4.5)
PLATELET # BLD AUTO: 172 K/UL — SIGNIFICANT CHANGE UP (ref 150–400)
POTASSIUM SERPL-MCNC: 4 MMOL/L — SIGNIFICANT CHANGE UP (ref 3.5–5.3)
POTASSIUM SERPL-SCNC: 4 MMOL/L — SIGNIFICANT CHANGE UP (ref 3.5–5.3)
PROTHROM AB SERPL-ACNC: 16.8 SEC — HIGH (ref 10–12.9)
RBC # BLD: 4.48 M/UL — SIGNIFICANT CHANGE UP (ref 4.2–5.8)
RBC # FLD: 21.2 % — HIGH (ref 10.3–14.5)
SODIUM SERPL-SCNC: 145 MMOL/L — SIGNIFICANT CHANGE UP (ref 135–145)
SPECIMEN SOURCE: SIGNIFICANT CHANGE UP
SPECIMEN SOURCE: SIGNIFICANT CHANGE UP
WBC # BLD: 8.51 K/UL — SIGNIFICANT CHANGE UP (ref 3.8–10.5)
WBC # FLD AUTO: 8.51 K/UL — SIGNIFICANT CHANGE UP (ref 3.8–10.5)

## 2019-04-28 RX ORDER — SODIUM,POTASSIUM PHOSPHATES 278-250MG
2 POWDER IN PACKET (EA) ORAL ONCE
Qty: 0 | Refills: 0 | Status: COMPLETED | OUTPATIENT
Start: 2019-04-28 | End: 2019-04-28

## 2019-04-28 RX ORDER — WARFARIN SODIUM 2.5 MG/1
3 TABLET ORAL ONCE
Qty: 0 | Refills: 0 | Status: COMPLETED | OUTPATIENT
Start: 2019-04-28 | End: 2019-04-28

## 2019-04-28 RX ADMIN — Medication 250 MILLIGRAM(S): at 20:28

## 2019-04-28 RX ADMIN — Medication 2000 UNIT(S): at 12:20

## 2019-04-28 RX ADMIN — PANTOPRAZOLE SODIUM 40 MILLIGRAM(S): 20 TABLET, DELAYED RELEASE ORAL at 18:11

## 2019-04-28 RX ADMIN — Medication 1 APPLICATION(S): at 12:20

## 2019-04-28 RX ADMIN — PANTOPRAZOLE SODIUM 40 MILLIGRAM(S): 20 TABLET, DELAYED RELEASE ORAL at 06:41

## 2019-04-28 RX ADMIN — Medication 300 MILLIGRAM(S): at 12:20

## 2019-04-28 RX ADMIN — MEROPENEM 100 MILLIGRAM(S): 1 INJECTION INTRAVENOUS at 06:41

## 2019-04-28 RX ADMIN — ZINC OXIDE 1 APPLICATION(S): 200 OINTMENT TOPICAL at 12:21

## 2019-04-28 RX ADMIN — Medication 2 PACKET(S): at 08:50

## 2019-04-28 RX ADMIN — WARFARIN SODIUM 3 MILLIGRAM(S): 2.5 TABLET ORAL at 21:53

## 2019-04-28 RX ADMIN — Medication 1 TABLET(S): at 12:20

## 2019-04-28 RX ADMIN — Medication 250 MILLIGRAM(S): at 08:50

## 2019-04-28 RX ADMIN — MEROPENEM 100 MILLIGRAM(S): 1 INJECTION INTRAVENOUS at 14:49

## 2019-04-28 RX ADMIN — MEROPENEM 100 MILLIGRAM(S): 1 INJECTION INTRAVENOUS at 21:53

## 2019-04-28 NOTE — PROGRESS NOTE ADULT - ASSESSMENT
78 year old male patient with pertinent history of Afib on coumadin, Dementia and anemia admitted on 4/22 from snf for evaluation of vomiting coffee ground emesis and fever to 100.8; patient was transferred to icu given hypotension and required pressor support. History per medical record and patient unable to provide history.   1. Patient admitted with septic shock, unclear etiology, possibly due to urinary origin versus pneumonia, versus soft tissue infection of left hip  - follow up cultures ---- found to have ESBL E coli in urine  - iv hydration and supportive care   - on pressor support per icu  - serial cbc and monitor temperature   - reviewed prior medical records to evaluate for resistant or atypical pathogens   - day #6 vancomycin  - tolerating antibiotics without rashes or side effects   - vancomycin trough is appropriate  - day #4 meropenem  - wound care of hip  - eventually will need peg tube  - expect another 24 hours antibiotics  2. other issues; Afib on coumadin, Dementia and anemia  - per medicine

## 2019-04-28 NOTE — PROGRESS NOTE ADULT - SUBJECTIVE AND OBJECTIVE BOX
CC:  Sepsis     HPI:    79 y/o male with advance dementia and AF on coumadin admitted on 4/22 to SDU for possible GIB and UTI sepsis--found to have R non obst renal stone and pyelo/possible Aspiration PNA and colitis.  He received 3L IVF while in SD--remain hypotensive--Tx to ICU for pressors.    4/23:  ICU D # 1.  Pt seen and examined in ICU--lethargic--arousable--On Santana 1.  PPI gtt.  .  Na 159.  INR 3.1  Hb 11.      4/24:  ICU D # 2.  Pt seen and examined in ICU--seen by lavon rollins--not safe to eat.  Remains on phenyl gtt 0.7  Na 148.  Stable H/H    4/25:  ICU D # 3.  Pt seen and examined in ICU--ESBL in UCx--very confused and unable to marin PO.  Off pressors at this time.  Na 148    4/26:  ICU D # 4.  Pt seen and examined in ICU--Off pressors--Marin TF--Na 148.  Remains very confused     4/27:  ICU D # 5.  Pt seen and examined in ICU--Remains off pressors--mentation not better at all--Marin TF.  Na 148    4/28:  ICU D # 6.  Pt seen and examined in ICU--no pressors--still dependent on TF--Na 145      PMH:  As above.     PSH:  As above.     FH: Non Contributory other than those listed in HPI    Social History:      MEDICATIONS  (STANDING):  calcium carbonate    500 mG (Tums) Chewable 1 Tablet(s) Chew daily  cholecalciferol Oral Tab/Cap - Peds 2000 Unit(s) Oral daily  Dakins Solution - 1/2 Strength 1 Application(s) Topical daily  dextrose 50% Injectable 12.5 Gram(s) IV Push once  dextrose 50% Injectable 25 Gram(s) IV Push once  dextrose 50% Injectable 25 Gram(s) IV Push once  ferrous    sulfate Liquid 300 milliGRAM(s) Oral daily  insulin lispro (HumaLOG) corrective regimen sliding scale   SubCutaneous every 6 hours  meropenem  IVPB      meropenem  IVPB 500 milliGRAM(s) IV Intermittent every 8 hours  pantoprazole  Injectable 40 milliGRAM(s) IV Push two times a day  polyethylene glycol 3350 17 Gram(s) Oral daily  potassium phosphate / sodium phosphate powder 2 Packet(s) Oral once  senna 2 Tablet(s) Oral at bedtime  vancomycin  IVPB 750 milliGRAM(s) IV Intermittent every 12 hours  warfarin 3 milliGRAM(s) Oral once  zinc oxide 20% Ointment 1 Application(s) Topical daily    MEDICATIONS  (PRN):  acetaminophen   Tablet .. 650 milliGRAM(s) Oral every 6 hours PRN Temp greater or equal to 38.5C (101.3F), Moderate Pain (4 - 6)  dextrose 40% Gel 15 Gram(s) Oral once PRN Blood Glucose LESS THAN 70 milliGRAM(s)/deciliter  glucagon  Injectable 1 milliGRAM(s) IntraMuscular once PRN Glucose LESS THAN 70 milligrams/deciliter      Allergies: NKDA    ROS:  SEE BELOW        ICU Vital Signs Last 24 Hrs  T(C): 37.6 (28 Apr 2019 05:00), Max: 37.6 (27 Apr 2019 16:00)  T(F): 99.6 (28 Apr 2019 05:00), Max: 99.6 (27 Apr 2019 16:00)  HR: 61 (28 Apr 2019 06:00) (61 - 82)  BP: 103/55 (28 Apr 2019 06:00) (82/56 - 120/53)  BP(mean): 68 (28 Apr 2019 06:00) (50 - 83)  ABP: --  ABP(mean): --  RR: 24 (28 Apr 2019 06:00) (19 - 34)  SpO2: 95% (28 Apr 2019 06:00) (95% - 100%)          I&O's Summary    27 Apr 2019 07:01  -  28 Apr 2019 07:00  --------------------------------------------------------  IN: 3222 mL / OUT: 0 mL / NET: 3222 mL        Physical Exam:  SEE BELOW                          10.5   8.51  )-----------( 172      ( 28 Apr 2019 06:12 )             34.7       04-28    145  |  110<H>  |  15  ----------------------------<  107<H>  4.0   |  31  |  0.85    Ca    7.7<L>      28 Apr 2019 06:12  Phos  2.3     04-28  Mg     2.3     04-28                      DVT Prophylaxis:                                                            Contraindication:     Advanced Directives:    Discussed with:    Visit Information:  Time spent excluding procedure:  30 cc mins    ** Time is exclusive of billed procedures and/or teaching and/or routine family updates.

## 2019-04-28 NOTE — PROGRESS NOTE ADULT - ASSESSMENT
77 y/o male w/ septic shock and superimposed GI bleed requiring pressor support    # Septic Shock 2/2 UTI and Aspiration PNA  - Under ICU Care  - UA (+), RVP (-)  - Blood cultures NGTD  - Urine culture->+E.Coli - ESBL which is resistant to zosyn   - continue meropenum  - continue vancomycin  - s/p doxycycline, cefepime and zosyn  - CT abd/pelvis noted possible PNA (likely aspiration), sterocoral colitis, non-obstructing R-sided renal stone and Right sided pyelitis and uteritis  - currently being monitored off pressors  - ID following  - Speech/Swallow recommendation appreciated  - Bautista - Strict I&O  - Aspiration Precautions     # GI Bleed  - Coffee ground emesis noted at NH and positive guaiac in ED  - Monitor H&H which is currently stable  - NPO  - Protonix IV BID  - GI consult appreciated   - No active bleeding at this time    # MIGUEL - Resolved  - continue IV fluids   - continue to monitor     # Hypernatremia - Resolved  - s/p IV fluids  - will monitor    # Atrial Fibrillation   - CHADS2 Vasc score of 6  - Coumadin Held initially due to supratherapeutic INR  - Cont 3 mg of coumadin tonight  - Rate Controlled   - No active bleeding at this time    # DM2  - Non-insulin dependent   - A1C 5.8  - Cont. 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 dakins solution    # Advanced Directives  - Nephew / HCP Anshu Hernandez [phone]  - Spoke with HCP at length on the phone 4/23 who wants full code  - Possible PEG tube placement, at the discretion of ICU and HCP

## 2019-04-28 NOTE — PROGRESS NOTE ADULT - NEUROLOGICAL DETAILS
Encephalopathic
Grossly non focal--moves all extreme spont
Confused.  Unable to follow instructions
Encephalopathic and confused
Confused
Encephalopathic

## 2019-04-28 NOTE — PROGRESS NOTE ADULT - SUBJECTIVE AND OBJECTIVE BOX
CC: Coffee ground emesis and fever    HPI:  79 y/o male w/ PMH significant for Afib (coumadin), Dementia, DM, DVT presented to ED from [facility] for coffee ground emesis and T. Max of 102.8. Unable to illicit any HPI or ROS 2/2  altered mental status. In the ED, patient found to have supra therapeutic INR 4.25, Lactate 2.1, MIGUEL, UA (+), RVP negative, CXR negative, Guaiac stool (+), CT abdomen with possible PNA, sterocoral colitis, non-obstructing R-sided renal stone and Right sided pyelitis and uteritis. Pt. given IV fluids per sepsis protocol, IV Vanco and cefepime. Pt. remained hypotensive and transferred to ICU for pressor support.     Interval HPI:  Patient seen and examined. Unable to provide history due to mental status. Currently off pressors. Management per ICU.    Review of Systems: Unable to obtain 2/2 altered mental status    MEDICATIONS  (STANDING):  calcium carbonate    500 mG (Tums) Chewable 1 Tablet(s) Chew daily  cholecalciferol Oral Tab/Cap - Peds 2000 Unit(s) Oral daily  Dakins Solution - 1/2 Strength 1 Application(s) Topical daily  ferrous    sulfate Liquid 300 milliGRAM(s) Oral daily  meropenem  IVPB      meropenem  IVPB 500 milliGRAM(s) IV Intermittent every 8 hours  pantoprazole  Injectable 40 milliGRAM(s) IV Push two times a day  polyethylene glycol 3350 17 Gram(s) Oral daily  senna 2 Tablet(s) Oral at bedtime  vancomycin  IVPB 750 milliGRAM(s) IV Intermittent every 12 hours  warfarin 3 milliGRAM(s) Oral once  zinc oxide 20% Ointment 1 Application(s) Topical daily    MEDICATIONS  (PRN):  acetaminophen   Tablet .. 650 milliGRAM(s) Oral every 6 hours PRN Temp greater or equal to 38.5C (101.3F), Moderate Pain (4 - 6)  glucagon  Injectable 1 milliGRAM(s) IntraMuscular once PRN Glucose LESS THAN 70 milligrams/deciliter    Vital Signs Last 24 Hrs  T(C): 37.6 (28 Apr 2019 05:00), Max: 37.6 (27 Apr 2019 16:00)  T(F): 99.6 (28 Apr 2019 05:00), Max: 99.6 (27 Apr 2019 16:00)  HR: 68 (28 Apr 2019 11:00) (61 - 82)  BP: 108/60 (28 Apr 2019 11:00) (79/52 - 120/53)  BP(mean): 59 (28 Apr 2019 08:00) (50 - 83)  RR: 29 (28 Apr 2019 11:00) (19 - 29)  SpO2: 97% (28 Apr 2019 11:00) (94% - 100%)    PHYSICAL EXAM:     GENERAL: NAD, resting comfortably  NECK: Supple, No JVD  NERVOUS SYSTEM:  Disoriented x4 unable to follow commands or answer questions appropriately  CHEST/LUNG: diffuse coarse breath sounds  HEART: S1 S2 normal, no murmur  ABDOMEN: Soft, Nontender, Nondistended; Bowel sounds present  GENITOURINARY: Bautista in place   EXTREMITIES:  2+ Peripheral Pulses, No clubbing, cyanosis, or edema  SKIN: No rash, no lesion    LABS:                         10.5   8.51  )-----------( 172      ( 28 Apr 2019 06:12 )             34.7     28 Apr 2019 06:12    145    |  110    |  15     ----------------------------<  107    4.0     |  31     |  0.85     Ca    7.7        28 Apr 2019 06:12  Phos  2.3       28 Apr 2019 06:12  Mg     2.3       28 Apr 2019 06:12      PT/INR - ( 28 Apr 2019 06:12 )   PT: 16.8 sec;   INR: 1.49 ratio         PTT - ( 28 Apr 2019 06:12 )  PTT:31.3 sec  CAPILLARY BLOOD GLUCOSE      POCT Blood Glucose.: 112 mg/dL (28 Apr 2019 06:10)  POCT Blood Glucose.: 107 mg/dL (27 Apr 2019 23:31)  POCT Blood Glucose.: 126 mg/dL (27 Apr 2019 18:30)  POCT Blood Glucose.: 114 mg/dL (27 Apr 2019 12:44)        Hemoglobin A1C, Whole Blood: 5.4 % (04-24 @ 06:10)  Hemoglobin A1C, Whole Blood: 5.8 % (04-23 @ 04:53)        < from: Xray Kidney Ureter Bladder (04.25.19 @ 10:35) >  IMPRESSION:   Enteric tube with tip overlying the stomach. Nonspecific bowel gas   pattern. Moderate stool burden in the rectum. Degenerative changes of the   spine. Status post left hip arthroplasty with adjacent heterotopic   ossification.    < end of copied text >      < from: CT Abdomen and Pelvis w/ IV Cont (04.22.19 @ 21:32) >  MPRESSION:    1. Bilateral patchy consolidation within the lower lobes which may   reflect pneumonia. Aspiration can be considered, given clinical history.    2. Large fecalith within the rectal vault with rectal wall thickening and   presacral edema for which stercoral colitis can be considered.    3. Nonobstructing right-sided stones within the renal collecting system.   Right-sided urothelial enhancement and proximal ureteral enhancement   compatible with right-sided pyelitis and ureteritis. Correlate with   urinalysis.    4. Other findings, as discussed.    < end of copied text >

## 2019-04-28 NOTE — PROGRESS NOTE ADULT - SUBJECTIVE AND OBJECTIVE BOX
Patient is a 78y old  Male who presents with a chief complaint of Sepsis, GI bleed (23 Apr 2019 10:34)    Date of service: 04-28-19 @ 11:00      Patient sitting in chair; confused, afebrile      ROS unable to obtain secondary to patient medical condition     MEDICATIONS  (STANDING):  calcium carbonate    500 mG (Tums) Chewable 1 Tablet(s) Chew daily  cholecalciferol Oral Tab/Cap - Peds 2000 Unit(s) Oral daily  Dakins Solution - 1/2 Strength 1 Application(s) Topical daily  ferrous    sulfate Liquid 300 milliGRAM(s) Oral daily  meropenem  IVPB      meropenem  IVPB 500 milliGRAM(s) IV Intermittent every 8 hours  pantoprazole  Injectable 40 milliGRAM(s) IV Push two times a day  polyethylene glycol 3350 17 Gram(s) Oral daily  senna 2 Tablet(s) Oral at bedtime  vancomycin  IVPB 750 milliGRAM(s) IV Intermittent every 12 hours  warfarin 3 milliGRAM(s) Oral once  zinc oxide 20% Ointment 1 Application(s) Topical daily    MEDICATIONS  (PRN):  acetaminophen   Tablet .. 650 milliGRAM(s) Oral every 6 hours PRN Temp greater or equal to 38.5C (101.3F), Moderate Pain (4 - 6)  glucagon  Injectable 1 milliGRAM(s) IntraMuscular once PRN Glucose LESS THAN 70 milligrams/deciliter      Vital Signs Last 24 Hrs  T(C): 37.6 (28 Apr 2019 05:00), Max: 37.6 (27 Apr 2019 16:00)  T(F): 99.6 (28 Apr 2019 05:00), Max: 99.6 (27 Apr 2019 16:00)  HR: 68 (28 Apr 2019 11:00) (61 - 82)  BP: 108/60 (28 Apr 2019 11:00) (79/52 - 120/53)  BP(mean): 59 (28 Apr 2019 08:00) (50 - 83)  RR: 29 (28 Apr 2019 11:00) (19 - 29)  SpO2: 97% (28 Apr 2019 11:00) (94% - 100%)    Physical Exam:    PE:    Constitutional: frail looking  HEENT: NC/AT, EOMI, PERRLA, conjunctivae clear; ears and nose atraumatic; caked blood in oropharynx  Neck: supple; thyroid not palpable  Respiratory: respiratory effort normal; scattered coarse breath sounds  Cardiovascular: S1S2 regular, no murmurs  Abdomen: soft, not tender, not distended, positive BS; no liver or spleen organomegaly  Genitourinary: no suprapubic tenderness  Musculoskeletal: no muscle tenderness, left hip with yellow- green drainage in linear skin breakdown with two distal circular lesions with brown drainage  Neurological/ Psychiatric: AxOx3, judgement and insight normal;  moving all extremities  Skin: no rashes; no palpable lesions    Labs: all available labs reviewed             Labs:                        10.5   8.51  )-----------( 172      ( 28 Apr 2019 06:12 )             34.7     04-28    145  |  110<H>  |  15  ----------------------------<  107<H>  4.0   |  31  |  0.85    Ca    7.7<L>      28 Apr 2019 06:12  Phos  2.3     04-28  Mg     2.3     04-28             Cultures:       Culture - Urine (collected 04-22-19 @ 17:34)  Source: .Urine Clean Catch (Midstream)  Final Report (04-24-19 @ 17:37):    >100,000 CFU/ml Escherichia coli ESBL  Organism: Escherichia coli ESBL (04-24-19 @ 17:37)  Organism: Escherichia coli ESBL (04-24-19 @ 17:37)      -  Amikacin: S <=16      -  Ampicillin: R >16 These ampicillin results predict results for amoxicillin      -  Ampicillin/Sulbactam: R <=8/4      -  Aztreonam: R >16      -  Cefazolin: R >16 For uncomplicated UTI with K. pneumoniae, E. coli, or P. mirablis: HOWIE <=16 is sensitive and HOWIE >=32 is resistant. This also predicts results for oral agents cefaclor, cefdinir, cefpodoxime, cefprozil, cefuroxime axetil, cephalexin and locarbef for uncomplicated UTI. Note that some isolates may be susceptible to these agents while testing resistant to cefazolin.      -  Cefepime: R >16      -  Cefoxitin: R >16      -  Ceftriaxone: R >32 Enterobacter, Citrobacter, and Serratia may develop resistance during prolonged therapy      -  Ciprofloxacin: R >2      -  Ertapenem: S <=1      -  Gentamicin: S <=4      -  Imipenem: S <=1      -  Levofloxacin: R >4      -  Meropenem: S <=1      -  Nitrofurantoin: S <=32 Should not be used to treat pyelonephritis      -  Piperacillin/Tazobactam: R <=16      -  Tigecycline: S <=2      -  Tobramycin: S <=4      -  Trimethoprim/Sulfamethoxazole: R >2/38      Method Type: HOWIE    Culture - Blood (collected 04-22-19 @ 17:34)  Source: .Blood Blood-Peripheral  Final Report (04-28-19 @ 01:01):    No growth at 5 days.    Culture - Blood (collected 04-22-19 @ 17:34)  Source: .Blood Blood-Peripheral  Final Report (04-28-19 @ 01:01):    No growth at 5 days.               Cultures:       Culture - Urine (collected 04-22-19 @ 17:34)  Source: .Urine Clean Catch (Midstream)  Final Report (04-24-19 @ 17:37):    >100,000 CFU/ml Escherichia coli ESBL  Organism: Escherichia coli ESBL (04-24-19 @ 17:37)  Organism: Escherichia coli ESBL (04-24-19 @ 17:37)      -  Amikacin: S <=16      -  Ampicillin: R >16 These ampicillin results predict results for amoxicillin      -  Ampicillin/Sulbactam: R <=8/4      -  Aztreonam: R >16      -  Cefazolin: R >16 For uncomplicated UTI with K. pneumoniae, E. coli, or P. mirablis: HOWIE <=16 is sensitive and HOWIE >=32 is resistant. This also predicts results for oral agents cefaclor, cefdinir, cefpodoxime, cefprozil, cefuroxime axetil, cephalexin and locarbef for uncomplicated UTI. Note that some isolates may be susceptible to these agents while testing resistant to cefazolin.      -  Cefepime: R >16      -  Cefoxitin: R >16      -  Ceftriaxone: R >32 Enterobacter, Citrobacter, and Serratia may develop resistance during prolonged therapy      -  Ciprofloxacin: R >2      -  Ertapenem: S <=1      -  Gentamicin: S <=4      -  Imipenem: S <=1      -  Levofloxacin: R >4      -  Meropenem: S <=1      -  Nitrofurantoin: S <=32 Should not be used to treat pyelonephritis      -  Piperacillin/Tazobactam: R <=16      -  Tigecycline: S <=2      -  Tobramycin: S <=4      -  Trimethoprim/Sulfamethoxazole: R >2/38      Method Type: HOWIE    Culture - Blood (collected 04-22-19 @ 17:34)  Source: .Blood Blood-Peripheral  Preliminary Report (04-24-19 @ 01:03):    No growth to date.    Culture - Blood (collected 04-22-19 @ 17:34)  Source: .Blood Blood-Peripheral  Preliminary Report (04-24-19 @ 01:03):    No growth to date.            < from: CT Abdomen and Pelvis w/ IV Cont (04.22.19 @ 21:32) >    EXAM:  CT ABDOMEN AND PELVIS IC                            PROCEDURE DATE:  04/22/2019          INTERPRETATION:  .    CLINICAL INFORMATION: Vomiting coffee grounds.    TECHNIQUE: Multiple axial CT images of the head were obtained without   contrast. Sagittal and coronal reconstructed images were acquired from   the source data.    COMPARISON: Prior CT examination of the abdomen and pelvis from 12/9/2016.    FINDINGS: A nonspecific subcentimeter prevascular lymph node is notable   within the mediastinum. The heart size is normal. There are   atherosclerotic calcifications of the imaged coronary arteries, aorta,   and branch vessels. The imaged portions of the aorta are normal in   caliber. An infrarenal IVC filter is redemonstrated.    Patchy confluent opacities are noted within the bilateral lower lobes.    Scattered liver cysts appear unchanged when compared to the prior CT   exam. Cholelithiasis is noted. There is no gallbladder wall thickening.    The pancreas, spleen, and adrenal glands appear unremarkable.    Multiple stones are again noted within the right renal collecting system,   the largest of which measures up to 1.5 cm in the upper pole. There is   right-sided urothelial enhancement and right-sided periureteral fat   stranding. There is no right-sided hydronephrosis. There is minimal   nonspecific left-sided perinephric stranding.    The urinary bladder is underdistended, limiting evaluation.    There are multiple scattered nonspecific subcentimeter retroperitoneal   and mesenteric lymph nodes.    There is a small hiatal hernia. The stomach is underdistended. There is   no small bowel obstruction. A very large fecalith is notable within the   rectum. There is mild rectal wall thickening and presacral edema. An   additional moderate amount of stool is notable throughout the large   bowel. A few colonic diverticula are notable. The appendix is not clearly   visualized.    The prostate gland and seminal vesicles appear unremarkable. A small   fat-containing left-sided inguinal hernia is noted.    A left-sided total hip arthroplasty is again noted. There is extensive   adjacent heterotopic ossification. The hip prosthesis generates streak   and beam hardening artifact limiting evaluation of the adjacent   structures. There is fatty atrophy of the left adductor compartment.    There is generalized osteopenia. Multilevel degenerative changes are   noted within the imaged potions of the spine.    < from: CT Abdomen and Pelvis w/ IV Cont (04.22.19 @ 21:32) >  MPRESSION:    1. Bilateral patchy consolidation within the lower lobes which may   reflect pneumonia. Aspiration can be considered, given clinical history.    2. Large fecalith within the rectal vault with rectal wall thickening and   presacral edema for which stercoral colitis can be considered.    3. Nonobstructing right-sided stones within the renal collecting system.   Right-sided urothelial enhancement and proximal ureteral enhancement   compatible with right-sided pyelitis and ureteritis. Correlate with   urinalysis.    4. Other findings, as discussed.    < end of copied text >    < end of copied text >      Radiology: all available radiological tests reviewed    Advanced directives addressed: full resuscitation

## 2019-04-29 LAB
ANION GAP SERPL CALC-SCNC: 4 MMOL/L — LOW (ref 5–17)
BUN SERPL-MCNC: 14 MG/DL — SIGNIFICANT CHANGE UP (ref 7–23)
CALCIUM SERPL-MCNC: 7.7 MG/DL — LOW (ref 8.5–10.1)
CHLORIDE SERPL-SCNC: 109 MMOL/L — HIGH (ref 96–108)
CO2 SERPL-SCNC: 30 MMOL/L — SIGNIFICANT CHANGE UP (ref 22–31)
CREAT SERPL-MCNC: 0.77 MG/DL — SIGNIFICANT CHANGE UP (ref 0.5–1.3)
GLUCOSE SERPL-MCNC: 106 MG/DL — HIGH (ref 70–99)
MAGNESIUM SERPL-MCNC: 2.4 MG/DL — SIGNIFICANT CHANGE UP (ref 1.6–2.6)
PHOSPHATE SERPL-MCNC: 2.8 MG/DL — SIGNIFICANT CHANGE UP (ref 2.5–4.5)
POTASSIUM SERPL-MCNC: 4.1 MMOL/L — SIGNIFICANT CHANGE UP (ref 3.5–5.3)
POTASSIUM SERPL-SCNC: 4.1 MMOL/L — SIGNIFICANT CHANGE UP (ref 3.5–5.3)
SODIUM SERPL-SCNC: 143 MMOL/L — SIGNIFICANT CHANGE UP (ref 135–145)

## 2019-04-29 PROCEDURE — 93971 EXTREMITY STUDY: CPT | Mod: 26,RT

## 2019-04-29 RX ORDER — ENOXAPARIN SODIUM 100 MG/ML
75 INJECTION SUBCUTANEOUS EVERY 12 HOURS
Qty: 0 | Refills: 0 | Status: DISCONTINUED | OUTPATIENT
Start: 2019-04-29 | End: 2019-05-01

## 2019-04-29 RX ORDER — NYSTATIN CREAM 100000 [USP'U]/G
1 CREAM TOPICAL
Qty: 0 | Refills: 0 | Status: DISCONTINUED | OUTPATIENT
Start: 2019-04-29 | End: 2019-05-10

## 2019-04-29 RX ADMIN — Medication 1 TABLET(S): at 14:55

## 2019-04-29 RX ADMIN — NYSTATIN CREAM 1 APPLICATION(S): 100000 CREAM TOPICAL at 19:00

## 2019-04-29 RX ADMIN — PANTOPRAZOLE SODIUM 40 MILLIGRAM(S): 20 TABLET, DELAYED RELEASE ORAL at 18:00

## 2019-04-29 RX ADMIN — MEROPENEM 100 MILLIGRAM(S): 1 INJECTION INTRAVENOUS at 23:18

## 2019-04-29 RX ADMIN — ENOXAPARIN SODIUM 75 MILLIGRAM(S): 100 INJECTION SUBCUTANEOUS at 19:41

## 2019-04-29 RX ADMIN — MEROPENEM 100 MILLIGRAM(S): 1 INJECTION INTRAVENOUS at 05:16

## 2019-04-29 RX ADMIN — Medication 2000 UNIT(S): at 13:14

## 2019-04-29 RX ADMIN — Medication 250 MILLIGRAM(S): at 18:30

## 2019-04-29 RX ADMIN — POLYETHYLENE GLYCOL 3350 17 GRAM(S): 17 POWDER, FOR SOLUTION ORAL at 13:01

## 2019-04-29 RX ADMIN — MEROPENEM 100 MILLIGRAM(S): 1 INJECTION INTRAVENOUS at 14:51

## 2019-04-29 RX ADMIN — PANTOPRAZOLE SODIUM 40 MILLIGRAM(S): 20 TABLET, DELAYED RELEASE ORAL at 05:16

## 2019-04-29 RX ADMIN — Medication 1 APPLICATION(S): at 14:52

## 2019-04-29 RX ADMIN — ZINC OXIDE 1 APPLICATION(S): 200 OINTMENT TOPICAL at 13:02

## 2019-04-29 RX ADMIN — Medication 300 MILLIGRAM(S): at 13:01

## 2019-04-29 RX ADMIN — Medication 250 MILLIGRAM(S): at 06:16

## 2019-04-29 NOTE — PROGRESS NOTE ADULT - ASSESSMENT
78-year-old man admitted with sepsis due to urinary tract infection, and found to have coffee-ground emesis, guaiac positive stool, and anemia.    Bleeding resolved with PPI.  Right arm DVT and now on anticoagulation.  Dysphagia.    Recommendations:    -Monitor hemoglobin.     -Continue Protonix    -Continue bowel regimen  -will plan for PEG later this week  -will d/w HCP for consent  -d/w RN and Dr. Mclaughlin

## 2019-04-29 NOTE — PROGRESS NOTE ADULT - ASSESSMENT
78 year old male patient with pertinent history of Afib on coumadin, Dementia and anemia admitted on 4/22 from snf for evaluation of vomiting coffee ground emesis and fever to 100.8; patient was transferred to icu given hypotension and required pressor support. History per medical record and patient unable to provide history.   1. Patient admitted with septic shock, unclear etiology, possibly due to urinary origin versus pneumonia, versus soft tissue infection of left hip  - follow up cultures ---- found to have ESBL E coli in urine  - iv hydration and supportive care   - on pressor support per icu  - serial cbc and monitor temperature   - reviewed prior medical records to evaluate for resistant or atypical pathogens   - day #7 vancomycin  - tolerating antibiotics without rashes or side effects   - vancomycin trough is appropriate  - day #5 meropenem  - wound care of hip  - eventually will need peg tube  - will stop antibiotics after todays doses  - okay from id standpoint to place peg tube  If further ID issues please reconsult   2. other issues; Afib on coumadin, Dementia and anemia  - per medicine

## 2019-04-29 NOTE — PROGRESS NOTE ADULT - ASSESSMENT
79 y/o male w/ septic shock and superimposed GI bleed requiring pressor support    Septic Shock 2/2 UTI and or aspiration PNA  - UA (+), RVP (-)  - Blood cultures NGTD  - Urine culture->+E.Coli - ESBL which is resistant to zosyn   - continue meropenum  - continue vancomycin  - s/p doxycycline, cefepime and zosyn  - CT abd/pelvis noted possible PNA (likely aspiration), sterocoral colitis, non-obstructing R-sided renal stone and Right sided pyelitis and uteritis  - currently being monitored off pressors  - ID following  - Speech/Swallow recommendation appreciated  - Bautista - Strict I&O  - Aspiration Precautions     GI Bleed  - Coffee ground emesis noted at NH and positive guaiac in ED  - Monitor H&H which is currently stable  - NPO  - Protonix IV BID  - GI consult appreciated   - No active bleeding at this time    MIGUEL  - likely prerenal  - continue IV fluids   - improving  - continue to monitor     Hypernatremia  - s/p IV fluids  - improving  - will monitor    Atrial Fibrillation   - CHADS2 Vasc score of 6  - coumadin initially held due to supratherapeutic INR which has resolved  - coumadin currently held pending PEG tube placement  - rate Controlled   - no active bleeding at this time    DM2  - Non-insulin dependent   - A1C 5.8  - Cont. 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 dakins solution    Advanced Directives  - Nephew/HCP Anshu Hernandez [phone]  - spoke with HCP at length on the phone 4/23 who wants full code 77 y/o male w/ septic shock and superimposed GI bleed requiring pressor support    Septic Shock 2/2 UTI and or aspiration PNA  - UA (+), RVP (-)  - Blood cultures NGTD  - Urine culture->+E.Coli - ESBL which is resistant to zosyn   - continue meropenum  - continue vancomycin  - s/p doxycycline, cefepime and zosyn  - CT abd/pelvis noted possible PNA (likely aspiration), sterocoral colitis, non-obstructing R-sided renal stone and Right sided pyelitis and uteritis  - currently being monitored off pressors  - ID following  - Speech/Swallow recommendation appreciated  - Bautista - Strict I&O  - Aspiration Precautions     GI Bleed  - Coffee ground emesis noted at NH and positive guaiac in ED  - Monitor H&H which is currently stable  - NPO  - Protonix IV BID  - GI consult appreciated   - No active bleeding at this time    MIGUEL  - likely prerenal  - continue IV fluids   - improving  - continue to monitor     Hypernatremia  - s/p IV fluids  - improving  - will monitor    Atrial Fibrillation   - CHADS2 Vasc score of 6  - coumadin initially held due to supratherapeutic INR which has resolved  - coumadin currently held pending PEG tube placement  - rate controlled   - no active bleeding at this time    DM2  - Non-insulin dependent   - A1C 5.8  - Cont. 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 dakins solution    Advanced Directives  - Nephew/HCP Anshu Hernandez [phone]  - spoke with HCP at length on the phone 4/23 who wants full code  - pending possible PEG tube placement

## 2019-04-29 NOTE — PROGRESS NOTE ADULT - ASSESSMENT
IMP:    77 y/o male with advance dementia and AF on coumadin admitted on 4/22 to SDU for possible GIB and UTI sepsis--found to have R non obst renal stone and ESBL UTI pyelo/possible Aspiration PNA and colitis.  Tx to ICU for septic shock, toxic encephalopathy, Hypernatremia, ?? GIB and MIGUEL (KDIGO 1) with baseline Cr 0.9     Does not appear he has active GIB  Chronic L hip OM on doxy as outpt  I suspect he will need PEG as there has been limited improvement in mentation   MIGUEL resolved     Plan:  ICU  IV Vanco/ pip/mayra (2 Stopped)--Merro   NPO--TF to goal--free water at 60/hr  HOB > 30  Aspiration precautions   PPI q12  Hold coumadin as he will need a PEG  DVT prophy Kristy HOWELL ultrasound  Restraint to prevent self harm and removal of medical devices      ICU care--d/w ICU staff

## 2019-04-29 NOTE — PROGRESS NOTE ADULT - SUBJECTIVE AND OBJECTIVE BOX
Patient is a 78y old  Male who presents with a chief complaint of Sepsis, GI bleed (23 Apr 2019 10:34)    Date of service: 04-29-19 @ 11:20      Patient grunting and moaning in bed; afebrile    ROS unable to obtain secondary to patient medical condition     MEDICATIONS  (STANDING):  calcium carbonate    500 mG (Tums) Chewable 1 Tablet(s) Chew daily  cholecalciferol Oral Tab/Cap - Peds 2000 Unit(s) Oral daily  Dakins Solution - 1/2 Strength 1 Application(s) Topical daily  ferrous    sulfate Liquid 300 milliGRAM(s) Oral daily  meropenem  IVPB      meropenem  IVPB 500 milliGRAM(s) IV Intermittent every 8 hours  pantoprazole  Injectable 40 milliGRAM(s) IV Push two times a day  polyethylene glycol 3350 17 Gram(s) Oral daily  senna 2 Tablet(s) Oral at bedtime  vancomycin  IVPB 750 milliGRAM(s) IV Intermittent every 12 hours  zinc oxide 20% Ointment 1 Application(s) Topical daily    MEDICATIONS  (PRN):  acetaminophen   Tablet .. 650 milliGRAM(s) Oral every 6 hours PRN Temp greater or equal to 38.5C (101.3F), Moderate Pain (4 - 6)  glucagon  Injectable 1 milliGRAM(s) IntraMuscular once PRN Glucose LESS THAN 70 milligrams/deciliter      Vital Signs Last 24 Hrs  T(C): 37.2 (29 Apr 2019 08:18), Max: 37.3 (29 Apr 2019 05:00)  T(F): 98.9 (29 Apr 2019 08:18), Max: 99.1 (29 Apr 2019 05:00)  HR: 71 (29 Apr 2019 10:00) (59 - 90)  BP: 101/73 (29 Apr 2019 10:00) (84/49 - 122/55)  BP(mean): 84 (29 Apr 2019 10:00) (57 - 87)  RR: 28 (29 Apr 2019 10:00) (16 - 31)  SpO2: 99% (29 Apr 2019 10:00) (93% - 99%)    Physical Exam:            Constitutional: frail looking  HEENT: NC/AT, EOMI, PERRLA, conjunctivae clear; ears and nose atraumatic; caked blood in oropharynx  Neck: supple; thyroid not palpable  Respiratory: respiratory effort normal; scattered coarse breath sounds  Cardiovascular: S1S2 regular, no murmurs  Abdomen: soft, not tender, not distended, positive BS; no liver or spleen organomegaly  Genitourinary: no suprapubic tenderness  Musculoskeletal: no muscle tenderness, left hip with yellow- green drainage in linear skin breakdown with two distal circular lesions with brown drainage  Neurological/ Psychiatric: AxOx3, judgement and insight normal;  moving all extremities  Skin: no rashes; no palpable lesions    Labs: all available labs reviewed             Labs:                 Labs:                        10.5   8.51  )-----------( 172      ( 28 Apr 2019 06:12 )             34.7     04-29    143  |  109<H>  |  14  ----------------------------<  106<H>  4.1   |  30  |  0.77    Ca    7.7<L>      29 Apr 2019 07:14  Phos  2.8     04-29  Mg     2.4     04-29             Cultures:       Culture - Urine (collected 04-22-19 @ 17:34)  Source: .Urine Clean Catch (Midstream)  Final Report (04-24-19 @ 17:37):    >100,000 CFU/ml Escherichia coli ESBL  Organism: Escherichia coli ESBL (04-24-19 @ 17:37)  Organism: Escherichia coli ESBL (04-24-19 @ 17:37)      -  Amikacin: S <=16      -  Ampicillin: R >16 These ampicillin results predict results for amoxicillin      -  Ampicillin/Sulbactam: R <=8/4      -  Aztreonam: R >16      -  Cefazolin: R >16 For uncomplicated UTI with K. pneumoniae, E. coli, or P. mirablis: HOWIE <=16 is sensitive and HOWIE >=32 is resistant. This also predicts results for oral agents cefaclor, cefdinir, cefpodoxime, cefprozil, cefuroxime axetil, cephalexin and locarbef for uncomplicated UTI. Note that some isolates may be susceptible to these agents while testing resistant to cefazolin.      -  Cefepime: R >16      -  Cefoxitin: R >16      -  Ceftriaxone: R >32 Enterobacter, Citrobacter, and Serratia may develop resistance during prolonged therapy      -  Ciprofloxacin: R >2      -  Ertapenem: S <=1      -  Gentamicin: S <=4      -  Imipenem: S <=1      -  Levofloxacin: R >4      -  Meropenem: S <=1      -  Nitrofurantoin: S <=32 Should not be used to treat pyelonephritis      -  Piperacillin/Tazobactam: R <=16      -  Tigecycline: S <=2      -  Tobramycin: S <=4      -  Trimethoprim/Sulfamethoxazole: R >2/38      Method Type: HOWIE    Culture - Blood (collected 04-22-19 @ 17:34)  Source: .Blood Blood-Peripheral  Final Report (04-28-19 @ 01:01):    No growth at 5 days.    Culture - Blood (collected 04-22-19 @ 17:34)  Source: .Blood Blood-Peripheral  Final Report (04-28-19 @ 01:01):    No growth at 5 days.              < from: CT Abdomen and Pelvis w/ IV Cont (04.22.19 @ 21:32) >    EXAM:  CT ABDOMEN AND PELVIS IC                            PROCEDURE DATE:  04/22/2019          INTERPRETATION:  .    CLINICAL INFORMATION: Vomiting coffee grounds.    TECHNIQUE: Multiple axial CT images of the head were obtained without   contrast. Sagittal and coronal reconstructed images were acquired from   the source data.    COMPARISON: Prior CT examination of the abdomen and pelvis from 12/9/2016.    FINDINGS: A nonspecific subcentimeter prevascular lymph node is notable   within the mediastinum. The heart size is normal. There are   atherosclerotic calcifications of the imaged coronary arteries, aorta,   and branch vessels. The imaged portions of the aorta are normal in   caliber. An infrarenal IVC filter is redemonstrated.    Patchy confluent opacities are noted within the bilateral lower lobes.    Scattered liver cysts appear unchanged when compared to the prior CT   exam. Cholelithiasis is noted. There is no gallbladder wall thickening.    The pancreas, spleen, and adrenal glands appear unremarkable.    Multiple stones are again noted within the right renal collecting system,   the largest of which measures up to 1.5 cm in the upper pole. There is   right-sided urothelial enhancement and right-sided periureteral fat   stranding. There is no right-sided hydronephrosis. There is minimal   nonspecific left-sided perinephric stranding.    The urinary bladder is underdistended, limiting evaluation.    There are multiple scattered nonspecific subcentimeter retroperitoneal   and mesenteric lymph nodes.    There is a small hiatal hernia. The stomach is underdistended. There is   no small bowel obstruction. A very large fecalith is notable within the   rectum. There is mild rectal wall thickening and presacral edema. An   additional moderate amount of stool is notable throughout the large   bowel. A few colonic diverticula are notable. The appendix is not clearly   visualized.    The prostate gland and seminal vesicles appear unremarkable. A small   fat-containing left-sided inguinal hernia is noted.    A left-sided total hip arthroplasty is again noted. There is extensive   adjacent heterotopic ossification. The hip prosthesis generates streak   and beam hardening artifact limiting evaluation of the adjacent   structures. There is fatty atrophy of the left adductor compartment.    There is generalized osteopenia. Multilevel degenerative changes are   noted within the imaged potions of the spine.    < from: CT Abdomen and Pelvis w/ IV Cont (04.22.19 @ 21:32) >  MPRESSION:    1. Bilateral patchy consolidation within the lower lobes which may   reflect pneumonia. Aspiration can be considered, given clinical history.    2. Large fecalith within the rectal vault with rectal wall thickening and   presacral edema for which stercoral colitis can be considered.    3. Nonobstructing right-sided stones within the renal collecting system.   Right-sided urothelial enhancement and proximal ureteral enhancement   compatible with right-sided pyelitis and ureteritis. Correlate with   urinalysis.    4. Other findings, as discussed.    < end of copied text >    < end of copied text >      Radiology: all available radiological tests reviewed    Advanced directives addressed: full resuscitation

## 2019-04-29 NOTE — PROGRESS NOTE ADULT - SUBJECTIVE AND OBJECTIVE BOX
CC: Coffee ground emesis and fever    HPI:  79 y/o male w/ PMH significant for Afib (coumadin), Dementia, DM, DVT presented to ED from [facility] for coffee ground emesis and T. Max of 102.8. Unable to illicit any HPI or ROS 2/2  altered mental status. In the ED, patient found to have supra therapeutic INR 4.25, Lactate 2.1, MIGUEL, UA (+), RVP negative, CXR negative, Guaiac stool (+), CT abdomen with possible PNA, sterocoral colitis, non-obstructing R-sided renal stone and Right sided pyelitis and uteritis. Pt. given IV fluids per sepsis protocol, IV Vanco and cefepime. Pt. remained hypotensive and transferred to ICU for pressor support.     Interval HPI:  Patient seen and examined. Unable to provide history due to mental status. Currently off pressors. Management per ICU.    Review of Systems: Unable to obtain 2/2 altered mental status    MEDICATIONS  (STANDING):  calcium carbonate    500 mG (Tums) Chewable 1 Tablet(s) Chew daily  cholecalciferol Oral Tab/Cap - Peds 2000 Unit(s) Oral daily  Dakins Solution - 1/2 Strength 1 Application(s) Topical daily  enoxaparin Injectable 75 milliGRAM(s) SubCutaneous every 12 hours  ferrous    sulfate Liquid 300 milliGRAM(s) Oral daily  meropenem  IVPB      meropenem  IVPB 500 milliGRAM(s) IV Intermittent every 8 hours  pantoprazole  Injectable 40 milliGRAM(s) IV Push two times a day  polyethylene glycol 3350 17 Gram(s) Oral daily  senna 2 Tablet(s) Oral at bedtime  vancomycin  IVPB 750 milliGRAM(s) IV Intermittent every 12 hours  zinc oxide 20% Ointment 1 Application(s) Topical daily    MEDICATIONS  (PRN):  acetaminophen   Tablet .. 650 milliGRAM(s) Oral every 6 hours PRN Temp greater or equal to 38.5C (101.3F), Moderate Pain (4 - 6)  glucagon  Injectable 1 milliGRAM(s) IntraMuscular once PRN Glucose LESS THAN 70 milligrams/deciliter      Vital Signs (24 Hrs):  T(C): 37.2 (04-29-19 @ 08:18), Max: 37.3 (04-29-19 @ 05:00)  HR: 71 (04-29-19 @ 10:00) (59 - 90)  BP: 101/73 (04-29-19 @ 10:00) (84/49 - 117/39)  RR: 28 (04-29-19 @ 10:00) (16 - 31)  SpO2: 99% (04-29-19 @ 10:00) (93% - 99%)  Wt(kg): --  Daily     Daily     I&O's Summary    28 Apr 2019 07:01  -  29 Apr 2019 07:00  --------------------------------------------------------  IN: 3055 mL / OUT: 0 mL / NET: 3055 mL        GENERAL: NAD, resting comfortably  NECK: Supple, No JVD  NERVOUS SYSTEM:  Disoriented x4 unable to follow commands or answer questions appropriately  CHEST/LUNG: diffuse coarse breath sounds  HEART: S1 S2 normal, no murmur  ABDOMEN: Soft, Nontender, Nondistended; Bowel sounds present  GENITOURINARY: Bautista in place   EXTREMITIES:  2+ Peripheral Pulses, No clubbing, cyanosis, or edema  SKIN: No rash, no lesion    LABS:    29 Apr 2019 07:14    143    |  109    |  14     ----------------------------<  106    4.1     |  30     |  0.77     Ca    7.7        29 Apr 2019 07:14  Phos  2.8       29 Apr 2019 07:14  Mg     2.4       29 Apr 2019 07:14      PT/INR - ( 28 Apr 2019 06:12 )   PT: 16.8 sec;   INR: 1.49 ratio         PTT - ( 28 Apr 2019 06:12 )  PTT:31.3 sec      < from: Xray Kidney Ureter Bladder (04.25.19 @ 10:35) >  IMPRESSION:   Enteric tube with tip overlying the stomach. Nonspecific bowel gas   pattern. Moderate stool burden in the rectum. Degenerative changes of the   spine. Status post left hip arthroplasty with adjacent heterotopic   ossification.    < end of copied text >      < from: CT Abdomen and Pelvis w/ IV Cont (04.22.19 @ 21:32) >  MPRESSION:    1. Bilateral patchy consolidation within the lower lobes which may   reflect pneumonia. Aspiration can be considered, given clinical history.    2. Large fecalith within the rectal vault with rectal wall thickening and   presacral edema for which stercoral colitis can be considered.    3. Nonobstructing right-sided stones within the renal collecting system.   Right-sided urothelial enhancement and proximal ureteral enhancement   compatible with right-sided pyelitis and ureteritis. Correlate with   urinalysis.    4. Other findings, as discussed.    < end of copied text >

## 2019-04-29 NOTE — PROGRESS NOTE ADULT - SUBJECTIVE AND OBJECTIVE BOX
CC:  Sepsis     HPI:    79 y/o male with advance dementia and AF on coumadin admitted on 4/22 to SDU for possible GIB and UTI sepsis--found to have R non obst renal stone and pyelo/possible Aspiration PNA and colitis.  He received 3L IVF while in SD--remain hypotensive--Tx to ICU for pressors.    4/23:  ICU D # 1.  Pt seen and examined in ICU--lethargic--arousable--On Santana 1.  PPI gtt.  .  Na 159.  INR 3.1  Hb 11.      4/24:  ICU D # 2.  Pt seen and examined in ICU--seen by lavon sanchez--not safe to eat.  Remains on phenyl gtt 0.7  Na 148.  Stable H/H    4/25:  ICU D # 3.  Pt seen and examined in ICU--ESBL in UCx--very confused and unable to marin PO.  Off pressors at this time.  Na 148    4/26:  ICU D # 4.  Pt seen and examined in ICU--Off pressors--Marin TF--Na 148.  Remains very confused     4/27:  ICU D # 5.  Pt seen and examined in ICU--Remains off pressors--mentation not better at all--Marin TF.  Na 148    4/28:  ICU D # 6.  Pt seen and examined in ICU--no pressors--still dependent on TF--Na 145    4/29: Patient seen and examined.  Patient remains confused and not following commands.  H & H stable.           PAST MEDICAL & SURGICAL HISTORY:  Osteomyelitis of left leg  Osteoarthritis  HTN (hypertension)  DVT (deep venous thrombosis)  PVD (peripheral vascular disease)  DM (diabetes mellitus)  COPD (chronic obstructive pulmonary disease)  Anemia  Dementia  No significant past surgical history      FAMILY HISTORY:      Social Hx:    Allergies    No Known Allergies    Intolerances            ICU Vital Signs Last 24 Hrs  T(C): 37.2 (29 Apr 2019 08:18), Max: 37.3 (29 Apr 2019 05:00)  T(F): 98.9 (29 Apr 2019 08:18), Max: 99.1 (29 Apr 2019 05:00)  HR: 71 (29 Apr 2019 10:00) (59 - 90)  BP: 101/73 (29 Apr 2019 10:00) (84/49 - 122/55)  BP(mean): 84 (29 Apr 2019 10:00) (57 - 87)  ABP: --  ABP(mean): --  RR: 28 (29 Apr 2019 10:00) (16 - 31)  SpO2: 99% (29 Apr 2019 10:00) (93% - 99%)          I&O's Summary    28 Apr 2019 07:01  -  29 Apr 2019 07:00  --------------------------------------------------------  IN: 3055 mL / OUT: 0 mL / NET: 3055 mL                              10.5   8.51  )-----------( 172      ( 28 Apr 2019 06:12 )             34.7       04-29    143  |  109<H>  |  14  ----------------------------<  106<H>  4.1   |  30  |  0.77    Ca    7.7<L>      29 Apr 2019 07:14  Phos  2.8     04-29  Mg     2.4     04-29          MEDICATIONS  (STANDING):  calcium carbonate    500 mG (Tums) Chewable 1 Tablet(s) Chew daily  cholecalciferol Oral Tab/Cap - Peds 2000 Unit(s) Oral daily  Dakins Solution - 1/2 Strength 1 Application(s) Topical daily  ferrous    sulfate Liquid 300 milliGRAM(s) Oral daily  meropenem  IVPB      meropenem  IVPB 500 milliGRAM(s) IV Intermittent every 8 hours  pantoprazole  Injectable 40 milliGRAM(s) IV Push two times a day  polyethylene glycol 3350 17 Gram(s) Oral daily  senna 2 Tablet(s) Oral at bedtime  vancomycin  IVPB 750 milliGRAM(s) IV Intermittent every 12 hours  zinc oxide 20% Ointment 1 Application(s) Topical daily    MEDICATIONS  (PRN):  acetaminophen   Tablet .. 650 milliGRAM(s) Oral every 6 hours PRN Temp greater or equal to 38.5C (101.3F), Moderate Pain (4 - 6)  glucagon  Injectable 1 milliGRAM(s) IntraMuscular once PRN Glucose LESS THAN 70 milligrams/deciliter      DVT Prophylaxis: V    Advanced Directives:  Discussed with:    Visit Information:     ** Time is exclusive of billed procedures and/or teaching and/or routine family updates.

## 2019-04-29 NOTE — PROGRESS NOTE ADULT - SUBJECTIVE AND OBJECTIVE BOX
GI    recalled for PEG  placement  awake but not aware  no n/v or evidence of bleeding per RN   no melena reported    ROS: not able to obtain  Physical Exam:  Vital Signs Last 24 Hrs  T(C): 37.2 (29 Apr 2019 08:18), Max: 37.3 (29 Apr 2019 05:00)  T(F): 98.9 (29 Apr 2019 08:18), Max: 99.1 (29 Apr 2019 05:00)  HR: 79 (29 Apr 2019 18:00) (59 - 90)  BP: 99/81 (29 Apr 2019 18:00) (84/49 - 150/136)  BP(mean): 86 (29 Apr 2019 18:00) (60 - 142)  RR: 21 (29 Apr 2019 18:00) (16 - 31)  SpO2: 98% (29 Apr 2019 18:00) (93% - 100%)  · Constitutional	detailed exam  · Constitutional Details	moaning, eyes open  · Eyes	detailed exam  · Eyes Details	PERRL  · ENMT	detailed exam  · Neck	detailed exam  · Neck Details	normal  · Back	No deformity or limitation of movement  · Respiratory	detailed exam  · Respiratory Details	respirations labored; rhonchi  -GI: NTND no mass, +BS normal  · Neurological	detailed exam  · Neurological Details	responds to pain  · Skin	detailed exam  · Skin Details	warm and dry  · Musculoskeletal	No joint pain, swelling or deformity; no limitation of movement                                              10.5   8.51  )-----------( 172      ( 28 Apr 2019 06:12 )             34.7

## 2019-04-30 LAB
ANION GAP SERPL CALC-SCNC: 4 MMOL/L — LOW (ref 5–17)
APTT BLD: 31.2 SEC — SIGNIFICANT CHANGE UP (ref 27.5–36.3)
BUN SERPL-MCNC: 15 MG/DL — SIGNIFICANT CHANGE UP (ref 7–23)
CALCIUM SERPL-MCNC: 8.2 MG/DL — LOW (ref 8.5–10.1)
CHLORIDE SERPL-SCNC: 110 MMOL/L — HIGH (ref 96–108)
CO2 SERPL-SCNC: 31 MMOL/L — SIGNIFICANT CHANGE UP (ref 22–31)
CREAT SERPL-MCNC: 0.74 MG/DL — SIGNIFICANT CHANGE UP (ref 0.5–1.3)
GLUCOSE SERPL-MCNC: 104 MG/DL — HIGH (ref 70–99)
HCT VFR BLD CALC: 35.5 % — LOW (ref 39–50)
HGB BLD-MCNC: 10.4 G/DL — LOW (ref 13–17)
INR BLD: 1.84 RATIO — HIGH (ref 0.88–1.16)
MAGNESIUM SERPL-MCNC: 2.7 MG/DL — HIGH (ref 1.6–2.6)
MCHC RBC-ENTMCNC: 23 PG — LOW (ref 27–34)
MCHC RBC-ENTMCNC: 29.3 GM/DL — LOW (ref 32–36)
MCV RBC AUTO: 78.5 FL — LOW (ref 80–100)
NRBC # BLD: 0 /100 WBCS — SIGNIFICANT CHANGE UP (ref 0–0)
PHOSPHATE SERPL-MCNC: 2.7 MG/DL — SIGNIFICANT CHANGE UP (ref 2.5–4.5)
PLATELET # BLD AUTO: 251 K/UL — SIGNIFICANT CHANGE UP (ref 150–400)
POTASSIUM SERPL-MCNC: 4.7 MMOL/L — SIGNIFICANT CHANGE UP (ref 3.5–5.3)
POTASSIUM SERPL-SCNC: 4.7 MMOL/L — SIGNIFICANT CHANGE UP (ref 3.5–5.3)
PROTHROM AB SERPL-ACNC: 20.8 SEC — HIGH (ref 10–12.9)
RBC # BLD: 4.52 M/UL — SIGNIFICANT CHANGE UP (ref 4.2–5.8)
RBC # FLD: 21.3 % — HIGH (ref 10.3–14.5)
SODIUM SERPL-SCNC: 145 MMOL/L — SIGNIFICANT CHANGE UP (ref 135–145)
WBC # BLD: 8.33 K/UL — SIGNIFICANT CHANGE UP (ref 3.8–10.5)
WBC # FLD AUTO: 8.33 K/UL — SIGNIFICANT CHANGE UP (ref 3.8–10.5)

## 2019-04-30 RX ADMIN — NYSTATIN CREAM 1 APPLICATION(S): 100000 CREAM TOPICAL at 17:01

## 2019-04-30 RX ADMIN — Medication 300 MILLIGRAM(S): at 12:10

## 2019-04-30 RX ADMIN — ENOXAPARIN SODIUM 75 MILLIGRAM(S): 100 INJECTION SUBCUTANEOUS at 05:50

## 2019-04-30 RX ADMIN — Medication 2000 UNIT(S): at 12:10

## 2019-04-30 RX ADMIN — ENOXAPARIN SODIUM 75 MILLIGRAM(S): 100 INJECTION SUBCUTANEOUS at 17:01

## 2019-04-30 RX ADMIN — Medication 100 MILLIGRAM(S): at 17:01

## 2019-04-30 RX ADMIN — PANTOPRAZOLE SODIUM 40 MILLIGRAM(S): 20 TABLET, DELAYED RELEASE ORAL at 17:01

## 2019-04-30 RX ADMIN — NYSTATIN CREAM 1 APPLICATION(S): 100000 CREAM TOPICAL at 05:52

## 2019-04-30 RX ADMIN — Medication 1 APPLICATION(S): at 06:00

## 2019-04-30 RX ADMIN — ZINC OXIDE 1 APPLICATION(S): 200 OINTMENT TOPICAL at 12:10

## 2019-04-30 RX ADMIN — PANTOPRAZOLE SODIUM 40 MILLIGRAM(S): 20 TABLET, DELAYED RELEASE ORAL at 05:51

## 2019-04-30 NOTE — PROGRESS NOTE ADULT - ASSESSMENT
IMP:    77 y/o male with advance dementia and AF on coumadin admitted on 4/22 to SDU for possible GIB and UTI sepsis--found to have R non obst renal stone and ESBL UTI pyelo/possible Aspiration PNA and colitis.  Tx to ICU for septic shock, toxic encephalopathy, Hypernatremia, ?? GIB and MIGUEL (KDIGO 1) with baseline Cr 0.9     Does not appear he has active GIB  Chronic L hip OM on doxy as outpt  I suspect he will need PEG as there has been limited improvement in mentation   MIGUEL resolved     Plan:    Completed IV antibiotics  Resumed Doxy for Osteo  NPO--TF to goal--free water at 60/hr  Need to be NPO on 5/1 at midnight for PEG on 5/2  HOB > 30  Aspiration precautions   PPI q12  Hold coumadin as he will need a PEG  On Lovenox m52L--Izty need to hold 5/1 evening dose as he will have a PEG placed on 5/2  DVT prophy Venodynes/Lovenox  RUE ultrasound--Positive DVT  Restraint to prevent self harm and removal of medical devices      Transfer to reg floor  Called into the hospitalist at 11:14

## 2019-04-30 NOTE — PROGRESS NOTE ADULT - ASSESSMENT
Jean Carlos Bryson is a 2year old male who was brought in for this visit. History was provided by the Mom. HPI:   Patient presents with:  Fever: Onset 1 week-Tmax:103F; cough, runny nose.      Started with fever 1 week ago- felt warm - low grade- lasted for This Visit:  No orders of the defined types were placed in this encounter. No follow-ups on file.       4/30/2019  Keara Messer DO 79 y/o male w/ septic shock and superimposed GI bleed requiring pressor support    Septic Shock   - UA (+), RVP (-)  - F/u Ucx/Bcx  - CT abd/pelvis noted possible PNA (likely aspiration), sterocoral colitis, non-obstructing R-sided renal stone and Right sided pyelitis and uteritis  - Pressors Support continues   - S/p Vanco/Cefepime  - ID consult  - Speech/Swallow consult  - Bautista - Strict I&O  - Aspiration Precautions     GI Bleed  - Coffee ground emesis noted at NH and positive guaiac in ED  - Monitor H&H  - NPO  - Protonix IV BID  - GI consult  - No active bleeding at this time    Pre-Renal MIGUEL  - BUN/Creat Ratio >20:1  - Improving - continue to monitor     Hypernatremia  - Na 151 --> 159  - Switched to D5W @125  - Fluid deficit 5L    Atrial Fibrillation   - CHADS2 Vasc score of 6  - Coumadin Held  - INR 4.2 --> 3.14 s/p Vitamin K  - Rate Controlled   - No active bleeding at this time    DM2  - Non-insulin dependent   - A1C 5.8  - Cont. 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 dakins solution    Advanced Directives  - Nephew/HCP Anshu HUNTER'Mahin [phone]  - Full Code 77 y/o male w/ septic shock and superimposed GI bleed requiring pressor support    Septic Shock   - UA (+), RVP (-)  - F/u Ucx/Bcx  - CT abd/pelvis noted possible PNA (likely aspiration), sterocoral colitis, non-obstructing R-sided renal stone and Right sided pyelitis and uteritis  - Pressors Support continues   - S/p Vanco/Cefepime  - ID consult  - Speech/Swallow consult  - Bautista - Strict I&O  - Aspiration Precautions     GI Bleed  - Coffee ground emesis noted at NH and positive guaiac in ED  - Monitor H&H  - NPO  - Protonix IV BID  - GI consult  - No active bleeding at this time    Pre-Renal MIGUEL  - BUN/Creat Ratio >20:1  - Improving - continue to monitor     Hypernatremia  - Na 151 --> 159  - Switched to D5W @125  - Fluid deficit 5L    Atrial Fibrillation   - CHADS2 Vasc score of 6  - Coumadin Held  - INR 4.2 --> 3.14 s/p Vitamin K  - Rate Controlled   - No active bleeding at this time    DM2  - Non-insulin dependent   - A1C 5.8  - Cont. 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 dakins solution    Advanced Directives  - Nephew/HCP Anshu Hernandez [phone]  - Full Code 77 y/o male w/ septic shock and superimposed GI bleed requiring pressor support    Septic Shock   - UA (+), RVP (-)  - F/u Ucx/Bcx  - CT abd/pelvis noted possible PNA (likely aspiration), sterocoral colitis, non-obstructing R-sided renal stone and Right sided pyelitis and uteritis  - Pressors Support continues   - s/p doxycycline and cefepime  - continue vanco/zosyn  - ID consult appreiated  - Speech/Swallow consult  - Bautista - Strict I&O  - Aspiration Precautions     GI Bleed  - Coffee ground emesis noted at NH and positive guaiac in ED  - Monitor H&H  - NPO  - Protonix IV BID  - GI consult  - No active bleeding at this time    MIGUEL  - likely prerenal  - continue IV fluids   - continue to monitor     Hypernatremia  - Na 151 --> 159  - Switched to D5W @125  - Fluid deficit 5L    Atrial Fibrillation   - CHADS2 Vasc score of 6  - Coumadin Held  - INR 4.2 --> 3.14 s/p Vitamin K  - Rate Controlled   - No active bleeding at this time    DM2  - Non-insulin dependent   - A1C 5.8  - Cont. 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 dakins solution    Advanced Directives  - Nephew/HCP Anshu Hernandez [phone]  - spoke with HCP at length on the phone 4/23 who wants full code

## 2019-04-30 NOTE — PROGRESS NOTE ADULT - ASSESSMENT
- holding oral medications    - low dose sliding scale for NPO patient    79 y/o male w/ septic shock and superimposed GI bleed requiring pressor support    Septic Shock 2/2 UTI and or aspiration PNA  - UA (+), RVP (-)  - Blood cultures NGTD  - Urine culture->+E.Coli - ESBL which is resistant to zosyn   - s/p meropenum and vancomycin  - CT abd/pelvis noted possible PNA (likely aspiration), sterocoral colitis, non-obstructing R-sided renal stone and Right sided pyelitis and uteritis  - currently being monitored off pressors  - ID following  - Speech/Swallow recommendation appreciated  - corpak in place  - Bautista - Strict I&O  - Aspiration Precautions     GI Bleed  - Coffee ground emesis noted at NH and positive guaiac in ED  - Monitor H&H which is currently stable  - NPO  - Protonix IV BID  - GI consult appreciated   - No active bleeding at this time    Right upper extremity DVT  - imaging as above  - continue lovenox  - hold lovenox after midnight on 5/1 for PEG tube placement on 5/2    MIGUEL  - resolved  - likely prerenal  - s/p IV fluids  - continue to monitor     Hypernatremia  - s/p IV fluids  - improving  - will monitor    Atrial Fibrillation   - CHADS2 Vasc score of 6  - on lovenox will hold on 5/1 as stated above  - rate controlled   - no active bleeding at this time    DM2  - Non-insulin dependent   - A1C 5.8  - Cont. 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 hip  - continue doxycycline   - continue dakins solution    Advanced Directives  - Nephew/HCP Anshu Hernandez [phone]  - spoke with HCP at length on the phone 4/23 who wants full code  - pending possible PEG tube placement 77 y/o male w/ septic shock and superimposed GI bleed requiring pressor support    Septic Shock 2/2 UTI and or aspiration PNA  - UA (+), RVP (-)  - Blood cultures NGTD  - Urine culture->+E.Coli - ESBL which is resistant to zosyn   - s/p meropenum and vancomycin  - CT abd/pelvis noted possible PNA (likely aspiration), sterocoral colitis, non-obstructing R-sided renal stone and Right sided pyelitis and uteritis  - currently being monitored off pressors  - ID following  - Speech/Swallow recommendation appreciated  - corpak in place  - Bautista - Strict I&O  - Aspiration Precautions     GI Bleed  - Coffee ground emesis noted at NH and positive guaiac in ED  - Monitor H&H which is currently stable  - NPO  - Protonix IV BID  - GI consult appreciated   - No active bleeding at this time    Right upper extremity DVT  - imaging as above  - continue lovenox  - hold lovenox after midnight on 5/1 for PEG tube placement on 5/2    MIGUEL  - resolved  - likely prerenal  - s/p IV fluids  - continue to monitor     Hypernatremia  - s/p IV fluids  - improving  - will monitor    Atrial Fibrillation   - CHADS2 Vasc score of 6  - continue to hold coumadin  - on lovenox will hold evening dose on 5/1 as stated above  - rate controlled   - no active bleeding at this time    DM2  - Non-insulin dependent   - A1C 5.8  - Cont. 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 hip  - continue doxycycline   - continue dakins solution    Advanced Directives  - Nephew/HCP Anshu Hernandez [phone]  - spoke with HCP at length on the phone 4/23 who wants full code  - pending possible PEG tube placement

## 2019-04-30 NOTE — PROGRESS NOTE ADULT - MS EXT PE MLT D E PC
no cyanosis
cyanosis/pedal edema/no cyanosis
no cyanosis/no pedal edema
no pedal edema/no cyanosis
no pedal edema/no cyanosis
no clubbing/no cyanosis
no cyanosis/no clubbing
pedal edema/no cyanosis

## 2019-04-30 NOTE — CHART NOTE - NSCHARTNOTEFT_GEN_A_CORE
Assessment:   *pt continues to be managed for septic shock 2/2 UTI or aspiration PNA with initial GIB now pending PEG placement.  *TF running at 55mL/hr at bedside.  Per pump, pt received an average of 1054mL/day over the past three days.  Which provided ~1581Kcal, 87g protein, and 800mL free water.  Meeting ~90% of estimated nutr needs with tolerance.  Pt ordered for free water flushes 60mL q1hr (=1320mL additional free water).  Total free water being provided = 2120mL.  *labs reviewed; hypermagnesemia noted. continue to monitor lytes/mins; replete/correct prn.  *no new wt since 4/24, obtain new wt when feasible to track/trend changes.  *(+) severe Rt arm edema.  BM (+) 4/29, soft on bowel regimen.      Recommendations:  1) c/w Glucerna 1.5 @ 55mL/hr  2) monitor hydration status; adjust free water flushes prn  3) monitor TF tolerance, keep back of bed > 35 degrees  4) biweekly wt checks to track/trend changes      Diet Prescription: Diet, NPO with Tube Feed:   Tube Feeding Modality: Nasogastric  Glucerna 1.5 William (GLUCERNA1.5)  Total Volume for 24 Hours (mL): 1320  Continuous  Starting Tube Feed Rate {mL per Hour}: 20  Increase Tube Feed Rate by (mL): 10     Every 6 hours  Until Goal Tube Feed Rate (mL per Hour): 55  Tube Feed Duration (in Hours): 24  Tube Feed Start Time: 00:00 (04-25-19 @ 10:37)      Wt Hx:  79Kg (4/24)  Weight (kg): 74.1 (04-23-19 @ 06:00)        Estimated Needs:   [x] no change since previous assessment  Estimated Energy Needs (25-30 calories/kg):  · Weight  (lbs) 154.3 lb  · Weight (kg) 70 kg  · From (25cal/kg) 1750 To (30cal/kg) 2100 Kcal    Other Calculation:  · Other Calculation  Ht. 68 "     Wt. 79 Kg       26.5 BMI       IBW  70 Kg      Pt is at 113   %  IBW    Estimated Protein Needs (1.4-1.8 g/kg):  · Weight  (lbs) 154.3  · Weight (kg) 70 kg  · From (1.4 g/kg) 98 To (1.8 g/kg) 126 g protein    Nutrition Diagnostic #1:  · Nutrition Diagnostic Terminology #1: Nutrient  · Nutrient: Malnutrition  · Etiology: decreased ability to consume sufficient energy/protein 2/2 dementia  · Signs/Symptoms: severe muscle; mod fat wasting; mild edema: NPO x 3 days  · Nutrition Intervention: Enteral Nutrition  · Enteral Nutrition: Composition; Concentration; Rate; 1) when feasible, start TF with Glucerna 1.5 @ 20mL/hr and titrate with tolerance, slowly by 10mL q6hrs to goal rate of 55mL/hr (1815Kcal, 100g protein, and 918mL free water) 2) consider free water flushes of 40mL/hr (=880mL with total free water of (TF+flushes) 1798mL)  · Goal/Expected Outcome: pt resume nutr source and meet >80% of estimated nutr needs      Nutrition Diagnosis is [x] ongoing  [ ] resolved [ ] not applicable     New Nutrition Diagnosis: [x] not applicable         Pertinent Medications: MEDICATIONS  (STANDING):  calcium carbonate    500 mG (Tums) Chewable 1 Tablet(s) Chew daily  cholecalciferol Oral Tab/Cap - Peds 2000 Unit(s) Oral daily  Dakins Solution - 1/2 Strength 1 Application(s) Topical daily  enoxaparin Injectable 75 milliGRAM(s) SubCutaneous every 12 hours  ferrous    sulfate Liquid 300 milliGRAM(s) Oral daily  nystatin Powder 1 Application(s) Topical two times a day  pantoprazole  Injectable 40 milliGRAM(s) IV Push two times a day  polyethylene glycol 3350 17 Gram(s) Oral daily  senna 2 Tablet(s) Oral at bedtime  zinc oxide 20% Ointment 1 Application(s) Topical daily    MEDICATIONS  (PRN):  acetaminophen   Tablet .. 650 milliGRAM(s) Oral every 6 hours PRN Temp greater or equal to 38.5C (101.3F), Moderate Pain (4 - 6)  glucagon  Injectable 1 milliGRAM(s) IntraMuscular once PRN Glucose LESS THAN 70 milligrams/deciliter    Pertinent Labs: 04-30 Na145 mmol/L Glu 104 mg/dL<H> K+ 4.7 mmol/L Cr  0.74 mg/dL BUN 15 mg/dL 04-30 Phos 2.7 mg/dL 04-24 [id number] 5.4 %    Skin: noy score = 10  no PU noted    Monitoring and Evaluation:   [x] PO intake/Nutr support infusion [ x ] Tolerance to Nutr [ x ] weights [ x ] labs[ x ] follow up per protocol  [ ] other:

## 2019-04-30 NOTE — PROGRESS NOTE ADULT - SUBJECTIVE AND OBJECTIVE BOX
CC: Coffee ground emesis and fever    HPI:  79 y/o male w/ PMH significant for Afib (coumadin), Dementia, DM, DVT presented to ED from [facility] for coffee ground emesis and T. Max of 102.8. Unable to illicit any HPI or ROS 2/2  altered mental status. In the ED, patient found to have supra therapeutic INR 4.25, Lactate 2.1, MIGUEL, UA (+), RVP negative, CXR negative, Guaiac stool (+), CT abdomen with possible PNA, sterocoral colitis, non-obstructing R-sided renal stone and Right sided pyelitis and uteritis. Pt. given IV fluids per sepsis protocol, IV Vanco and cefepime. Pt. remained hypotensive and transferred to ICU for pressor support.     Interval HPI:  Patient seen and examined. Unable to provide history due to mental status. Currently off pressors. Management per ICU.    Review of Systems: Unable to obtain 2/2 altered mental status    MEDICATIONS  (STANDING):  calcium carbonate    500 mG (Tums) Chewable 1 Tablet(s) Chew daily  cholecalciferol Oral Tab/Cap - Peds 2000 Unit(s) Oral daily  Dakins Solution - 1/2 Strength 1 Application(s) Topical daily  doxycycline hyclate Capsule 100 milliGRAM(s) Oral every 12 hours  enoxaparin Injectable 75 milliGRAM(s) SubCutaneous every 12 hours  ferrous    sulfate Liquid 300 milliGRAM(s) Oral daily  nystatin Powder 1 Application(s) Topical two times a day  pantoprazole  Injectable 40 milliGRAM(s) IV Push two times a day  polyethylene glycol 3350 17 Gram(s) Oral daily  senna 2 Tablet(s) Oral at bedtime  zinc oxide 20% Ointment 1 Application(s) Topical daily    MEDICATIONS  (PRN):  acetaminophen   Tablet .. 650 milliGRAM(s) Oral every 6 hours PRN Temp greater or equal to 38.5C (101.3F), Moderate Pain (4 - 6)  glucagon  Injectable 1 milliGRAM(s) IntraMuscular once PRN Glucose LESS THAN 70 milligrams/deciliter    Vital Signs (24 Hrs):  T(C): 37.4 (04-30-19 @ 04:00), Max: 37.6 (04-29-19 @ 18:00)  HR: 62 (04-30-19 @ 14:00) (62 - 84)  BP: 94/78 (04-30-19 @ 14:00) (75/56 - 120/45)  RR: 24 (04-30-19 @ 14:00) (12 - 30)  SpO2: 94% (04-30-19 @ 14:00) (92% - 99%)  Wt(kg): --  Daily     Daily     I&O's Summary    29 Apr 2019 07:01  -  30 Apr 2019 07:00  --------------------------------------------------------  IN: 3603 mL / OUT: 2200 mL / NET: 1403 mL    30 Apr 2019 07:01  -  30 Apr 2019 16:50  --------------------------------------------------------  IN: 228 mL / OUT: 0 mL / NET: 228 mL      GENERAL: NAD, resting comfortably  NECK: Supple, No JVD  NERVOUS SYSTEM:  Disoriented x4 unable to follow commands or answer questions appropriately  CHEST/LUNG: diffuse coarse breath sounds  HEART: S1 S2 normal, no murmur  ABDOMEN: Soft, Nontender, Nondistended; Bowel sounds present  GENITOURINARY: Bautista in place   EXTREMITIES:  2+ Peripheral Pulses, No clubbing, cyanosis, or edema  SKIN: No rash, no lesion    LABS:                        10.4   8.33  )-----------( 251      ( 30 Apr 2019 06:23 )             35.5     30 Apr 2019 06:23    145    |  110    |  15     ----------------------------<  104    4.7     |  31     |  0.74     Ca    8.2        30 Apr 2019 06:23  Phos  2.7       30 Apr 2019 06:23  Mg     2.7       30 Apr 2019 06:23      PT/INR - ( 30 Apr 2019 06:23 )   PT: 20.8 sec;   INR: 1.84 ratio         PTT - ( 30 Apr 2019 06:23 )  PTT:31.2 sec    < from: Xray Kidney Ureter Bladder (04.25.19 @ 10:35) >  IMPRESSION:   Enteric tube with tip overlying the stomach. Nonspecific bowel gas   pattern. Moderate stool burden in the rectum. Degenerative changes of the   spine. Status post left hip arthroplasty with adjacent heterotopic   ossification.    < end of copied text >      < from: CT Abdomen and Pelvis w/ IV Cont (04.22.19 @ 21:32) >  MPRESSION:    1. Bilateral patchy consolidation within the lower lobes which may   reflect pneumonia. Aspiration can be considered, given clinical history.    2. Large fecalith within the rectal vault with rectal wall thickening and   presacral edema for which stercoral colitis can be considered.    3. Nonobstructing right-sided stones within the renal collecting system.   Right-sided urothelial enhancement and proximal ureteral enhancement   compatible with right-sided pyelitis and ureteritis. Correlate with   urinalysis.    4. Other findings, as discussed.    < end of copied text >    < from: US Duplex Venous Upper Ext Ltd, Right (04.29.19 @ 13:17) >    IMPRESSION:     Occlusive thrombus around the right cephalic venous catheter.    < end of copied text >

## 2019-05-01 LAB
APTT BLD: 34.3 SEC — SIGNIFICANT CHANGE UP (ref 27.5–36.3)
GLUCOSE BLDC GLUCOMTR-MCNC: 105 MG/DL — HIGH (ref 70–99)
HCT VFR BLD CALC: 33.4 % — LOW (ref 39–50)
HGB BLD-MCNC: 9.9 G/DL — LOW (ref 13–17)
INR BLD: 1.49 RATIO — HIGH (ref 0.88–1.16)
MCHC RBC-ENTMCNC: 23.2 PG — LOW (ref 27–34)
MCHC RBC-ENTMCNC: 29.6 GM/DL — LOW (ref 32–36)
MCV RBC AUTO: 78.4 FL — LOW (ref 80–100)
NRBC # BLD: 0 /100 WBCS — SIGNIFICANT CHANGE UP (ref 0–0)
PLATELET # BLD AUTO: 241 K/UL — SIGNIFICANT CHANGE UP (ref 150–400)
PROTHROM AB SERPL-ACNC: 16.8 SEC — HIGH (ref 10–12.9)
RBC # BLD: 4.26 M/UL — SIGNIFICANT CHANGE UP (ref 4.2–5.8)
RBC # FLD: 21.2 % — HIGH (ref 10.3–14.5)
WBC # BLD: 8.09 K/UL — SIGNIFICANT CHANGE UP (ref 3.8–10.5)
WBC # FLD AUTO: 8.09 K/UL — SIGNIFICANT CHANGE UP (ref 3.8–10.5)

## 2019-05-01 RX ORDER — CEFAZOLIN SODIUM 1 G
2000 VIAL (EA) INJECTION ONCE
Qty: 0 | Refills: 0 | Status: DISCONTINUED | OUTPATIENT
Start: 2019-05-01 | End: 2019-05-08

## 2019-05-01 RX ORDER — SODIUM CHLORIDE 9 MG/ML
1000 INJECTION, SOLUTION INTRAVENOUS
Qty: 0 | Refills: 0 | Status: DISCONTINUED | OUTPATIENT
Start: 2019-05-01 | End: 2019-05-02

## 2019-05-01 RX ADMIN — Medication 1 APPLICATION(S): at 06:00

## 2019-05-01 RX ADMIN — ENOXAPARIN SODIUM 75 MILLIGRAM(S): 100 INJECTION SUBCUTANEOUS at 06:03

## 2019-05-01 RX ADMIN — Medication 100 MILLIGRAM(S): at 06:03

## 2019-05-01 RX ADMIN — Medication 300 MILLIGRAM(S): at 11:23

## 2019-05-01 RX ADMIN — PANTOPRAZOLE SODIUM 40 MILLIGRAM(S): 20 TABLET, DELAYED RELEASE ORAL at 06:03

## 2019-05-01 RX ADMIN — NYSTATIN CREAM 1 APPLICATION(S): 100000 CREAM TOPICAL at 18:11

## 2019-05-01 RX ADMIN — Medication 2000 UNIT(S): at 11:24

## 2019-05-01 RX ADMIN — Medication 100 MILLIGRAM(S): at 18:11

## 2019-05-01 RX ADMIN — ZINC OXIDE 1 APPLICATION(S): 200 OINTMENT TOPICAL at 11:24

## 2019-05-01 RX ADMIN — NYSTATIN CREAM 1 APPLICATION(S): 100000 CREAM TOPICAL at 06:03

## 2019-05-01 RX ADMIN — PANTOPRAZOLE SODIUM 40 MILLIGRAM(S): 20 TABLET, DELAYED RELEASE ORAL at 18:11

## 2019-05-01 NOTE — PROGRESS NOTE ADULT - SUBJECTIVE AND OBJECTIVE BOX
CC:  Sepsis     HPI:    77 y/o male with advance dementia and AF on coumadin admitted on 4/22 to SDU for possible GIB and UTI sepsis--found to have R non obst renal stone and pyelo/possible Aspiration PNA and colitis.  He received 3L IVF while in SD--remain hypotensive--Tx to ICU for pressors.    4/23:  ICU D # 1.  Pt seen and examined in ICU--lethargic--arousable--On Santana 1.  PPI gtt.  .  Na 159.  INR 3.1  Hb 11.      4/24:  ICU D # 2.  Pt seen and examined in ICU--seen by lavon sanchez--not safe to eat.  Remains on phenyl gtt 0.7  Na 148.  Stable H/H    4/25:  ICU D # 3.  Pt seen and examined in ICU--ESBL in UCx--very confused and unable to marin PO.  Off pressors at this time.  Na 148    4/26:  ICU D # 4.  Pt seen and examined in ICU--Off pressors--Marin TF--Na 148.  Remains very confused     4/27:  ICU D # 5.  Pt seen and examined in ICU--Remains off pressors--mentation not better at all--Marin TF.  Na 148    4/28:  ICU D # 6.  Pt seen and examined in ICU--no pressors--still dependent on TF--Na 145    4/29: Patient seen and examined.  Patient remains confused and not following commands.  H & H stable.      4/30: Patient seen and no events over night.  Remains confused    5/1: Confused.  For peg on 5/2.           PAST MEDICAL & SURGICAL HISTORY:  Osteomyelitis of left leg  Osteoarthritis  HTN (hypertension)  DVT (deep venous thrombosis)  PVD (peripheral vascular disease)  DM (diabetes mellitus)  COPD (chronic obstructive pulmonary disease)  Anemia  Dementia  No significant past surgical history      FAMILY HISTORY:      Social Hx:    Allergies    No Known Allergies    Intolerances            ICU Vital Signs Last 24 Hrs  T(C): 37.2 (01 May 2019 02:00), Max: 37.6 (01 May 2019 00:00)  T(F): 99 (01 May 2019 02:00), Max: 99.6 (01 May 2019 00:00)  HR: 77 (01 May 2019 06:00) (57 - 81)  BP: 93/36 (01 May 2019 06:00) (81/46 - 142/52)  BP(mean): 47 (01 May 2019 06:00) (47 - 99)  ABP: --  ABP(mean): --  RR: 15 (01 May 2019 06:00) (15 - 30)  SpO2: 100% (01 May 2019 06:00) (92% - 100%)          I&O's Summary    30 Apr 2019 07:01  -  01 May 2019 07:00  --------------------------------------------------------  IN: 2178 mL / OUT: 1250 mL / NET: 928 mL                              9.9    8.09  )-----------( 241      ( 01 May 2019 06:24 )             33.4       04-30    145  |  110<H>  |  15  ----------------------------<  104<H>  4.7   |  31  |  0.74    Ca    8.2<L>      30 Apr 2019 06:23  Phos  2.7     04-30  Mg     2.7     04-30      MEDICATIONS  (STANDING):  calcium carbonate    500 mG (Tums) Chewable 1 Tablet(s) Chew daily  cholecalciferol Oral Tab/Cap - Peds 2000 Unit(s) Oral daily  Dakins Solution - 1/2 Strength 1 Application(s) Topical daily  doxycycline hyclate Capsule 100 milliGRAM(s) Oral every 12 hours  ferrous    sulfate Liquid 300 milliGRAM(s) Oral daily  nystatin Powder 1 Application(s) Topical two times a day  pantoprazole  Injectable 40 milliGRAM(s) IV Push two times a day  polyethylene glycol 3350 17 Gram(s) Oral daily  senna 2 Tablet(s) Oral at bedtime  zinc oxide 20% Ointment 1 Application(s) Topical daily    MEDICATIONS  (PRN):  acetaminophen   Tablet .. 650 milliGRAM(s) Oral every 6 hours PRN Temp greater or equal to 38.5C (101.3F), Moderate Pain (4 - 6)  glucagon  Injectable 1 milliGRAM(s) IntraMuscular once PRN Glucose LESS THAN 70 milligrams/deciliter      DVT Prophylaxis: Lovenox    Advanced Directives:  Discussed with:    Visit Information:    ** Time is exclusive of billed procedures and/or teaching and/or routine family updates.

## 2019-05-01 NOTE — PROGRESS NOTE ADULT - ASSESSMENT
IMP:    77 y/o male with advance dementia and AF on coumadin admitted on 4/22 to SDU for possible GIB and UTI sepsis--found to have R non obst renal stone and ESBL UTI pyelo/possible Aspiration PNA and colitis.  Tx to ICU for septic shock, toxic encephalopathy, Hypernatremia, ?? GIB and MIGUEL (KDIGO 1) with baseline Cr 0.9     Does not appear he has active GIB  Chronic L hip OM on doxy as outpt  I suspect he will need PEG as there has been limited improvement in mentation   MIGUEL resolved   RUE DVT dx 4/29 at the site of the extended dwell catheter     Plan:    Completed IV antibiotics  Resumed Doxy for Osteo  NPO--TF to goal--free water at 60/hr  NPO after midnight for PEG on 5/2  I D/D Lovenox after 5/1 AM dose for PEG on 5/2  INR now < 1.5 on 5/1  HOB > 30  Aspiration precautions   PPI q12  On Lovenox q12H--Holding 5/1 evening dose as he will have a PEG placed on 5/2  DVT prophy Venodynes/Lovenox  RUE ultrasound--Positive DVT  Restraint to prevent self harm and removal of medical devices      Transfer to reg floor  Called into the hospitalist at 11:14 on 4/30

## 2019-05-01 NOTE — PROGRESS NOTE ADULT - ASSESSMENT
77 y/o male w/ septic shock and superimposed GI bleed requiring pressor support    Septic Shock 2/2 UTI and or aspiration PNA  - resolved now off pressors  - UA (+), RVP (-)  - Blood cultures NGTD  - Urine culture->+E.Coli - ESBL which is resistant to zosyn   - s/p meropenum and vancomycin  - CT abd/pelvis noted possible PNA (likely aspiration), sterocoral colitis, non-obstructing R-sided renal stone and Right sided pyelitis and uteritis  - ID following  - Speech/Swallow recommendation appreciated  - corpak in place - pending PEG tube placement tomorrow  - Bautista - Strict I&O  - Aspiration Precautions     GI Bleed  - Coffee ground emesis noted at NH and positive guaiac in ED  - Monitor H&H which is currently stable  - NPO  - Protonix IV BID  - GI consult appreciated   - No active bleeding at this time    Right upper extremity DVT  - imaging as above  - continue lovenox  - lovenox held for PEG tube placement on tomorrow 5/2    MIGUEL  - resolved  - likely prerenal  - s/p IV fluids  - continue to monitor     Hypernatremia  - s/p IV fluids  - improving  - will monitor    Atrial Fibrillation   - CHADS2 Vasc score of 6  - continue to hold coumadin  - on lovenox- but held as above  - rate controlled   - no active bleeding at this time    DM2  - Non-insulin dependent   - A1C 5.8  - 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 hip  - continue doxycycline   - continue dakins solution    Advanced Directives  - Nephew/HCP Anshu Hernandez [phone]  - spoke with HCP at length on the phone 4/23 who wants full code  - pending possible PEG tube placement

## 2019-05-01 NOTE — PROGRESS NOTE ADULT - SUBJECTIVE AND OBJECTIVE BOX
CC: Coffee ground emesis and fever    HPI:  77 y/o male w/ PMH significant for Afib (coumadin), Dementia, DM, DVT presented to ED from [facility] for coffee ground emesis and T. Max of 102.8. Unable to illicit any HPI or ROS 2/2  altered mental status. In the ED, patient found to have supra therapeutic INR 4.25, Lactate 2.1, MIGUEL, UA (+), RVP negative, CXR negative, Guaiac stool (+), CT abdomen with possible PNA, sterocoral colitis, non-obstructing R-sided renal stone and Right sided pyelitis and uteritis. Pt. given IV fluids per sepsis protocol, IV Vanco and cefepime. Pt. remained hypotensive and transferred to ICU for pressor support.     Interval HPI:  Patient seen and examined. Unable to provide history due to mental status. Recently downgraded now off pressors.     Review of Systems: Unable to obtain 2/2 altered mental status    MEDICATIONS  (STANDING):  calcium carbonate    500 mG (Tums) Chewable 1 Tablet(s) Chew daily  cholecalciferol Oral Tab/Cap - Peds 2000 Unit(s) Oral daily  Dakins Solution - 1/2 Strength 1 Application(s) Topical daily  doxycycline hyclate Capsule 100 milliGRAM(s) Oral every 12 hours  ferrous    sulfate Liquid 300 milliGRAM(s) Oral daily  nystatin Powder 1 Application(s) Topical two times a day  pantoprazole  Injectable 40 milliGRAM(s) IV Push two times a day  polyethylene glycol 3350 17 Gram(s) Oral daily  senna 2 Tablet(s) Oral at bedtime  zinc oxide 20% Ointment 1 Application(s) Topical daily    MEDICATIONS  (PRN):  acetaminophen   Tablet .. 650 milliGRAM(s) Oral every 6 hours PRN Temp greater or equal to 38.5C (101.3F), Moderate Pain (4 - 6)  glucagon  Injectable 1 milliGRAM(s) IntraMuscular once PRN Glucose LESS THAN 70 milligrams/deciliter      Vital Signs (24 Hrs):  T(C): 37.2 (05-01-19 @ 02:00), Max: 37.6 (05-01-19 @ 00:00)  HR: 73 (05-01-19 @ 09:00) (57 - 81)  BP: 93/64 (05-01-19 @ 09:00) (88/74 - 142/52)  RR: 27 (05-01-19 @ 09:00) (15 - 30)  SpO2: 100% (05-01-19 @ 09:00) (92% - 100%)  Wt(kg): --  Daily     Daily     I&O's Summary    30 Apr 2019 07:01  -  01 May 2019 07:00  --------------------------------------------------------  IN: 2178 mL / OUT: 1250 mL / NET: 928 mL          GENERAL: NAD, resting comfortably  NECK: Supple, No JVD  NERVOUS SYSTEM:  Disoriented x4 unable to follow commands or answer questions appropriately  CHEST/LUNG: diffuse coarse breath sounds  HEART: S1 S2 normal, no murmur  ABDOMEN: Soft, Nontender, Nondistended; Bowel sounds present  GENITOURINARY: Bautista in place   EXTREMITIES:  2+ Peripheral Pulses, No clubbing, cyanosis, or edema  SKIN: No rash, no lesion    LABS:                        9.9    8.09  )-----------( 241      ( 01 May 2019 06:24 )             33.4       Ca    8.2        30 Apr 2019 06:23      PT/INR - ( 01 May 2019 06:24 )   PT: 16.8 sec;   INR: 1.49 ratio         PTT - ( 01 May 2019 06:24 )  PTT:34.3 sec    < from: Xray Kidney Ureter Bladder (04.25.19 @ 10:35) >  IMPRESSION:   Enteric tube with tip overlying the stomach. Nonspecific bowel gas   pattern. Moderate stool burden in the rectum. Degenerative changes of the   spine. Status post left hip arthroplasty with adjacent heterotopic   ossification.    < end of copied text >      < from: CT Abdomen and Pelvis w/ IV Cont (04.22.19 @ 21:32) >  MPRESSION:    1. Bilateral patchy consolidation within the lower lobes which may   reflect pneumonia. Aspiration can be considered, given clinical history.    2. Large fecalith within the rectal vault with rectal wall thickening and   presacral edema for which stercoral colitis can be considered.    3. Nonobstructing right-sided stones within the renal collecting system.   Right-sided urothelial enhancement and proximal ureteral enhancement   compatible with right-sided pyelitis and ureteritis. Correlate with   urinalysis.    4. Other findings, as discussed.    < end of copied text >    < from: US Duplex Venous Upper Ext Ltd, Right (04.29.19 @ 13:17) >    IMPRESSION:     Occlusive thrombus around the right cephalic venous catheter.    < end of copied text >

## 2019-05-01 NOTE — PROGRESS NOTE ADULT - ASSESSMENT
78-year-old man admitted with sepsis due to urinary tract infection, and found to have coffee-ground emesis, guaiac positive stool, and anemia.    Bleeding resolved with PPI.  Right arm DVT and now on anticoagulation.  Dysphagia.    Recommendations:    -spoke to HCP Anshu Hernandez who gave consent for PEG  -Continue Protonix    -Continue bowel regimen  -will plan for PEG tomorrow  -d/w RHODA and Dr. Mclaughlin

## 2019-05-01 NOTE — PROGRESS NOTE ADULT - SUBJECTIVE AND OBJECTIVE BOX
GI    no changes  awake but not aware  no n/v or evidence of bleeding per RN   no melena reported    ROS: not able to obtain    Physical Exam:  Vital Signs Last 24 Hrs  T(C): 37.2 (01 May 2019 02:00), Max: 37.6 (01 May 2019 00:00)  T(F): 99 (01 May 2019 02:00), Max: 99.6 (01 May 2019 00:00)  HR: 74 (01 May 2019 17:00) (66 - 81)  BP: 103/56 (01 May 2019 17:00) (84/64 - 142/52)  BP(mean): 64 (01 May 2019 17:00) (47 - 103)  RR: 25 (01 May 2019 17:00) (15 - 29)  SpO2: 99% (01 May 2019 17:00) (92% - 100%)    · Constitutional	detailed exam  · Constitutional Details	moaning, eyes open  · Eyes	detailed exam  · Eyes Details	PERRL  · ENMT	detailed exam  · Neck	detailed exam  · Neck Details	normal  · Back	No deformity or limitation of movement  · Respiratory	detailed exam  · Respiratory Details	respirations labored; rhonchi  -GI: NTND no mass, +BS normal  · Neurological	detailed exam  · Neurological Details	responds to pain  · Skin	detailed exam  · Skin Details	warm and dry  · Musculoskeletal	No joint pain, swelling or deformity; no limitation of movement

## 2019-05-02 LAB
ANION GAP SERPL CALC-SCNC: 3 MMOL/L — LOW (ref 5–17)
BUN SERPL-MCNC: 13 MG/DL — SIGNIFICANT CHANGE UP (ref 7–23)
CALCIUM SERPL-MCNC: 8.5 MG/DL — SIGNIFICANT CHANGE UP (ref 8.5–10.1)
CHLORIDE SERPL-SCNC: 109 MMOL/L — HIGH (ref 96–108)
CO2 SERPL-SCNC: 29 MMOL/L — SIGNIFICANT CHANGE UP (ref 22–31)
CREAT SERPL-MCNC: 0.78 MG/DL — SIGNIFICANT CHANGE UP (ref 0.5–1.3)
GLUCOSE BLDC GLUCOMTR-MCNC: 102 MG/DL — HIGH (ref 70–99)
GLUCOSE BLDC GLUCOMTR-MCNC: 110 MG/DL — HIGH (ref 70–99)
GLUCOSE BLDC GLUCOMTR-MCNC: 111 MG/DL — HIGH (ref 70–99)
GLUCOSE SERPL-MCNC: 106 MG/DL — HIGH (ref 70–99)
HCT VFR BLD CALC: 33.4 % — LOW (ref 39–50)
HGB BLD-MCNC: 10 G/DL — LOW (ref 13–17)
INR BLD: 1.3 RATIO — HIGH (ref 0.88–1.16)
MAGNESIUM SERPL-MCNC: 2.5 MG/DL — SIGNIFICANT CHANGE UP (ref 1.6–2.6)
MCHC RBC-ENTMCNC: 23.4 PG — LOW (ref 27–34)
MCHC RBC-ENTMCNC: 29.9 GM/DL — LOW (ref 32–36)
MCV RBC AUTO: 78.2 FL — LOW (ref 80–100)
NRBC # BLD: 0 /100 WBCS — SIGNIFICANT CHANGE UP (ref 0–0)
PHOSPHATE SERPL-MCNC: 2.8 MG/DL — SIGNIFICANT CHANGE UP (ref 2.5–4.5)
PLATELET # BLD AUTO: 292 K/UL — SIGNIFICANT CHANGE UP (ref 150–400)
POTASSIUM SERPL-MCNC: 5.4 MMOL/L — HIGH (ref 3.5–5.3)
POTASSIUM SERPL-SCNC: 5.4 MMOL/L — HIGH (ref 3.5–5.3)
PROTHROM AB SERPL-ACNC: 14.6 SEC — HIGH (ref 10–12.9)
RBC # BLD: 4.27 M/UL — SIGNIFICANT CHANGE UP (ref 4.2–5.8)
RBC # FLD: 20.8 % — HIGH (ref 10.3–14.5)
SODIUM SERPL-SCNC: 141 MMOL/L — SIGNIFICANT CHANGE UP (ref 135–145)
WBC # BLD: 9.13 K/UL — SIGNIFICANT CHANGE UP (ref 3.8–10.5)
WBC # FLD AUTO: 9.13 K/UL — SIGNIFICANT CHANGE UP (ref 3.8–10.5)

## 2019-05-02 RX ORDER — ENOXAPARIN SODIUM 100 MG/ML
75 INJECTION SUBCUTANEOUS EVERY 12 HOURS
Qty: 0 | Refills: 0 | Status: DISCONTINUED | OUTPATIENT
Start: 2019-05-02 | End: 2019-05-02

## 2019-05-02 RX ORDER — SODIUM CHLORIDE 9 MG/ML
1000 INJECTION INTRAMUSCULAR; INTRAVENOUS; SUBCUTANEOUS
Qty: 0 | Refills: 0 | Status: DISCONTINUED | OUTPATIENT
Start: 2019-05-03 | End: 2019-05-03

## 2019-05-02 RX ORDER — ENOXAPARIN SODIUM 100 MG/ML
75 INJECTION SUBCUTANEOUS ONCE
Qty: 0 | Refills: 0 | Status: COMPLETED | OUTPATIENT
Start: 2019-05-02 | End: 2019-05-02

## 2019-05-02 RX ADMIN — Medication 2000 UNIT(S): at 14:37

## 2019-05-02 RX ADMIN — ZINC OXIDE 1 APPLICATION(S): 200 OINTMENT TOPICAL at 14:41

## 2019-05-02 RX ADMIN — SENNA PLUS 2 TABLET(S): 8.6 TABLET ORAL at 23:13

## 2019-05-02 RX ADMIN — NYSTATIN CREAM 1 APPLICATION(S): 100000 CREAM TOPICAL at 05:09

## 2019-05-02 RX ADMIN — PANTOPRAZOLE SODIUM 40 MILLIGRAM(S): 20 TABLET, DELAYED RELEASE ORAL at 18:15

## 2019-05-02 RX ADMIN — ENOXAPARIN SODIUM 75 MILLIGRAM(S): 100 INJECTION SUBCUTANEOUS at 18:15

## 2019-05-02 RX ADMIN — Medication 1 APPLICATION(S): at 14:42

## 2019-05-02 RX ADMIN — Medication 1 TABLET(S): at 18:19

## 2019-05-02 RX ADMIN — NYSTATIN CREAM 1 APPLICATION(S): 100000 CREAM TOPICAL at 18:19

## 2019-05-02 RX ADMIN — Medication 100 MILLIGRAM(S): at 05:09

## 2019-05-02 RX ADMIN — SODIUM CHLORIDE 50 MILLILITER(S): 9 INJECTION, SOLUTION INTRAVENOUS at 00:00

## 2019-05-02 RX ADMIN — Medication 100 MILLIGRAM(S): at 18:15

## 2019-05-02 RX ADMIN — PANTOPRAZOLE SODIUM 40 MILLIGRAM(S): 20 TABLET, DELAYED RELEASE ORAL at 05:16

## 2019-05-02 RX ADMIN — Medication 300 MILLIGRAM(S): at 14:39

## 2019-05-02 NOTE — PROGRESS NOTE ADULT - ASSESSMENT
78-year-old man admitted with sepsis due to urinary tract infection, and found to have coffee-ground emesis, guaiac positive stool, and anemia.    Bleeding resolved with PPI.  Right arm DVT and now on anticoagulation.  Dysphagia.    Recommendations:    -spoke to HCP Anshu Hernandez who gave consent for PEG  -came to bedside to do PEG but HCP not reachable for anesthesia consent (he already consented to procedure last night and witnessed procedure consent in chart) therefore cancelled today  -can try again tomorrow pending endoscopy availability  -resume TF and hold again after MN  -Continue Protonix    -Continue bowel regimen

## 2019-05-02 NOTE — PROGRESS NOTE ADULT - SUBJECTIVE AND OBJECTIVE BOX
GI    came to bedside to do PEG but HCP not reachable for anesthesia consent (he already consented to procedure last night and witnessed procedure consent in chart)  no changes  awake but not aware  no n/v or evidence of bleeding per RN   no melena reported    ROS: not able to obtain    Physical Exam:  Vital Signs Last 24 Hrs  T(C): 36.6 (02 May 2019 08:00), Max: 37.1 (01 May 2019 20:00)  T(F): 97.8 (02 May 2019 08:00), Max: 98.8 (01 May 2019 20:00)  HR: 94 (02 May 2019 12:00) (58 - 94)  BP: 158/72 (02 May 2019 12:00) (84/64 - 158/72)  BP(mean): 95 (02 May 2019 12:00) (63 - 117)  RR: 17 (02 May 2019 12:00) (12 - 32)  SpO2: 96% (02 May 2019 12:00) (96% - 100%)    · Constitutional	detailed exam  · Constitutional Details	moaning, eyes open  · Eyes	detailed exam  · Eyes Details	PERRL  · ENMT	detailed exam  · Neck	detailed exam  · Neck Details	normal  · Back	No deformity or limitation of movement  · Respiratory	detailed exam  · Respiratory Details	respirations labored; rhonchi  -GI: NTND no mass, +BS normal  · Neurological	detailed exam  · Neurological Details	responds to pain  · Skin	detailed exam  · Skin Details	warm and dry  · Musculoskeletal	No joint pain, swelling or deformity; no limitation of movement

## 2019-05-02 NOTE — PROGRESS NOTE ADULT - GASTROINTESTINAL DETAILS
soft
soft/nontender
soft/nontender
soft
soft/no distention
soft/no distention/nontender
soft/nontender
no distention/bowel sounds normal/soft
soft/bowel sounds normal/nontender
soft

## 2019-05-02 NOTE — PROGRESS NOTE ADULT - SUBJECTIVE AND OBJECTIVE BOX
CC:  Sepsis     HPI:    79 y/o male with advance dementia and AF on coumadin admitted on 4/22 to SDU for possible GIB and UTI sepsis--found to have R non obst renal stone and pyelo/possible Aspiration PNA and colitis.  He received 3L IVF while in SD--remain hypotensive--Tx to ICU for pressors.    4/23:  ICU D # 1.  Pt seen and examined in ICU--lethargic--arousable--On Santana 1.  PPI gtt.  .  Na 159.  INR 3.1  Hb 11.      4/24:  ICU D # 2.  Pt seen and examined in ICU--seen by lavon sanchez--not safe to eat.  Remains on phenyl gtt 0.7  Na 148.  Stable H/H    4/25:  ICU D # 3.  Pt seen and examined in ICU--ESBL in UCx--very confused and unable to marin PO.  Off pressors at this time.  Na 148    4/26:  ICU D # 4.  Pt seen and examined in ICU--Off pressors--Marin TF--Na 148.  Remains very confused     4/27:  ICU D # 5.  Pt seen and examined in ICU--Remains off pressors--mentation not better at all--Marin TF.  Na 148    4/28:  ICU D # 6.  Pt seen and examined in ICU--no pressors--still dependent on TF--Na 145    4/29: Patient seen and examined.  Patient remains confused and not following commands.  H & H stable.      4/30: Patient seen and no events over night.  Remains confused    5/1: Confused.  For peg on 5/2.      5/2: No over night events.  For PEG today         PAST MEDICAL & SURGICAL HISTORY:  Osteomyelitis of left leg  Osteoarthritis  HTN (hypertension)  DVT (deep venous thrombosis)  PVD (peripheral vascular disease)  DM (diabetes mellitus)  COPD (chronic obstructive pulmonary disease)  Anemia  Dementia  No significant past surgical history      FAMILY HISTORY:      Social Hx:    Allergies    No Known Allergies    Intolerances            ICU Vital Signs Last 24 Hrs  T(C): 36.6 (02 May 2019 08:00), Max: 37.1 (01 May 2019 20:00)  T(F): 97.8 (02 May 2019 08:00), Max: 98.8 (01 May 2019 20:00)  HR: 58 (02 May 2019 11:00) (58 - 84)  BP: 104/45 (02 May 2019 11:00) (84/64 - 139/96)  BP(mean): 63 (02 May 2019 11:00) (62 - 117)  ABP: --  ABP(mean): --  RR: 27 (02 May 2019 11:00) (12 - 32)  SpO2: 97% (02 May 2019 11:00) (97% - 100%)          I&O's Summary    01 May 2019 07:01  -  02 May 2019 07:00  --------------------------------------------------------  IN: 1942 mL / OUT: 1950 mL / NET: -8 mL                              10.0   9.13  )-----------( 292      ( 02 May 2019 06:30 )             33.4       05-02    141  |  109<H>  |  13  ----------------------------<  106<H>  5.4<H>   |  29  |  0.78    Ca    8.5      02 May 2019 06:30  Phos  2.8     05-02  Mg     2.5     05-02                      MEDICATIONS  (STANDING):  calcium carbonate    500 mG (Tums) Chewable 1 Tablet(s) Chew daily  ceFAZolin   IVPB 2000 milliGRAM(s) IV Intermittent once  cholecalciferol Oral Tab/Cap - Peds 2000 Unit(s) Oral daily  Dakins Solution - 1/2 Strength 1 Application(s) Topical daily  dextrose 5% + sodium chloride 0.45%. 1000 milliLiter(s) (50 mL/Hr) IV Continuous <Continuous>  doxycycline hyclate Capsule 100 milliGRAM(s) Oral every 12 hours  ferrous    sulfate Liquid 300 milliGRAM(s) Oral daily  nystatin Powder 1 Application(s) Topical two times a day  pantoprazole  Injectable 40 milliGRAM(s) IV Push two times a day  polyethylene glycol 3350 17 Gram(s) Oral daily  senna 2 Tablet(s) Oral at bedtime  zinc oxide 20% Ointment 1 Application(s) Topical daily    MEDICATIONS  (PRN):  acetaminophen   Tablet .. 650 milliGRAM(s) Oral every 6 hours PRN Temp greater or equal to 38.5C (101.3F), Moderate Pain (4 - 6)  glucagon  Injectable 1 milliGRAM(s) IntraMuscular once PRN Glucose LESS THAN 70 milligrams/deciliter      DVT Prophylaxis:    Advanced Directives:  Discussed with:    Visit Information:    ** Time is exclusive of billed procedures and/or teaching and/or routine family updates.

## 2019-05-02 NOTE — PROGRESS NOTE ADULT - RS GEN PE MLT RESP DETAILS PC
breath sounds equal/airway patent
breath sounds equal/good air movement/airway patent
breath sounds equal/no rhonchi/no wheezes/no rales
rhonchi/airway patent
no rhonchi/no rales/no wheezes/breath sounds equal
breath sounds equal/good air movement/airway patent
breath sounds equal/no rhonchi/no rales/no wheezes
good air movement/airway patent/breath sounds equal
no rales/breath sounds equal/no rhonchi
airway patent

## 2019-05-02 NOTE — PROGRESS NOTE ADULT - ASSESSMENT
IMP:    79 y/o male with advance dementia and AF on coumadin admitted on 4/22 to SDU for possible GIB and UTI sepsis--found to have R non obst renal stone and ESBL UTI pyelo/possible Aspiration PNA and colitis.  Tx to ICU for septic shock, toxic encephalopathy, Hypernatremia, ?? GIB and MIGUEL (KDIGO 1) with baseline Cr 0.9     Does not appear he has active GIB  Chronic L hip OM on doxy as outpt  I suspect he will need PEG as there has been limited improvement in mentation   MIGUEL resolved   RUE DVT dx 4/29 at the site of the extended dwell catheter     Plan:    Completed IV antibiotics  Resumed Doxy for Osteo  NPO--TF to goal--free water at 60/hr  NPO after midnight for PEG on 5/2  I D/C Lovenox after 5/1 AM dose for PEG on 5/2  INR now < 1.5 on 5/1  HOB > 30  Aspiration precautions   PPI q12  On Lovenox q12H--Holding 5/1 evening dose as he will have a PEG placed on 5/2  DVT prophy Venodynes/Lovenox  RUE ultrasound--Positive DVT  Restraint to prevent self harm and removal of medical devices      Transfer to reg floor  Called into the hospitalist at 11:14 on 4/30

## 2019-05-02 NOTE — CHART NOTE - NSCHARTNOTEFT_GEN_A_CORE
Pt seen and examined with house staff.  Plan formulated and reviewed on rounds     Briefly,  77 y/o male with advance dementia and AF on coumadin admitted with UTI sepsis and septic shock--off pressors.  Pt Dx'ed with UE DVT around the catheter.  Presently on LMWH (on hold for PEG) today.    Unobtainable ros    Stable vitals  lethargic   Receiving nutrition via TF    Aspiration risk--unable to marin PO    PEG   Restart AC--LMWH and coumadin when Ok with GI  Removal of UE catheter    HOB > 30  NPO  DC IVF once TF is resumed

## 2019-05-02 NOTE — PROGRESS NOTE ADULT - COMMENTS
Cant obtain
Unobtainable

## 2019-05-02 NOTE — PROGRESS NOTE ADULT - SUBJECTIVE AND OBJECTIVE BOX
CC: Coffee ground emesis and fever    HPI:  79 y/o male w/ PMH significant for Afib (coumadin), Dementia, DM, DVT presented to ED from [facility] for coffee ground emesis and T. Max of 102.8. Unable to illicit any HPI or ROS 2/2  altered mental status. In the ED, patient found to have supra therapeutic INR 4.25, Lactate 2.1, MIGUEL, UA (+), RVP negative, CXR negative, Guaiac stool (+), CT abdomen with possible PNA, sterocoral colitis, non-obstructing R-sided renal stone and Right sided pyelitis and uteritis. Pt. given IV fluids per sepsis protocol, IV Vanco and cefepime. Pt. remained hypotensive and transferred to ICU for pressor support.     Interval HPI:  Patient seen and examined. Unable to provide history due to mental status. Recently downgraded now off pressors. Scheduled for PEG tube placement today.     Review of Systems: Unable to obtain 2/2 altered mental status    MEDICATIONS  (STANDING):  calcium carbonate    500 mG (Tums) Chewable 1 Tablet(s) Chew daily  ceFAZolin   IVPB 2000 milliGRAM(s) IV Intermittent once  cholecalciferol Oral Tab/Cap - Peds 2000 Unit(s) Oral daily  Dakins Solution - 1/2 Strength 1 Application(s) Topical daily  dextrose 5% + sodium chloride 0.45%. 1000 milliLiter(s) (50 mL/Hr) IV Continuous <Continuous>  doxycycline hyclate Capsule 100 milliGRAM(s) Oral every 12 hours  ferrous    sulfate Liquid 300 milliGRAM(s) Oral daily  nystatin Powder 1 Application(s) Topical two times a day  pantoprazole  Injectable 40 milliGRAM(s) IV Push two times a day  polyethylene glycol 3350 17 Gram(s) Oral daily  senna 2 Tablet(s) Oral at bedtime  zinc oxide 20% Ointment 1 Application(s) Topical daily    MEDICATIONS  (PRN):  acetaminophen   Tablet .. 650 milliGRAM(s) Oral every 6 hours PRN Temp greater or equal to 38.5C (101.3F), Moderate Pain (4 - 6)  glucagon  Injectable 1 milliGRAM(s) IntraMuscular once PRN Glucose LESS THAN 70 milligrams/deciliter      Vital Signs (24 Hrs):  T(C): 36.6 (05-02-19 @ 08:00), Max: 37.1 (05-01-19 @ 20:00)  HR: 94 (05-02-19 @ 12:00) (58 - 94)  BP: 158/72 (05-02-19 @ 12:00) (84/64 - 158/72)  RR: 17 (05-02-19 @ 12:00) (12 - 32)  SpO2: 96% (05-02-19 @ 12:00) (96% - 100%)  Wt(kg): --  Daily     Daily     I&O's Summary    01 May 2019 07:01  -  02 May 2019 07:00  --------------------------------------------------------  IN: 1942 mL / OUT: 1950 mL / NET: -8 mL      GENERAL: NAD, resting comfortably  NECK: Supple, No JVD  NERVOUS SYSTEM:  Disoriented x4 unable to follow commands or answer questions appropriately  CHEST/LUNG: diffuse coarse breath sounds  HEART: S1 S2 normal, no murmur  ABDOMEN: Soft, Nontender, Nondistended; Bowel sounds present  GENITOURINARY: Bautista in place   EXTREMITIES:  2+ Peripheral Pulses, No clubbing, cyanosis, or edema  SKIN: No rash, no lesion    LABS:                        10.0   9.13  )-----------( 292      ( 02 May 2019 06:30 )             33.4     02 May 2019 06:30    141    |  109    |  13     ----------------------------<  106    5.4     |  29     |  0.78     Ca    8.5        02 May 2019 06:30  Phos  2.8       02 May 2019 06:30  Mg     2.5       02 May 2019 06:30      PT/INR - ( 02 May 2019 06:30 )   PT: 14.6 sec;   INR: 1.30 ratio         PTT - ( 01 May 2019 06:24 )  PTT:34.3 sec  < from: Xray Kidney Ureter Bladder (04.25.19 @ 10:35) >  IMPRESSION:   Enteric tube with tip overlying the stomach. Nonspecific bowel gas   pattern. Moderate stool burden in the rectum. Degenerative changes of the   spine. Status post left hip arthroplasty with adjacent heterotopic   ossification.    < end of copied text >      < from: CT Abdomen and Pelvis w/ IV Cont (04.22.19 @ 21:32) >  MPRESSION:    1. Bilateral patchy consolidation within the lower lobes which may   reflect pneumonia. Aspiration can be considered, given clinical history.    2. Large fecalith within the rectal vault with rectal wall thickening and   presacral edema for which stercoral colitis can be considered.    3. Nonobstructing right-sided stones within the renal collecting system.   Right-sided urothelial enhancement and proximal ureteral enhancement   compatible with right-sided pyelitis and ureteritis. Correlate with   urinalysis.    4. Other findings, as discussed.    < end of copied text >    < from: US Duplex Venous Upper Ext Ltd, Right (04.29.19 @ 13:17) >    IMPRESSION:     Occlusive thrombus around the right cephalic venous catheter.    < end of copied text >

## 2019-05-02 NOTE — PROGRESS NOTE ADULT - ASSESSMENT
79 y/o male w/ septic shock and superimposed GI bleed requiring pressor support    Septic Shock 2/2 UTI and or aspiration PNA  - resolved now off pressors  - UA (+), RVP (-)  - Blood cultures NGTD  - Urine culture->+E.Coli - ESBL which is resistant to zosyn   - s/p meropenum and vancomycin  - CT abd/pelvis noted possible PNA (likely aspiration), sterocoral colitis, non-obstructing R-sided renal stone and Right sided pyelitis and uteritis  - ID following  - Speech/Swallow recommendation appreciated  - corpak in place - pending PEG tube placement today  - Bautista - Strict I&O  - Aspiration Precautions     GI Bleed  - Coffee ground emesis noted at NH and positive guaiac in ED  - Monitor H&H which is currently stable  - NPO  - Protonix IV BID  - GI consult appreciated   - No active bleeding at this time    Right upper extremity DVT  - imaging as above  - continue lovenox  - lovenox held for PEG tube placement on tomorrow 5/2    MIGUEL  - resolved  - likely prerenal  - s/p IV fluids  - continue to monitor     Hypernatremia  - s/p IV fluids  - improving  - will monitor    Atrial Fibrillation   - CHADS2 Vasc score of 6  - continue to hold coumadin - will resume per GI reccomendations  - on lovenox- but held as above  - rate controlled   - no active bleeding at this time    DM2  - Non-insulin dependent   - A1C 5.8  - 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 hip  - continue doxycycline   - continue dakins solution    Advanced Directives  - Nephew/HCP Anshu Hernandez [phone]  - spoke with HCP at length on the phone 4/23 and 5/2 who wants full code  - PEG tube placement scheduled for today 79 y/o male w/ septic shock and superimposed GI bleed requiring pressor support    Septic Shock 2/2 UTI and or aspiration PNA  - resolved now off pressors  - UA (+), RVP (-)  - Blood cultures NGTD  - Urine culture->+E.Coli - ESBL which is resistant to zosyn   - s/p meropenum and vancomycin  - CT abd/pelvis noted possible PNA (likely aspiration), sterocoral colitis, non-obstructing R-sided renal stone and Right sided pyelitis and uteritis  - ID following  - Speech/Swallow recommendation appreciated  - corpak in place - pending PEG tube placement today  - Bautista - Strict I&O  - Aspiration Precautions     GI Bleed  - Coffee ground emesis noted at NH and positive guaiac in ED  - Monitor H&H which is currently stable  - NPO  - Protonix IV BID  - GI consult appreciated   - No active bleeding at this time    Right upper extremity DVT  - imaging as above  - continue lovenox  - lovenox held for PEG tube placement    MIGUEL  - resolved  - likely prerenal  - s/p IV fluids  - continue to monitor     Hypernatremia  - s/p IV fluids  - improving  - will monitor    Atrial Fibrillation   - CHADS2 Vasc score of 6  - continue to hold coumadin - will resume per GI recommendations after PEG tube placement  - on lovenox- but held as above  - rate controlled   - no active bleeding at this time    DM2  - Non-insulin dependent   - A1C 5.8  - 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

## 2019-05-02 NOTE — PROGRESS NOTE ADULT - CARDIOVASCULAR DETAILS
irregular rate and rhythm
irregular rate and rhythm
positive S2/positive S1
irregular rate and rhythm
irregular rate and rhythm
positive S2/positive S1
positive S1/positive S2
positive S1/positive S2/tachycardia

## 2019-05-03 LAB
ANION GAP SERPL CALC-SCNC: 6 MMOL/L — SIGNIFICANT CHANGE UP (ref 5–17)
BUN SERPL-MCNC: 13 MG/DL — SIGNIFICANT CHANGE UP (ref 7–23)
CALCIUM SERPL-MCNC: 8.1 MG/DL — LOW (ref 8.5–10.1)
CHLORIDE SERPL-SCNC: 114 MMOL/L — HIGH (ref 96–108)
CO2 SERPL-SCNC: 27 MMOL/L — SIGNIFICANT CHANGE UP (ref 22–31)
CREAT SERPL-MCNC: 0.82 MG/DL — SIGNIFICANT CHANGE UP (ref 0.5–1.3)
GLUCOSE BLDC GLUCOMTR-MCNC: 84 MG/DL — SIGNIFICANT CHANGE UP (ref 70–99)
GLUCOSE BLDC GLUCOMTR-MCNC: 85 MG/DL — SIGNIFICANT CHANGE UP (ref 70–99)
GLUCOSE BLDC GLUCOMTR-MCNC: 87 MG/DL — SIGNIFICANT CHANGE UP (ref 70–99)
GLUCOSE SERPL-MCNC: 96 MG/DL — SIGNIFICANT CHANGE UP (ref 70–99)
HCT VFR BLD CALC: 33.5 % — LOW (ref 39–50)
HGB BLD-MCNC: 9.9 G/DL — LOW (ref 13–17)
MAGNESIUM SERPL-MCNC: 2.9 MG/DL — HIGH (ref 1.6–2.6)
MCHC RBC-ENTMCNC: 23.3 PG — LOW (ref 27–34)
MCHC RBC-ENTMCNC: 29.6 GM/DL — LOW (ref 32–36)
MCV RBC AUTO: 79 FL — LOW (ref 80–100)
NRBC # BLD: 0 /100 WBCS — SIGNIFICANT CHANGE UP (ref 0–0)
PHOSPHATE SERPL-MCNC: 2.9 MG/DL — SIGNIFICANT CHANGE UP (ref 2.5–4.5)
PLATELET # BLD AUTO: 310 K/UL — SIGNIFICANT CHANGE UP (ref 150–400)
POTASSIUM SERPL-MCNC: 4.9 MMOL/L — SIGNIFICANT CHANGE UP (ref 3.5–5.3)
POTASSIUM SERPL-SCNC: 4.9 MMOL/L — SIGNIFICANT CHANGE UP (ref 3.5–5.3)
RBC # BLD: 4.24 M/UL — SIGNIFICANT CHANGE UP (ref 4.2–5.8)
RBC # FLD: 20.9 % — HIGH (ref 10.3–14.5)
SODIUM SERPL-SCNC: 147 MMOL/L — HIGH (ref 135–145)
WBC # BLD: 8.53 K/UL — SIGNIFICANT CHANGE UP (ref 3.8–10.5)
WBC # FLD AUTO: 8.53 K/UL — SIGNIFICANT CHANGE UP (ref 3.8–10.5)

## 2019-05-03 RX ORDER — ENOXAPARIN SODIUM 100 MG/ML
80 INJECTION SUBCUTANEOUS EVERY 12 HOURS
Qty: 0 | Refills: 0 | Status: DISCONTINUED | OUTPATIENT
Start: 2019-05-03 | End: 2019-05-05

## 2019-05-03 RX ADMIN — ENOXAPARIN SODIUM 80 MILLIGRAM(S): 100 INJECTION SUBCUTANEOUS at 12:27

## 2019-05-03 RX ADMIN — SENNA PLUS 2 TABLET(S): 8.6 TABLET ORAL at 21:55

## 2019-05-03 RX ADMIN — Medication 100 MILLIGRAM(S): at 18:32

## 2019-05-03 RX ADMIN — ENOXAPARIN SODIUM 80 MILLIGRAM(S): 100 INJECTION SUBCUTANEOUS at 21:54

## 2019-05-03 RX ADMIN — NYSTATIN CREAM 1 APPLICATION(S): 100000 CREAM TOPICAL at 05:49

## 2019-05-03 RX ADMIN — Medication 1 TABLET(S): at 12:27

## 2019-05-03 RX ADMIN — Medication 650 MILLIGRAM(S): at 20:11

## 2019-05-03 RX ADMIN — ZINC OXIDE 1 APPLICATION(S): 200 OINTMENT TOPICAL at 12:28

## 2019-05-03 RX ADMIN — Medication 2000 UNIT(S): at 12:27

## 2019-05-03 RX ADMIN — Medication 1 APPLICATION(S): at 12:28

## 2019-05-03 RX ADMIN — PANTOPRAZOLE SODIUM 40 MILLIGRAM(S): 20 TABLET, DELAYED RELEASE ORAL at 18:31

## 2019-05-03 RX ADMIN — POLYETHYLENE GLYCOL 3350 17 GRAM(S): 17 POWDER, FOR SOLUTION ORAL at 12:27

## 2019-05-03 RX ADMIN — NYSTATIN CREAM 1 APPLICATION(S): 100000 CREAM TOPICAL at 18:31

## 2019-05-03 RX ADMIN — Medication 300 MILLIGRAM(S): at 12:27

## 2019-05-03 RX ADMIN — Medication 650 MILLIGRAM(S): at 21:00

## 2019-05-03 RX ADMIN — Medication 100 MILLIGRAM(S): at 05:49

## 2019-05-03 NOTE — PROGRESS NOTE ADULT - SUBJECTIVE AND OBJECTIVE BOX
CC: Coffee ground emesis and fever    HPI:  79 y/o male w/ PMH significant for Afib (coumadin), Dementia, DM, DVT presented to ED from [facility] for coffee ground emesis and T. Max of 102.8. Unable to illicit any HPI or ROS 2/2  altered mental status. In the ED, patient found to have supra therapeutic INR 4.25, Lactate 2.1, MIGUEL, UA (+), RVP negative, CXR negative, Guaiac stool (+), CT abdomen with possible PNA, sterocoral colitis, non-obstructing R-sided renal stone and Right sided pyelitis and uteritis. Pt. given IV fluids per sepsis protocol, IV Vanco and cefepime. Pt. remained hypotensive and transferred to ICU for pressor support.     Interval HPI:  Patient seen and examined. Alert more then usual with significant improvement of mentation. Scheduled for PEG tube placement  Monday.    Review of Systems: Unable to obtain 2/2 altered mental status    MEDICATIONS  (STANDING):  calcium carbonate    500 mG (Tums) Chewable 1 Tablet(s) Chew daily  ceFAZolin   IVPB 2000 milliGRAM(s) IV Intermittent once  cholecalciferol Oral Tab/Cap - Peds 2000 Unit(s) Oral daily  Dakins Solution - 1/2 Strength 1 Application(s) Topical daily  doxycycline hyclate Capsule 100 milliGRAM(s) Oral every 12 hours  enoxaparin Injectable 80 milliGRAM(s) SubCutaneous every 12 hours  ferrous    sulfate Liquid 300 milliGRAM(s) Oral daily  nystatin Powder 1 Application(s) Topical two times a day  pantoprazole  Injectable 40 milliGRAM(s) IV Push two times a day  polyethylene glycol 3350 17 Gram(s) Oral daily  senna 2 Tablet(s) Oral at bedtime  zinc oxide 20% Ointment 1 Application(s) Topical daily    MEDICATIONS  (PRN):  acetaminophen   Tablet .. 650 milliGRAM(s) Oral every 6 hours PRN Temp greater or equal to 38.5C (101.3F), Moderate Pain (4 - 6)  glucagon  Injectable 1 milliGRAM(s) IntraMuscular once PRN Glucose LESS THAN 70 milligrams/deciliter    Vital Signs (24 Hrs):  T(C): 36.9 (05-03-19 @ 11:22), Max: 37.2 (05-02-19 @ 21:46)  HR: 83 (05-03-19 @ 11:22) (67 - 83)  BP: 98/44 (05-03-19 @ 11:22) (95/45 - 116/72)  RR: 18 (05-03-19 @ 11:22) (17 - 18)  SpO2: 98% (05-03-19 @ 11:22) (94% - 98%)  Wt(kg): --  Daily     Daily     I&O's Summary    02 May 2019 07:01  -  03 May 2019 07:00  --------------------------------------------------------  IN: 553 mL / OUT: 0 mL / NET: 553 mL      GENERAL: NAD, resting comfortably  NECK: Supple, No JVD  NERVOUS SYSTEM:  Disoriented x4 unable to follow commands or answer questions appropriately  CHEST/LUNG: diffuse coarse breath sounds  HEART: S1 S2 normal, no murmur  ABDOMEN: Soft, Nontender, Nondistended; Bowel sounds present  GENITOURINARY: Bautista in place   EXTREMITIES:  2+ Peripheral Pulses, No clubbing, cyanosis, or edema  SKIN: No rash, no lesion    LABS:                        9.9    8.53  )-----------( 310      ( 03 May 2019 07:38 )             33.5     03 May 2019 07:38    147    |  114    |  13     ----------------------------<  96     4.9     |  27     |  0.82     Ca    8.1        03 May 2019 07:38  Phos  2.9       03 May 2019 07:38  Mg     2.9       03 May 2019 07:38      PT/INR - ( 02 May 2019 06:30 )   PT: 14.6 sec;   INR: 1.30 ratio         < from: Xray Kidney Ureter Bladder (04.25.19 @ 10:35) >  IMPRESSION:   Enteric tube with tip overlying the stomach. Nonspecific bowel gas   pattern. Moderate stool burden in the rectum. Degenerative changes of the   spine. Status post left hip arthroplasty with adjacent heterotopic   ossification.    < end of copied text >      < from: CT Abdomen and Pelvis w/ IV Cont (04.22.19 @ 21:32) >  MPRESSION:    1. Bilateral patchy consolidation within the lower lobes which may   reflect pneumonia. Aspiration can be considered, given clinical history.    2. Large fecalith within the rectal vault with rectal wall thickening and   presacral edema for which stercoral colitis can be considered.    3. Nonobstructing right-sided stones within the renal collecting system.   Right-sided urothelial enhancement and proximal ureteral enhancement   compatible with right-sided pyelitis and ureteritis. Correlate with   urinalysis.    4. Other findings, as discussed.    < end of copied text >    < from: US Duplex Venous Upper Ext Ltd, Right (04.29.19 @ 13:17) >    IMPRESSION:     Occlusive thrombus around the right cephalic venous catheter.    < end of copied text >

## 2019-05-03 NOTE — PROGRESS NOTE ADULT - SUBJECTIVE AND OBJECTIVE BOX
GI    endo suite totally booked today and earliest to schedule PEG is Mon  no clinical changes  awake but not aware  NGT remains in place  no n/v or evidence of bleeding per RN   no melena reported    ROS: not able to obtain    Physical Exam:  Vital Signs Last 24 Hrs  T(C): 37.2 (02 May 2019 21:46), Max: 37.2 (02 May 2019 21:46)  T(F): 98.9 (02 May 2019 21:46), Max: 98.9 (02 May 2019 21:46)  HR: 67 (02 May 2019 21:46) (58 - 94)  BP: 95/45 (02 May 2019 21:46) (95/45 - 161/108)  BP(mean): 86 (02 May 2019 15:00) (63 - 124)  RR: 17 (02 May 2019 21:46) (17 - 27)  SpO2: 94% (02 May 2019 21:46) (94% - 100%)    · Constitutional	detailed exam  · Constitutional Details	moaning, eyes open  NGT in place  · Eyes	detailed exam  · Eyes Details	PERRL  · ENMT	detailed exam  · Neck	detailed exam  · Neck Details	normal  · Back	No deformity or limitation of movement  · Respiratory	detailed exam  · Respiratory Details	respirations labored; rhonchi  -GI: NTND no mass, +BS normal  · Neurological	detailed exam  · Neurological Details	responds to pain  · Skin	detailed exam  · Skin Details	warm and dry  · Musculoskeletal	No joint pain, swelling or deformity; no limitation of movement

## 2019-05-03 NOTE — PROVIDER CONTACT NOTE (OTHER) - SITUATION
Left message with service
pt had low grade axillary temperature, tylenol was given and now normal temperature. (first call placed to PA who called back & states a resident patient) Pt also constant coughing & has corpak withTF

## 2019-05-03 NOTE — PROVIDER CONTACT NOTE (OTHER) - REASON
BP 63/36
BP 74/38 after 1L NS bolus
pt constant coughing, also had low grade temp earlier
Consult- Gastroenterology

## 2019-05-03 NOTE — PROVIDER CONTACT NOTE (OTHER) - ACTION/TREATMENT ORDERED:
500 ml NS bolus  stat lactate  espinoza catheter  critical care consult
1 liter bolus
MD to come see patient

## 2019-05-03 NOTE — PROGRESS NOTE ADULT - ASSESSMENT
78-year-old man admitted with sepsis due to urinary tract infection, and found to have coffee-ground emesis, guaiac positive stool, and anemia.    Bleeding resolved with PPI.  Right arm DVT and now on anticoagulation.  Dysphagia and awaits PEG. Attempted to place PEG 5/2 but HCP was not reachable for anesthesia consent (procedure consent was already obtained earlier and in chart     Recommendations:    -spoke to HCP Anshu Hernandez who gave consent for PEG  -still need HCP consent for anesthesia   -resume TF and hold again after MN on Sunday  -Continue Protonix    -Continue bowel regimen  -plan for PEG Monday (on schedule for afternoon)  -d/w RN

## 2019-05-03 NOTE — CHART NOTE - NSCHARTNOTEFT_GEN_A_CORE
Pt seen and examined with house staff.  Plan formulated and reviewed on rounds    Briefly,  79 y/o male with advance dementia and AF on coumadin admitted with UTI sepsis and septic shock--off pressors.  Pt Dx'ed with UE DVT around the catheter.      PEG was canceled today due to lack of endo time  Unobtainable ros    Stable vitals  lethargic   Receiving nutrition via TF    Aspiration risk--unable to marin PO    PEG on Monday  Restart AC--LMWH for now--hold coumadin  HOB > 30  Restart TF with free water  DC NS

## 2019-05-03 NOTE — PROGRESS NOTE ADULT - ASSESSMENT
77 y/o male w/ septic shock and superimposed GI bleed requiring pressor support    Septic Shock 2/2 UTI and or aspiration PNA  - resolved now off pressors  - UA (+), RVP (-)  - Blood cultures NGTD  - Urine culture->+E.Coli - ESBL which is resistant to zosyn   - s/p meropenum and vancomycin  - CT abd/pelvis noted possible PNA (likely aspiration), sterocoral colitis, non-obstructing R-sided renal stone and Right sided pyelitis and uteritis  - ID following  - Speech/Swallow recommendation appreciated  - corpak in place - pending PEG tube placement Monday 5/6  - Bautista - Strict I&O  - Aspiration Precautions     GI Bleed  - Coffee ground emesis noted at NH and positive guaiac in ED  - Monitor H&H which is currently stable  - NPO  - Protonix IV BID  - GI consult appreciated   - No active bleeding at this time    Right upper extremity DVT  - imaging as above  - continue lovenox  - will hold evening dose Sunday night for PEG tube placement Monday 5/6    MIGUEL  - resolved  - likely prerenal  - s/p IV fluids  - continue to monitor     Hypernatremia  - s/p IV fluids  - improving  - will monitor    Atrial Fibrillation   - CHADS2 Vasc score of 6  - continue to hold coumadin - will resume per GI recommendations after PEG tube placement  - on lovenox- will hold for Frankie night as above  - after PEG tube placement will continue warfarin and lovenox till INR is 2-3  - rate controlled     DM2  - Non-insulin dependent   - A1C 5.8  - 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

## 2019-05-04 LAB
ANION GAP SERPL CALC-SCNC: 2 MMOL/L — LOW (ref 5–17)
BUN SERPL-MCNC: 13 MG/DL — SIGNIFICANT CHANGE UP (ref 7–23)
CALCIUM SERPL-MCNC: 8.1 MG/DL — LOW (ref 8.5–10.1)
CHLORIDE SERPL-SCNC: 107 MMOL/L — SIGNIFICANT CHANGE UP (ref 96–108)
CO2 SERPL-SCNC: 30 MMOL/L — SIGNIFICANT CHANGE UP (ref 22–31)
CREAT SERPL-MCNC: 0.78 MG/DL — SIGNIFICANT CHANGE UP (ref 0.5–1.3)
GLUCOSE BLDC GLUCOMTR-MCNC: 80 MG/DL — SIGNIFICANT CHANGE UP (ref 70–99)
GLUCOSE BLDC GLUCOMTR-MCNC: 90 MG/DL — SIGNIFICANT CHANGE UP (ref 70–99)
GLUCOSE BLDC GLUCOMTR-MCNC: 91 MG/DL — SIGNIFICANT CHANGE UP (ref 70–99)
GLUCOSE BLDC GLUCOMTR-MCNC: 94 MG/DL — SIGNIFICANT CHANGE UP (ref 70–99)
GLUCOSE BLDC GLUCOMTR-MCNC: 97 MG/DL — SIGNIFICANT CHANGE UP (ref 70–99)
GLUCOSE BLDC GLUCOMTR-MCNC: 98 MG/DL — SIGNIFICANT CHANGE UP (ref 70–99)
GLUCOSE SERPL-MCNC: 94 MG/DL — SIGNIFICANT CHANGE UP (ref 70–99)
HCT VFR BLD CALC: 32.1 % — LOW (ref 39–50)
HGB BLD-MCNC: 9.7 G/DL — LOW (ref 13–17)
MCHC RBC-ENTMCNC: 23.7 PG — LOW (ref 27–34)
MCHC RBC-ENTMCNC: 30.2 GM/DL — LOW (ref 32–36)
MCV RBC AUTO: 78.5 FL — LOW (ref 80–100)
NRBC # BLD: 0 /100 WBCS — SIGNIFICANT CHANGE UP (ref 0–0)
PLATELET # BLD AUTO: 343 K/UL — SIGNIFICANT CHANGE UP (ref 150–400)
POTASSIUM SERPL-MCNC: 4.3 MMOL/L — SIGNIFICANT CHANGE UP (ref 3.5–5.3)
POTASSIUM SERPL-SCNC: 4.3 MMOL/L — SIGNIFICANT CHANGE UP (ref 3.5–5.3)
RBC # BLD: 4.09 M/UL — LOW (ref 4.2–5.8)
RBC # FLD: 20.8 % — HIGH (ref 10.3–14.5)
SODIUM SERPL-SCNC: 139 MMOL/L — SIGNIFICANT CHANGE UP (ref 135–145)
WBC # BLD: 7.92 K/UL — SIGNIFICANT CHANGE UP (ref 3.8–10.5)
WBC # FLD AUTO: 7.92 K/UL — SIGNIFICANT CHANGE UP (ref 3.8–10.5)

## 2019-05-04 PROCEDURE — 71045 X-RAY EXAM CHEST 1 VIEW: CPT | Mod: 26

## 2019-05-04 RX ORDER — SODIUM CHLORIDE 9 MG/ML
1000 INJECTION, SOLUTION INTRAVENOUS
Qty: 0 | Refills: 0 | Status: DISCONTINUED | OUTPATIENT
Start: 2019-05-04 | End: 2019-05-06

## 2019-05-04 RX ADMIN — ENOXAPARIN SODIUM 80 MILLIGRAM(S): 100 INJECTION SUBCUTANEOUS at 23:30

## 2019-05-04 RX ADMIN — PANTOPRAZOLE SODIUM 40 MILLIGRAM(S): 20 TABLET, DELAYED RELEASE ORAL at 18:19

## 2019-05-04 RX ADMIN — SODIUM CHLORIDE 75 MILLILITER(S): 9 INJECTION, SOLUTION INTRAVENOUS at 13:20

## 2019-05-04 RX ADMIN — Medication 100 MILLIGRAM(S): at 18:19

## 2019-05-04 RX ADMIN — Medication 100 MILLIGRAM(S): at 05:13

## 2019-05-04 RX ADMIN — Medication 1 TABLET(S): at 13:19

## 2019-05-04 RX ADMIN — NYSTATIN CREAM 1 APPLICATION(S): 100000 CREAM TOPICAL at 18:20

## 2019-05-04 RX ADMIN — POLYETHYLENE GLYCOL 3350 17 GRAM(S): 17 POWDER, FOR SOLUTION ORAL at 13:20

## 2019-05-04 RX ADMIN — Medication 2000 UNIT(S): at 13:19

## 2019-05-04 RX ADMIN — ENOXAPARIN SODIUM 80 MILLIGRAM(S): 100 INJECTION SUBCUTANEOUS at 08:41

## 2019-05-04 RX ADMIN — NYSTATIN CREAM 1 APPLICATION(S): 100000 CREAM TOPICAL at 05:15

## 2019-05-04 RX ADMIN — ZINC OXIDE 1 APPLICATION(S): 200 OINTMENT TOPICAL at 13:21

## 2019-05-04 RX ADMIN — Medication 1 APPLICATION(S): at 18:20

## 2019-05-04 RX ADMIN — PANTOPRAZOLE SODIUM 40 MILLIGRAM(S): 20 TABLET, DELAYED RELEASE ORAL at 05:13

## 2019-05-04 RX ADMIN — Medication 300 MILLIGRAM(S): at 13:20

## 2019-05-04 NOTE — CHART NOTE - NSCHARTNOTEFT_GEN_A_CORE
Pt seen and examined with house staff.  Plan formulated and reviewed on rounds    Briefly,  79 y/o male with advance dementia and AF on coumadin admitted with UTI sepsis and septic shock--off pressors.  Pt Dx'ed with UE DVT around the catheter.      Low grade temp overnight  Pt is unable to keep head elevated consistently.  High risk for aspiration    PEG is planned for Monday    Unobtainable ros    Stable vitals  lethargic     Aspiration risk--unable to marin PO    Keep NPO except for meds  PanCx if spike in temp  Start D5W gtt as he is prone to develop hypernatremia  PEG on Monday  Restart AC--LMWH for now--hold coumadin  HOB > 30

## 2019-05-04 NOTE — PROGRESS NOTE ADULT - ASSESSMENT
79 y/o male w/ septic shock and superimposed GI bleed requiring pressor support    Septic Shock 2/2 UTI and or aspiration PNA  - resolved now off pressors  - UA (+), RVP (-)  - Blood cultures NGTD  - Urine culture->+E.Coli - ESBL which is resistant to zosyn   - s/p meropenum and vancomycin  - CT abd/pelvis noted possible PNA (likely aspiration), sterocoral colitis, non-obstructing R-sided renal stone and Right sided pyelitis and uteritis  - ID following  - Speech/Swallow recommendation appreciated  - corpak in place - pending PEG tube placement Monday 5/6  - Bautista - Strict I&O  - Aspiration Precautions     GI Bleed  - Coffee ground emesis noted at NH and positive guaiac in ED  - Monitor H&H which is currently stable  - NPO  - Protonix IV BID  - GI consult appreciated   - No active bleeding at this time    Right upper extremity DVT  - imaging as above  - continue lovenox  - will hold evening dose Sunday night for PEG tube placement Monday 5/6    MIGUEL  - resolved  - likely prerenal  - s/p IV fluids  - continue to monitor     Hypernatremia  - s/p IV fluids  - improving  - will monitor    Atrial Fibrillation   - CHADS2 Vasc score of 6  - continue to hold coumadin - will resume per GI recommendations after PEG tube placement  - on lovenox- will hold for Frankie night as above  - after PEG tube placement will continue warfarin and lovenox till INR is 2-3  - rate controlled     DM2  - Non-insulin dependent   - A1C 5.8  - 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 hip  - continue doxycycline   - continue dakins solution    DVT ppx  - on lovenox    Advanced Directives  - Nephew/HCP Anshu Hernandez [phone]  - spoke with HCP at length on the phone 4/23 and 5/2 who wants full code  - PEG tube placement scheduled for Monday

## 2019-05-04 NOTE — PROGRESS NOTE ADULT - SUBJECTIVE AND OBJECTIVE BOX
CC: Coffee ground emesis and fever    HPI:  77 y/o male w/ PMH significant for Afib (coumadin), Dementia, DM, DVT presented to ED from [facility] for coffee ground emesis and T. Max of 102.8. Unable to illicit any HPI or ROS 2/2  altered mental status. In the ED, patient found to have supra therapeutic INR 4.25, Lactate 2.1, MIGUEL, UA (+), RVP negative, CXR negative, Guaiac stool (+), CT abdomen with possible PNA, sterocoral colitis, non-obstructing R-sided renal stone and Right sided pyelitis and uteritis. Pt. given IV fluids per sepsis protocol, IV Vanco and cefepime. Pt. remained hypotensive and transferred to ICU for pressor support.     Interval HPI:  Patient seen and examined. Fever overnight, likely due to aspiration. PEG tube scheduled for Monday.    Review of Systems: Unable to obtain 2/2 altered mental status    MEDICATIONS  (STANDING):  calcium carbonate    500 mG (Tums) Chewable 1 Tablet(s) Chew daily  ceFAZolin   IVPB 2000 milliGRAM(s) IV Intermittent once  cholecalciferol Oral Tab/Cap - Peds 2000 Unit(s) Oral daily  Dakins Solution - 1/2 Strength 1 Application(s) Topical daily  dextrose 5%. 1000 milliLiter(s) (75 mL/Hr) IV Continuous <Continuous>  doxycycline hyclate Capsule 100 milliGRAM(s) Oral every 12 hours  enoxaparin Injectable 80 milliGRAM(s) SubCutaneous every 12 hours  ferrous    sulfate Liquid 300 milliGRAM(s) Oral daily  nystatin Powder 1 Application(s) Topical two times a day  pantoprazole  Injectable 40 milliGRAM(s) IV Push two times a day  polyethylene glycol 3350 17 Gram(s) Oral daily  senna 2 Tablet(s) Oral at bedtime  zinc oxide 20% Ointment 1 Application(s) Topical daily    MEDICATIONS  (PRN):  acetaminophen   Tablet .. 650 milliGRAM(s) Oral every 6 hours PRN Temp greater or equal to 38.5C (101.3F), Moderate Pain (4 - 6)  glucagon  Injectable 1 milliGRAM(s) IntraMuscular once PRN Glucose LESS THAN 70 milligrams/deciliter    Vital Signs (24 Hrs):  T(C): 37 (05-04-19 @ 12:21), Max: 38.2 (05-03-19 @ 20:03)  HR: 81 (05-04-19 @ 12:21) (72 - 105)  BP: 103/61 (05-04-19 @ 12:21) (103/61 - 122/61)  RR: 20 (05-04-19 @ 12:21) (18 - 21)  SpO2: 97% (05-04-19 @ 12:21) (95% - 100%)  Wt(kg): --  Daily     Daily     I&O's Summary    03 May 2019 07:01  -  04 May 2019 07:00  --------------------------------------------------------  IN: 2185 mL / OUT: 0 mL / NET: 2185 mL      GENERAL: NAD, resting comfortably  NECK: Supple, No JVD  NERVOUS SYSTEM:  Disoriented x4 unable to follow commands or answer questions appropriately  CHEST/LUNG: diffuse coarse breath sounds  HEART: S1 S2 normal, no murmur  ABDOMEN: Soft, Nontender, Nondistended; Bowel sounds present  GENITOURINARY: Bautista in place   EXTREMITIES:  2+ Peripheral Pulses, No clubbing, cyanosis, or edema  SKIN: No rash, no lesion    LABS:                        9.7    7.92  )-----------( 343      ( 04 May 2019 07:51 )             32.1     04 May 2019 07:51    139    |  107    |  13     ----------------------------<  94     4.3     |  30     |  0.78     Ca    8.1        04 May 2019 07:51         < from: Xray Kidney Ureter Bladder (04.25.19 @ 10:35) >  IMPRESSION:   Enteric tube with tip overlying the stomach. Nonspecific bowel gas   pattern. Moderate stool burden in the rectum. Degenerative changes of the   spine. Status post left hip arthroplasty with adjacent heterotopic   ossification.    < end of copied text >      < from: CT Abdomen and Pelvis w/ IV Cont (04.22.19 @ 21:32) >  MPRESSION:    1. Bilateral patchy consolidation within the lower lobes which may   reflect pneumonia. Aspiration can be considered, given clinical history.    2. Large fecalith within the rectal vault with rectal wall thickening and   presacral edema for which stercoral colitis can be considered.    3. Nonobstructing right-sided stones within the renal collecting system.   Right-sided urothelial enhancement and proximal ureteral enhancement   compatible with right-sided pyelitis and ureteritis. Correlate with   urinalysis.    4. Other findings, as discussed.    < end of copied text >    < from: US Duplex Venous Upper Ext Ltd, Right (04.29.19 @ 13:17) >    IMPRESSION:     Occlusive thrombus around the right cephalic venous catheter.    < end of copied text >

## 2019-05-05 LAB
ANION GAP SERPL CALC-SCNC: 7 MMOL/L — SIGNIFICANT CHANGE UP (ref 5–17)
BUN SERPL-MCNC: 10 MG/DL — SIGNIFICANT CHANGE UP (ref 7–23)
CALCIUM SERPL-MCNC: 7.9 MG/DL — LOW (ref 8.5–10.1)
CHLORIDE SERPL-SCNC: 109 MMOL/L — HIGH (ref 96–108)
CO2 SERPL-SCNC: 23 MMOL/L — SIGNIFICANT CHANGE UP (ref 22–31)
CREAT SERPL-MCNC: 0.7 MG/DL — SIGNIFICANT CHANGE UP (ref 0.5–1.3)
GLUCOSE BLDC GLUCOMTR-MCNC: 120 MG/DL — HIGH (ref 70–99)
GLUCOSE BLDC GLUCOMTR-MCNC: 91 MG/DL — SIGNIFICANT CHANGE UP (ref 70–99)
GLUCOSE BLDC GLUCOMTR-MCNC: 95 MG/DL — SIGNIFICANT CHANGE UP (ref 70–99)
GLUCOSE BLDC GLUCOMTR-MCNC: 97 MG/DL — SIGNIFICANT CHANGE UP (ref 70–99)
GLUCOSE BLDC GLUCOMTR-MCNC: 99 MG/DL — SIGNIFICANT CHANGE UP (ref 70–99)
GLUCOSE SERPL-MCNC: 97 MG/DL — SIGNIFICANT CHANGE UP (ref 70–99)
HCT VFR BLD CALC: 37.4 % — LOW (ref 39–50)
HGB BLD-MCNC: 10.6 G/DL — LOW (ref 13–17)
MCHC RBC-ENTMCNC: 23.2 PG — LOW (ref 27–34)
MCHC RBC-ENTMCNC: 28.3 GM/DL — LOW (ref 32–36)
MCV RBC AUTO: 82 FL — SIGNIFICANT CHANGE UP (ref 80–100)
NRBC # BLD: 0 /100 WBCS — SIGNIFICANT CHANGE UP (ref 0–0)
PLATELET # BLD AUTO: 235 K/UL — SIGNIFICANT CHANGE UP (ref 150–400)
POTASSIUM SERPL-MCNC: 4.4 MMOL/L — SIGNIFICANT CHANGE UP (ref 3.5–5.3)
POTASSIUM SERPL-SCNC: 4.4 MMOL/L — SIGNIFICANT CHANGE UP (ref 3.5–5.3)
RBC # BLD: 4.56 M/UL — SIGNIFICANT CHANGE UP (ref 4.2–5.8)
RBC # FLD: 21.7 % — HIGH (ref 10.3–14.5)
SODIUM SERPL-SCNC: 139 MMOL/L — SIGNIFICANT CHANGE UP (ref 135–145)
WBC # BLD: 6.71 K/UL — SIGNIFICANT CHANGE UP (ref 3.8–10.5)
WBC # FLD AUTO: 6.71 K/UL — SIGNIFICANT CHANGE UP (ref 3.8–10.5)

## 2019-05-05 RX ORDER — ENOXAPARIN SODIUM 100 MG/ML
80 INJECTION SUBCUTANEOUS ONCE
Qty: 0 | Refills: 0 | Status: COMPLETED | OUTPATIENT
Start: 2019-05-05 | End: 2019-05-05

## 2019-05-05 RX ADMIN — Medication 100 MILLIGRAM(S): at 06:13

## 2019-05-05 RX ADMIN — NYSTATIN CREAM 1 APPLICATION(S): 100000 CREAM TOPICAL at 06:13

## 2019-05-05 RX ADMIN — NYSTATIN CREAM 1 APPLICATION(S): 100000 CREAM TOPICAL at 17:30

## 2019-05-05 RX ADMIN — Medication 1 TABLET(S): at 12:41

## 2019-05-05 RX ADMIN — ENOXAPARIN SODIUM 80 MILLIGRAM(S): 100 INJECTION SUBCUTANEOUS at 08:47

## 2019-05-05 RX ADMIN — ZINC OXIDE 1 APPLICATION(S): 200 OINTMENT TOPICAL at 12:44

## 2019-05-05 RX ADMIN — SODIUM CHLORIDE 40 MILLILITER(S): 9 INJECTION, SOLUTION INTRAVENOUS at 13:33

## 2019-05-05 RX ADMIN — SODIUM CHLORIDE 75 MILLILITER(S): 9 INJECTION, SOLUTION INTRAVENOUS at 09:07

## 2019-05-05 RX ADMIN — Medication 100 MILLIGRAM(S): at 17:29

## 2019-05-05 RX ADMIN — POLYETHYLENE GLYCOL 3350 17 GRAM(S): 17 POWDER, FOR SOLUTION ORAL at 12:42

## 2019-05-05 RX ADMIN — PANTOPRAZOLE SODIUM 40 MILLIGRAM(S): 20 TABLET, DELAYED RELEASE ORAL at 17:28

## 2019-05-05 RX ADMIN — ENOXAPARIN SODIUM 80 MILLIGRAM(S): 100 INJECTION SUBCUTANEOUS at 23:35

## 2019-05-05 RX ADMIN — Medication 2000 UNIT(S): at 12:42

## 2019-05-05 RX ADMIN — Medication 1 APPLICATION(S): at 12:43

## 2019-05-05 RX ADMIN — PANTOPRAZOLE SODIUM 40 MILLIGRAM(S): 20 TABLET, DELAYED RELEASE ORAL at 06:14

## 2019-05-05 RX ADMIN — Medication 300 MILLIGRAM(S): at 12:41

## 2019-05-05 NOTE — CHART NOTE - NSCHARTNOTEFT_GEN_A_CORE
Pt seen and examined with house staff.  Plan formulated and reviewed on rounds    Briefly,  79 y/o male with advance dementia and AF on coumadin admitted with UTI sepsis and septic shock--off pressors.  Pt Dx'ed with UE DVT around the catheter.      Pt is unable to keep head elevated consistently.  High risk for aspiration  TF on hold  Started on D5W gtt    PEG is planned for Monday    Unobtainable ros    Stable vitals No fevers  lethargic     Aspiration risk--unable to marin PO    Keep NPO except for meds  PanCx if spike in temp  Cont D5W gtt as he is prone to develop hypernatremia--will decrease rate to 40  PEG on Monday  Last dose of LMWH should be tonight--then DC until after PEG placement   HOB > 30

## 2019-05-05 NOTE — PROGRESS NOTE ADULT - SUBJECTIVE AND OBJECTIVE BOX
CC: Coffee ground emesis and fever    HPI:  79 y/o male w/ PMH significant for Afib (coumadin), Dementia, DM, DVT presented to ED from [facility] for coffee ground emesis and T. Max of 102.8. Unable to illicit any HPI or ROS 2/2  altered mental status. In the ED, patient found to have supra therapeutic INR 4.25, Lactate 2.1, MIGUEL, UA (+), RVP negative, CXR negative, Guaiac stool (+), CT abdomen with possible PNA, sterocoral colitis, non-obstructing R-sided renal stone and Right sided pyelitis and uteritis. Pt. given IV fluids per sepsis protocol, IV Vanco and cefepime. Pt. remained hypotensive and transferred to ICU for pressor support.       5/5/19: Patient seen and examined. No overnight events reported  PEG tube insertion scheduled for 5/6/19.Pt unable to provide pertinent HPI or ROS    Review of Systems: Unable to obtain 2/2 altered mental status    MEDICATIONS  (STANDING):  calcium carbonate    500 mG (Tums) Chewable 1 Tablet(s) Chew daily  ceFAZolin   IVPB 2000 milliGRAM(s) IV Intermittent once  cholecalciferol Oral Tab/Cap - Peds 2000 Unit(s) Oral daily  Dakins Solution - 1/2 Strength 1 Application(s) Topical daily  dextrose 5%. 1000 milliLiter(s) (40 mL/Hr) IV Continuous <Continuous>  doxycycline hyclate Capsule 100 milliGRAM(s) Oral every 12 hours  enoxaparin Injectable 80 milliGRAM(s) SubCutaneous once  ferrous    sulfate Liquid 300 milliGRAM(s) Oral daily  nystatin Powder 1 Application(s) Topical two times a day  pantoprazole  Injectable 40 milliGRAM(s) IV Push two times a day  polyethylene glycol 3350 17 Gram(s) Oral daily  senna 2 Tablet(s) Oral at bedtime  zinc oxide 20% Ointment 1 Application(s) Topical daily    MEDICATIONS  (PRN):  acetaminophen   Tablet .. 650 milliGRAM(s) Oral every 6 hours PRN Temp greater or equal to 38.5C (101.3F), Moderate Pain (4 - 6)  glucagon  Injectable 1 milliGRAM(s) IntraMuscular once PRN Glucose LESS THAN 70 milligrams/deciliter      Vital Signs Last 24 Hrs  T(C): 36.4 (05 May 2019 12:07), Max: 37.2 (05 May 2019 05:25)  T(F): 97.6 (05 May 2019 12:07), Max: 98.9 (05 May 2019 05:25)  HR: 80 (05 May 2019 12:07) (63 - 80)  BP: 97/63 (05 May 2019 12:07) (97/63 - 142/93)  BP(mean): --  RR: 16 (05 May 2019 12:07) (16 - 20)  SpO2: 96% (05 May 2019 12:07) (96% - 97%)      GENERAL: NAD, resting comfortably  NECK: Supple, No JVD  NERVOUS SYSTEM:  Disoriented x4 unable to follow commands or answer questions appropriately  CHEST/LUNG: diffuse coarse breath sounds  HEART: S1 S2 normal, no murmur  ABDOMEN: Soft, Nontender, Nondistended; Bowel sounds present  GENITOURINARY: Bautista in place   EXTREMITIES:  2+ Peripheral Pulses, No clubbing, cyanosis, or edema  SKIN: No rash, no lesion    LABS:           Lab Results:  CBC  CBC Full  -  ( 05 May 2019 07:33 )  WBC Count : 6.71 K/uL  RBC Count : 4.56 M/uL  Hemoglobin : 10.6 g/dL  Hematocrit : 37.4 %  Platelet Count - Automated : 235 K/uL  Mean Cell Volume : 82.0 fl  Mean Cell Hemoglobin : 23.2 pg  Mean Cell Hemoglobin Concentration : 28.3 gm/dL        Chemistry  05-05    139  |  109<H>  |  10  ----------------------------<  97  4.4   |  23  |  0.70    Ca    7.9<L>      05 May 2019 07:33           < from: Xray Kidney Ureter Bladder (04.25.19 @ 10:35) >  IMPRESSION:   Enteric tube with tip overlying the stomach. Nonspecific bowel gas   pattern. Moderate stool burden in the rectum. Degenerative changes of the   spine. Status post left hip arthroplasty with adjacent heterotopic   ossification.    < end of copied text >      < from: CT Abdomen and Pelvis w/ IV Cont (04.22.19 @ 21:32) >  MPRESSION:    1. Bilateral patchy consolidation within the lower lobes which may   reflect pneumonia. Aspiration can be considered, given clinical history.    2. Large fecalith within the rectal vault with rectal wall thickening and   presacral edema for which stercoral colitis can be considered.    3. Nonobstructing right-sided stones within the renal collecting system.   Right-sided urothelial enhancement and proximal ureteral enhancement   compatible with right-sided pyelitis and ureteritis. Correlate with   urinalysis.    4. Other findings, as discussed.    < end of copied text >    < from: US Duplex Venous Upper Ext Ltd, Right (04.29.19 @ 13:17) >    IMPRESSION:     Occlusive thrombus around the right cephalic venous catheter.    < end of copied text >

## 2019-05-06 LAB
ALBUMIN SERPL ELPH-MCNC: 2.1 G/DL — LOW (ref 3.3–5)
ALP SERPL-CCNC: 146 U/L — HIGH (ref 40–120)
ALT FLD-CCNC: 27 U/L — SIGNIFICANT CHANGE UP (ref 12–78)
ANION GAP SERPL CALC-SCNC: 7 MMOL/L — SIGNIFICANT CHANGE UP (ref 5–17)
APTT BLD: 39.3 SEC — HIGH (ref 27.5–36.3)
AST SERPL-CCNC: 31 U/L — SIGNIFICANT CHANGE UP (ref 15–37)
BILIRUB SERPL-MCNC: 0.5 MG/DL — SIGNIFICANT CHANGE UP (ref 0.2–1.2)
BUN SERPL-MCNC: 9 MG/DL — SIGNIFICANT CHANGE UP (ref 7–23)
CALCIUM SERPL-MCNC: 8.5 MG/DL — SIGNIFICANT CHANGE UP (ref 8.5–10.1)
CHLORIDE SERPL-SCNC: 107 MMOL/L — SIGNIFICANT CHANGE UP (ref 96–108)
CO2 SERPL-SCNC: 26 MMOL/L — SIGNIFICANT CHANGE UP (ref 22–31)
CREAT SERPL-MCNC: 0.76 MG/DL — SIGNIFICANT CHANGE UP (ref 0.5–1.3)
GLUCOSE BLDC GLUCOMTR-MCNC: 81 MG/DL — SIGNIFICANT CHANGE UP (ref 70–99)
GLUCOSE BLDC GLUCOMTR-MCNC: 82 MG/DL — SIGNIFICANT CHANGE UP (ref 70–99)
GLUCOSE BLDC GLUCOMTR-MCNC: 94 MG/DL — SIGNIFICANT CHANGE UP (ref 70–99)
GLUCOSE BLDC GLUCOMTR-MCNC: 96 MG/DL — SIGNIFICANT CHANGE UP (ref 70–99)
GLUCOSE SERPL-MCNC: 94 MG/DL — SIGNIFICANT CHANGE UP (ref 70–99)
HCT VFR BLD CALC: 35.8 % — LOW (ref 39–50)
HGB BLD-MCNC: 10.6 G/DL — LOW (ref 13–17)
INR BLD: 1.27 RATIO — HIGH (ref 0.88–1.16)
MCHC RBC-ENTMCNC: 23.5 PG — LOW (ref 27–34)
MCHC RBC-ENTMCNC: 29.6 GM/DL — LOW (ref 32–36)
MCV RBC AUTO: 79.2 FL — LOW (ref 80–100)
NRBC # BLD: 0 /100 WBCS — SIGNIFICANT CHANGE UP (ref 0–0)
PLATELET # BLD AUTO: 420 K/UL — HIGH (ref 150–400)
POTASSIUM SERPL-MCNC: 4.4 MMOL/L — SIGNIFICANT CHANGE UP (ref 3.5–5.3)
POTASSIUM SERPL-SCNC: 4.4 MMOL/L — SIGNIFICANT CHANGE UP (ref 3.5–5.3)
PROT SERPL-MCNC: 7.5 GM/DL — SIGNIFICANT CHANGE UP (ref 6–8.3)
PROTHROM AB SERPL-ACNC: 14.2 SEC — HIGH (ref 10–12.9)
RBC # BLD: 4.52 M/UL — SIGNIFICANT CHANGE UP (ref 4.2–5.8)
RBC # FLD: 21.2 % — HIGH (ref 10.3–14.5)
SODIUM SERPL-SCNC: 140 MMOL/L — SIGNIFICANT CHANGE UP (ref 135–145)
WBC # BLD: 6.94 K/UL — SIGNIFICANT CHANGE UP (ref 3.8–10.5)
WBC # FLD AUTO: 6.94 K/UL — SIGNIFICANT CHANGE UP (ref 3.8–10.5)

## 2019-05-06 RX ORDER — ENOXAPARIN SODIUM 100 MG/ML
80 INJECTION SUBCUTANEOUS EVERY 12 HOURS
Qty: 0 | Refills: 0 | Status: DISCONTINUED | OUTPATIENT
Start: 2019-05-06 | End: 2019-05-07

## 2019-05-06 RX ADMIN — Medication 1 APPLICATION(S): at 12:23

## 2019-05-06 RX ADMIN — NYSTATIN CREAM 1 APPLICATION(S): 100000 CREAM TOPICAL at 06:33

## 2019-05-06 RX ADMIN — PANTOPRAZOLE SODIUM 40 MILLIGRAM(S): 20 TABLET, DELAYED RELEASE ORAL at 17:19

## 2019-05-06 RX ADMIN — SENNA PLUS 2 TABLET(S): 8.6 TABLET ORAL at 22:18

## 2019-05-06 RX ADMIN — ZINC OXIDE 1 APPLICATION(S): 200 OINTMENT TOPICAL at 12:23

## 2019-05-06 RX ADMIN — Medication 100 MILLIGRAM(S): at 17:23

## 2019-05-06 RX ADMIN — Medication 100 MILLIGRAM(S): at 06:22

## 2019-05-06 RX ADMIN — ENOXAPARIN SODIUM 80 MILLIGRAM(S): 100 INJECTION SUBCUTANEOUS at 20:07

## 2019-05-06 RX ADMIN — NYSTATIN CREAM 1 APPLICATION(S): 100000 CREAM TOPICAL at 17:29

## 2019-05-06 RX ADMIN — PANTOPRAZOLE SODIUM 40 MILLIGRAM(S): 20 TABLET, DELAYED RELEASE ORAL at 06:21

## 2019-05-06 NOTE — PROGRESS NOTE ADULT - ASSESSMENT
78-year-old man admitted with sepsis due to urinary tract infection, and found to have coffee-ground emesis, guaiac positive stool, and anemia.    Bleeding resolved with PPI.  Right arm DVT and now on anticoagulation.  Dysphagia and awaits PEG. Needs palliative input first.    Recommendations:    -spoke to HCP Anshu Hernandez who gave consent for PEG  -now needs palliative consult per hospital policy, prior to proceeding with PEG  -cont TF  -Continue Protonix    -Continue bowel regimen  -plan for PEG on hold pending above  -d/w RN

## 2019-05-06 NOTE — PROGRESS NOTE ADULT - SUBJECTIVE AND OBJECTIVE BOX
CC: Coffee ground emesis and fever    HPI:  77 y/o male w/ PMH significant for Afib (coumadin), Dementia, DM, DVT presented to ED from [facility] for coffee ground emesis and T. Max of 102.8. Unable to illicit any HPI or ROS 2/2  altered mental status. In the ED, patient found to have supra therapeutic INR 4.25, Lactate 2.1, MIGUEL, UA (+), RVP negative, CXR negative, Guaiac stool (+), CT abdomen with possible PNA, sterocoral colitis, non-obstructing R-sided renal stone and Right sided pyelitis and uteritis. Pt. given IV fluids per sepsis protocol, IV Vanco and cefepime. Pt. remained hypotensive and transferred to ICU for pressor support.     Interval HPI:  Patient seen and examined. No overnight events. PEG tube scheduled for today. Spoke with  at  office, left call back number as he was busy.        Review of Systems: Unable to obtain 2/2 altered mental status    MEDICATIONS  (STANDING):  calcium carbonate    500 mG (Tums) Chewable 1 Tablet(s) Chew daily  ceFAZolin   IVPB 2000 milliGRAM(s) IV Intermittent once  cholecalciferol Oral Tab/Cap - Peds 2000 Unit(s) Oral daily  Dakins Solution - 1/2 Strength 1 Application(s) Topical daily  dextrose 5%. 1000 milliLiter(s) (40 mL/Hr) IV Continuous <Continuous>  doxycycline hyclate Capsule 100 milliGRAM(s) Oral every 12 hours  ferrous    sulfate Liquid 300 milliGRAM(s) Oral daily  nystatin Powder 1 Application(s) Topical two times a day  pantoprazole  Injectable 40 milliGRAM(s) IV Push two times a day  polyethylene glycol 3350 17 Gram(s) Oral daily  senna 2 Tablet(s) Oral at bedtime  zinc oxide 20% Ointment 1 Application(s) Topical daily    MEDICATIONS  (PRN):  acetaminophen   Tablet .. 650 milliGRAM(s) Oral every 6 hours PRN Temp greater or equal to 38.5C (101.3F), Moderate Pain (4 - 6)  glucagon  Injectable 1 milliGRAM(s) IntraMuscular once PRN Glucose LESS THAN 70 milligrams/deciliter    Vital Signs (24 Hrs):  T(C): 36.7 (05-06-19 @ 10:54), Max: 36.8 (05-05-19 @ 23:39)  HR: 87 (05-06-19 @ 10:54) (76 - 104)  BP: 119/59 (05-06-19 @ 10:54) (106/93 - 119/59)  RR: 18 (05-06-19 @ 10:54) (18 - 20)  SpO2: 95% (05-06-19 @ 10:54) (94% - 97%)  Wt(kg): --  Daily     Daily     I&O's Summary    05 May 2019 07:01  -  06 May 2019 07:00  --------------------------------------------------------  IN: 1100 mL / OUT: 0 mL / NET: 1100 mL      GENERAL: NAD, resting comfortably  NECK: Supple, No JVD  NERVOUS SYSTEM:  Disoriented x4 unable to follow commands or answer questions appropriately  CHEST/LUNG: diffuse coarse breath sounds  HEART: S1 S2 normal, no murmur  ABDOMEN: Soft, Nontender, Nondistended; Bowel sounds present  GENITOURINARY: Bautista in place   EXTREMITIES:  2+ Peripheral Pulses, No clubbing, cyanosis, or edema  SKIN: No rash, no lesion    LABS:                        10.6   6.94  )-----------( 420      ( 06 May 2019 08:03 )             35.8     06 May 2019 08:03    140    |  107    |  9      ----------------------------<  94     4.4     |  26     |  0.76     Ca    8.5        06 May 2019 08:03    TPro  7.5    /  Alb  2.1    /  TBili  0.5    /  DBili  x      /  AST  31     /  ALT  27     /  AlkPhos  146    06 May 2019 08:03    PT/INR - ( 06 May 2019 08:03 )   PT: 14.2 sec;   INR: 1.27 ratio         PTT - ( 06 May 2019 08:03 )  PTT:39.3 sec          < from: Xray Kidney Ureter Bladder (04.25.19 @ 10:35) >  IMPRESSION:   Enteric tube with tip overlying the stomach. Nonspecific bowel gas   pattern. Moderate stool burden in the rectum. Degenerative changes of the   spine. Status post left hip arthroplasty with adjacent heterotopic   ossification.    < end of copied text >      < from: CT Abdomen and Pelvis w/ IV Cont (04.22.19 @ 21:32) >  MPRESSION:    1. Bilateral patchy consolidation within the lower lobes which may   reflect pneumonia. Aspiration can be considered, given clinical history.    2. Large fecalith within the rectal vault with rectal wall thickening and   presacral edema for which stercoral colitis can be considered.    3. Nonobstructing right-sided stones within the renal collecting system.   Right-sided urothelial enhancement and proximal ureteral enhancement   compatible with right-sided pyelitis and ureteritis. Correlate with   urinalysis.    4. Other findings, as discussed.    < end of copied text >    < from: US Duplex Venous Upper Ext Ltd, Right (04.29.19 @ 13:17) >    IMPRESSION:     Occlusive thrombus around the right cephalic venous catheter.    < end of copied text > CC: Coffee ground emesis and fever    HPI:  79 y/o male w/ PMH significant for Afib (coumadin), Dementia, DM, DVT presented to ED from [facility] for coffee ground emesis and T. Max of 102.8. Unable to illicit any HPI or ROS 2/2  altered mental status. In the ED, patient found to have supra therapeutic INR 4.25, Lactate 2.1, MIGUEL, UA (+), RVP negative, CXR negative, Guaiac stool (+), CT abdomen with possible PNA, sterocoral colitis, non-obstructing R-sided renal stone and Right sided pyelitis and uteritis. Pt. given IV fluids per sepsis protocol, IV Vanco and cefepime. Pt. remained hypotensive and transferred to ICU for pressor support.     Interval HPI:  Patient seen and examined. No overnight events. Pending palliative evaluations prior to PEG tube placement. Spoke with  at  office, left call back number as he was busy.     Review of Systems: Unable to obtain 2/2 altered mental status    MEDICATIONS  (STANDING):  calcium carbonate    500 mG (Tums) Chewable 1 Tablet(s) Chew daily  ceFAZolin   IVPB 2000 milliGRAM(s) IV Intermittent once  cholecalciferol Oral Tab/Cap - Peds 2000 Unit(s) Oral daily  Dakins Solution - 1/2 Strength 1 Application(s) Topical daily  dextrose 5%. 1000 milliLiter(s) (40 mL/Hr) IV Continuous <Continuous>  doxycycline hyclate Capsule 100 milliGRAM(s) Oral every 12 hours  ferrous    sulfate Liquid 300 milliGRAM(s) Oral daily  nystatin Powder 1 Application(s) Topical two times a day  pantoprazole  Injectable 40 milliGRAM(s) IV Push two times a day  polyethylene glycol 3350 17 Gram(s) Oral daily  senna 2 Tablet(s) Oral at bedtime  zinc oxide 20% Ointment 1 Application(s) Topical daily    MEDICATIONS  (PRN):  acetaminophen   Tablet .. 650 milliGRAM(s) Oral every 6 hours PRN Temp greater or equal to 38.5C (101.3F), Moderate Pain (4 - 6)  glucagon  Injectable 1 milliGRAM(s) IntraMuscular once PRN Glucose LESS THAN 70 milligrams/deciliter    Vital Signs (24 Hrs):  T(C): 36.7 (05-06-19 @ 10:54), Max: 36.8 (05-05-19 @ 23:39)  HR: 87 (05-06-19 @ 10:54) (76 - 104)  BP: 119/59 (05-06-19 @ 10:54) (106/93 - 119/59)  RR: 18 (05-06-19 @ 10:54) (18 - 20)  SpO2: 95% (05-06-19 @ 10:54) (94% - 97%)  Wt(kg): --  Daily     Daily     I&O's Summary    05 May 2019 07:01  -  06 May 2019 07:00  --------------------------------------------------------  IN: 1100 mL / OUT: 0 mL / NET: 1100 mL      GENERAL: NAD, resting comfortably  NECK: Supple, No JVD  NERVOUS SYSTEM:  Disoriented x4 unable to follow commands or answer questions appropriately  CHEST/LUNG: diffuse coarse breath sounds  HEART: S1 S2 normal, no murmur  ABDOMEN: Soft, Nontender, Nondistended; Bowel sounds present  GENITOURINARY: Bautista in place   EXTREMITIES:  2+ Peripheral Pulses, No clubbing, cyanosis, or edema  SKIN: No rash, no lesion    LABS:                        10.6   6.94  )-----------( 420      ( 06 May 2019 08:03 )             35.8     06 May 2019 08:03    140    |  107    |  9      ----------------------------<  94     4.4     |  26     |  0.76     Ca    8.5        06 May 2019 08:03    TPro  7.5    /  Alb  2.1    /  TBili  0.5    /  DBili  x      /  AST  31     /  ALT  27     /  AlkPhos  146    06 May 2019 08:03    PT/INR - ( 06 May 2019 08:03 )   PT: 14.2 sec;   INR: 1.27 ratio         PTT - ( 06 May 2019 08:03 )  PTT:39.3 sec          < from: Xray Kidney Ureter Bladder (04.25.19 @ 10:35) >  IMPRESSION:   Enteric tube with tip overlying the stomach. Nonspecific bowel gas   pattern. Moderate stool burden in the rectum. Degenerative changes of the   spine. Status post left hip arthroplasty with adjacent heterotopic   ossification.    < end of copied text >      < from: CT Abdomen and Pelvis w/ IV Cont (04.22.19 @ 21:32) >  MPRESSION:    1. Bilateral patchy consolidation within the lower lobes which may   reflect pneumonia. Aspiration can be considered, given clinical history.    2. Large fecalith within the rectal vault with rectal wall thickening and   presacral edema for which stercoral colitis can be considered.    3. Nonobstructing right-sided stones within the renal collecting system.   Right-sided urothelial enhancement and proximal ureteral enhancement   compatible with right-sided pyelitis and ureteritis. Correlate with   urinalysis.    4. Other findings, as discussed.    < end of copied text >    < from: US Duplex Venous Upper Ext Ltd, Right (04.29.19 @ 13:17) >    IMPRESSION:     Occlusive thrombus around the right cephalic venous catheter.    < end of copied text >

## 2019-05-06 NOTE — CHART NOTE - NSCHARTNOTEFT_GEN_A_CORE
Pt seen and examined with house staff.  Plan formulated and reviewed on rounds    Briefly,  79 y/o male with advance dementia and AF on coumadin admitted with UTI sepsis and septic shock--off pressors.  Pt Dx'ed with UE DVT around the catheter.      Pt is unable to keep head elevated consistently.  High risk for aspiration  NPO  On D5W gtt    PEG is planned for today    Unobtainable ros    Stable vitals No fevers  lethargic     Aspiration risk--unable to marin PO    Keep NPO except for meds  PanCx if spike in temp  Cont D5W gtt 40  PEG planned for today  Hold LMWH until after PEG placement   HOB > 30

## 2019-05-06 NOTE — PROGRESS NOTE ADULT - ASSESSMENT
77 y/o male w/ septic shock and superimposed GI bleed requiring pressor support    Septic Shock 2/2 UTI and or aspiration PNA  - resolved now off pressors  - Blood cultures NGTD  - Urine culture->+E.Coli - ESBL which is resistant to zosyn   - s/p meropenum and vancomycin  - corpak in place - pending PEG tube placement today  - Aspiration Precautions     GI Bleed  - Coffee ground emesis noted at NH and positive guaiac in ED  - Monitor H&H which is currently stable  - NPO  - Protonix IV BID  - GI consult appreciated   - No active bleeding at this time    Right upper extremity DVT  - imaging as above  - lovenox on hold for now  - after PEG tube placement will continue warfarin and lovenox till INR is 2-3    MIGUEL  - resolved  - likely prerenal  - s/p IV fluids  - continue to monitor     Hypernatremia  - resolved  - s/p IV fluids  - will monitor    Atrial Fibrillation   - CHADS2 Vasc score of 6  - continue to hold coumadin - will resume per GI recommendations after PEG tube placement  - on lovenox- will hold for Sunday night as above  - after PEG tube placement will continue warfarin and lovenox till INR is 2-3  - rate controlled     DM2  - Non-insulin dependent   - A1C 5.8  - 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 hip  - continue doxycycline   - continue dakins solution    DVT ppx  - lovenox held as above    Advanced Directives  - Nephew/HCP Anshu Hernandez [phone]  - spoke with HCP at length on the phone 4/23 and 5/2 who wants full code  - PEG tube placement scheduled for today 77 y/o male w/ septic shock and superimposed GI bleed requiring pressor support    Septic Shock 2/2 UTI and or aspiration PNA  - resolved now off pressors  - Blood cultures NGTD  - Urine culture->+E.Coli - ESBL which is resistant to zosyn   - s/p meropenum and vancomycin  - Pending palliative evaluations prior to PEG tube placement  - corpak in place  - Aspiration Precautions     GI Bleed  - Coffee ground emesis noted at NH and positive guaiac in ED  - Monitor H&H which is currently stable  - NPO  - Protonix IV BID  - GI consult appreciated   - No active bleeding at this time    Right upper extremity DVT  - imaging as above  - lovenox on hold for now  - after PEG tube placement will continue warfarin and lovenox till INR is 2-3    MIGUEL  - resolved  - likely prerenal  - s/p IV fluids  - continue to monitor     Hypernatremia  - resolved  - s/p IV fluids  - will monitor    Atrial Fibrillation   - CHADS2 Vasc score of 6  - continue to hold coumadin - will resume per GI recommendations after PEG tube placement  - on lovenox- will hold for Sunday night as above  - after PEG tube placement will continue warfarin and lovenox till INR is 2-3  - rate controlled     DM2  - Non-insulin dependent   - A1C 5.8  - 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 hip  - continue doxycycline   - continue dakins solution    DVT ppx  - lovenox held as above    Advanced Directives  - Nephew/HCP Anshu Hernandez [phone]  - spoke with HCP at length on the phone 4/23 and 5/2 who wants full code  - Pending palliative evaluations prior to PEG tube placement 79 y/o male w/ septic shock and superimposed GI bleed requiring pressor support    Septic Shock 2/2 UTI and or aspiration PNA  - resolved now off pressors  - Blood cultures NGTD  - Urine culture->+E.Coli - ESBL which is resistant to zosyn   - s/p meropenum and vancomycin  - Pending palliative evaluations prior to PEG tube placement  - corpak in place  - Aspiration Precautions     GI Bleed  - Coffee ground emesis noted at NH and positive guaiac in ED  - Monitor H&H which is currently stable  - NPO  - Protonix IV BID  - GI consult appreciated   - No active bleeding at this time    Right upper extremity DVT  - imaging as above  - continue lovenox   - after PEG tube placement will continue warfarin and lovenox till INR is 2-3    MIGUEL  - resolved  - likely prerenal  - s/p IV fluids  - continue to monitor     Hypernatremia  - resolved  - s/p IV fluids  - will monitor    Atrial Fibrillation   - CHADS2 Vasc score of 6  - hold coumadin - will resume per GI recommendations after PEG tube placement  - on lovenox  - after PEG tube placement will continue warfarin and lovenox till INR is 2-3  - rate controlled     DM2  - Non-insulin dependent   - A1C 5.8  - 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 hip  - continue doxycycline   - continue dakins solution    DVT ppx  - on lovenox    Advanced Directives  - Nephew/HCP Anshu Hernandez [phone]  - spoke with HCP at length on the phone 4/23 and 5/2 who wants full code  - Pending palliative evaluations prior to PEG tube placement

## 2019-05-07 LAB
ANION GAP SERPL CALC-SCNC: 5 MMOL/L — SIGNIFICANT CHANGE UP (ref 5–17)
BUN SERPL-MCNC: 12 MG/DL — SIGNIFICANT CHANGE UP (ref 7–23)
CALCIUM SERPL-MCNC: 8.8 MG/DL — SIGNIFICANT CHANGE UP (ref 8.5–10.1)
CHLORIDE SERPL-SCNC: 106 MMOL/L — SIGNIFICANT CHANGE UP (ref 96–108)
CO2 SERPL-SCNC: 27 MMOL/L — SIGNIFICANT CHANGE UP (ref 22–31)
CREAT SERPL-MCNC: 0.85 MG/DL — SIGNIFICANT CHANGE UP (ref 0.5–1.3)
GLUCOSE BLDC GLUCOMTR-MCNC: 101 MG/DL — HIGH (ref 70–99)
GLUCOSE BLDC GLUCOMTR-MCNC: 71 MG/DL — SIGNIFICANT CHANGE UP (ref 70–99)
GLUCOSE BLDC GLUCOMTR-MCNC: 85 MG/DL — SIGNIFICANT CHANGE UP (ref 70–99)
GLUCOSE BLDC GLUCOMTR-MCNC: 89 MG/DL — SIGNIFICANT CHANGE UP (ref 70–99)
GLUCOSE BLDC GLUCOMTR-MCNC: 97 MG/DL — SIGNIFICANT CHANGE UP (ref 70–99)
GLUCOSE SERPL-MCNC: 87 MG/DL — SIGNIFICANT CHANGE UP (ref 70–99)
HCT VFR BLD CALC: 36.1 % — LOW (ref 39–50)
HGB BLD-MCNC: 10.7 G/DL — LOW (ref 13–17)
MCHC RBC-ENTMCNC: 23.6 PG — LOW (ref 27–34)
MCHC RBC-ENTMCNC: 29.6 GM/DL — LOW (ref 32–36)
MCV RBC AUTO: 79.5 FL — LOW (ref 80–100)
NRBC # BLD: 0 /100 WBCS — SIGNIFICANT CHANGE UP (ref 0–0)
PLATELET # BLD AUTO: 418 K/UL — HIGH (ref 150–400)
POTASSIUM SERPL-MCNC: 4.7 MMOL/L — SIGNIFICANT CHANGE UP (ref 3.5–5.3)
POTASSIUM SERPL-SCNC: 4.7 MMOL/L — SIGNIFICANT CHANGE UP (ref 3.5–5.3)
RBC # BLD: 4.54 M/UL — SIGNIFICANT CHANGE UP (ref 4.2–5.8)
RBC # FLD: 21.2 % — HIGH (ref 10.3–14.5)
SODIUM SERPL-SCNC: 138 MMOL/L — SIGNIFICANT CHANGE UP (ref 135–145)
WBC # BLD: 7.88 K/UL — SIGNIFICANT CHANGE UP (ref 3.8–10.5)
WBC # FLD AUTO: 7.88 K/UL — SIGNIFICANT CHANGE UP (ref 3.8–10.5)

## 2019-05-07 RX ADMIN — Medication 300 MILLIGRAM(S): at 12:31

## 2019-05-07 RX ADMIN — Medication 1 TABLET(S): at 14:39

## 2019-05-07 RX ADMIN — NYSTATIN CREAM 1 APPLICATION(S): 100000 CREAM TOPICAL at 06:25

## 2019-05-07 RX ADMIN — PANTOPRAZOLE SODIUM 40 MILLIGRAM(S): 20 TABLET, DELAYED RELEASE ORAL at 06:24

## 2019-05-07 RX ADMIN — ZINC OXIDE 1 APPLICATION(S): 200 OINTMENT TOPICAL at 12:32

## 2019-05-07 RX ADMIN — Medication 100 MILLIGRAM(S): at 06:25

## 2019-05-07 RX ADMIN — PANTOPRAZOLE SODIUM 40 MILLIGRAM(S): 20 TABLET, DELAYED RELEASE ORAL at 18:02

## 2019-05-07 RX ADMIN — Medication 1 APPLICATION(S): at 12:32

## 2019-05-07 RX ADMIN — ENOXAPARIN SODIUM 80 MILLIGRAM(S): 100 INJECTION SUBCUTANEOUS at 06:38

## 2019-05-07 RX ADMIN — Medication 100 MILLIGRAM(S): at 18:02

## 2019-05-07 RX ADMIN — NYSTATIN CREAM 1 APPLICATION(S): 100000 CREAM TOPICAL at 18:03

## 2019-05-07 RX ADMIN — Medication 2000 UNIT(S): at 12:31

## 2019-05-07 NOTE — PROGRESS NOTE ADULT - ASSESSMENT
78-year-old man admitted with sepsis due to urinary tract infection, and found to have coffee-ground emesis, guaiac positive stool, and anemia.    Bleeding resolved with PPI.  Right arm DVT and now on anticoagulation.  Dysphagia and awaits PEG. Needs palliative input first.    Recommendations:    -spoke to HCP Anshu Hernandez who gave consent for PEG  -now needs palliative consult per hospital policy, prior to proceeding with PEG  -cont TF  -Continue Protonix    -Continue bowel regimen  -plan for PEG on hold pending above  -will keep NPO after MN in case was can schedule PEG tomorrow

## 2019-05-07 NOTE — CONSULT NOTE ADULT - SUBJECTIVE AND OBJECTIVE BOX
HPI: Mr. Richter is a 78y old Male coming from [facility] with hx of Advanced Dementia (FAST 7d), Afib (coumadin), DM, DVT, GIB, admitted 4/22 for coffee ground emesis and T. Max of 102.8. In the ED, patient found to have supra therapeutic INR 4.25, Lactate 2.1, MIGUEL, UA (+), RVP negative, CXR negative, Guaiac stool (+), CT abdomen with possible PNA, sterocoral colitis, non-obstructing R-sided renal stone and Right sided pyelitis and uteritis. Hospital course notable for persistent hypotension requiring ICU transition and pressors, with stabilization and eventual transfer to [facility]. GIB controlled with PPI. Further c/b dysphagia and NPO status requiring NGT and consideration of PEG. Palliative Care consulted to assist with establishing GOC, specifically pertaining to PEG placement in advanced dementia pt as per hospital policy.     Met Mr. Richter this am, no family at bedside. Pt awake and attends to interviewer, but mumbling and unable to share HPI.  Called nephew/surrogate for further information. See goc note (and last bullet in plan) for further details on outcome of this conversation.      PAIN: ( )Yes   ( x)No- no nonverbal signs of pain  DYSPNEA: ( ) Yes  ( x) No- no nonverbal signs of dyspnea    PAST MEDICAL & SURGICAL HISTORY:  Osteomyelitis of left leg  Osteoarthritis  HTN (hypertension)  DVT (deep venous thrombosis)  PVD (peripheral vascular disease)  DM (diabetes mellitus)  COPD (chronic obstructive pulmonary disease)  Anemia  Dementia  No significant past surgical history      SOCIAL HX: unable to gather    Hx opiate tolerance ( )YES  (x )NO    Baseline ADLs  (Prior to Admission)- as per NH records, pt dependent for all ADLs  ( ) Independent   ( x)Dependent    FAMILY HISTORY:  unable to gather 2/2 to advanced dementia    Review of Systems:  Unable to gather 2/2 to advanced dementia      PHYSICAL EXAM:    Vital Signs Last 24 Hrs  T(C): 36.7 (07 May 2019 05:06), Max: 37.2 (06 May 2019 16:46)  T(F): 98.1 (07 May 2019 05:06), Max: 98.9 (06 May 2019 16:46)  HR: 79 (07 May 2019 05:06) (78 - 79)  BP: 109/67 (07 May 2019 05:06) (109/67 - 127/74)  BP(mean): --  RR: 18 (07 May 2019 05:06) (18 - 18)  SpO2: 95% (07 May 2019 05:06) (95% - 98%)  Daily     Daily     PPSV2:  20 %  FAST: 7d    General: Elderly male, thin, confused, garbled undecipherable speech, appears in NAD  Mental Status: AOx0  HEENT: dmm, ngt in place, +temporal wasting  Lungs: dec at bases bl  Cardiac: +s1 s2 rrr  GI: soft mild distention, +bs nt incontinent  : incontinent  Ext: moves extremities spontaneously, no edema, muscle/fat wasting throughout  Neuro: limited by cognition, pt unable to follow commands      LABS:                        10.7   7.88  )-----------( 418      ( 07 May 2019 07:13 )             36.1     05-07    138  |  106  |  12  ----------------------------<  87  4.7   |  27  |  0.85    Ca    8.8      07 May 2019 07:13    TPro  7.5  /  Alb  2.1<L>  /  TBili  0.5  /  DBili  x   /  AST  31  /  ALT  27  /  AlkPhos  146<H>  05-06    PT/INR - ( 06 May 2019 08:03 )   PT: 14.2 sec;   INR: 1.27 ratio         PTT - ( 06 May 2019 08:03 )  PTT:39.3 sec  Albumin: Albumin, Serum: 2.1 g/dL (05-06 @ 08:03)      Allergies    No Known Allergies    Intolerances      MEDICATIONS  (STANDING):  calcium carbonate    500 mG (Tums) Chewable 1 Tablet(s) Chew daily  ceFAZolin   IVPB 2000 milliGRAM(s) IV Intermittent once  cholecalciferol Oral Tab/Cap - Peds 2000 Unit(s) Oral daily  Dakins Solution - 1/2 Strength 1 Application(s) Topical daily  doxycycline hyclate Capsule 100 milliGRAM(s) Oral every 12 hours  enoxaparin Injectable 80 milliGRAM(s) SubCutaneous every 12 hours  ferrous    sulfate Liquid 300 milliGRAM(s) Oral daily  nystatin Powder 1 Application(s) Topical two times a day  pantoprazole  Injectable 40 milliGRAM(s) IV Push two times a day  polyethylene glycol 3350 17 Gram(s) Oral daily  senna 2 Tablet(s) Oral at bedtime  zinc oxide 20% Ointment 1 Application(s) Topical daily    MEDICATIONS  (PRN):  acetaminophen   Tablet .. 650 milliGRAM(s) Oral every 6 hours PRN Temp greater or equal to 38.5C (101.3F), Moderate Pain (4 - 6)  glucagon  Injectable 1 milliGRAM(s) IntraMuscular once PRN Glucose LESS THAN 70 milligrams/deciliter      RADIOLOGY/ADDITIONAL STUDIES:    EXAM:  XR CHEST PORTABLE IMMED 1V                        PROCEDURE DATE:  04/22/2019      Impression:    No radiologically active cardiopulmonary process seen.    NUHA MENENDEZ M.D., ATTENDING RADIOLOGIST  This document has been electronically signed. Apr 23 2019  8:11AM    EXAM:  CT ABDOMEN AND PELVIS IC                        PROCEDURE DATE:  04/22/2019      IMPRESSION:    1. Bilateral patchy consolidation within the lower lobes which may   reflect pneumonia. Aspiration can be considered, given clinical history.    2. Large fecalith within the rectal vault with rectal wall thickening and   presacral edema for which stercoral colitis can be considered.    3. Nonobstructing right-sided stones within the renal collecting system.   Right-sided urothelial enhancement and proximal ureteral enhancement   compatible with right-sided pyelitis and ureteritis. Correlate with   urinalysis.    4. Other findings, as discussed.      DA LOUIS M.D., ATTENDING RADIOLOGIST  This document has been electronically signed. Apr 22 2019  9:46PM    EXAM:  US DPLX UPR EXT VEINS LTD RT                        PROCEDURE DATE:  04/29/2019      IMPRESSION:     Occlusive thrombus around the right cephalic venous catheter.    NO MAINE   This document has been electronically signed. Apr 29 2019  1:24PM
78 year old male patient with pertinent history of Afib on coumadin, Dementia and anemia  presents to ED from [facility] for coffee ground emesis and tmax of 100.8.  Unable to illicit any history due baseline advanced dementia. In the ED patient was found to be febrile, tachycardic, tachypneic and had a positive guaiac. Patient hydrated per sepsis protocol and received IV vanco and cefepime. CT showed right sided pyelonephritis and stone, and fecal impaction with stercoral proctitis. The patient has had no further coffee-ground emesis and there is no overt melena or hematochezia.  His hemoglobin stable at 10.  Per his nurse he is having several bowel movements now with no blood.  Patient cannot give any history.    PAST MEDICAL & SURGICAL HISTORY:  Osteomyelitis of left leg  Osteoarthritis  HTN (hypertension)  DVT (deep venous thrombosis)  PVD (peripheral vascular disease)  DM (diabetes mellitus)  COPD (chronic obstructive pulmonary disease)  Anemia  Dementia  No significant past surgical history    MEDICATIONS  (STANDING):  calcium carbonate    500 mG (Tums) Chewable 1 Tablet(s) Chew daily  cholecalciferol Oral Tab/Cap - Peds 2000 Unit(s) Oral daily  Dakins Solution - 1/2 Strength 1 Application(s) Topical daily  dextrose 5%. 1000 milliLiter(s) (125 mL/Hr) IV Continuous <Continuous>  dextrose 50% Injectable 12.5 Gram(s) IV Push once  dextrose 50% Injectable 25 Gram(s) IV Push once  dextrose 50% Injectable 25 Gram(s) IV Push once  ferrous    sulfate Liquid 300 milliGRAM(s) Oral daily  insulin lispro (HumaLOG) corrective regimen sliding scale   SubCutaneous every 6 hours  pantoprazole  Injectable 40 milliGRAM(s) IV Push two times a day  phenylephrine    Infusion 0.5 MICROgram(s)/kG/Min (13.894 mL/Hr) IV Continuous <Continuous>  piperacillin/tazobactam IVPB. 3.375 Gram(s) IV Intermittent every 8 hours  polyethylene glycol 3350 17 Gram(s) Oral daily  senna 2 Tablet(s) Oral at bedtime  vancomycin  IVPB 750 milliGRAM(s) IV Intermittent every 12 hours  zinc oxide 20% Ointment 1 Application(s) Topical daily    Allergies    No Known Allergies    FAMILY HISTORY:  NC    Soc: cannot obtain due to dementia    ROS: not able to obtain    Vital Signs Last 24 Hrs  T(C): 36.7 (23 Apr 2019 14:00), Max: 38.7 (22 Apr 2019 18:00)  T(F): 98 (23 Apr 2019 14:00), Max: 101.7 (22 Apr 2019 18:00)  HR: 87 (23 Apr 2019 17:00) (56 - 104)  BP: 101/70 (23 Apr 2019 16:00) (63/36 - 122/62)  BP(mean): 81 (23 Apr 2019 16:00) (42 - 95)  RR: 26 (23 Apr 2019 17:00) (19 - 35)  SpO2: 96% (23 Apr 2019 17:00) (68% - 100%)  Physical Exam:  · Constitutional	detailed exam  · Constitutional Details	cachectic  · Eyes	detailed exam  · Eyes Details	PERRL  · ENMT	detailed exam  · Neck	detailed exam  · Neck Details	normal  · Back	No deformity or limitation of movement  · Respiratory	detailed exam  · Respiratory Details	respirations labored; rhonchi  -GI: NTND no mass, +BS normal  · Neurological	detailed exam  · Neurological Details	responds to pain  · Skin	detailed exam  · Skin Details	warm and dry  · Musculoskeletal	No joint pain, swelling or deformity; no limitation of movement  · Psychiatric	Affect and characteristics of appearance, verbalizations, behaviors are appropriate                          10.3   11.20 )-----------( 180      ( 23 Apr 2019 12:28 )             35.2     04-23    159<H>  |  129<H>  |  32<H>  ----------------------------<  103<H>  4.6   |  22  |  1.35<H>    Ca    7.6<L>      23 Apr 2019 04:53    TPro  6.1  /  Alb  2.0<L>  /  TBili  0.7  /  DBili  x   /  AST  17  /  ALT  23  /  AlkPhos  97  04-23
Patient is 79yo male with PMhx of severe dementia, non verbal at baseline Afib on Coumadin, anemia, presented from [facility], with "coffee ground emesis" and fever. Upon further workup patient found to have UTI, febrile T 100.8 tachycardic. Pt given 3L NS bolus, espinoza placed (UOP 175cc), repeat lactate 3.4-->7.0.   Patient is non verbal, cannot provide any history. History gathered from chart, and from resident Dr Tellez.   Spoke to nephew/HCP Anshu Molina, via phone [phone], who informed me that the patient is to be FULL CODE.     PMhx severe dementia, non verbal at baseline Afib on Coumadin, anemia,  PShx L Hip surgery  Meds as per EMR  FHx NC  Social lives in [facility]  Allergies NKDA    T(C): 36.3 (19 @ 02:18), Max: 39.3 (19 @ 17:04)  HR: 80 (19 @ 05:00) (74 - 122)  BP: 85/65 (19 @ 05:00) (63/36 - 117/56)  RR: 19 (19 @ 05:00) (19 - 35)  SpO2: 68% (19 @ 05:00) (68% - 100%)  Wt(kg): --    Gen: Awake, moaning, NAD  HEENT: NCAT, EOMI. dry MM  Neck: Supple  CV: nml S1S2, RRR  Lungs: CTABL  Abd: Soft, NT, ND, BS+  Ext: No edema  Neuro: Non focal                                  11.0   10.50 )-----------( 157      ( 2019 04:53 )             38.8     2019 19:41    151    |  124    |  42     ----------------------------<  138    4.0     |  19     |  1.49     Ca    8.2        2019 19:41    TPro  6.4    /  Alb  2.1    /  TBili  0.4    /  DBili  x      /  AST  22     /  ALT  25     /  AlkPhos  110    2019 19:41    PT/INR - ( 2019 04:53 )   PT: 36.1 sec;   INR: 3.14 ratio         PTT - ( 2019 04:53 )  PTT:36.6 sec  CAPILLARY BLOOD GLUCOSE        LIVER FUNCTIONS - ( 2019 19:41 )  Alb: 2.1 g/dL / Pro: 6.4 gm/dL / ALK PHOS: 110 U/L / ALT: 25 U/L / AST: 22 U/L / GGT: x           Urinalysis Basic - ( 2019 17:34 )    Color: Yellow / Appearance: very cloudy / S.020 / pH: x  Gluc: x / Ketone: Negative  / Bili: Negative / Urobili: Negative mg/dL   Blood: x / Protein: 15 mg/dL / Nitrite: Positive   Leuk Esterase: Moderate / RBC: 11-25 /HPF / WBC 26-50   Sq Epi: x / Non Sq Epi: Occasional / Bacteria: Many    LACTATE 3.4-->7.0      CT A/P  1. Bilateral patchy consolidation within the lower lobes which may   reflect pneumonia. Aspiration can be considered, given clinical history.    2. Large fecalith within the rectal vault with rectal wall thickening and   presacral edema for which stercoral colitis can be considered.    3. Nonobstructing right-sided stones within the renal collecting system.   Right-sided urothelial enhancement and proximal ureteral enhancement   compatible with right-sided pyelitis and ureteritis. Correlate with   urinalysis.
Patient is a 78y old  Male who presents with a chief complaint of Sepsis, GI bleed (2019 10:34)    HPI:  78 year old male patient with pertinent history of Afib on coumadin, Dementia and anemia admitted on  from snf for evaluation of vomiting coffee ground emesis and fever to 100.8; patient was transferred to icu given hypotension and required pressor support. History per medical record and patient unable to provide history.               PMH: as above  PSH: as above  Meds: per reconciliation sheet, noted below  MEDICATIONS  (STANDING):  calcium carbonate    500 mG (Tums) Chewable 1 Tablet(s) Chew daily  cholecalciferol Oral Tab/Cap - Peds 2000 Unit(s) Oral daily  Dakins Solution - 1/2 Strength 1 Application(s) Topical daily  dextrose 5%. 1000 milliLiter(s) (125 mL/Hr) IV Continuous <Continuous>  dextrose 50% Injectable 12.5 Gram(s) IV Push once  dextrose 50% Injectable 25 Gram(s) IV Push once  dextrose 50% Injectable 25 Gram(s) IV Push once  doxycycline hyclate Capsule 100 milliGRAM(s) Oral every 12 hours  ferrous    sulfate Liquid 300 milliGRAM(s) Oral daily  insulin lispro (HumaLOG) corrective regimen sliding scale   SubCutaneous every 6 hours  pantoprazole  Injectable 40 milliGRAM(s) IV Push two times a day  phenylephrine    Infusion 0.5 MICROgram(s)/kG/Min (13.894 mL/Hr) IV Continuous <Continuous>  piperacillin/tazobactam IVPB. 3.375 Gram(s) IV Intermittent every 8 hours  polyethylene glycol 3350 17 Gram(s) Oral daily  senna 2 Tablet(s) Oral at bedtime  zinc oxide 20% Ointment 1 Application(s) Topical daily    MEDICATIONS  (PRN):  acetaminophen   Tablet .. 500 milliGRAM(s) Oral every 4 hours PRN Temp greater or equal to 38C (100.4F)  dextrose 40% Gel 15 Gram(s) Oral once PRN Blood Glucose LESS THAN 70 milliGRAM(s)/deciliter  glucagon  Injectable 1 milliGRAM(s) IntraMuscular once PRN Glucose LESS THAN 70 milligrams/deciliter    Allergies    No Known Allergies    Intolerances      Social: no smoking, no alcohol, no illegal drugs; no recent travel, no exposure to TB  FAMILY HISTORY:     no history of premature cardiovascular disease in first degree relatives  ROS: unable to obtain secondary to patient medical condition     Vital Signs Last 24 Hrs  T(C): 36.2 (2019 10:00), Max: 39.3 (2019 17:04)  T(F): 97.2 (2019 10:00), Max: 102.8 (2019 17:04)  HR: 67 (2019 13:00) (56 - 122)  BP: 102/41 (2019 13:00) (63/36 - 122/62)  BP(mean): 52 (2019 13:00) (42 - 95)  RR: 25 (2019 13:00) (19 - 35)  SpO2: 95% (2019 13:00) (68% - 100%)  Daily Height in cm: 172.72 (2019 16:25)    Daily     PE:    Constitutional: frail looking  HEENT: NC/AT, EOMI, PERRLA, conjunctivae clear; ears and nose atraumatic; caked blood in oropharynx  Neck: supple; thyroid not palpable  Respiratory: respiratory effort normal; scattered coarse breath sounds  Cardiovascular: S1S2 regular, no murmurs  Abdomen: soft, not tender, not distended, positive BS; no liver or spleen organomegaly  Genitourinary: no suprapubic tenderness  Musculoskeletal: no muscle tenderness, left hip with dressing over wound  Neurological/ Psychiatric: AxOx3, judgement and insight normal;  moving all extremities  Skin: no rashes; no palpable lesions    Labs: all available labs reviewed                        10.3   11.20 )-----------( 180      ( 2019 12:28 )             35.2     04-23    159<H>  |  129<H>  |  32<H>  ----------------------------<  103<H>  4.6   |  22  |  1.35<H>    Ca    7.6<L>      2019 04:53    TPro  6.1  /  Alb  2.0<L>  /  TBili  0.7  /  DBili  x   /  AST  17  /  ALT  23  /  AlkPhos  97  04-23     LIVER FUNCTIONS - ( 2019 04:53 )  Alb: 2.0 g/dL / Pro: 6.1 gm/dL / ALK PHOS: 97 U/L / ALT: 23 U/L / AST: 17 U/L / GGT: x           Urinalysis Basic - ( 2019 17:34 )    Color: Yellow / Appearance: very cloudy / S.020 / pH: x  Gluc: x / Ketone: Negative  / Bili: Negative / Urobili: Negative mg/dL   Blood: x / Protein: 15 mg/dL / Nitrite: Positive   Leuk Esterase: Moderate / RBC: 11-25 /HPF / WBC 26-50   Sq Epi: x / Non Sq Epi: Occasional / Bacteria: Many      < from: CT Abdomen and Pelvis w/ IV Cont (19 @ 21:32) >    EXAM:  CT ABDOMEN AND PELVIS IC                            PROCEDURE DATE:  2019          INTERPRETATION:  .    CLINICAL INFORMATION: Vomiting coffee grounds.    TECHNIQUE: Multiple axial CT images of the head were obtained without   contrast. Sagittal and coronal reconstructed images were acquired from   the source data.    COMPARISON: Prior CT examination of the abdomen and pelvis from 2016.    FINDINGS: A nonspecific subcentimeter prevascular lymph node is notable   within the mediastinum. The heart size is normal. There are   atherosclerotic calcifications of the imaged coronary arteries, aorta,   and branch vessels. The imaged portions of the aorta are normal in   caliber. An infrarenal IVC filter is redemonstrated.    Patchy confluent opacities are noted within the bilateral lower lobes.    Scattered liver cysts appear unchanged when compared to the prior CT   exam. Cholelithiasis is noted. There is no gallbladder wall thickening.    The pancreas, spleen, and adrenal glands appear unremarkable.    Multiple stones are again noted within the right renal collecting system,   the largest of which measures up to 1.5 cm in the upper pole. There is   right-sided urothelial enhancement and right-sided periureteral fat   stranding. There is no right-sided hydronephrosis. There is minimal   nonspecific left-sided perinephric stranding.    The urinary bladder is underdistended, limiting evaluation.    There are multiple scattered nonspecific subcentimeter retroperitoneal   and mesenteric lymph nodes.    There is a small hiatal hernia. The stomach is underdistended. There is   no small bowel obstruction. A very large fecalith is notable within the   rectum. There is mild rectal wall thickening and presacral edema. An   additional moderate amount of stool is notable throughout the large   bowel. A few colonic diverticula are notable. The appendix is not clearly   visualized.    The prostate gland and seminal vesicles appear unremarkable. A small   fat-containing left-sided inguinal hernia is noted.    A left-sided total hip arthroplasty is again noted. There is extensive   adjacent heterotopic ossification. The hip prosthesis generates streak   and beam hardening artifact limiting evaluation of the adjacent   structures. There is fatty atrophy of the left adductor compartment.    There is generalized osteopenia. Multilevel degenerative changes are   noted within the imaged potions of the spine.    < from: CT Abdomen and Pelvis w/ IV Cont (19 @ 21:32) >  MPRESSION:    1. Bilateral patchy consolidation within the lower lobes which may   reflect pneumonia. Aspiration can be considered, given clinical history.    2. Large fecalith within the rectal vault with rectal wall thickening and   presacral edema for which stercoral colitis can be considered.    3. Nonobstructing right-sided stones within the renal collecting system.   Right-sided urothelial enhancement and proximal ureteral enhancement   compatible with right-sided pyelitis and ureteritis. Correlate with   urinalysis.    4. Other findings, as discussed.    < end of copied text >    < end of copied text >      Radiology: all available radiological tests reviewed    Advanced directives addressed: full resuscitation

## 2019-05-07 NOTE — PROGRESS NOTE ADULT - SUBJECTIVE AND OBJECTIVE BOX
GI    awaits palliative care consult before PEG can be placed  no clinical changes  awake but not aware  NGT remains in place, on TF  no n/v or evidence of bleeding per RN     ROS: not able to obtain    Physical Exam:  Vital Signs Last 24 Hrs  T(C): 36.7 (07 May 2019 05:06), Max: 37.2 (06 May 2019 16:46)  T(F): 98.1 (07 May 2019 05:06), Max: 98.9 (06 May 2019 16:46)  HR: 79 (07 May 2019 05:06) (78 - 79)  BP: 109/67 (07 May 2019 05:06) (109/67 - 127/74)  BP(mean): --  RR: 18 (07 May 2019 05:06) (18 - 18)  SpO2: 95% (07 May 2019 05:06) (95% - 98%)    · Constitutional	detailed exam  · Constitutional Details	moaning, eyes open  NGT in place  · Eyes	detailed exam  · Eyes Details	PERRL  · ENMT	detailed exam  · Neck	detailed exam  · Neck Details	normal  · Back	No deformity or limitation of movement  · Respiratory	detailed exam  · Respiratory Details	respirations labored; rhonchi  -GI: NTND no mass, +BS normal  · Neurological	detailed exam  · Neurological Details	responds to pain  · Skin	detailed exam  · Skin Details	warm and dry  · Musculoskeletal	No joint pain, swelling or deformity; no limitation of movement

## 2019-05-07 NOTE — CONSULT NOTE ADULT - ASSESSMENT
78y old Male coming from [facility] with hx of Advanced Dementia (FAST 7d), Afib (coumadin), DM, DVT, GIB, admitted 4/22 for coffee ground emesis and T. Max of 102.8. In the ED, patient found to have supra therapeutic INR 4.25, Lactate 2.1, MIGUEL, UA (+), RVP negative, CXR negative, Guaiac stool (+), CT abdomen with possible PNA, sterocoral colitis, non-obstructing R-sided renal stone and Right sided pyelitis and uteritis. Hospital course notable for persistent hypotension requiring ICU transition and pressors, with stabilization and eventual transfer to [facility]. GIB controlled with PPI. Further c/b dysphagia and NPO status requiring NGT and consideration of PEG. Palliative Care consulted to assist with establishing GOC, specifically pertaining to PEG placement in advanced dementia pt as per hospital policy.     1) AMS/Dementia  - as per nephew, pt with dementia for past 8 years  - now at end stages, FAST 7d  - fall and aspiration precautions  - reorienting as able    2) Malnutrition/Debility  - fully dependent for all ADLs in setting of end stage dementia  - speech and swallow eval and reeval appreciated maintaining recommendation for NPO status  - PEG discussed at length with nephew, including recommendation against this given lack of evidence to support that this intervention improves QOL or length of life for advanced dementia patients  - despite this, as well as noted efforts from all subspecialists, nephew would still like to proceed with PEG placement  - shared with primary team who will arrange  - nutrition notes appreciated- severe protein calorie malnutrition    3) Urosepsis  - resolved with IV abx  - also s/p pressors  - ID and ICU notes appreciated    4) GIB  - coffee ground emesis on admission alongside +guaiac   - GI notes appreciated  - stabilized with PPI  - proceeding with PEG    5) DVT  - on lovenox    6) Prognosis  - poor  - advanced, end stage dementia, with full dependence for all ADLs, dysphagia, documented severe protein calorie malnutrition without ability to maintain intake, recurrent infections, GIB, PPS<40%, and albumin 2.1 (<2.5 purporting poorer prognosis in setting of above) pt would fit criteria for hospice. However, family not ready for this at this time    7) GOC/Advanced Directives  - pt does not have capacity for decision making due to his advanced dementia  - no HCP on file, nephew Anshu Molina [phone] serving as surrogate  - MOLST completed today with no limits: CPR, no limits, trial of intubation/ventillation, send to hospital, ok for feeding tube, trial of IVF, use abx  - GOC meeting held via phone- nephew open to hearing all options including comfort focus and recommendation against PEG given advanced dementia, but at this time is not ready for comfort focus. He would like to proceed with PEG placement. He was also open to verbal completion of MOLST to further clarify his wishes for his uncle's care, with copy left on chart for him. *Spent 32 minutes discussing GOC with family including Advance care planning, explanation and discussion of advance directives, reviewed all treatment/dispo options, and MOLST. See GOC note for further details.    *Given sx managed and GOC clarified will sign off. Discussed with Dr. Fofana*    Thank you for including us in Mr. Richter's care. Will continue to follow with you.    Zelda Nelson MD  Palliative Care Attending
77yo male with PMhx of Afib on Coumadin, severe dementia, chronic osteo, non verbal, a/w septic shock, likely 2/2 UTI/Pyelonephritis, anemia, ?GIB    MIGUEL/ARF  Dehydration  Septic Shock  UTI/Pyelonephritis  Dementia  Afib on Coumadin  Anemia  rule out GIB    - currently afebrile, SBP ranging 70-90, HR 70s, o2 sat 98%  - has received 3L NS bolus thus far, UOP 175cc  - Lactate 3.4-->7.0, RVP neg  - CT A/P results noted      PLAN  - supp o2 as needed, duonebs PRN  - broad spectrum Abx coverage Zosyn IV, Vanco  - follow up cultures, procalcitonin  - IVF, LR bolus 1L, then start at 100cc/hr  - ECHO  - start Neosynephrine, may need stress dose steroids, goal MAP 65, monitor UOP, repeat lactate  - NPO  - PPI drip  - VitK 10mg IV x 1  - GI ppx  - DVT ppx, SCDs  - Monitor in MICU    Critical care time 45 minutes
78 year old male patient with pertinent history of Afib on coumadin, Dementia and anemia admitted on 4/22 from snf for evaluation of vomiting coffee ground emesis and fever to 100.8; patient was transferred to icu given hypotension and required pressor support. History per medical record and patient unable to provide history.   1. Patient admitted with septic shock, unclear etiology, possibly due to urinary origin versus pneumonia, versus soft tissue infection of left hip  - follow up cultures   - iv hydration and supportive care   - on pressor support per icu  - serial cbc and monitor temperature   - reviewed prior medical records to evaluate for resistant or atypical pathogens   - agree with zosyn as ordered, will cover broadly for urine and lung  - will change doxycycline to vancomycin to cover lung and skin bacteria  - check vancomycin trough prior to fourth dose   - wound care of hip  2. other issues; Afib on coumadin, Dementia and anemia  - per medicine
78-year-old man admitted with sepsis due to urinary tract infection, and found to have coffee-ground emesis, guaiac positive stool, and anemia.  There is likely a presentation of GI blood loss here however the primary issue seems to be the pyelonephritis with GI bleeding as a secondary problem.  This may be induced by stress gastritis or ulcer, esophagitis or other sources.  Also found to have fecal impaction on CAT scan, now spontaneously moving bowels.    Recommendations:    -Monitor hemoglobin.  Watch for signs of bleeding  -Continue Protonix IV  -he is currently not stable enough for elective upper endoscopy.  -Continue bowel regimen  -PO as tolerated  Treatment of kidney infection per primary team.    Thank you for the courtesy of this referral.  Please call with any gastrointestinal issues regarding this patient.    Luisito Johnson MD  [phone]

## 2019-05-07 NOTE — CHART NOTE - NSCHARTNOTEFT_GEN_A_CORE
Pt seen and examined with house staff.  Plan formulated and reviewed on rounds    Briefly,  77 y/o male with advance dementia and AF on coumadin admitted with UTI sepsis and septic shock--off pressors.  Pt Dx'ed with UE DVT around the catheter.      Pt is unable to keep head elevated consistently. High risk for aspiration    PEG on hold--as per hospital policy, mandatory palliative eval prior to PEG placement in anyone with advance dementia    Unobtainable ros    Stable vitals No fevers  lethargic   Non verbal    Aspiration risk--unable to marin PO    Palliative eval  Keep NPO except for meds--would not start TF via corpak  DC IVF  Follow Na level as he is prone to developing hypernatremia  PanCx if spike in temp  Restart Full dose LMWH  HOB > 30

## 2019-05-07 NOTE — PROGRESS NOTE ADULT - ASSESSMENT
79 y/o male w/ septic shock and superimposed GI bleed requiring pressor support    Septic Shock 2/2 UTI and or aspiration PNA  - resolved now off pressors  - Blood cultures NGTD  - Urine culture->+E.Coli - ESBL which is resistant to zosyn   - s/p meropenum and vancomycin  - NPO after midnight for possible PEG tube placement tomorrow  - corpak in place  - Aspiration Precautions     GI Bleed  - Coffee ground emesis noted at NH and positive guaiac in ED  - Monitor H&H which is currently stable  - NPO  - Protonix IV BID  - GI consult appreciated   - No active bleeding at this time    Right upper extremity DVT  - imaging as above  - on lovenox- will hold evening dose   - after PEG tube placement will continue warfarin and lovenox till INR is 2-3    MIGUEL  - resolved  - likely prerenal  - s/p IV fluids  - continue to monitor     Hypernatremia  - resolved  - s/p IV fluids  - will monitor    Atrial Fibrillation   - CHADS2 Vasc score of 6  - hold coumadin - will resume per GI recommendations after PEG tube placement  - will hold lovenox as above  - after PEG tube placement will continue warfarin and lovenox till INR is 2-3  - rate controlled     DM2  - Non-insulin dependent   - A1C 5.8  - 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

## 2019-05-07 NOTE — GOALS OF CARE CONVERSATION - PERSONAL ADVANCE DIRECTIVE - CONVERSATION DETAILS
Called nephew to review medical issues and overall C. He was able to talk via phone. He explained that pt has been dealing with dementia for past 8 years. He notes after pt fell and broke his hip his life has been rather downhill since then. He lives at [facility], which as per records, he has been at since 2017. Nephew notes he is not aware of day-today events of pt's life as he is unable to visit often, directing us to [facility] for further details on this. From his perspective, pt has been doing ok thus far. He was open to full review of impressions and plan moving forward.     He was aware that pt was not eating and that he was planned for PEG tube, but was unable to give further detail about what has gone on otherwise. Thus, reviewed issues of urosepsis, GIB, and how these have been stabilized. Took time to explain the natural progression of dementia, including normally seen decline in desire for or ability to tolerate po intake often. Noted that this is not uncommon and that at end stage of disease, it is also not painful for pt. Acknowledged that the idea of not feeding a loved one, despite the science behind why it may not be ideal, is often difficult for family members as this is the way that we all first learn how to care for one another. He agreed that this is his perspective. Explained that in this population of patients pursuing a PEG tube, does not prevent issues of most concern right now like aspiration, lengthen prognosis, or improve QOL. While he was open to hearing this, he remained resolved to pursue this intervention. Noted our intent to respect this, wanting only to respect his right to full discussion (also acknowledging that other subspecialists likely already reviewed these issues with him and the reason for our involvement/hospital policy, which he understood).     Given the clarity of his position and the repetitiveness of inquires that he noted, offered to also complete a MOLST form so his wishes could be more concretely noted for all who care for his uncle. He was open to this, as well as giving verbal consent. Reviewed all issues on form, particularly discussion about code status, again recommending some limits including DNR status given low likelihood of "meaningful" recovery. Also utilized this to segue into consideration of comfort focus via hospice, inquiring if he had heard about this before. Pt's nephew affirmed having this offered in the past and that at this time he was not ready for a comfort focus or any decisions congruent with this, preferring no limits at this time and MOLST decisions in alignment with that. Let him know that we would also honor this, though our duty is to share honest impressions and concerns, which he noted understanding. Left copy for him on the chart. (See below for full list of decisions)    Ultimately, plan is to continue with current interventions with no limits, including pursuit of PEG tube. Shared above with RN staff, case management, and Dr. Fofana from primary medical team.     **Given pt is comfortable and GOC are clarified will sign off. Should you need further help in the future with support with such decisions and or sx management please do not hesitate to call.

## 2019-05-07 NOTE — GOALS OF CARE CONVERSATION - PERSONAL ADVANCE DIRECTIVE - TREATMENT GUIDELINE COMMENT
MOLST decisions: CPR, no limits, trial of intubation/ventillation, send to hospital, ok for feeding tube, trial of IVF, use abx

## 2019-05-07 NOTE — PROGRESS NOTE ADULT - SUBJECTIVE AND OBJECTIVE BOX
CC: Coffee ground emesis and fever    HPI:  77 y/o male w/ PMH significant for Afib (coumadin), Dementia, DM, DVT presented to ED from [facility] for coffee ground emesis and T. Max of 102.8. Unable to illicit any HPI or ROS 2/2  altered mental status. In the ED, patient found to have supra therapeutic INR 4.25, Lactate 2.1, MIGUEL, UA (+), RVP negative, CXR negative, Guaiac stool (+), CT abdomen with possible PNA, sterocoral colitis, non-obstructing R-sided renal stone and Right sided pyelitis and uteritis. Pt. given IV fluids per sepsis protocol, IV Vanco and cefepime. Pt. remained hypotensive and transferred to ICU for pressor support.     Interval HPI:  Patient seen and examined. No overnight events. No acute issues. PEG tube possible for tomorrow.       Review of Systems: Unable to obtain 2/2 altered mental status    MMEDICATIONS  (STANDING):  calcium carbonate    500 mG (Tums) Chewable 1 Tablet(s) Chew daily  ceFAZolin   IVPB 2000 milliGRAM(s) IV Intermittent once  cholecalciferol Oral Tab/Cap - Peds 2000 Unit(s) Oral daily  Dakins Solution - 1/2 Strength 1 Application(s) Topical daily  doxycycline hyclate Capsule 100 milliGRAM(s) Oral every 12 hours  enoxaparin Injectable 80 milliGRAM(s) SubCutaneous every 12 hours  ferrous    sulfate Liquid 300 milliGRAM(s) Oral daily  nystatin Powder 1 Application(s) Topical two times a day  pantoprazole  Injectable 40 milliGRAM(s) IV Push two times a day  polyethylene glycol 3350 17 Gram(s) Oral daily  senna 2 Tablet(s) Oral at bedtime  zinc oxide 20% Ointment 1 Application(s) Topical daily    MEDICATIONS  (PRN):  acetaminophen   Tablet .. 650 milliGRAM(s) Oral every 6 hours PRN Temp greater or equal to 38.5C (101.3F), Moderate Pain (4 - 6)  glucagon  Injectable 1 milliGRAM(s) IntraMuscular once PRN Glucose LESS THAN 70 milligrams/deciliter    Vital Signs (24 Hrs):  T(C): 36.8 (05-07-19 @ 12:40), Max: 37.2 (05-06-19 @ 16:46)  HR: 77 (05-07-19 @ 12:40) (77 - 79)  BP: 109/55 (05-07-19 @ 12:40) (109/55 - 127/74)  RR: 18 (05-07-19 @ 12:40) (18 - 18)  SpO2: 100% (05-07-19 @ 12:40) (95% - 100%)  Wt(kg): --  Daily     Daily     I&O's Summary    06 May 2019 07:01  -  07 May 2019 07:00  --------------------------------------------------------  IN: 300 mL / OUT: 0 mL / NET: 300 mL    07 May 2019 07:01  -  07 May 2019 15:14  --------------------------------------------------------  IN: 400 mL / OUT: 0 mL / NET: 400 mL      GENERAL: NAD, resting comfortably  NECK: Supple, No JVD  NERVOUS SYSTEM:  Disoriented x4 unable to follow commands or answer questions appropriately  CHEST/LUNG: diffuse coarse breath sounds  HEART: S1 S2 normal, no murmur  ABDOMEN: Soft, Nontender, Nondistended; Bowel sounds present  GENITOURINARY: Bautista in place   EXTREMITIES:  2+ Peripheral Pulses, No clubbing, cyanosis, or edema  SKIN: No rash, no lesion    LABS:                        10.7   7.88  )-----------( 418      ( 07 May 2019 07:13 )             36.1     07 May 2019 07:13    138    |  106    |  12     ----------------------------<  87     4.7     |  27     |  0.85     Ca    8.8        07 May 2019 07:13      PT/INR - ( 06 May 2019 08:03 )   PT: 14.2 sec;   INR: 1.27 ratio         PTT - ( 06 May 2019 08:03 )  PTT:39.3 sec        < from: Xray Kidney Ureter Bladder (04.25.19 @ 10:35) >  IMPRESSION:   Enteric tube with tip overlying the stomach. Nonspecific bowel gas   pattern. Moderate stool burden in the rectum. Degenerative changes of the   spine. Status post left hip arthroplasty with adjacent heterotopic   ossification.    < end of copied text >      < from: CT Abdomen and Pelvis w/ IV Cont (04.22.19 @ 21:32) >  MPRESSION:    1. Bilateral patchy consolidation within the lower lobes which may   reflect pneumonia. Aspiration can be considered, given clinical history.    2. Large fecalith within the rectal vault with rectal wall thickening and   presacral edema for which stercoral colitis can be considered.    3. Nonobstructing right-sided stones within the renal collecting system.   Right-sided urothelial enhancement and proximal ureteral enhancement   compatible with right-sided pyelitis and ureteritis. Correlate with   urinalysis.    4. Other findings, as discussed.    < end of copied text >    < from: US Duplex Venous Upper Ext Ltd, Right (04.29.19 @ 13:17) >    IMPRESSION:     Occlusive thrombus around the right cephalic venous catheter.    < end of copied text >

## 2019-05-07 NOTE — CHART NOTE - NSCHARTNOTEFT_GEN_A_CORE
Assessment:     *pt seen for f/u; NGT in place however TF stopped and NPO d/t possible PEG placement. PEG placement was initially cancelled d/t policy for Palliative consult needed.   *GOC discussed and noted review of risks for PEG tube placement and that pursuing a PEG tube "does not prevent issues of most concern right now like aspiration, lengthen prognosis, or improve QOL."  * Nutrition support not recommended due to overall declining medical status which evidenced based studies indicate EN is not effective in prolonging survival and improving quality of life. It can also increase risk of aspiration pneumonia as well as other related issues.  *as per GOC pts family wishes for PEG placement; recommend once placed to c/w current TF as pt was noted to be tolerating.   *no new wt since 4/24; recommend obtaining new wt and monitoring biweekly  *no noted edema; noy 13 = high risk for skin breakdown.   *last BM noted 5/2      Recommendations:    1. restart TF as medically feasible   2. monitor hydration status; adjust free H20 flush PRN  3. monitor for s/s intolerance and keep HOB >35degrees  4. obtain new wt and monitor weekly.         Diet Presciption: Diet, NPO:   Except Medications (05-07-19 @ 11:00)  Diet, NPO after Midnight:      NPO Start Date: 07-May-2019,   NPO Start Time: 23:59 (05-07-19 @ 12:00)      Wt Hx:  Height (cm): 172.72 (04-22-19 @ 16:25)  Weight (kg): 74.1 (04-23-19 @ 06:00)  BMI (kg/m2): 24.8 (04-23-19 @ 06:00)        Estimated Needs:   [x ] no change since previous assessment    [x] no change since previous assessment  Estimated Energy Needs (25-30 calories/kg):  · Weight  (lbs) 154.3 lb  · Weight (kg) 70 kg  · From (25cal/kg) 1750 To (30cal/kg) 2100 Kcal    Other Calculation:  · Other Calculation  Ht. 68 "     Wt. 79 Kg       26.5 BMI       IBW  70 Kg      Pt is at 113   %  IBW    Estimated Protein Needs (1.4-1.8 g/kg):  · Weight  (lbs) 154.3  · Weight (kg) 70 kg  · From (1.4 g/kg) 98 To (1.8 g/kg) 126 g protein      Nutrition Diagnosis is [x ] ongoing  [ ] resolved [ ] not applicable         New Nutrition Diagnosis: [x ] not applicable     Nutrition Diagnostic #1:  · Nutrition Diagnostic Terminology #1: Nutrient  · Nutrient: Malnutrition  · Etiology: decreased ability to consume sufficient energy/protein 2/2 dementia  · Signs/Symptoms: severe muscle; mod fat wasting; mild edema: NPO x 3 days  · Nutrition Intervention: Enteral Nutrition  · Enteral Nutrition: Composition; Concentration; Rate; 1) when feasible, start TF with Glucerna 1.5 @ 20mL/hr and titrate with tolerance, slowly by 10mL q6hrs to goal rate of 55mL/hr (1815Kcal, 100g protein, and 918mL free water) 2) consider free water flushes of 40mL/hr (=880mL with total free water of (TF+flushes) 1798mL)  · Goal/Expected Outcome: pt resume nutr source and meet >80% of estimated nutr needs        Pertinent Medications: MEDICATIONS  (STANDING):  calcium carbonate    500 mG (Tums) Chewable 1 Tablet(s) Chew daily  ceFAZolin   IVPB 2000 milliGRAM(s) IV Intermittent once  cholecalciferol Oral Tab/Cap - Peds 2000 Unit(s) Oral daily  Dakins Solution - 1/2 Strength 1 Application(s) Topical daily  doxycycline hyclate Capsule 100 milliGRAM(s) Oral every 12 hours  enoxaparin Injectable 80 milliGRAM(s) SubCutaneous every 12 hours  ferrous    sulfate Liquid 300 milliGRAM(s) Oral daily  nystatin Powder 1 Application(s) Topical two times a day  pantoprazole  Injectable 40 milliGRAM(s) IV Push two times a day  polyethylene glycol 3350 17 Gram(s) Oral daily  senna 2 Tablet(s) Oral at bedtime  zinc oxide 20% Ointment 1 Application(s) Topical daily    MEDICATIONS  (PRN):  acetaminophen   Tablet .. 650 milliGRAM(s) Oral every 6 hours PRN Temp greater or equal to 38.5C (101.3F), Moderate Pain (4 - 6)  glucagon  Injectable 1 milliGRAM(s) IntraMuscular once PRN Glucose LESS THAN 70 milligrams/deciliter    Pertinent Labs: 05-07 Na138 mmol/L Glu 87 mg/dL K+ 4.7 mmol/L Cr  0.85 mg/dL BUN 12 mg/dL 05-03 Phos 2.9 mg/dL 05-06 Alb 2.1 g/dL<L> 04-24 [id number] 5.4 %     CAPILLARY BLOOD GLUCOSE      POCT Blood Glucose.: 101 mg/dL (07 May 2019 12:03)  POCT Blood Glucose.: 97 mg/dL (07 May 2019 06:40)  POCT Blood Glucose.: 71 mg/dL (07 May 2019 00:14)  POCT Blood Glucose.: 81 mg/dL (06 May 2019 17:01)      Skin: noy score = 13          Monitoring and Evaluation:   [x] PO intake/Nutr support infusion [ x ] Tolerance to Nutr [ x ] weights [ x ] labs[ x ] follow up per protocol  [ ] other:

## 2019-05-08 LAB
ANION GAP SERPL CALC-SCNC: 4 MMOL/L — LOW (ref 5–17)
ANION GAP SERPL CALC-SCNC: 9 MMOL/L — SIGNIFICANT CHANGE UP (ref 5–17)
APTT BLD: 186.4 SEC — CRITICAL HIGH (ref 27.5–36.3)
APTT BLD: 33.5 SEC — SIGNIFICANT CHANGE UP (ref 27.5–36.3)
BUN SERPL-MCNC: 11 MG/DL — SIGNIFICANT CHANGE UP (ref 7–23)
BUN SERPL-MCNC: 12 MG/DL — SIGNIFICANT CHANGE UP (ref 7–23)
CALCIUM SERPL-MCNC: 8.5 MG/DL — SIGNIFICANT CHANGE UP (ref 8.5–10.1)
CALCIUM SERPL-MCNC: 8.7 MG/DL — SIGNIFICANT CHANGE UP (ref 8.5–10.1)
CHLORIDE SERPL-SCNC: 108 MMOL/L — SIGNIFICANT CHANGE UP (ref 96–108)
CHLORIDE SERPL-SCNC: 109 MMOL/L — HIGH (ref 96–108)
CO2 SERPL-SCNC: 24 MMOL/L — SIGNIFICANT CHANGE UP (ref 22–31)
CO2 SERPL-SCNC: 25 MMOL/L — SIGNIFICANT CHANGE UP (ref 22–31)
CREAT SERPL-MCNC: 0.87 MG/DL — SIGNIFICANT CHANGE UP (ref 0.5–1.3)
CREAT SERPL-MCNC: 0.92 MG/DL — SIGNIFICANT CHANGE UP (ref 0.5–1.3)
GLUCOSE BLDC GLUCOMTR-MCNC: 101 MG/DL — HIGH (ref 70–99)
GLUCOSE BLDC GLUCOMTR-MCNC: 81 MG/DL — SIGNIFICANT CHANGE UP (ref 70–99)
GLUCOSE BLDC GLUCOMTR-MCNC: 85 MG/DL — SIGNIFICANT CHANGE UP (ref 70–99)
GLUCOSE BLDC GLUCOMTR-MCNC: 90 MG/DL — SIGNIFICANT CHANGE UP (ref 70–99)
GLUCOSE SERPL-MCNC: 76 MG/DL — SIGNIFICANT CHANGE UP (ref 70–99)
GLUCOSE SERPL-MCNC: 86 MG/DL — SIGNIFICANT CHANGE UP (ref 70–99)
HCT VFR BLD CALC: 35.1 % — LOW (ref 39–50)
HCT VFR BLD CALC: 38 % — LOW (ref 39–50)
HGB BLD-MCNC: 10.4 G/DL — LOW (ref 13–17)
HGB BLD-MCNC: 11 G/DL — LOW (ref 13–17)
INR BLD: 1.2 RATIO — HIGH (ref 0.88–1.16)
MCHC RBC-ENTMCNC: 23.4 PG — LOW (ref 27–34)
MCHC RBC-ENTMCNC: 23.8 PG — LOW (ref 27–34)
MCHC RBC-ENTMCNC: 28.9 GM/DL — LOW (ref 32–36)
MCHC RBC-ENTMCNC: 29.6 GM/DL — LOW (ref 32–36)
MCV RBC AUTO: 78.9 FL — LOW (ref 80–100)
MCV RBC AUTO: 82.1 FL — SIGNIFICANT CHANGE UP (ref 80–100)
NRBC # BLD: 0 /100 WBCS — SIGNIFICANT CHANGE UP (ref 0–0)
NRBC # BLD: 0 /100 WBCS — SIGNIFICANT CHANGE UP (ref 0–0)
PLATELET # BLD AUTO: 391 K/UL — SIGNIFICANT CHANGE UP (ref 150–400)
PLATELET # BLD AUTO: 412 K/UL — HIGH (ref 150–400)
POTASSIUM SERPL-MCNC: 4.4 MMOL/L — SIGNIFICANT CHANGE UP (ref 3.5–5.3)
POTASSIUM SERPL-MCNC: 5.9 MMOL/L — HIGH (ref 3.5–5.3)
POTASSIUM SERPL-SCNC: 4.4 MMOL/L — SIGNIFICANT CHANGE UP (ref 3.5–5.3)
POTASSIUM SERPL-SCNC: 5.9 MMOL/L — HIGH (ref 3.5–5.3)
PROTHROM AB SERPL-ACNC: 13.4 SEC — HIGH (ref 10–12.9)
RBC # BLD: 4.45 M/UL — SIGNIFICANT CHANGE UP (ref 4.2–5.8)
RBC # BLD: 4.63 M/UL — SIGNIFICANT CHANGE UP (ref 4.2–5.8)
RBC # FLD: 21.3 % — HIGH (ref 10.3–14.5)
RBC # FLD: 21.9 % — HIGH (ref 10.3–14.5)
SODIUM SERPL-SCNC: 136 MMOL/L — SIGNIFICANT CHANGE UP (ref 135–145)
SODIUM SERPL-SCNC: 143 MMOL/L — SIGNIFICANT CHANGE UP (ref 135–145)
WBC # BLD: 7.22 K/UL — SIGNIFICANT CHANGE UP (ref 3.8–10.5)
WBC # BLD: 8.46 K/UL — SIGNIFICANT CHANGE UP (ref 3.8–10.5)
WBC # FLD AUTO: 7.22 K/UL — SIGNIFICANT CHANGE UP (ref 3.8–10.5)
WBC # FLD AUTO: 8.46 K/UL — SIGNIFICANT CHANGE UP (ref 3.8–10.5)

## 2019-05-08 PROCEDURE — 43246 EGD PLACE GASTROSTOMY TUBE: CPT | Mod: AS

## 2019-05-08 PROCEDURE — 93010 ELECTROCARDIOGRAM REPORT: CPT

## 2019-05-08 RX ORDER — WARFARIN SODIUM 2.5 MG/1
3 TABLET ORAL AT BEDTIME
Qty: 0 | Refills: 0 | Status: DISCONTINUED | OUTPATIENT
Start: 2019-05-08 | End: 2019-05-09

## 2019-05-08 RX ORDER — HEPARIN SODIUM 5000 [USP'U]/ML
INJECTION INTRAVENOUS; SUBCUTANEOUS
Qty: 25000 | Refills: 0 | Status: DISCONTINUED | OUTPATIENT
Start: 2019-05-08 | End: 2019-05-10

## 2019-05-08 RX ORDER — PANTOPRAZOLE SODIUM 20 MG/1
40 TABLET, DELAYED RELEASE ORAL DAILY
Qty: 0 | Refills: 0 | Status: DISCONTINUED | OUTPATIENT
Start: 2019-05-09 | End: 2019-05-10

## 2019-05-08 RX ORDER — HEPARIN SODIUM 5000 [USP'U]/ML
3000 INJECTION INTRAVENOUS; SUBCUTANEOUS EVERY 6 HOURS
Qty: 0 | Refills: 0 | Status: DISCONTINUED | OUTPATIENT
Start: 2019-05-08 | End: 2019-05-10

## 2019-05-08 RX ORDER — HEPARIN SODIUM 5000 [USP'U]/ML
6000 INJECTION INTRAVENOUS; SUBCUTANEOUS EVERY 6 HOURS
Qty: 0 | Refills: 0 | Status: DISCONTINUED | OUTPATIENT
Start: 2019-05-08 | End: 2019-05-10

## 2019-05-08 RX ORDER — HEPARIN SODIUM 5000 [USP'U]/ML
6000 INJECTION INTRAVENOUS; SUBCUTANEOUS ONCE
Qty: 0 | Refills: 0 | Status: COMPLETED | OUTPATIENT
Start: 2019-05-08 | End: 2019-05-08

## 2019-05-08 RX ORDER — ENOXAPARIN SODIUM 100 MG/ML
80 INJECTION SUBCUTANEOUS EVERY 12 HOURS
Qty: 0 | Refills: 0 | Status: DISCONTINUED | OUTPATIENT
Start: 2019-05-08 | End: 2019-05-08

## 2019-05-08 RX ADMIN — HEPARIN SODIUM 1300 UNIT(S)/HR: 5000 INJECTION INTRAVENOUS; SUBCUTANEOUS at 15:45

## 2019-05-08 RX ADMIN — HEPARIN SODIUM 0 UNIT(S)/HR: 5000 INJECTION INTRAVENOUS; SUBCUTANEOUS at 22:50

## 2019-05-08 RX ADMIN — POLYETHYLENE GLYCOL 3350 17 GRAM(S): 17 POWDER, FOR SOLUTION ORAL at 13:47

## 2019-05-08 RX ADMIN — NYSTATIN CREAM 1 APPLICATION(S): 100000 CREAM TOPICAL at 05:50

## 2019-05-08 RX ADMIN — HEPARIN SODIUM 6000 UNIT(S): 5000 INJECTION INTRAVENOUS; SUBCUTANEOUS at 15:49

## 2019-05-08 RX ADMIN — ZINC OXIDE 1 APPLICATION(S): 200 OINTMENT TOPICAL at 18:28

## 2019-05-08 RX ADMIN — HEPARIN SODIUM 1100 UNIT(S)/HR: 5000 INJECTION INTRAVENOUS; SUBCUTANEOUS at 23:51

## 2019-05-08 RX ADMIN — NYSTATIN CREAM 1 APPLICATION(S): 100000 CREAM TOPICAL at 18:28

## 2019-05-08 RX ADMIN — Medication 100 MILLIGRAM(S): at 18:17

## 2019-05-08 RX ADMIN — WARFARIN SODIUM 3 MILLIGRAM(S): 2.5 TABLET ORAL at 21:36

## 2019-05-08 RX ADMIN — Medication 2000 UNIT(S): at 18:17

## 2019-05-08 RX ADMIN — Medication 1 APPLICATION(S): at 13:48

## 2019-05-08 RX ADMIN — Medication 300 MILLIGRAM(S): at 13:47

## 2019-05-08 RX ADMIN — SENNA PLUS 2 TABLET(S): 8.6 TABLET ORAL at 21:36

## 2019-05-08 RX ADMIN — Medication 1 TABLET(S): at 18:17

## 2019-05-08 NOTE — PROGRESS NOTE ADULT - SUBJECTIVE AND OBJECTIVE BOX
CC: Coffee ground emesis and fever    HPI:  79 y/o male w/ PMH significant for Afib (coumadin), Dementia, DM, DVT presented to ED from [facility] for coffee ground emesis and T. Max of 102.8. Unable to illicit any HPI or ROS 2/2  altered mental status. In the ED, patient found to have supra therapeutic INR 4.25, Lactate 2.1, MIGUEL, UA (+), RVP negative, CXR negative, Guaiac stool (+), CT abdomen with possible PNA, sterocoral colitis, non-obstructing R-sided renal stone and Right sided pyelitis and uteritis. Pt. given IV fluids per sepsis protocol, IV Vanco and cefepime. Pt. remained hypotensive and transferred to ICU for pressor support.     Interval HPI:  Patient seen and examined. Overnight patient hypoglycemic as feeds were held for PEG. PEG tube scheduled for today.       Review of Systems: Unable to obtain 2/2 altered mental status    MEDICATIONS  (STANDING):  calcium carbonate    500 mG (Tums) Chewable 1 Tablet(s) Chew daily  ceFAZolin   IVPB 2000 milliGRAM(s) IV Intermittent once  cholecalciferol Oral Tab/Cap - Peds 2000 Unit(s) Oral daily  Dakins Solution - 1/2 Strength 1 Application(s) Topical daily  doxycycline hyclate Capsule 100 milliGRAM(s) Oral every 12 hours  ferrous    sulfate Liquid 300 milliGRAM(s) Oral daily  nystatin Powder 1 Application(s) Topical two times a day  pantoprazole  Injectable 40 milliGRAM(s) IV Push two times a day  polyethylene glycol 3350 17 Gram(s) Oral daily  senna 2 Tablet(s) Oral at bedtime  zinc oxide 20% Ointment 1 Application(s) Topical daily    MEDICATIONS  (PRN):  acetaminophen   Tablet .. 650 milliGRAM(s) Oral every 6 hours PRN Temp greater or equal to 38.5C (101.3F), Moderate Pain (4 - 6)  glucagon  Injectable 1 milliGRAM(s) IntraMuscular once PRN Glucose LESS THAN 70 milligrams/deciliter    Vital Signs (24 Hrs):  T(C): 36.4 (05-08-19 @ 04:31), Max: 37 (05-07-19 @ 16:26)  HR: 83 (05-08-19 @ 04:31) (72 - 93)  BP: 128/78 (05-08-19 @ 04:31) (105/57 - 128/78)  RR: 18 (05-08-19 @ 04:31) (18 - 18)  SpO2: 96% (05-08-19 @ 04:31) (96% - 100%)  Wt(kg): --  Daily     Daily     I&O's Summary    07 May 2019 07:01  -  08 May 2019 07:00  --------------------------------------------------------  IN: 400 mL / OUT: 0 mL / NET: 400 mL      GENERAL: NAD, resting comfortably  NECK: Supple, No JVD  NERVOUS SYSTEM:  Disoriented x4 unable to follow commands or answer questions appropriately  CHEST/LUNG: diffuse coarse breath sounds  HEART: S1 S2 normal, no murmur  ABDOMEN: Soft, Nontender, Nondistended; Bowel sounds present  GENITOURINARY: Bautista in place   EXTREMITIES:  2+ Peripheral Pulses, No clubbing, cyanosis, or edema  SKIN: No rash, no lesion    LABS:                        10.7   7.88  )-----------( 418      ( 07 May 2019 07:13 )             36.1     07 May 2019 07:13    138    |  106    |  12     ----------------------------<  87     4.7     |  27     |  0.85     Ca    8.8        07 May 2019 07:13      PT/INR - ( 06 May 2019 08:03 )   PT: 14.2 sec;   INR: 1.27 ratio         PTT - ( 06 May 2019 08:03 )  PTT:39.3 sec      < from: Xray Kidney Ureter Bladder (04.25.19 @ 10:35) >  IMPRESSION:   Enteric tube with tip overlying the stomach. Nonspecific bowel gas   pattern. Moderate stool burden in the rectum. Degenerative changes of the   spine. Status post left hip arthroplasty with adjacent heterotopic   ossification.    < end of copied text >      < from: CT Abdomen and Pelvis w/ IV Cont (04.22.19 @ 21:32) >  MPRESSION:    1. Bilateral patchy consolidation within the lower lobes which may   reflect pneumonia. Aspiration can be considered, given clinical history.    2. Large fecalith within the rectal vault with rectal wall thickening and   presacral edema for which stercoral colitis can be considered.    3. Nonobstructing right-sided stones within the renal collecting system.   Right-sided urothelial enhancement and proximal ureteral enhancement   compatible with right-sided pyelitis and ureteritis. Correlate with   urinalysis.    4. Other findings, as discussed.    < end of copied text >    < from: US Duplex Venous Upper Ext Ltd, Right (04.29.19 @ 13:17) >    IMPRESSION:     Occlusive thrombus around the right cephalic venous catheter.    < end of copied text >

## 2019-05-08 NOTE — PROGRESS NOTE ADULT - ASSESSMENT
79 y/o male w/ septic shock and superimposed GI bleed requiring pressor support, now downgraded no longer requiring pressor support.     Septic Shock 2/2 UTI and or aspiration PNA  - resolved now off pressors  - Blood cultures NGTD  - Urine culture->+E.Coli - ESBL which is resistant to zosyn   - s/p meropenum and vancomycin  - scheduled for PEG tube placement today  - corpak in place  - Aspiration Precautions     GI Bleed  - Coffee ground emesis noted at NH and positive guaiac in ED  - Monitor H&H which is currently stable  - NPO  - Protonix IV BID  - GI consult appreciated   - No active bleeding at this time    Right upper extremity DVT  - imaging as above  - on lovenox- will hold evening dose   - after PEG tube placement will continue warfarin and lovenox till INR is 2-3    MIGUEL  - resolved  - likely prerenal  - s/p IV fluids  - continue to monitor     Hypernatremia  - resolved  - s/p IV fluids  - will monitor    Atrial Fibrillation   - CHADS2 Vasc score of 6  - hold coumadin - will resume per GI recommendations after PEG tube placement  - will hold lovenox as above  - after PEG tube placement will continue warfarin and lovenox till INR is 2-3  - rate controlled     DM2  - Non-insulin dependent   - A1C 5.8  - 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 hip  - continue doxycycline   - continue dakins solution    DVT ppx  - on lovenox - held as above    Advanced Directives  - Nephew/HCP Anshu Hernandez [phone]  - spoke with HCP at length on the phone 4/23 and 5/2 who wants full code  - Palliative evaluated appreciated   - PEG tube placement today  - DC planning 79 y/o male w/ septic shock and superimposed GI bleed requiring pressor support, now downgraded no longer requiring pressor support.     Septic Shock 2/2 UTI and or aspiration PNA  - resolved now off pressors  - Blood cultures NGTD  - Urine culture->+E.Coli - ESBL which is resistant to zosyn   - s/p meropenum and vancomycin  - scheduled for PEG tube placement today  - corpak in place  - Aspiration Precautions     GI Bleed  - Coffee ground emesis noted at NH and positive guaiac in ED  - Monitor H&H which is currently stable  - NPO  - Protonix IV BID  - GI consult appreciated   - No active bleeding at this time    Right upper extremity DVT  - imaging as above  - on coumadin  - after PEG tube placement will continue warfarin     MIGUEL  - resolved  - likely prerenal  - s/p IV fluids  - continue to monitor     Hypernatremia  - resolved  - s/p IV fluids  - will monitor    Atrial Fibrillation   - CHADS2 Vasc score of 5  - hold coumadin - will resume per GI recommendations after PEG tube placement  - rate controlled     DM2  - Non-insulin dependent   - A1C 5.8  - 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 hip  - continue doxycycline   - continue dakins solution    DVT ppx  - on coumadin - held as above    Advanced Directives  - Nephew/HCP Anshu Hernandez [phone]  - spoke with HCP at length on the phone 4/23 and 5/2 who wants full code  - Palliative evaluated appreciated   - PEG tube placement today  - DC planning 79 y/o male w/ septic shock and superimposed GI bleed requiring pressor support, now downgraded no longer requiring pressor support.     Septic Shock 2/2 UTI and or aspiration PNA  - resolved now off pressors  - Blood cultures NGTD  - Urine culture->+E.Coli - ESBL which is resistant to zosyn   - s/p meropenum and vancomycin  - s/p PEG tube placement   - corpak in place  - Aspiration Precautions     GI Bleed  - Coffee ground emesis noted at NH and positive guaiac in ED  - Monitor H&H which is currently stable  - NPO  - Protonix IV BID  - GI consult appreciated   - No active bleeding at this time    Right upper extremity DVT  - imaging as above  - will start coumadin and lovenox till INR 2-3    MIGUEL  - resolved  - likely prerenal  - s/p IV fluids  - continue to monitor     Hypernatremia  - resolved  - s/p IV fluids  - will monitor    Atrial Fibrillation   - CHADS score of 3 (HTN-1, DM-1, Age-1)  - on coumadin  - rate controlled     DM2  - Non-insulin dependent   - A1C 5.8  - 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 hip  - continue doxycycline   - continue dakins solution    DVT ppx  - on coumadin     Advanced Directives  - Nephew/HCP Anshu Hernandez [phone]  - spoke with HCP at length on the phone 4/23 and 5/2 who wants full code  - Palliative evaluation appreciated   - s/p PEG tube placement   - DC planning 79 y/o male w/ septic shock and superimposed GI bleed requiring pressor support, now downgraded no longer requiring pressor support.     Septic Shock 2/2 UTI and or aspiration PNA  - resolved now off pressors  - Blood cultures NGTD  - Urine culture->+E.Coli - ESBL which is resistant to zosyn   - s/p meropenum and vancomycin  - s/p PEG tube placement   - corpak in place  - Aspiration Precautions     GI Bleed  - Coffee ground emesis noted at NH and positive guaiac in ED  - Monitor H&H which is currently stable  - NPO  - Protonix IV BID  - GI consult appreciated   - No active bleeding at this time    Right upper extremity DVT  - imaging as above  - will start coumadin and heparin drip till INR 2-3    MIGUEL  - resolved  - likely prerenal  - s/p IV fluids  - continue to monitor     Hypernatremia  - resolved  - s/p IV fluids  - will monitor    Atrial Fibrillation   - CHADS score of 3 (HTN-1, DM-1, Age-1)  - on coumadin  - rate controlled     DM2  - Non-insulin dependent   - A1C 5.8  - 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 hip  - continue doxycycline   - continue dakins solution    DVT ppx  - on coumadin     Advanced Directives  - Nephew/HCP Anshu Hernandez [phone]  - spoke with HCP at length on the phone 4/23 and 5/2 who wants full code  - Palliative evaluation appreciated   - s/p PEG tube placement   - DC planning

## 2019-05-08 NOTE — PROGRESS NOTE ADULT - SUBJECTIVE AND OBJECTIVE BOX
PEG placed  see procedure note in results section  OK to start meds/water flushes in 4 h  OK to start TF in AM PEG placed  see procedure note in results section  OK to start meds/water flushes in 4 h  OK to start TF in AM  OK to resume anticoag in 4 hours

## 2019-05-08 NOTE — PHARMACOTHERAPY INTERVENTION NOTE - COMMENTS
baseline INR /APTT
Pt admitted to ICU from nursing home, performed med rec by reviewing nursing home records

## 2019-05-08 NOTE — CHART NOTE - NSCHARTNOTEFT_GEN_A_CORE
Patient s/p PEG tube placement. Evaluated at bedside. Abdominal binder in place. No signs of bleeding. Will resume AC. Tube feeding will be started tomorrow.

## 2019-05-09 LAB
ANION GAP SERPL CALC-SCNC: 8 MMOL/L — SIGNIFICANT CHANGE UP (ref 5–17)
APTT BLD: 113.7 SEC — HIGH (ref 27.5–36.3)
APTT BLD: 66.5 SEC — HIGH (ref 27.5–36.3)
APTT BLD: 88.8 SEC — HIGH (ref 27.5–36.3)
BUN SERPL-MCNC: 10 MG/DL — SIGNIFICANT CHANGE UP (ref 7–23)
CALCIUM SERPL-MCNC: 8.5 MG/DL — SIGNIFICANT CHANGE UP (ref 8.5–10.1)
CHLORIDE SERPL-SCNC: 111 MMOL/L — HIGH (ref 96–108)
CO2 SERPL-SCNC: 24 MMOL/L — SIGNIFICANT CHANGE UP (ref 22–31)
CREAT SERPL-MCNC: 0.75 MG/DL — SIGNIFICANT CHANGE UP (ref 0.5–1.3)
GLUCOSE BLDC GLUCOMTR-MCNC: 79 MG/DL — SIGNIFICANT CHANGE UP (ref 70–99)
GLUCOSE BLDC GLUCOMTR-MCNC: 82 MG/DL — SIGNIFICANT CHANGE UP (ref 70–99)
GLUCOSE BLDC GLUCOMTR-MCNC: 86 MG/DL — SIGNIFICANT CHANGE UP (ref 70–99)
GLUCOSE BLDC GLUCOMTR-MCNC: 93 MG/DL — SIGNIFICANT CHANGE UP (ref 70–99)
GLUCOSE SERPL-MCNC: 81 MG/DL — SIGNIFICANT CHANGE UP (ref 70–99)
HCT VFR BLD CALC: 33.7 % — LOW (ref 39–50)
HGB BLD-MCNC: 10.1 G/DL — LOW (ref 13–17)
INR BLD: 1.33 RATIO — HIGH (ref 0.88–1.16)
MCHC RBC-ENTMCNC: 23.7 PG — LOW (ref 27–34)
MCHC RBC-ENTMCNC: 30 GM/DL — LOW (ref 32–36)
MCV RBC AUTO: 79.1 FL — LOW (ref 80–100)
NRBC # BLD: 0 /100 WBCS — SIGNIFICANT CHANGE UP (ref 0–0)
PLATELET # BLD AUTO: 398 K/UL — SIGNIFICANT CHANGE UP (ref 150–400)
POTASSIUM SERPL-MCNC: 4.4 MMOL/L — SIGNIFICANT CHANGE UP (ref 3.5–5.3)
POTASSIUM SERPL-SCNC: 4.4 MMOL/L — SIGNIFICANT CHANGE UP (ref 3.5–5.3)
PROTHROM AB SERPL-ACNC: 14.9 SEC — HIGH (ref 10–12.9)
RBC # BLD: 4.26 M/UL — SIGNIFICANT CHANGE UP (ref 4.2–5.8)
RBC # FLD: 21.3 % — HIGH (ref 10.3–14.5)
SODIUM SERPL-SCNC: 143 MMOL/L — SIGNIFICANT CHANGE UP (ref 135–145)
WBC # BLD: 8.33 K/UL — SIGNIFICANT CHANGE UP (ref 3.8–10.5)
WBC # FLD AUTO: 8.33 K/UL — SIGNIFICANT CHANGE UP (ref 3.8–10.5)

## 2019-05-09 RX ORDER — WARFARIN SODIUM 2.5 MG/1
3 TABLET ORAL ONCE
Refills: 0 | Status: COMPLETED | OUTPATIENT
Start: 2019-05-09 | End: 2019-05-09

## 2019-05-09 RX ADMIN — Medication 100 MILLIGRAM(S): at 16:54

## 2019-05-09 RX ADMIN — NYSTATIN CREAM 1 APPLICATION(S): 100000 CREAM TOPICAL at 16:52

## 2019-05-09 RX ADMIN — Medication 300 MILLIGRAM(S): at 11:34

## 2019-05-09 RX ADMIN — PANTOPRAZOLE SODIUM 40 MILLIGRAM(S): 20 TABLET, DELAYED RELEASE ORAL at 11:34

## 2019-05-09 RX ADMIN — Medication 100 MILLIGRAM(S): at 05:37

## 2019-05-09 RX ADMIN — HEPARIN SODIUM 900 UNIT(S)/HR: 5000 INJECTION INTRAVENOUS; SUBCUTANEOUS at 20:30

## 2019-05-09 RX ADMIN — SENNA PLUS 2 TABLET(S): 8.6 TABLET ORAL at 22:17

## 2019-05-09 RX ADMIN — NYSTATIN CREAM 1 APPLICATION(S): 100000 CREAM TOPICAL at 05:37

## 2019-05-09 RX ADMIN — ZINC OXIDE 1 APPLICATION(S): 200 OINTMENT TOPICAL at 10:09

## 2019-05-09 RX ADMIN — HEPARIN SODIUM 900 UNIT(S)/HR: 5000 INJECTION INTRAVENOUS; SUBCUTANEOUS at 14:04

## 2019-05-09 RX ADMIN — Medication 1 APPLICATION(S): at 10:09

## 2019-05-09 RX ADMIN — WARFARIN SODIUM 3 MILLIGRAM(S): 2.5 TABLET ORAL at 22:17

## 2019-05-09 RX ADMIN — HEPARIN SODIUM 1100 UNIT(S)/HR: 5000 INJECTION INTRAVENOUS; SUBCUTANEOUS at 06:42

## 2019-05-09 RX ADMIN — POLYETHYLENE GLYCOL 3350 17 GRAM(S): 17 POWDER, FOR SOLUTION ORAL at 11:34

## 2019-05-09 RX ADMIN — Medication 2000 UNIT(S): at 11:34

## 2019-05-09 RX ADMIN — Medication 1 TABLET(S): at 16:54

## 2019-05-09 NOTE — PROGRESS NOTE ADULT - SUBJECTIVE AND OBJECTIVE BOX
CC: Coffee ground emesis and fever    HPI:  77 y/o male w/ PMH significant for Afib (coumadin), Dementia, DM, DVT presented to ED from [facility] for coffee ground emesis and T. Max of 102.8. Unable to illicit any HPI or ROS 2/2  altered mental status. In the ED, patient found to have supra therapeutic INR 4.25, Lactate 2.1, MIGUEL, UA (+), RVP negative, CXR negative, Guaiac stool (+), CT abdomen with possible PNA, sterocoral colitis, non-obstructing R-sided renal stone and Right sided pyelitis and uteritis. Pt. given IV fluids per sepsis protocol, IV Vanco and cefepime. Pt. remained hypotensive and transferred to ICU for pressor support.     Interval HPI:  Patient seen and examined. No overnight events. S/p PEG. Will resume feedings. DC planning      Review of Systems: Unable to obtain 2/2 altered mental status    MEDICATIONS  (STANDING):  calcium carbonate    500 mG (Tums) Chewable 1 Tablet(s) Chew daily  cholecalciferol Oral Tab/Cap - Peds 2000 Unit(s) Oral daily  Dakins Solution - 1/2 Strength 1 Application(s) Topical daily  doxycycline hyclate Capsule 100 milliGRAM(s) Oral every 12 hours  ferrous    sulfate Liquid 300 milliGRAM(s) Oral daily  heparin  Infusion.  Unit(s)/Hr (13 mL/Hr) IV Continuous <Continuous>  nystatin Powder 1 Application(s) Topical two times a day  pantoprazole   Suspension 40 milliGRAM(s) Enteral Tube daily  polyethylene glycol 3350 17 Gram(s) Oral daily  senna 2 Tablet(s) Oral at bedtime  warfarin 3 milliGRAM(s) Oral at bedtime  zinc oxide 20% Ointment 1 Application(s) Topical daily    MEDICATIONS  (PRN):  acetaminophen   Tablet .. 650 milliGRAM(s) Oral every 6 hours PRN Temp greater or equal to 38.5C (101.3F), Moderate Pain (4 - 6)  glucagon  Injectable 1 milliGRAM(s) IntraMuscular once PRN Glucose LESS THAN 70 milligrams/deciliter  heparin  Injectable 6000 Unit(s) IV Push every 6 hours PRN For aPTT less than 40  heparin  Injectable 3000 Unit(s) IV Push every 6 hours PRN For aPTT between 40 - 57      Vital Signs (24 Hrs):  T(C): 36.6 (05-09-19 @ 05:10), Max: 36.8 (05-08-19 @ 23:00)  HR: 75 (05-09-19 @ 05:10) (75 - 88)  BP: 118/53 (05-09-19 @ 05:10) (111/66 - 118/53)  RR: 18 (05-09-19 @ 05:10) (18 - 18)  SpO2: 95% (05-09-19 @ 05:10) (95% - 99%)  Wt(kg): --  Daily     Daily     I&O's Summary      GENERAL: NAD, resting comfortably  NECK: Supple, No JVD  NERVOUS SYSTEM:  Disoriented x4 unable to follow commands or answer questions appropriately  CHEST/LUNG: diffuse coarse breath sounds  HEART: S1 S2 normal, no murmur  ABDOMEN: Soft, Nontender, Nondistended; Bowel sounds present  GENITOURINARY: Bautista in place   EXTREMITIES:  2+ Peripheral Pulses, No clubbing, cyanosis, or edema  SKIN: No rash, no lesion      LABS:                        10.1   8.33  )-----------( 398      ( 09 May 2019 05:50 )             33.7     09 May 2019 05:50    143    |  111    |  10     ----------------------------<  81     4.4     |  24     |  0.75     Ca    8.5        09 May 2019 05:50      PT/INR - ( 09 May 2019 05:50 )   PT: 14.9 sec;   INR: 1.33 ratio         PTT - ( 09 May 2019 05:50 )  PTT:88.8 sec      < from: Xray Kidney Ureter Bladder (04.25.19 @ 10:35) >  IMPRESSION:   Enteric tube with tip overlying the stomach. Nonspecific bowel gas   pattern. Moderate stool burden in the rectum. Degenerative changes of the   spine. Status post left hip arthroplasty with adjacent heterotopic   ossification.    < end of copied text >      < from: CT Abdomen and Pelvis w/ IV Cont (04.22.19 @ 21:32) >  MPRESSION:    1. Bilateral patchy consolidation within the lower lobes which may   reflect pneumonia. Aspiration can be considered, given clinical history.    2. Large fecalith within the rectal vault with rectal wall thickening and   presacral edema for which stercoral colitis can be considered.    3. Nonobstructing right-sided stones within the renal collecting system.   Right-sided urothelial enhancement and proximal ureteral enhancement   compatible with right-sided pyelitis and ureteritis. Correlate with   urinalysis.    4. Other findings, as discussed.    < end of copied text >    < from: US Duplex Venous Upper Ext Ltd, Right (04.29.19 @ 13:17) >    IMPRESSION:     Occlusive thrombus around the right cephalic venous catheter.    < end of copied text > CC: Coffee ground emesis and fever    HPI:  77 y/o male w/ PMH significant for Afib (coumadin), Dementia, DM, DVT presented to ED from [facility] for coffee ground emesis and T. Max of 102.8. Unable to illicit any HPI or ROS 2/2  altered mental status. In the ED, patient found to have supra therapeutic INR 4.25, Lactate 2.1, MIGUEL, UA (+), RVP negative, CXR negative, Guaiac stool (+), CT abdomen with possible PNA, sterocoral colitis, non-obstructing R-sided renal stone and Right sided pyelitis and uteritis. Pt. given IV fluids per sepsis protocol, IV Vanco and cefepime. Pt. remained hypotensive and transferred to ICU for pressor support.     Interval HPI:  Patient seen and examined. No overnight events. S/p PEG tube placement. Will resume tube feedings. DC planning      Review of Systems: Unable to obtain 2/2 altered mental status    MEDICATIONS  (STANDING):  calcium carbonate    500 mG (Tums) Chewable 1 Tablet(s) Chew daily  cholecalciferol Oral Tab/Cap - Peds 2000 Unit(s) Oral daily  Dakins Solution - 1/2 Strength 1 Application(s) Topical daily  doxycycline hyclate Capsule 100 milliGRAM(s) Oral every 12 hours  ferrous    sulfate Liquid 300 milliGRAM(s) Oral daily  heparin  Infusion.  Unit(s)/Hr (13 mL/Hr) IV Continuous <Continuous>  nystatin Powder 1 Application(s) Topical two times a day  pantoprazole   Suspension 40 milliGRAM(s) Enteral Tube daily  polyethylene glycol 3350 17 Gram(s) Oral daily  senna 2 Tablet(s) Oral at bedtime  warfarin 3 milliGRAM(s) Oral at bedtime  zinc oxide 20% Ointment 1 Application(s) Topical daily    MEDICATIONS  (PRN):  acetaminophen   Tablet .. 650 milliGRAM(s) Oral every 6 hours PRN Temp greater or equal to 38.5C (101.3F), Moderate Pain (4 - 6)  glucagon  Injectable 1 milliGRAM(s) IntraMuscular once PRN Glucose LESS THAN 70 milligrams/deciliter  heparin  Injectable 6000 Unit(s) IV Push every 6 hours PRN For aPTT less than 40  heparin  Injectable 3000 Unit(s) IV Push every 6 hours PRN For aPTT between 40 - 57      Vital Signs (24 Hrs):  T(C): 36.6 (05-09-19 @ 05:10), Max: 36.8 (05-08-19 @ 23:00)  HR: 75 (05-09-19 @ 05:10) (75 - 88)  BP: 118/53 (05-09-19 @ 05:10) (111/66 - 118/53)  RR: 18 (05-09-19 @ 05:10) (18 - 18)  SpO2: 95% (05-09-19 @ 05:10) (95% - 99%)  Wt(kg): --  Daily     Daily     I&O's Summary      GENERAL: NAD, resting comfortably  NECK: Supple, No JVD  NERVOUS SYSTEM:  Disoriented x4 unable to follow commands or answer questions appropriately  CHEST/LUNG: CTA b/l  HEART: S1 S2 normal, no murmur  ABDOMEN: Soft, Nontender, Nondistended; Bowel sounds present; PEG tube site clean, dry and intact  GENITOURINARY: Bautista in place   EXTREMITIES:  2+ Peripheral Pulses, No clubbing, cyanosis, or edema  SKIN: No rash, no lesion      LABS:                        10.1   8.33  )-----------( 398      ( 09 May 2019 05:50 )             33.7     09 May 2019 05:50    143    |  111    |  10     ----------------------------<  81     4.4     |  24     |  0.75     Ca    8.5        09 May 2019 05:50      PT/INR - ( 09 May 2019 05:50 )   PT: 14.9 sec;   INR: 1.33 ratio         PTT - ( 09 May 2019 05:50 )  PTT:88.8 sec      < from: Xray Kidney Ureter Bladder (04.25.19 @ 10:35) >  IMPRESSION:   Enteric tube with tip overlying the stomach. Nonspecific bowel gas   pattern. Moderate stool burden in the rectum. Degenerative changes of the   spine. Status post left hip arthroplasty with adjacent heterotopic   ossification.    < end of copied text >      < from: CT Abdomen and Pelvis w/ IV Cont (04.22.19 @ 21:32) >  MPRESSION:    1. Bilateral patchy consolidation within the lower lobes which may   reflect pneumonia. Aspiration can be considered, given clinical history.    2. Large fecalith within the rectal vault with rectal wall thickening and   presacral edema for which stercoral colitis can be considered.    3. Nonobstructing right-sided stones within the renal collecting system.   Right-sided urothelial enhancement and proximal ureteral enhancement   compatible with right-sided pyelitis and ureteritis. Correlate with   urinalysis.    4. Other findings, as discussed.    < end of copied text >    < from: US Duplex Venous Upper Ext Ltd, Right (04.29.19 @ 13:17) >    IMPRESSION:     Occlusive thrombus around the right cephalic venous catheter.    < end of copied text >

## 2019-05-09 NOTE — PROGRESS NOTE ADULT - ASSESSMENT
78-year-old man admitted with sepsis due to urinary tract infection, and found to have coffee-ground emesis, guaiac positive stool, and anemia.    Bleeding resolved with PPI.  Right arm DVT and now on anticoagulation.  Dysphagia and s/p PEG    Recommendations:    -cont TF, increase to goal rate  -anticoagulation  -Continue bowel regimen  -no GI objection to d/c, please call with any GI issues

## 2019-05-09 NOTE — PROGRESS NOTE ADULT - ATTENDING COMMENTS
As above, treatment plan formulated on rounds.
on exam- comfortable, doest not follow command, lethargic and chronically ill appearance   -rs-aeeb, cta  -p/a-soft, bs+  -cvs-s1s2 normal    #NPO for peg tube placement     #ct supportive care     #Discussed with with Dr. Mclaughlin
Patient seen and examined with Family Medicine Residents Kim Tellez, Carrie Leslie, and Maira Pimentel on the Family Medicine Teaching Service.  Agree with history, physical, labs and plan which were reviewed in detail after a face to face encounter with the patient.
on exam- comfortable, does not talk, very minimal responsive   -rs-aeeb, cta  -p/a-soft, bs+  -cvs-s1s2 normal     #ct supportive care, check potassium again     #Discussed with Elizabeth
Patient seen and examined with Family Medicine Residents Kim Camacho, Carrie Leslie, and NP student Leslye Medellin on the Family Medicine Teaching Service.  Agree with history, physical, labs and plan which were reviewed in detail after a face to face encounter with the patient.

## 2019-05-09 NOTE — PROGRESS NOTE ADULT - PROVIDER SPECIALTY LIST ADULT
Critical Care
Family Medicine
Gastroenterology
Hospitalist
Hospitalist
Infectious Disease
Critical Care
Family Medicine
Critical Care

## 2019-05-09 NOTE — PROGRESS NOTE ADULT - ASSESSMENT
79 y/o male w/ septic shock and superimposed GI bleed requiring pressor support, now downgraded no longer requiring pressor support.     Septic Shock 2/2 UTI and or aspiration PNA  - resolved now off pressors  - Blood cultures NGTD  - Urine culture->+E.Coli - ESBL   - s/p meropenum  - s/p PEG tube placement   - Aspiration Precautions     GI Bleed  - Coffee ground emesis on admission   - Monitor H&H which is currently stable  - Protonix IV BID  - GI consult appreciated   - No active bleeding at this time    Right upper extremity DVT  - imaging as above  - will start coumadin and heparin drip till INR 2-3    MIGUEL  - resolved  - likely prerenal  - s/p IV fluids  - continue to monitor     Hypernatremia  - resolved  - s/p IV fluids  - will monitor    Atrial Fibrillation   - CHADS score of 3 (HTN-1, DM-1, Age-1)  - on coumadin  - rate controlled     DM2  - Non-insulin dependent   - A1C 5.8  - 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 hip  - continue doxycycline   - continue dakins solution    DVT ppx  - on coumadin     Advanced Directives  - Nephew/HCP Anshu Hernandez [phone]  - spoke with HCP at length on the phone 4/23 and 5/2 who wants full code  - Palliative evaluation appreciated   - s/p PEG tube placement   - DC planning

## 2019-05-09 NOTE — PROGRESS NOTE ADULT - SUBJECTIVE AND OBJECTIVE BOX
GI    s/p PEG  TF started  no clinical changes  awake but not aware  no n/v or evidence of bleeding per RN     ROS: not able to obtain    Physical Exam:  Vital Signs Last 24 Hrs  T(C): 36.6 (09 May 2019 05:10), Max: 36.8 (08 May 2019 23:00)  T(F): 97.9 (09 May 2019 05:10), Max: 98.3 (08 May 2019 23:00)  HR: 75 (09 May 2019 05:10) (75 - 88)  BP: 118/53 (09 May 2019 05:10) (111/66 - 118/53)  BP(mean): --  RR: 18 (09 May 2019 05:10) (18 - 18)  SpO2: 95% (09 May 2019 05:10) (95% - 99%)    · Constitutional	detailed exam  · Constitutional Details	moaning, eyes open  · Eyes	detailed exam  · Eyes Details	PERRL  · ENMT	detailed exam  · Neck	detailed exam  · Neck Details	normal  · Back	No deformity or limitation of movement  · Respiratory	detailed exam  · Respiratory Details	respirations labored; rhonchi  -GI: NTND no mass, +BS normal  PEG site clean intact no bleeding or tenderness  · Neurological	detailed exam  · Neurological Details	responds to pain  · Skin	detailed exam  · Skin Details	warm and dry  · Musculoskeletal	No joint pain, swelling or deformity; no limitation of movement

## 2019-05-09 NOTE — PROGRESS NOTE ADULT - REASON FOR ADMISSION
Sepsis, GI bleed

## 2019-05-10 ENCOUNTER — TRANSCRIPTION ENCOUNTER (OUTPATIENT)
Age: 79
End: 2019-05-10

## 2019-05-10 VITALS
DIASTOLIC BLOOD PRESSURE: 60 MMHG | OXYGEN SATURATION: 96 % | HEART RATE: 71 BPM | SYSTOLIC BLOOD PRESSURE: 117 MMHG | RESPIRATION RATE: 18 BRPM | TEMPERATURE: 98 F

## 2019-05-10 LAB
ANION GAP SERPL CALC-SCNC: 4 MMOL/L — LOW (ref 5–17)
APTT BLD: 82.3 SEC — HIGH (ref 27.5–36.3)
APTT BLD: 82.7 SEC — HIGH (ref 27.5–36.3)
BUN SERPL-MCNC: 12 MG/DL — SIGNIFICANT CHANGE UP (ref 7–23)
CALCIUM SERPL-MCNC: 8.2 MG/DL — LOW (ref 8.5–10.1)
CHLORIDE SERPL-SCNC: 107 MMOL/L — SIGNIFICANT CHANGE UP (ref 96–108)
CO2 SERPL-SCNC: 29 MMOL/L — SIGNIFICANT CHANGE UP (ref 22–31)
CREAT SERPL-MCNC: 0.86 MG/DL — SIGNIFICANT CHANGE UP (ref 0.5–1.3)
GLUCOSE BLDC GLUCOMTR-MCNC: 100 MG/DL — HIGH (ref 70–99)
GLUCOSE BLDC GLUCOMTR-MCNC: 110 MG/DL — HIGH (ref 70–99)
GLUCOSE SERPL-MCNC: 105 MG/DL — HIGH (ref 70–99)
HCT VFR BLD CALC: 33.9 % — LOW (ref 39–50)
HGB BLD-MCNC: 10 G/DL — LOW (ref 13–17)
INR BLD: 1.37 RATIO — HIGH (ref 0.88–1.16)
MCHC RBC-ENTMCNC: 23.1 PG — LOW (ref 27–34)
MCHC RBC-ENTMCNC: 29.5 GM/DL — LOW (ref 32–36)
MCV RBC AUTO: 78.3 FL — LOW (ref 80–100)
NRBC # BLD: 0 /100 WBCS — SIGNIFICANT CHANGE UP (ref 0–0)
PLATELET # BLD AUTO: 368 K/UL — SIGNIFICANT CHANGE UP (ref 150–400)
POTASSIUM SERPL-MCNC: 3.9 MMOL/L — SIGNIFICANT CHANGE UP (ref 3.5–5.3)
POTASSIUM SERPL-SCNC: 3.9 MMOL/L — SIGNIFICANT CHANGE UP (ref 3.5–5.3)
PROTHROM AB SERPL-ACNC: 15.4 SEC — HIGH (ref 10–12.9)
RBC # BLD: 4.33 M/UL — SIGNIFICANT CHANGE UP (ref 4.2–5.8)
RBC # FLD: 21.3 % — HIGH (ref 10.3–14.5)
SODIUM SERPL-SCNC: 140 MMOL/L — SIGNIFICANT CHANGE UP (ref 135–145)
WBC # BLD: 7.5 K/UL — SIGNIFICANT CHANGE UP (ref 3.8–10.5)
WBC # FLD AUTO: 7.5 K/UL — SIGNIFICANT CHANGE UP (ref 3.8–10.5)

## 2019-05-10 RX ORDER — PANTOPRAZOLE SODIUM 20 MG/1
0 TABLET, DELAYED RELEASE ORAL
Qty: 0 | Refills: 0 | DISCHARGE
Start: 2019-05-10

## 2019-05-10 RX ORDER — APIXABAN 2.5 MG/1
10 TABLET, FILM COATED ORAL EVERY 12 HOURS
Refills: 0 | Status: DISCONTINUED | OUTPATIENT
Start: 2019-05-10 | End: 2019-05-10

## 2019-05-10 RX ORDER — APIXABAN 2.5 MG/1
2 TABLET, FILM COATED ORAL
Qty: 0 | Refills: 0 | DISCHARGE
Start: 2019-05-10

## 2019-05-10 RX ORDER — WARFARIN SODIUM 2.5 MG/1
1 TABLET ORAL
Qty: 0 | Refills: 0 | DISCHARGE

## 2019-05-10 RX ADMIN — Medication 2000 UNIT(S): at 13:03

## 2019-05-10 RX ADMIN — Medication 1 TABLET(S): at 13:02

## 2019-05-10 RX ADMIN — POLYETHYLENE GLYCOL 3350 17 GRAM(S): 17 POWDER, FOR SOLUTION ORAL at 13:02

## 2019-05-10 RX ADMIN — PANTOPRAZOLE SODIUM 40 MILLIGRAM(S): 20 TABLET, DELAYED RELEASE ORAL at 13:02

## 2019-05-10 RX ADMIN — APIXABAN 10 MILLIGRAM(S): 2.5 TABLET, FILM COATED ORAL at 16:01

## 2019-05-10 RX ADMIN — HEPARIN SODIUM 900 UNIT(S)/HR: 5000 INJECTION INTRAVENOUS; SUBCUTANEOUS at 03:13

## 2019-05-10 RX ADMIN — Medication 1 APPLICATION(S): at 13:04

## 2019-05-10 RX ADMIN — Medication 100 MILLIGRAM(S): at 05:50

## 2019-05-10 RX ADMIN — Medication 300 MILLIGRAM(S): at 13:02

## 2019-05-10 RX ADMIN — ZINC OXIDE 1 APPLICATION(S): 200 OINTMENT TOPICAL at 13:04

## 2019-05-10 NOTE — DISCHARGE NOTE PROVIDER - CARE PROVIDER_API CALL
Noé Ryan)  Medicine  [address] [place] [address]  Phone: [phone]  Fax: [phone]  Follow Up Time:

## 2019-05-10 NOTE — DISCHARGE NOTE NURSING/CASE MANAGEMENT/SOCIAL WORK - NSDCDPATPORTLINK_GEN_ALL_CORE
You can access the [facility] Patient Portal, offered by [facility], by registering with the following website: [email or web address]

## 2019-05-10 NOTE — DISCHARGE NOTE PROVIDER - HOSPITAL COURSE
77 y/o male w/ PMH significant for Afib (coumadin), Dementia, DM, DVT presented to ED from [facility] for coffee ground emesis and T. Max of 102.8. Unable to illicit any HPI or ROS 2/2  altered mental status. In the ED, patient found to have supra therapeutic INR 4.25, Lactate 2.1, MIGUEL, UA (+), RVP negative, CXR negative, Guaiac stool (+), CT abdomen with possible PNA, sterocoral colitis, non-obstructing R-sided renal stone and Right sided pyelitis and uteritis. Pt. given IV fluids per sepsis protocol, IV Vanco and cefepime. Pt. remained hypotensive and transferred to ICU for pressor support. Pt treated for sepsis 2/2 to UTI with appropriate antibiotics. Pt eventually downgraded to regular floors. Evaluated by GI for coffe ground emesis who recommended to acute intervention. Pt has not had any active bleeding, and hemoglobin has remained stable throughout hospitilization. Hospital course complicated with RUE DVT. Pt previously on coumadin for afib. However, patient will be discharged with eliquis for new DVT and will continue thereafter for afib. Pt s/p PEG tube placement in which he receives medications and feeds. On day of discharge patient is afebrile and hemodynamically stable.         Interval History: Patient evaluated at bedside. No overnight events. Stable for discharge. HCP instructed of hospital course and discharge plan.        REVIEW OF SYSTEMS: Unable to obtain due to mental status        Vital Signs (24 Hrs):    T(C): 36.6 (05-10-19 @ 12:19), Max: 36.7 (05-09-19 @ 17:41)    HR: 71 (05-10-19 @ 12:19) (71 - 76)    BP: 117/60 (05-10-19 @ 12:19) (117/55 - 123/46)    RR: 18 (05-10-19 @ 12:19) (18 - 18)    SpO2: 96% (05-10-19 @ 12:19) (94% - 100%)    Wt(kg): --    Daily       Daily         I&O's Summary        09 May 2019 07:01  -  10 May 2019 07:00    --------------------------------------------------------    IN: 597 mL / OUT: 0 mL / NET: 597 mL        PHYSICAL EXAM:    GENERAL: NAD, comfortable    HEAD:  Normocephalic    EYES: EOMI, PERRLA, conjunctiva and sclera clear    NECK: Supple    CHEST/LUNG: Clear to auscultation bilaterally; No wheeze, ronchi or rales    HEART: Regular rate and rhythm; No murmurs, rubs, or gallops    ABDOMEN: Soft, Nontender, Nondistended; Bowel sounds present, PEG tube site clean, dry and intact    EXTREMITIES:  No edema     NEUROLOGY: non-focal 77 y/o male w/ PMH significant for Afib (coumadin), Dementia, DM, DVT presented to ED from [facility] for coffee ground emesis and T. Max of 102.8. Unable to illicit any HPI or ROS 2/2  altered mental status. In the ED, patient found to have supra therapeutic INR 4.25, Lactate 2.1, MIGUEL, UA (+), RVP negative, CXR negative, Guaiac stool (+), CT abdomen with possible PNA, sterocoral colitis, non-obstructing R-sided renal stone and Right sided pyelitis and uteritis. Pt. given IV fluids per sepsis protocol, IV Vanco and cefepime. Pt. remained hypotensive and transferred to ICU for pressor support. Pt treated for sepsis 2/2 to UTI with appropriate antibiotics. Pt eventually downgraded to regular floors. Evaluated by GI for coffe ground emesis who recommended to acute intervention. Pt has not had any active bleeding, and hemoglobin has remained stable throughout hospitilization. Hospital course complicated with RUE DVT. Pt previously on coumadin for afib. However, patient will be discharged with eliquis for new DVT and will continue thereafter for afib. Pt s/p PEG tube placement in which he receives medications and feeds. On day of discharge patient is afebrile and hemodynamically stable. PCP Dr. Ryan is aware of hospital course and discharge plan.         Interval History: Patient evaluated at bedside. No overnight events. Stable for discharge. HCP instructed of hospital course and discharge plan.        REVIEW OF SYSTEMS: Unable to obtain due to mental status        Vital Signs (24 Hrs):    T(C): 36.6 (05-10-19 @ 12:19), Max: 36.7 (05-09-19 @ 17:41)    HR: 71 (05-10-19 @ 12:19) (71 - 76)    BP: 117/60 (05-10-19 @ 12:19) (117/55 - 123/46)    RR: 18 (05-10-19 @ 12:19) (18 - 18)    SpO2: 96% (05-10-19 @ 12:19) (94% - 100%)    Wt(kg): --    Daily       Daily         I&O's Summary        09 May 2019 07:01  -  10 May 2019 07:00    --------------------------------------------------------    IN: 597 mL / OUT: 0 mL / NET: 597 mL        PHYSICAL EXAM:    GENERAL: NAD, comfortable    HEAD:  Normocephalic    EYES: EOMI, PERRLA, conjunctiva and sclera clear    NECK: Supple    CHEST/LUNG: Clear to auscultation bilaterally; No wheeze, ronchi or rales    HEART: Regular rate and rhythm; No murmurs, rubs, or gallops    ABDOMEN: Soft, Nontender, Nondistended; Bowel sounds present, PEG tube site clean, dry and intact    EXTREMITIES:  No edema     NEUROLOGY: non-focal

## 2019-05-10 NOTE — DISCHARGE NOTE PROVIDER - NSDCCPCAREPLAN_GEN_ALL_CORE_FT
PRINCIPAL DISCHARGE DIAGNOSIS  Diagnosis: Sepsis secondary to UTI  Assessment and Plan of Treatment: - resolved now off pressors  - Blood cultures NGTD  - Urine culture->+E.Coli - ESBL   - s/p meropenum  - follow up with your outpatient providers      SECONDARY DISCHARGE DIAGNOSES  Diagnosis: S/P percutaneous endoscopic gastrostomy (PEG) tube placement  Assessment and Plan of Treatment: - continue routine care  - continue feeds and medications  - follow up with outpatient providers for further management    Diagnosis: Chronic osteomyelitis  Assessment and Plan of Treatment: - continue doxycycline   - follow up with outpatient providers for further management    Diagnosis: Diabetes  Assessment and Plan of Treatment: - A1C 5.8  - continue outpatient medications  - follow up with outpatient providers for further management    Diagnosis: Afib  Assessment and Plan of Treatment: - CHADS score of 3 (HTN-1, DM-1, Age-1)  - coumadin discontinued  - continue eliquis   - HR controlled during hospital stay. Continue to monitor HR outpatient  - follow up with outpatient providers for further management      Diagnosis: DVT, lower extremity  Assessment and Plan of Treatment: - Hospital course complicated by RUE DVT  - continue eliquis as prescribed    Diagnosis: UGI bleed  Assessment and Plan of Treatment: - Coffee ground emesis on admission  - Hemoglobin stable throughout hospital course  - Continue protonix  - Evaluated by GI who recommended no acute intervention  - Continue to monitor hemoglobin as outpatient. PRINCIPAL DISCHARGE DIAGNOSIS  Diagnosis: Sepsis secondary to UTI  Assessment and Plan of Treatment: - resolved now off pressors  - Blood cultures NGTD  - Urine culture->+E.Coli - ESBL   - s/p meropenum  - follow up with your outpatient providers      SECONDARY DISCHARGE DIAGNOSES  Diagnosis: S/P percutaneous endoscopic gastrostomy (PEG) tube placement  Assessment and Plan of Treatment: - continue routine care  - continue feeds and medications  - follow up with outpatient providers for further management    Diagnosis: Chronic osteomyelitis  Assessment and Plan of Treatment: - continue doxycycline   - follow up with outpatient providers for further management    Diagnosis: Diabetes  Assessment and Plan of Treatment: - A1C 5.8  - continue outpatient medications  - follow up with outpatient providers for further management    Diagnosis: Afib  Assessment and Plan of Treatment: - CHADS score of 3 (HTN-1, DM-1, Age-1)  - coumadin discontinued  - continue eliquis   - HR controlled during hospital stay. Continue to monitor HR outpatient  - follow up with outpatient providers for further management      Diagnosis: DVT, lower extremity  Assessment and Plan of Treatment: - Hospital course complicated by RUE DVT  - continue eliquis as prescribed    Diagnosis: UGI bleed  Assessment and Plan of Treatment: - Coffee ground emesis on admission  - Hemoglobin stable throughout hospital course  - Continue protonix  - Evaluated by GI who recommended no acute intervention  - continue iron supplementation  - Continue to monitor hemoglobin as outpatient.

## 2019-05-14 DIAGNOSIS — R65.21 SEVERE SEPSIS WITH SEPTIC SHOCK: ICD-10-CM

## 2019-05-14 DIAGNOSIS — E11.69 TYPE 2 DIABETES MELLITUS WITH OTHER SPECIFIED COMPLICATION: ICD-10-CM

## 2019-05-14 DIAGNOSIS — Z79.899 OTHER LONG TERM (CURRENT) DRUG THERAPY: ICD-10-CM

## 2019-05-14 DIAGNOSIS — E86.0 DEHYDRATION: ICD-10-CM

## 2019-05-14 DIAGNOSIS — D64.9 ANEMIA, UNSPECIFIED: ICD-10-CM

## 2019-05-14 DIAGNOSIS — A41.9 SEPSIS, UNSPECIFIED ORGANISM: ICD-10-CM

## 2019-05-14 DIAGNOSIS — N39.0 URINARY TRACT INFECTION, SITE NOT SPECIFIED: ICD-10-CM

## 2019-05-14 DIAGNOSIS — E87.0 HYPEROSMOLALITY AND HYPERNATREMIA: ICD-10-CM

## 2019-05-14 DIAGNOSIS — F03.90 UNSPECIFIED DEMENTIA WITHOUT BEHAVIORAL DISTURBANCE: ICD-10-CM

## 2019-05-14 DIAGNOSIS — M86.652 OTHER CHRONIC OSTEOMYELITIS, LEFT THIGH: ICD-10-CM

## 2019-05-14 DIAGNOSIS — Z79.01 LONG TERM (CURRENT) USE OF ANTICOAGULANTS: ICD-10-CM

## 2019-05-14 DIAGNOSIS — I48.2 CHRONIC ATRIAL FIBRILLATION: ICD-10-CM

## 2019-05-14 DIAGNOSIS — E43 UNSPECIFIED SEVERE PROTEIN-CALORIE MALNUTRITION: ICD-10-CM

## 2019-05-14 DIAGNOSIS — N17.9 ACUTE KIDNEY FAILURE, UNSPECIFIED: ICD-10-CM

## 2019-05-14 DIAGNOSIS — K44.9 DIAPHRAGMATIC HERNIA WITHOUT OBSTRUCTION OR GANGRENE: ICD-10-CM

## 2019-05-14 DIAGNOSIS — J44.9 CHRONIC OBSTRUCTIVE PULMONARY DISEASE, UNSPECIFIED: ICD-10-CM

## 2019-05-14 DIAGNOSIS — K52.9 NONINFECTIVE GASTROENTERITIS AND COLITIS, UNSPECIFIED: ICD-10-CM

## 2019-05-14 DIAGNOSIS — I82.621 ACUTE EMBOLISM AND THROMBOSIS OF DEEP VEINS OF RIGHT UPPER EXTREMITY: ICD-10-CM

## 2019-05-14 DIAGNOSIS — J69.0 PNEUMONITIS DUE TO INHALATION OF FOOD AND VOMIT: ICD-10-CM

## 2019-06-07 RX ORDER — CEPHALEXIN 500 MG
1 CAPSULE ORAL
Qty: 0 | Refills: 0 | DISCHARGE
Start: 2019-06-07 | End: 2019-06-13

## 2019-06-10 ENCOUNTER — INPATIENT (INPATIENT)
Facility: HOSPITAL | Age: 79
LOS: 6 days | Discharge: NURSING, REHAB OR LONG-TERM CARE | End: 2019-06-17
Attending: INTERNAL MEDICINE | Admitting: INTERNAL MEDICINE
Payer: MEDICARE

## 2019-06-10 VITALS
WEIGHT: 130.07 LBS | DIASTOLIC BLOOD PRESSURE: 59 MMHG | HEIGHT: 66 IN | SYSTOLIC BLOOD PRESSURE: 107 MMHG | RESPIRATION RATE: 17 BRPM | HEART RATE: 103 BPM | OXYGEN SATURATION: 94 %

## 2019-06-10 LAB
ALBUMIN SERPL ELPH-MCNC: 2.3 G/DL — LOW (ref 3.3–5)
ALP SERPL-CCNC: 98 U/L — SIGNIFICANT CHANGE UP (ref 40–120)
ALT FLD-CCNC: 25 U/L — SIGNIFICANT CHANGE UP (ref 12–78)
ANION GAP SERPL CALC-SCNC: 9 MMOL/L — SIGNIFICANT CHANGE UP (ref 5–17)
APPEARANCE UR: CLEAR — SIGNIFICANT CHANGE UP
APTT BLD: 27.9 SEC — SIGNIFICANT CHANGE UP (ref 27.5–36.3)
AST SERPL-CCNC: 22 U/L — SIGNIFICANT CHANGE UP (ref 15–37)
BACTERIA # UR AUTO: ABNORMAL
BASOPHILS # BLD AUTO: 0.07 K/UL — SIGNIFICANT CHANGE UP (ref 0–0.2)
BASOPHILS NFR BLD AUTO: 0.5 % — SIGNIFICANT CHANGE UP (ref 0–2)
BILIRUB SERPL-MCNC: 0.2 MG/DL — SIGNIFICANT CHANGE UP (ref 0.2–1.2)
BILIRUB UR-MCNC: NEGATIVE — SIGNIFICANT CHANGE UP
BLD GP AB SCN SERPL QL: SIGNIFICANT CHANGE UP
BUN SERPL-MCNC: 34 MG/DL — HIGH (ref 7–23)
CALCIUM SERPL-MCNC: 8.5 MG/DL — SIGNIFICANT CHANGE UP (ref 8.5–10.1)
CHLORIDE SERPL-SCNC: 105 MMOL/L — SIGNIFICANT CHANGE UP (ref 96–108)
CO2 SERPL-SCNC: 24 MMOL/L — SIGNIFICANT CHANGE UP (ref 22–31)
COLOR SPEC: YELLOW — SIGNIFICANT CHANGE UP
COMMENT - URINE: SIGNIFICANT CHANGE UP
CREAT SERPL-MCNC: 0.83 MG/DL — SIGNIFICANT CHANGE UP (ref 0.5–1.3)
DIFF PNL FLD: ABNORMAL
EOSINOPHIL # BLD AUTO: 0.27 K/UL — SIGNIFICANT CHANGE UP (ref 0–0.5)
EOSINOPHIL NFR BLD AUTO: 1.9 % — SIGNIFICANT CHANGE UP (ref 0–6)
GLUCOSE BLDC GLUCOMTR-MCNC: 113 MG/DL — HIGH (ref 70–99)
GLUCOSE SERPL-MCNC: 130 MG/DL — HIGH (ref 70–99)
GLUCOSE UR QL: NEGATIVE MG/DL — SIGNIFICANT CHANGE UP
HCT VFR BLD CALC: 33.9 % — LOW (ref 39–50)
HGB BLD-MCNC: 10.2 G/DL — LOW (ref 13–17)
IMM GRANULOCYTES NFR BLD AUTO: 0.4 % — SIGNIFICANT CHANGE UP (ref 0–1.5)
INR BLD: 1.42 RATIO — HIGH (ref 0.88–1.16)
KETONES UR-MCNC: NEGATIVE — SIGNIFICANT CHANGE UP
LACTATE SERPL-SCNC: 1.4 MMOL/L — SIGNIFICANT CHANGE UP (ref 0.7–2)
LEUKOCYTE ESTERASE UR-ACNC: ABNORMAL
LYMPHOCYTES # BLD AUTO: 1.54 K/UL — SIGNIFICANT CHANGE UP (ref 1–3.3)
LYMPHOCYTES # BLD AUTO: 11 % — LOW (ref 13–44)
MCHC RBC-ENTMCNC: 23.9 PG — LOW (ref 27–34)
MCHC RBC-ENTMCNC: 30.1 GM/DL — LOW (ref 32–36)
MCV RBC AUTO: 79.4 FL — LOW (ref 80–100)
MONOCYTES # BLD AUTO: 0.89 K/UL — SIGNIFICANT CHANGE UP (ref 0–0.9)
MONOCYTES NFR BLD AUTO: 6.4 % — SIGNIFICANT CHANGE UP (ref 2–14)
NEUTROPHILS # BLD AUTO: 11.17 K/UL — HIGH (ref 1.8–7.4)
NEUTROPHILS NFR BLD AUTO: 79.8 % — HIGH (ref 43–77)
NITRITE UR-MCNC: NEGATIVE — SIGNIFICANT CHANGE UP
PH UR: 7 — SIGNIFICANT CHANGE UP (ref 5–8)
PLATELET # BLD AUTO: 312 K/UL — SIGNIFICANT CHANGE UP (ref 150–400)
POTASSIUM SERPL-MCNC: 4.8 MMOL/L — SIGNIFICANT CHANGE UP (ref 3.5–5.3)
POTASSIUM SERPL-SCNC: 4.8 MMOL/L — SIGNIFICANT CHANGE UP (ref 3.5–5.3)
PROT SERPL-MCNC: 7.1 GM/DL — SIGNIFICANT CHANGE UP (ref 6–8.3)
PROT UR-MCNC: 30 MG/DL
PROTHROM AB SERPL-ACNC: 15.9 SEC — HIGH (ref 10–12.9)
RBC # BLD: 4.27 M/UL — SIGNIFICANT CHANGE UP (ref 4.2–5.8)
RBC # FLD: 18.4 % — HIGH (ref 10.3–14.5)
RBC CASTS # UR COMP ASSIST: ABNORMAL /HPF (ref 0–4)
SODIUM SERPL-SCNC: 138 MMOL/L — SIGNIFICANT CHANGE UP (ref 135–145)
SP GR SPEC: 1.01 — SIGNIFICANT CHANGE UP (ref 1.01–1.02)
TROPONIN I SERPL-MCNC: <0.015 NG/ML — SIGNIFICANT CHANGE UP (ref 0.01–0.04)
TYPE + AB SCN PNL BLD: SIGNIFICANT CHANGE UP
UROBILINOGEN FLD QL: NEGATIVE MG/DL — SIGNIFICANT CHANGE UP
WBC # BLD: 14 K/UL — HIGH (ref 3.8–10.5)
WBC # FLD AUTO: 14 K/UL — HIGH (ref 3.8–10.5)
WBC UR QL: ABNORMAL

## 2019-06-10 PROCEDURE — 71045 X-RAY EXAM CHEST 1 VIEW: CPT | Mod: 26

## 2019-06-10 PROCEDURE — 93010 ELECTROCARDIOGRAM REPORT: CPT

## 2019-06-10 PROCEDURE — 99285 EMERGENCY DEPT VISIT HI MDM: CPT

## 2019-06-10 RX ORDER — FAMOTIDINE 10 MG/ML
1 INJECTION INTRAVENOUS
Qty: 0 | Refills: 0 | DISCHARGE

## 2019-06-10 RX ORDER — CEFEPIME 1 G/1
2000 INJECTION, POWDER, FOR SOLUTION INTRAMUSCULAR; INTRAVENOUS ONCE
Refills: 0 | Status: COMPLETED | OUTPATIENT
Start: 2019-06-10 | End: 2019-06-10

## 2019-06-10 RX ORDER — PANTOPRAZOLE SODIUM 20 MG/1
40 TABLET, DELAYED RELEASE ORAL ONCE
Refills: 0 | Status: COMPLETED | OUTPATIENT
Start: 2019-06-10 | End: 2019-06-10

## 2019-06-10 RX ORDER — PANTOPRAZOLE SODIUM 20 MG/1
10 TABLET, DELAYED RELEASE ORAL ONCE
Refills: 0 | Status: DISCONTINUED | OUTPATIENT
Start: 2019-06-10 | End: 2019-06-10

## 2019-06-10 RX ORDER — IPRATROPIUM/ALBUTEROL SULFATE 18-103MCG
3 AEROSOL WITH ADAPTER (GRAM) INHALATION EVERY 6 HOURS
Refills: 0 | Status: DISCONTINUED | OUTPATIENT
Start: 2019-06-10 | End: 2019-06-17

## 2019-06-10 RX ORDER — SODIUM CHLORIDE 9 MG/ML
1000 INJECTION, SOLUTION INTRAVENOUS
Refills: 0 | Status: DISCONTINUED | OUTPATIENT
Start: 2019-06-10 | End: 2019-06-11

## 2019-06-10 RX ORDER — VANCOMYCIN HCL 1 G
1000 VIAL (EA) INTRAVENOUS ONCE
Refills: 0 | Status: COMPLETED | OUTPATIENT
Start: 2019-06-10 | End: 2019-06-10

## 2019-06-10 RX ORDER — PANTOPRAZOLE SODIUM 20 MG/1
8 TABLET, DELAYED RELEASE ORAL
Qty: 80 | Refills: 0 | Status: DISCONTINUED | OUTPATIENT
Start: 2019-06-10 | End: 2019-06-12

## 2019-06-10 RX ORDER — ONDANSETRON 8 MG/1
4 TABLET, FILM COATED ORAL EVERY 6 HOURS
Refills: 0 | Status: DISCONTINUED | OUTPATIENT
Start: 2019-06-10 | End: 2019-06-17

## 2019-06-10 RX ORDER — SENNA PLUS 8.6 MG/1
2 TABLET ORAL
Qty: 0 | Refills: 0 | DISCHARGE

## 2019-06-10 RX ORDER — MEROPENEM 1 G/30ML
1000 INJECTION INTRAVENOUS EVERY 8 HOURS
Refills: 0 | Status: DISCONTINUED | OUTPATIENT
Start: 2019-06-10 | End: 2019-06-17

## 2019-06-10 RX ORDER — ACETAMINOPHEN 500 MG
650 TABLET ORAL ONCE
Refills: 0 | Status: COMPLETED | OUTPATIENT
Start: 2019-06-10 | End: 2019-06-10

## 2019-06-10 RX ORDER — MEROPENEM 1 G/30ML
1000 INJECTION INTRAVENOUS ONCE
Refills: 0 | Status: COMPLETED | OUTPATIENT
Start: 2019-06-10 | End: 2019-06-10

## 2019-06-10 RX ORDER — MEROPENEM 1 G/30ML
INJECTION INTRAVENOUS
Refills: 0 | Status: DISCONTINUED | OUTPATIENT
Start: 2019-06-10 | End: 2019-06-17

## 2019-06-10 RX ORDER — SODIUM CHLORIDE 9 MG/ML
1750 INJECTION INTRAMUSCULAR; INTRAVENOUS; SUBCUTANEOUS ONCE
Refills: 0 | Status: COMPLETED | OUTPATIENT
Start: 2019-06-10 | End: 2019-06-10

## 2019-06-10 RX ORDER — SACCHAROMYCES BOULARDII 250 MG
1 POWDER IN PACKET (EA) ORAL
Qty: 0 | Refills: 0 | DISCHARGE

## 2019-06-10 RX ORDER — SODIUM CHLORIDE 9 MG/ML
1000 INJECTION INTRAMUSCULAR; INTRAVENOUS; SUBCUTANEOUS
Refills: 0 | Status: DISCONTINUED | OUTPATIENT
Start: 2019-06-10 | End: 2019-06-10

## 2019-06-10 RX ADMIN — MEROPENEM 100 MILLIGRAM(S): 1 INJECTION INTRAVENOUS at 14:59

## 2019-06-10 RX ADMIN — Medication 1000 MILLIGRAM(S): at 14:14

## 2019-06-10 RX ADMIN — Medication 650 MILLIGRAM(S): at 13:00

## 2019-06-10 RX ADMIN — Medication 3 MILLILITER(S): at 20:36

## 2019-06-10 RX ADMIN — SODIUM CHLORIDE 1750 MILLILITER(S): 9 INJECTION INTRAMUSCULAR; INTRAVENOUS; SUBCUTANEOUS at 13:20

## 2019-06-10 RX ADMIN — CEFEPIME 2000 MILLIGRAM(S): 1 INJECTION, POWDER, FOR SOLUTION INTRAMUSCULAR; INTRAVENOUS at 13:08

## 2019-06-10 RX ADMIN — PANTOPRAZOLE SODIUM 10 MG/HR: 20 TABLET, DELAYED RELEASE ORAL at 15:40

## 2019-06-10 RX ADMIN — CEFEPIME 100 MILLIGRAM(S): 1 INJECTION, POWDER, FOR SOLUTION INTRAMUSCULAR; INTRAVENOUS at 12:38

## 2019-06-10 RX ADMIN — Medication 650 MILLIGRAM(S): at 12:00

## 2019-06-10 RX ADMIN — PANTOPRAZOLE SODIUM 40 MILLIGRAM(S): 20 TABLET, DELAYED RELEASE ORAL at 13:12

## 2019-06-10 RX ADMIN — SODIUM CHLORIDE 1750 MILLILITER(S): 9 INJECTION INTRAMUSCULAR; INTRAVENOUS; SUBCUTANEOUS at 12:20

## 2019-06-10 RX ADMIN — MEROPENEM 100 MILLIGRAM(S): 1 INJECTION INTRAVENOUS at 22:47

## 2019-06-10 RX ADMIN — SODIUM CHLORIDE 85 MILLILITER(S): 9 INJECTION, SOLUTION INTRAVENOUS at 17:37

## 2019-06-10 RX ADMIN — Medication 250 MILLIGRAM(S): at 13:13

## 2019-06-10 NOTE — ED PROVIDER NOTE - CLINICAL SUMMARY MEDICAL DECISION MAKING FREE TEXT BOX
77 y/o Male with multiple PMHx including dementia, A-fib on coumadin, hospitalized first week of may for urosepsis complicated by septic shock requiring IVF and pressor support. +complications of coffee ground emesis and +Right DVT, discharged on Eliquis. Bloody vomiting today at [facility], Febrile at ED. Code sepsis initiated: EKG, CXR, panculture including urine, serum lactate, serial troponin, NPO, type and screen, IV fluids, IV vancomycin and cefepime, IV protonix. Monitor, observe, reassess, expect readmission. 77 y/o Male with multiple PMHx including dementia, A-fib on coumadin, hospitalized first week of may for urosepsis complicated by septic shock requiring IVF and pressor support. +complications of coffee ground emesis and +Right UE DVT, discharged on Eliquis. Bloody vomiting today at [facility], Febrile at ED. Code sepsis initiated: EKG, CXR, panculture including urine, serum lactate, serial troponin, NPO, type and screen, IV fluids, IV vancomycin and cefepime, IV protonix. Monitor, observe, reassess, expect readmission.

## 2019-06-10 NOTE — ED PROVIDER NOTE - PROGRESS NOTE DETAILS
Chastity rivera:  I have re-evaluated the patient's fluid status and reviewed vital signs. Clinical evaluation demonstrates an appropriate response to fluid resuscitation.

## 2019-06-10 NOTE — ED ADULT NURSE REASSESSMENT NOTE - NS ED NURSE REASSESS COMMENT FT1
Received pt from previous nurse. Pt is nonverbal as baseline. Pt is resting in his bed at this time. Pt does not present with any signs of distress. Ask Dr. Diaz about pt's feeding MD stated that she is going to wait for GI to see the patient. Safety and comfort measures in place. Hourly rounding will be done on my time. Will continue to monitor.

## 2019-06-10 NOTE — ED PROVIDER NOTE - OBJECTIVE STATEMENT
77 y/o Male with a PMHx of Gl bleed, dysphagia, gastric ulcer with hemorrhage, unspecific arrythmia, COPD, Dementia, DM type II, DVT(on Eliquis), HTN, Osteomyelitis of left leg, PVD, prior PNA/past sepsis presents to ED BIBEMS sent in from [facility] for evaluation of GI bleed(+Guaiac), vomiting burgundy colored, and fever of 102F at facility. Pt is full code per nursing home records. Patient is on Eliquis for DVT. Patient was last hospitalized in May for sepsis, DVT, GI bleed.

## 2019-06-10 NOTE — PATIENT PROFILE ADULT - VISION (WITH CORRECTIVE LENSES IF THE PATIENT USUALLY WEARS THEM):
Partially impaired: cannot see medication labels or newsprint, but can see obstacles in path, and the surrounding layout; can count fingers at arm's length/unable to assess, pt confused

## 2019-06-10 NOTE — H&P ADULT - NSHPLABSRESULTS_GEN_ALL_CORE
Urinalysis Basic - ( 10 Yemi 2019 12:33 )    Color: Yellow / Appearance: Clear / S.010 / pH: x  Gluc: x / Ketone: Negative  / Bili: Negative / Urobili: Negative mg/dL   Blood: x / Protein: 30 mg/dL / Nitrite: Negative   Leuk Esterase: Moderate / RBC: 3-5 /HPF / WBC 26-50   Sq Epi: x / Non Sq Epi: x / Bacteria: Moderate    10 Yemi 2019 12:25    138    |  105    |  34     ----------------------------<  130    4.8     |  24     |  0.83     Ca    8.5        10 Yemi 2019 12:25    TPro  7.1    /  Alb  2.3    /  TBili  0.2    /  DBili  x      /  AST  22     /  ALT  25     /  AlkPhos  98     10 Yemi 2019 12:25  LIVER FUNCTIONS - ( 10 Yemi 2019 12:25 )  Alb: 2.3 g/dL / Pro: 7.1 gm/dL / ALK PHOS: 98 U/L / ALT: 25 U/L / AST: 22 U/L / GGT: x         PT/INR - ( 10 Yemi 2019 12:25 )   PT: 15.9 sec;   INR: 1.42 ratio         PTT - ( 10 Yemi 2019 12:25 )  PTT:27.9 secCBC Full  -  ( 10 Yemi 2019 12:25 )  WBC Count : 14.00 K/uL  Hemoglobin : 10.2 g/dL  Hematocrit : 33.9 %  Platelet Count - Automated : 312 K/uL  Mean Cell Volume : 79.4 fl  Mean Cell Hemoglobin : 23.9 pg  Mean Cell Hemoglobin Concentration : 30.1 gm/dL  Auto Neutrophil # : 11.17 K/uL  Auto Lymphocyte # : 1.54 K/uL  Auto Monocyte # : 0.89 K/uL  Auto Eosinophil # : 0.27 K/uL  Auto Basophil # : 0.07 K/uL  Auto Neutrophil % : 79.8 %  Auto Lymphocyte % : 11.0 %  Auto Monocyte % : 6.4 %  Auto Eosinophil % : 1.9 %  Auto Basophil % : 0.5 %  CARDIAC MARKERS ( 10 Yemi 2019 17:55 )  <0.015 ng/mL / x     / x     / x     / x      CARDIAC MARKERS ( 10 Yemi 2019 15:23 )  <0.015 ng/mL / x     / x     / x     / x      CARDIAC MARKERS ( 10 Yemi 2019 12:25 )  <0.015 ng/mL / x     / x     / x     / x

## 2019-06-10 NOTE — ED ADULT NURSE NOTE - CHIEF COMPLAINT QUOTE
sent in from [facility] for + yana after vomiting bloody emesis and fever of 102 at facility

## 2019-06-10 NOTE — ED PROVIDER NOTE - CARE PLAN
Principal Discharge DX:	UTI (urinary tract infection)  Secondary Diagnosis:	Sepsis, due to unspecified organism  Secondary Diagnosis:	Upper GI bleed  Secondary Diagnosis:	Anticoagulated by anticoagulation treatment

## 2019-06-10 NOTE — CONSULT NOTE ADULT - SUBJECTIVE AND OBJECTIVE BOX
Patient is a 78y old  Male who presents with a chief complaint of fever    HPI:  79 y/o male with h/o Afib on coumadin, dementia, PEG tube and anemia admitted on 6/10 from snf for vomiting coffee ground emesis and fever to 102F. History per medical record and patient unable to provide history. In ER the patient received meropenem.    He had a recent hospitalization in 2019 and was noted with UTI with ESBL E.coli    PMH: as above  PSH: as above  Meds: per reconciliation sheet, noted below  MEDICATIONS  (STANDING):  ALBUTerol/ipratropium for Nebulization 3 milliLiter(s) Nebulizer every 6 hours  dextrose 5% + sodium chloride 0.9%. 1000 milliLiter(s) (85 mL/Hr) IV Continuous <Continuous>  meropenem  IVPB      meropenem  IVPB 1000 milliGRAM(s) IV Intermittent every 8 hours  pantoprazole Infusion 8 mG/Hr (10 mL/Hr) IV Continuous <Continuous>    MEDICATIONS  (PRN):  ondansetron Injectable 4 milliGRAM(s) IV Push every 6 hours PRN Nausea    Allergies    No Known Allergies    Intolerances      Social: no smoking, no alcohol, no illegal drugs; no recent travel, no exposure to TB  FAMILY HISTORY:    no history of premature cardiovascular disease in first degree relatives    ROS: the patient is confused and nonverbal  unobtainable    Vital Signs Last 24 Hrs  T(C): 36.9 (10 Yemi 2019 15:41), Max: 38.4 (10 Yemi 2019 12:00)  T(F): 98.4 (10 Yemi 2019 15:41), Max: 101.2 (10 Yemi 2019 12:00)  HR: 80 (10 Yemi 2019 15:41) (80 - 105)  BP: 111/48 (10 Yemi 2019 15:41) (94/57 - 125/47)  BP(mean): --  RR: 20 (10 Yemi 2019 15:41) (17 - 27)  SpO2: 95% (10 Yemi 2019 15:41) (94% - 98%)  Daily Height in cm: 167.64 (10 Yemi 2019 11:49)    Daily     PE:    Constitutional: frail looking  HEENT: NC/AT, EOMI, PERRLA, conjunctivae clear; ears and nose atraumatic; pharynx benign  Neck: supple; thyroid not palpable  Back: no tenderness  Respiratory: respiratory effort normal; crackles at bases  Cardiovascular: S1S2 regular, no murmurs  Abdomen: soft, not tender, not distended, positive BS; no liver or spleen organomegaly; PEG in place  Genitourinary: no suprapubic tenderness  Lymphatic: no LN palpable  Musculoskeletal: no muscle tenderness, no joint swelling or tenderness  Extremities: no pedal edema  Neurological/ Psychiatric: confused, judgement and insight impaired; moving all extremities  Skin: no rashes; no palpable lesions    Labs: all available labs reviewed                        10.2   14.00 )-----------( 312      ( 10 Yemi 2019 12:25 )             33.9     06-10    138  |  105  |  34<H>  ----------------------------<  130<H>  4.8   |  24  |  0.83    Ca    8.5      10 Yemi 2019 12:25    TPro  7.1  /  Alb  2.3<L>  /  TBili  0.2  /  DBili  x   /  AST  22  /  ALT  25  /  AlkPhos  98  06-10     LIVER FUNCTIONS - ( 10 Yemi 2019 12:25 )  Alb: 2.3 g/dL / Pro: 7.1 gm/dL / ALK PHOS: 98 U/L / ALT: 25 U/L / AST: 22 U/L / GGT: x           Urinalysis Basic - ( 10 Yemi 2019 12:33 )    Color: Yellow / Appearance: Clear / S.010 / pH: x  Gluc: x / Ketone: Negative  / Bili: Negative / Urobili: Negative mg/dL   Blood: x / Protein: 30 mg/dL / Nitrite: Negative   Leuk Esterase: Moderate / RBC: 3-5 /HPF / WBC 26-50   Sq Epi: x / Non Sq Epi: x / Bacteria: Moderate    Culture - Urine (19 @ 17:34)    -  Tobramycin: S <=4    -  Trimethoprim/Sulfamethoxazole: R >2/38    -  Ampicillin/Sulbactam: R <=8/4    -  Aztreonam: R >16    -  Cefazolin: R >16 For uncomplicated UTI with K. pneumoniae, E. coli, or P. mirablis: HOWIE <=16 is sensitive and HOWIE >=32 is resistant. This also predicts results for oral agents cefaclor, cefdinir, cefpodoxime, cefprozil, cefuroxime axetil, cephalexin and locarbef for uncomplicated UTI. Note that some isolates may be susceptible to these agents while testing resistant to cefazolin.    -  Cefepime: R >16    -  Cefoxitin: R >16    -  Ceftriaxone: R >32 Enterobacter, Citrobacter, and Serratia may develop resistance during prolonged therapy    -  Ciprofloxacin: R >2    -  Ertapenem: S <=1    -  Gentamicin: S <=4    -  Levofloxacin: R >4    -  Imipenem: S <=1    -  Meropenem: S <=1    -  Nitrofurantoin: S <=32 Should not be used to treat pyelonephritis    -  Piperacillin/Tazobactam: R <=16    -  Tigecycline: S <=2    -  Amikacin: S <=16    -  Ampicillin: R >16 These ampicillin results predict results for amoxicillin    Specimen Source: .Urine Clean Catch (Midstream)    Culture Results:   >100,000 CFU/ml Escherichia coli ESBL    Organism Identification: Escherichia coli ESBL    Organism: Escherichia coli ESBL    Method Type: HOWIE    Radiology: all available radiological tests reviewed    < from: Xray Chest 1 View-PORTABLE IMMEDIATE (06.10.19 @ 13:33) >  Minimal residual left lower lobe infiltrate?    < end of copied text >      Advanced directives addressed: full resuscitation

## 2019-06-10 NOTE — H&P ADULT - ASSESSMENT
77 y/o Male resident at [facility] with a PMHx of Gl bleed, dysphagia, unspecific arrythmia, COPD, Dementia, DM type II, Afib, DVT(on Eliquis), HTN, Osteomyelitis of left leg, PVD, prior PNA/past sepsis, recently discharged form  5/10 for sepsis/ UTI/ UGIB(gastric ulcer) and PEG tube, now   presents to ED BIBEMS sent in from [facility] for  vomiting burgundy colored, and fever of 102F at facility. Patient had dementia and all hx obtianed from chart. Currently with black post hemetemesis around mouth and pale. ROS is unattainable.      admit to med surge     1-sepsis secondary to UTI, PNA  -pna possibly aspiration?  -imaging suspicous for pna  -UA positive w/ hx of ESBL- start meropemen  -ID consult    2-UGIB w/ coffe ground hematemesis  -gi consult  -protonix drip  -hold all anticoagulation  -HOld all feeds  -c/w iv fluids d5ns@85cc/hr    3-afib/dvt  -hold all AC, risks outweight the benefits    4-hx of dysphagia  -aspiration precautions  -45 degree head elevations, especially since recent upper gib    5-dvt prophy-scds b/l

## 2019-06-10 NOTE — H&P ADULT - NSHPPHYSICALEXAM_GEN_ALL_CORE
Vital Signs Last 24 Hrs  T(C): 37.3 (10 Yemi 2019 20:30), Max: 38.4 (10 Yemi 2019 12:00)  T(F): 99.2 (10 Yemi 2019 20:30), Max: 101.2 (10 Yemi 2019 12:00)  HR: 77 (10 Yemi 2019 20:45) (76 - 105)  BP: 107/51 (10 Yemi 2019 20:30) (94/57 - 125/47)  BP(mean): --  RR: 19 (10 Yemi 2019 20:30) (17 - 27)  SpO2: 89% (10 Yemi 2019 20:45) (89% - 98%)    HEENT:   pupils equal and reactive, EOMI, no oropharyngeal lesions, erythema, exudates, oral thrush    NECK:   supple, no carotid bruits, no palpable lymph nodes, no thyromegaly    CV:  +S1, +S2, regular, no murmurs or rubs    RESP:   lungs clear to auscultation bilaterally, no wheezing, rales, rhonchi, good air entry bilaterally    BREAST:  not examined    GI:  abdomen soft, non-tender, non-distended, normal BS, no bruits, no abdominal masses, no palpable masses    RECTAL:  not examined    :  not examined    MSK:   normal muscle tone, no atrophy, no rigidity, no contractions    EXT:   no clubbing, no cyanosis, no edema, no calf pain, swelling or erythema    VASCULAR:  pulses equal and symmetric in the upper and lower extremities    NEURO: awake, not alert or oriented;    SKIN:  no ulcers, lesions or rashes

## 2019-06-10 NOTE — CONSULT NOTE ADULT - ASSESSMENT
79 y/o male with h/o Afib on coumadin, dementia and anemia admitted on 6/10 from snf for vomiting coffee ground emesis and fever to 102F. History per medical record and patient unable to provide history. In ER the patient received meropenem.    1. Febrile syndrome. Possible sepsis. Pyuria. Probable UTI. Possible LLL pneumonia. Prior UTI with ESBL E.coli.  -encephalopathy  -leukocytosis  -obtain BC x 2, urine c/s  -start meropenem 1 gm IV q8h  -reason for abx use and side effects reviewed; monitor BMP  -GI evaluation  -contact isolation  -old chart reviewed to assess prior cultures  -monitor temps  -f/u CBC  -supportive care  2. Other issues:   -care per medicine

## 2019-06-10 NOTE — ED ADULT NURSE REASSESSMENT NOTE - NS ED NURSE REASSESS COMMENT FT1
Patient afebrile at this time. repositioned for comfort, diaper changed, no active vomiting at this time. Admitted, pending admission orders. Will continue monitoring patient.

## 2019-06-10 NOTE — H&P ADULT - HISTORY OF PRESENT ILLNESS
77 y/o Male resident at [facility] with a PMHx of Gl bleed, dysphagia, unspecific arrythmia, COPD, Dementia, DM type II, Afib, DVT(on Eliquis), HTN, Osteomyelitis of left leg, PVD, prior PNA/past sepsis, recently discharged form  5/10 for sepsis/ UTI/ UGIB(gastric ulcer) and PEG tube, now   presents to ED BIBEMS sent in from [facility] for  vomiting burgundy colored, and fever of 102F at facility. Patient had dementia and all hx obtianed from chart. Currently with black post hemetemesis around mouth and pale. ROS is unattainable.

## 2019-06-10 NOTE — ED PROVIDER NOTE - ENMT, MLM
MM mildly dry. Small amounts of bloody emesis. Airway patent, Nasal mucosa clear. Throat has no vesicles, no oropharyngeal exudates and uvula is midline.

## 2019-06-10 NOTE — ED ADULT NURSE NOTE - OBJECTIVE STATEMENT
BIBA with c/o vomiting blood. Patient febrile at ED, rectal temp 101.2, sepsis started. Patient confused, nonverbal. Unable to obtain hx. Hx of GI bleed. Contact iso in place for ESBL of urine. BIBA with c/o vomiting blood. Patient febrile at ED, rectal temp 101.2, sepsis started. Patient confused, nonverbal. Unable to obtain hx. Hx of GI bleed. Contact iso in place for ESBL of urine. Abscess noted to left hip, drainage in place. BIBA with c/o vomiting blood. Patient febrile at ED, rectal temp 101.2, sepsis started. Patient confused, nonverbal. Unable to obtain hx. Hx of GI bleed. Contact iso in place for ESBL of urine. Abscess noted to left hip, drainage in place. Peg tube in place.

## 2019-06-11 LAB
ALBUMIN SERPL ELPH-MCNC: 1.9 G/DL — LOW (ref 3.3–5)
ALP SERPL-CCNC: 69 U/L — SIGNIFICANT CHANGE UP (ref 40–120)
ALT FLD-CCNC: 21 U/L — SIGNIFICANT CHANGE UP (ref 12–78)
ANION GAP SERPL CALC-SCNC: 5 MMOL/L — SIGNIFICANT CHANGE UP (ref 5–17)
AST SERPL-CCNC: 28 U/L — SIGNIFICANT CHANGE UP (ref 15–37)
BASOPHILS # BLD AUTO: 0.03 K/UL — SIGNIFICANT CHANGE UP (ref 0–0.2)
BASOPHILS NFR BLD AUTO: 0.4 % — SIGNIFICANT CHANGE UP (ref 0–2)
BILIRUB SERPL-MCNC: 0.3 MG/DL — SIGNIFICANT CHANGE UP (ref 0.2–1.2)
BUN SERPL-MCNC: 15 MG/DL — SIGNIFICANT CHANGE UP (ref 7–23)
CALCIUM SERPL-MCNC: 7.6 MG/DL — LOW (ref 8.5–10.1)
CHLORIDE SERPL-SCNC: 115 MMOL/L — HIGH (ref 96–108)
CO2 SERPL-SCNC: 25 MMOL/L — SIGNIFICANT CHANGE UP (ref 22–31)
CREAT SERPL-MCNC: 0.58 MG/DL — SIGNIFICANT CHANGE UP (ref 0.5–1.3)
CULTURE RESULTS: NO GROWTH — SIGNIFICANT CHANGE UP
EOSINOPHIL # BLD AUTO: 0.63 K/UL — HIGH (ref 0–0.5)
EOSINOPHIL NFR BLD AUTO: 9.3 % — HIGH (ref 0–6)
GLUCOSE BLDC GLUCOMTR-MCNC: 110 MG/DL — HIGH (ref 70–99)
GLUCOSE BLDC GLUCOMTR-MCNC: 128 MG/DL — HIGH (ref 70–99)
GLUCOSE BLDC GLUCOMTR-MCNC: 133 MG/DL — HIGH (ref 70–99)
GLUCOSE BLDC GLUCOMTR-MCNC: 99 MG/DL — SIGNIFICANT CHANGE UP (ref 70–99)
GLUCOSE SERPL-MCNC: 108 MG/DL — HIGH (ref 70–99)
HBA1C BLD-MCNC: 5.1 % — SIGNIFICANT CHANGE UP (ref 4–5.6)
HCT VFR BLD CALC: 25.8 % — LOW (ref 39–50)
HGB BLD-MCNC: 7.8 G/DL — LOW (ref 13–17)
IMM GRANULOCYTES NFR BLD AUTO: 0.6 % — SIGNIFICANT CHANGE UP (ref 0–1.5)
LYMPHOCYTES # BLD AUTO: 1.13 K/UL — SIGNIFICANT CHANGE UP (ref 1–3.3)
LYMPHOCYTES # BLD AUTO: 16.6 % — SIGNIFICANT CHANGE UP (ref 13–44)
MCHC RBC-ENTMCNC: 24.2 PG — LOW (ref 27–34)
MCHC RBC-ENTMCNC: 30.2 GM/DL — LOW (ref 32–36)
MCV RBC AUTO: 80.1 FL — SIGNIFICANT CHANGE UP (ref 80–100)
MONOCYTES # BLD AUTO: 0.45 K/UL — SIGNIFICANT CHANGE UP (ref 0–0.9)
MONOCYTES NFR BLD AUTO: 6.6 % — SIGNIFICANT CHANGE UP (ref 2–14)
NEUTROPHILS # BLD AUTO: 4.52 K/UL — SIGNIFICANT CHANGE UP (ref 1.8–7.4)
NEUTROPHILS NFR BLD AUTO: 66.5 % — SIGNIFICANT CHANGE UP (ref 43–77)
PLATELET # BLD AUTO: 214 K/UL — SIGNIFICANT CHANGE UP (ref 150–400)
POTASSIUM SERPL-MCNC: 3.9 MMOL/L — SIGNIFICANT CHANGE UP (ref 3.5–5.3)
POTASSIUM SERPL-SCNC: 3.9 MMOL/L — SIGNIFICANT CHANGE UP (ref 3.5–5.3)
PROT SERPL-MCNC: 5.9 GM/DL — LOW (ref 6–8.3)
RBC # BLD: 3.22 M/UL — LOW (ref 4.2–5.8)
RBC # FLD: 18.3 % — HIGH (ref 10.3–14.5)
SODIUM SERPL-SCNC: 145 MMOL/L — SIGNIFICANT CHANGE UP (ref 135–145)
SPECIMEN SOURCE: SIGNIFICANT CHANGE UP
WBC # BLD: 6.8 K/UL — SIGNIFICANT CHANGE UP (ref 3.8–10.5)
WBC # FLD AUTO: 6.8 K/UL — SIGNIFICANT CHANGE UP (ref 3.8–10.5)

## 2019-06-11 RX ORDER — SODIUM CHLORIDE 9 MG/ML
1000 INJECTION, SOLUTION INTRAVENOUS
Refills: 0 | Status: DISCONTINUED | OUTPATIENT
Start: 2019-06-11 | End: 2019-06-12

## 2019-06-11 RX ORDER — SODIUM CHLORIDE 9 MG/ML
200 INJECTION INTRAMUSCULAR; INTRAVENOUS; SUBCUTANEOUS ONCE
Refills: 0 | Status: COMPLETED | OUTPATIENT
Start: 2019-06-11 | End: 2019-06-11

## 2019-06-11 RX ADMIN — PANTOPRAZOLE SODIUM 10 MG/HR: 20 TABLET, DELAYED RELEASE ORAL at 01:20

## 2019-06-11 RX ADMIN — SODIUM CHLORIDE 100 MILLILITER(S): 9 INJECTION, SOLUTION INTRAVENOUS at 06:42

## 2019-06-11 RX ADMIN — SODIUM CHLORIDE 1200 MILLILITER(S): 9 INJECTION INTRAMUSCULAR; INTRAVENOUS; SUBCUTANEOUS at 06:30

## 2019-06-11 RX ADMIN — PANTOPRAZOLE SODIUM 10 MG/HR: 20 TABLET, DELAYED RELEASE ORAL at 09:28

## 2019-06-11 RX ADMIN — Medication 3 MILLILITER(S): at 02:45

## 2019-06-11 RX ADMIN — MEROPENEM 100 MILLIGRAM(S): 1 INJECTION INTRAVENOUS at 13:49

## 2019-06-11 RX ADMIN — MEROPENEM 100 MILLIGRAM(S): 1 INJECTION INTRAVENOUS at 05:23

## 2019-06-11 RX ADMIN — SODIUM CHLORIDE 100 MILLILITER(S): 9 INJECTION, SOLUTION INTRAVENOUS at 17:20

## 2019-06-11 RX ADMIN — MEROPENEM 100 MILLIGRAM(S): 1 INJECTION INTRAVENOUS at 21:32

## 2019-06-11 RX ADMIN — Medication 3 MILLILITER(S): at 21:32

## 2019-06-11 RX ADMIN — Medication 3 MILLILITER(S): at 08:50

## 2019-06-11 RX ADMIN — SODIUM CHLORIDE 100 MILLILITER(S): 9 INJECTION, SOLUTION INTRAVENOUS at 09:28

## 2019-06-11 NOTE — PROGRESS NOTE ADULT - SUBJECTIVE AND OBJECTIVE BOX
Assessment and Plan:      CHIEF COMPLAINT:    SUBJECTIVE:     REVIEW OF SYSTEMS:    CONSTITUTIONAL: No weakness, fevers or chills  EYES/ENT: No visual changes;  No vertigo or throat pain   NECK: No pain or stiffness  RESPIRATORY: No cough, wheezing, hemoptysis; No shortness of breath  CARDIOVASCULAR: No chest pain or palpitations  GASTROINTESTINAL: No abdominal or epigastric pain. No nausea, vomiting, or hematemesis; No diarrhea or constipation. No melena or hematochezia.  GENITOURINARY: No dysuria, frequency or hematuria  NEUROLOGICAL: No numbness or weakness  SKIN: No itching, burning, rashes, or lesions   All other review of systems is negative unless indicated above    Vital Signs Last 24 Hrs  T(C): 37.1 (11 Jun 2019 06:00), Max: 38.4 (10 Yemi 2019 12:00)  T(F): 98.8 (11 Jun 2019 06:00), Max: 101.2 (10 Yemi 2019 12:00)  HR: 74 (11 Jun 2019 08:47) (74 - 105)  BP: 106/37 (11 Jun 2019 08:47) (94/57 - 125/47)  BP(mean): --  RR: 18 (11 Jun 2019 09:00) (17 - 27)  SpO2: 97% (11 Jun 2019 09:00) (89% - 100%)    I&O's Summary    10 Yemi 2019 07:01  -  11 Jun 2019 07:00  --------------------------------------------------------  IN: 0 mL / OUT: 1 mL / NET: -1 mL        CAPILLARY BLOOD GLUCOSE      POCT Blood Glucose.: 110 mg/dL (11 Jun 2019 06:47)  POCT Blood Glucose.: 113 mg/dL (10 Yemi 2019 23:38)      PHYSICAL EXAM:    Constitutional: NAD, awake and alert, well-developed  HEENT: PERR, EOMI, Normal Hearing, MMM  Neck: Soft and supple, No LAD, No JVD  Respiratory: Breath sounds are clear bilaterally, No wheezing, rales or rhonchi  Cardiovascular: S1 and S2, regular rate and rhythm, no Murmurs, gallops or rubs  Gastrointestinal: Bowel Sounds present, soft, nontender, nondistended, no guarding, no rebound  Extremities: No peripheral edema  Vascular: 2+ peripheral pulses  Neurological: Alert but not oriented no focal deficits  Musculoskeletal: 5/5 strength b/l upper and lower extremities  Skin: No rashes    MEDICATIONS:  MEDICATIONS  (STANDING):  ALBUTerol/ipratropium for Nebulization 3 milliLiter(s) Nebulizer every 6 hours  dextrose 5% + sodium chloride 0.9%. 1000 milliLiter(s) (100 mL/Hr) IV Continuous <Continuous>  meropenem  IVPB      meropenem  IVPB 1000 milliGRAM(s) IV Intermittent every 8 hours  pantoprazole Infusion 8 mG/Hr (10 mL/Hr) IV Continuous <Continuous>      LABS: All Labs Reviewed:                        7.8    6.80  )-----------( 214      ( 11 Jun 2019 07:23 )             25.8     06-11    145  |  115<H>  |  15  ----------------------------<  108<H>  3.9   |  25  |  0.58    Ca    7.6<L>      11 Jun 2019 07:23    TPro  5.9<L>  /  Alb  1.9<L>  /  TBili  0.3  /  DBili  x   /  AST  28  /  ALT  21  /  AlkPhos  69  06-11    PT/INR - ( 10 Yemi 2019 12:25 )   PT: 15.9 sec;   INR: 1.42 ratio         PTT - ( 10 Yemi 2019 12:25 )  PTT:27.9 sec  CARDIAC MARKERS ( 10 Yemi 2019 17:55 )  <0.015 ng/mL / x     / x     / x     / x      CARDIAC MARKERS ( 10 Yemi 2019 15:23 )  <0.015 ng/mL / x     / x     / x     / x      CARDIAC MARKERS ( 10 Yemi 2019 12:25 )  <0.015 ng/mL / x     / x     / x     / x          Blood Culture: 06-10 @ 12:33  Organism --  Gram Stain Blood -- Gram Stain --  Specimen Source .Urine None  Culture-Blood --        Assessment:    79 y/o Male resident at [facility] with a PMHx of Gl bleed, dysphagia, unspecific arrythmia, COPD, Dementia, DM type II, Afib, DVT(on Eliquis), HTN, Osteomyelitis of left leg, PVD, prior PNA/past sepsis, recently discharged form HH 5/10 for sepsis/ UTI/ UGIB(gastric ulcer) and PEG tube, now   presents to ED BIBEMS sent in from [facility] for  vomiting burgundy colored, and fever of 102F at facility. Patient had dementia and all hx obtianed from chart. Currently with black post hemetemesis around mouth and pale. ROS is unattainable.        1-sepsis secondary to UTI, PNA  -pna possibly aspiration?  -imaging suspicous for pna  -UA positive w/ hx of ESBL- c/w meropemen  -ID consult apprecaited    2-UGIB w/ coffee ground hematemesis  -gi consult  -protonix drip  -hold all anticoagulation  -HOld all feeds till plan from GI for scope  -c/w iv fluids d5ns@85cc/hr    3-afib/dvt  -hold all AC, risks outweight the benefits    4-hx of dysphagia  -aspiration precautions  -45 degree head elevations, especially since recent upper gib    5-dvt prophy-scds b/l     Patient has MOLST form in chart, and is full code

## 2019-06-11 NOTE — CONSULT NOTE ADULT - ASSESSMENT
78M s/p PEG placement 5/2019 (otherwise negative EGD except hiatal hernia) now presenting with sepsis, hematemesis. The hematemesis has since stopped and there is no melena.   Likely UGIB due to esophagitis, vs PUD. PEG site issues can also cause gastric bleeding.  Seems to have stopped bleeding now in hospital.    Recommend:  -trend CBC  -favor conservative approach  -BID PPI  -if ongoing bleeding will then need EGD  -d/w RN  -d/w Dr. Diaz at bedside

## 2019-06-11 NOTE — PROVIDER CONTACT NOTE (OTHER) - SITUATION
Pt received from ED. pt baseline confused/nonverbal. pt presented with 84% on RA.     other vital signs: 99.2 rectally, HR 79, 107/51, RR 19

## 2019-06-11 NOTE — PROGRESS NOTE ADULT - SUBJECTIVE AND OBJECTIVE BOX
Date of service: 19 @ 12:10    Lying in bed in NAD  Alert and confused  Has dry cough  No SOB at rest  Fever is down    ROS: unobtainable    MEDICATIONS  (STANDING):  ALBUTerol/ipratropium for Nebulization 3 milliLiter(s) Nebulizer every 6 hours  dextrose 5% + sodium chloride 0.9%. 1000 milliLiter(s) (100 mL/Hr) IV Continuous <Continuous>  meropenem  IVPB      meropenem  IVPB 1000 milliGRAM(s) IV Intermittent every 8 hours  pantoprazole Infusion 8 mG/Hr (10 mL/Hr) IV Continuous <Continuous>      Vital Signs Last 24 Hrs  T(C): 37.1 (2019 11:10), Max: 37.4 (10 Yemi 2019 14:38)  T(F): 98.7 (2019 11:10), Max: 99.4 (10 Yemi 2019 14:38)  HR: 74 (2019 11:10) (74 - 96)  BP: 95/75 (2019 11:10) (94/57 - 125/47)  BP(mean): --  RR: 20 (2019 11:10) (18 - 27)  SpO2: 100% (2019 11:10) (89% - 100%)    Physical Exam:      Constitutional: frail looking  HEENT: NC/AT, EOMI, PERRLA, conjunctivae clear  Neck: supple; thyroid not palpable  Back: no tenderness  Respiratory: respiratory effort normal; crackles at bases  Cardiovascular: S1S2 regular, no murmurs  Abdomen: soft, not tender, not distended, positive BS; PEG in place  Genitourinary: no suprapubic tenderness  Lymphatic: no LN palpable  Musculoskeletal: no muscle tenderness, no joint swelling or tenderness  Extremities: no pedal edema  Neurological/ Psychiatric: confused, moving all extremities  Skin: no rashes; no palpable lesions    Labs: reviewed                        7.8    6.80  )-----------( 214      ( 2019 07:23 )             25.8     06-11    145  |  115<H>  |  15  ----------------------------<  108<H>  3.9   |  25  |  0.58    Ca    7.6<L>      2019 07:23    TPro  5.9<L>  /  Alb  1.9<L>  /  TBili  0.3  /  DBili  x   /  AST  28  /  ALT  21  /  AlkPhos  69  06-11                        10.2   14.00 )-----------( 312      ( 10 Yemi 2019 12:25 )             33.9     06-10    138  |  105  |  34<H>  ----------------------------<  130<H>  4.8   |  24  |  0.83    Ca    8.5      10 Yemi 2019 12:25    TPro  7.1  /  Alb  2.3<L>  /  TBili  0.2  /  DBili  x   /  AST  22  /  ALT  25  /  AlkPhos  98  06-10     LIVER FUNCTIONS - ( 10 Yemi 2019 12:25 )  Alb: 2.3 g/dL / Pro: 7.1 gm/dL / ALK PHOS: 98 U/L / ALT: 25 U/L / AST: 22 U/L / GGT: x           Urinalysis Basic - ( 10 Yemi 2019 12:33 )    Color: Yellow / Appearance: Clear / S.010 / pH: x  Gluc: x / Ketone: Negative  / Bili: Negative / Urobili: Negative mg/dL   Blood: x / Protein: 30 mg/dL / Nitrite: Negative   Leuk Esterase: Moderate / RBC: 3-5 /HPF / WBC 26-50   Sq Epi: x / Non Sq Epi: x / Bacteria: Moderate    Culture - Urine (19 @ 17:34)    -  Tobramycin: S <=4    -  Trimethoprim/Sulfamethoxazole: R >2/38    -  Ampicillin/Sulbactam: R <=8/4    -  Aztreonam: R >16    -  Cefazolin: R >16 For uncomplicated UTI with K. pneumoniae, E. coli, or P. mirablis: HOWIE <=16 is sensitive and HOWIE >=32 is resistant. This also predicts results for oral agents cefaclor, cefdinir, cefpodoxime, cefprozil, cefuroxime axetil, cephalexin and locarbef for uncomplicated UTI. Note that some isolates may be susceptible to these agents while testing resistant to cefazolin.    -  Cefepime: R >16    -  Cefoxitin: R >16    -  Ceftriaxone: R >32 Enterobacter, Citrobacter, and Serratia may develop resistance during prolonged therapy    -  Ciprofloxacin: R >2    -  Ertapenem: S <=1    -  Gentamicin: S <=4    -  Levofloxacin: R >4    -  Imipenem: S <=1    -  Meropenem: S <=1    -  Nitrofurantoin: S <=32 Should not be used to treat pyelonephritis    -  Piperacillin/Tazobactam: R <=16    -  Tigecycline: S <=2    -  Amikacin: S <=16    -  Ampicillin: R >16 These ampicillin results predict results for amoxicillin    Specimen Source: .Urine Clean Catch (Midstream)    Culture Results:   >100,000 CFU/ml Escherichia coli ESBL    Organism Identification: Escherichia coli ESBL    Organism: Escherichia coli ESBL    Method Type: HOWIE      Culture - Urine (collected 10 Yemi 2019 12:33)  Source: .Urine None  Final Report (2019 08:27):    No growth    Radiology: all available radiological tests reviewed    < from: Xray Chest 1 View-PORTABLE IMMEDIATE (06.10.19 @ 13:33) >  Minimal residual left lower lobe infiltrate?    < end of copied text >      Advanced directives addressed: full resuscitation

## 2019-06-11 NOTE — PROGRESS NOTE ADULT - SUBJECTIVE AND OBJECTIVE BOX
called to evaluate for BP 85/50  96/46     77 y/o Male resident at [facility] with a PMHx of Gl bleed, dysphagia, unspecific arrythmia, COPD, Dementia, DM type II, Afib, DVT(on Eliquis), HTN, Osteomyelitis of left leg, PVD, prior PNA/past sepsis, recently discharged form  5/10 for sepsis/ UTI/ UGIB(gastric ulcer) and PEG tube, now   presents to ED BIBEMS sent in from [facility] for  vomiting burgundy colored, and fever of 102F at facility.  Patient has advanced dementia and is non communicative. On antibiotics for Urosepsis with ESBL.    Urine output monitored via diapers and described as adequate  Had smear of ? melena upon cleaning this am .  Lungs with scattered rhonchi which clear and shift with cough  Heart RR& R 90/min   o2 sat 92%

## 2019-06-11 NOTE — CONSULT NOTE ADULT - SUBJECTIVE AND OBJECTIVE BOX
77 y/o Male resident at [facility] with a PMHx of Gl bleed, dysphagia, arrythmia, COPD, Dementia, DM type II, Afib, DVT(on Eliquis), HTN, Osteomyelitis of left leg, PVD, prior PNA/past sepsis, recently discharged from  5/10 for sepsis/ UTI/ UGIB (negative EGD) and PEG tube placed, now presents to ED BIBEMS sent in from [facility] for  vomiting burgundy colored, and fever of 102F at facility. Patient had dementia and all hx obtained from chart.   No further bleeding overnight and no melena reported by RN.    PAST MEDICAL & SURGICAL HISTORY:  Osteomyelitis of left leg  Osteoarthritis  HTN (hypertension)  DVT (deep venous thrombosis)  PVD (peripheral vascular disease)  DM (diabetes mellitus)  COPD (chronic obstructive pulmonary disease)  Anemia  Dementia  No significant past surgical history    MEDICATIONS  (STANDING):  ALBUTerol/ipratropium for Nebulization 3 milliLiter(s) Nebulizer every 6 hours  dextrose 5% + sodium chloride 0.9%. 1000 milliLiter(s) (100 mL/Hr) IV Continuous <Continuous>  meropenem  IVPB      meropenem  IVPB 1000 milliGRAM(s) IV Intermittent every 8 hours  pantoprazole Infusion 8 mG/Hr (10 mL/Hr) IV Continuous <Continuous>    Allergies    No Known Allergies    Intolerances    FAMILY HISTORY:  No pertinent family history in first degree relatives    Soc: unobtainable    ROS: unobtainable    Physical Exam:     Vital Signs Last 24 Hrs  T(C): 37.1 (11 Jun 2019 11:10), Max: 37.4 (10 Yemi 2019 14:38)  T(F): 98.7 (11 Jun 2019 11:10), Max: 99.4 (10 Yemi 2019 14:38)  HR: 74 (11 Jun 2019 11:10) (74 - 95)  BP: 95/75 (11 Jun 2019 11:10) (94/57 - 125/47)  BP(mean): --  RR: 20 (11 Jun 2019 11:10) (18 - 27)  SpO2: 100% (11 Jun 2019 11:10) (89% - 100%)    	HEENT:   pupils equal and reactive, EOMI, no oropharyngeal lesions, erythema, exudates, oral thrush    	NECK:   supple, no carotid bruits, no palpable lymph nodes, no thyromegaly    	CV:  +S1, +S2, regular, no murmurs or rubs    	RESP:   lungs clear to auscultation bilaterally, no wheezing, rales, rhonchi, good air entry bilaterally    	GI:  abdomen soft, non-tender, non-distended, normal BS, no bruits, no abdominal masses, no palpable masses, PEG with ext bumper at 2 cm    	MSK:   normal muscle tone, no atrophy, no rigidity, no contractions    	EXT:   no clubbing, no cyanosis, no edema, no calf pain, swelling or erythema    	VASCULAR:  pulses equal and symmetric in the upper and lower extremities    	NEURO: awake, not alert or oriented    	SKIN:  no ulcers, lesions or rashes                          7.8    6.80  )-----------( 214      ( 11 Jun 2019 07:23 )             25.8     06-11    145  |  115<H>  |  15  ----------------------------<  108<H>  3.9   |  25  |  0.58    Ca    7.6<L>      11 Jun 2019 07:23    TPro  5.9<L>  /  Alb  1.9<L>  /  TBili  0.3  /  DBili  x   /  AST  28  /  ALT  21  /  AlkPhos  69  06-11

## 2019-06-12 LAB
ANION GAP SERPL CALC-SCNC: 5 MMOL/L — SIGNIFICANT CHANGE UP (ref 5–17)
BUN SERPL-MCNC: 9 MG/DL — SIGNIFICANT CHANGE UP (ref 7–23)
CALCIUM SERPL-MCNC: 7.8 MG/DL — LOW (ref 8.5–10.1)
CHLORIDE SERPL-SCNC: 115 MMOL/L — HIGH (ref 96–108)
CO2 SERPL-SCNC: 27 MMOL/L — SIGNIFICANT CHANGE UP (ref 22–31)
CREAT SERPL-MCNC: 0.69 MG/DL — SIGNIFICANT CHANGE UP (ref 0.5–1.3)
GLUCOSE BLDC GLUCOMTR-MCNC: 85 MG/DL — SIGNIFICANT CHANGE UP (ref 70–99)
GLUCOSE SERPL-MCNC: 90 MG/DL — SIGNIFICANT CHANGE UP (ref 70–99)
HCT VFR BLD CALC: 27.9 % — LOW (ref 39–50)
HGB BLD-MCNC: 8.3 G/DL — LOW (ref 13–17)
MCHC RBC-ENTMCNC: 24.1 PG — LOW (ref 27–34)
MCHC RBC-ENTMCNC: 29.7 GM/DL — LOW (ref 32–36)
MCV RBC AUTO: 80.9 FL — SIGNIFICANT CHANGE UP (ref 80–100)
PLATELET # BLD AUTO: 227 K/UL — SIGNIFICANT CHANGE UP (ref 150–400)
POTASSIUM SERPL-MCNC: 3.8 MMOL/L — SIGNIFICANT CHANGE UP (ref 3.5–5.3)
POTASSIUM SERPL-SCNC: 3.8 MMOL/L — SIGNIFICANT CHANGE UP (ref 3.5–5.3)
RBC # BLD: 3.45 M/UL — LOW (ref 4.2–5.8)
RBC # FLD: 18.4 % — HIGH (ref 10.3–14.5)
SODIUM SERPL-SCNC: 147 MMOL/L — HIGH (ref 135–145)
WBC # BLD: 6.2 K/UL — SIGNIFICANT CHANGE UP (ref 3.8–10.5)
WBC # FLD AUTO: 6.2 K/UL — SIGNIFICANT CHANGE UP (ref 3.8–10.5)

## 2019-06-12 RX ORDER — PANTOPRAZOLE SODIUM 20 MG/1
40 TABLET, DELAYED RELEASE ORAL
Refills: 0 | Status: DISCONTINUED | OUTPATIENT
Start: 2019-06-12 | End: 2019-06-14

## 2019-06-12 RX ADMIN — MEROPENEM 100 MILLIGRAM(S): 1 INJECTION INTRAVENOUS at 13:43

## 2019-06-12 RX ADMIN — Medication 3 MILLILITER(S): at 01:09

## 2019-06-12 RX ADMIN — Medication 3 MILLILITER(S): at 07:39

## 2019-06-12 RX ADMIN — MEROPENEM 100 MILLIGRAM(S): 1 INJECTION INTRAVENOUS at 05:07

## 2019-06-12 RX ADMIN — PANTOPRAZOLE SODIUM 40 MILLIGRAM(S): 20 TABLET, DELAYED RELEASE ORAL at 17:49

## 2019-06-12 RX ADMIN — Medication 3 MILLILITER(S): at 13:41

## 2019-06-12 RX ADMIN — MEROPENEM 100 MILLIGRAM(S): 1 INJECTION INTRAVENOUS at 23:16

## 2019-06-12 NOTE — DIETITIAN INITIAL EVALUATION ADULT. - ENERGY NEEDS
Ht.66      "        Wt. 129.8   #              BMI 20.9                    #               Pt is at   91 %  IBW

## 2019-06-12 NOTE — DIETITIAN INITIAL EVALUATION ADULT. - NS AS NUTRI INTERV ENTERAL NUTRITION
1) Initiate Jevity 1.5 @ 30cc/hr and increase 10cc Q6 hours until goal rate of 50cc (total volume 1100ml) + 2 packs of Prosource TF (22gm protein)- TF + Prosource will provide 1730 calories/92gm protein. 2) Additional water flush @ 45cc h (total volume 990ml + free water from TF- total volume 1826ml/daily).

## 2019-06-12 NOTE — DIETITIAN INITIAL EVALUATION ADULT. - NS AS NUTRI DX NUTRIENT
Pt meets criteria for severe protein/calorie malnutrition in context of chronic acute illness secondary to significant weight loss, severe muscle/fat wasting./Malnutrition

## 2019-06-12 NOTE — PROGRESS NOTE ADULT - SUBJECTIVE AND OBJECTIVE BOX
GI    no events  TF remain on hold   no reported hematemesis  no melena  hgb stable    ROS: unobtainable    Physical Exam:   Vital Signs Last 24 Hrs  T(C): 36.2 (12 Jun 2019 06:20), Max: 37.1 (11 Jun 2019 11:10)  T(F): 97.1 (12 Jun 2019 06:20), Max: 98.7 (11 Jun 2019 11:10)  HR: 85 (12 Jun 2019 07:39) (74 - 88)  BP: 92/41 (12 Jun 2019 06:20) (92/41 - 116/70)  BP(mean): --  RR: 19 (12 Jun 2019 06:20) (17 - 20)  SpO2: 94% (12 Jun 2019 06:20) (93% - 100%)    	HEENT:   pupils equal and reactive, EOMI, no oropharyngeal lesions, erythema, exudates, oral thrush    	NECK:   supple, no carotid bruits, no palpable lymph nodes, no thyromegaly    	CV:  +S1, +S2, regular, no murmurs or rubs    	RESP:   lungs clear to auscultation bilaterally, no wheezing, rales, rhonchi, good air entry bilaterally    	GI:  abdomen soft, non-tender, non-distended, normal BS, no bruits, no abdominal masses, no palpable masses, PEG with ext bumper at 2 cm    	MSK:   normal muscle tone, no atrophy, no rigidity, no contractions    	EXT:   no clubbing, no cyanosis, no edema, no calf pain, swelling or erythema    	VASCULAR:  pulses equal and symmetric in the upper and lower extremities    	NEURO: awake, not alert or oriented    	SKIN:  no ulcers, lesions or rashes                                              8.3    6.20  )-----------( 227      ( 12 Jun 2019 07:28 )             27.9

## 2019-06-12 NOTE — CHART NOTE - NSCHARTNOTEFT_GEN_A_CORE
Upon Nutritional Assessment by the Registered Dietitian your patient was determined to meet criteria / has evidence of the following diagnosis/diagnoses:          [ ]  Mild Protein Calorie Malnutrition        [ ]  Moderate Protein Calorie Malnutrition        [x ] Severe Protein Calorie Malnutrition        [ ] Unspecified Protein Calorie Malnutrition        [ ] Underweight / BMI <19        [ ] Morbid Obesity / BMI > 40      Findings as based on:  •  Comprehensive nutrition assessment and consultation  •  Calorie counts (nutrient intake analysis)  •  Food acceptance and intake status from observations by staff  •  Follow up  •  Patient education  •  Intervention secondary to interdisciplinary rounds  •   concerns    *****BMI 20.9 NFPE indicates severe muscle wasting: temporal, clavicle, scapula, calves, knees, and quads. Severe fat loss: ocular, triceps, ribs, buccal. Pt meets criteria for severe protein/calorie malnutrition in context of chronic acute illness secondary to significant weight loss, severe muscle/fat wasting.    Treatment:        1) Initiate Jevity 1.5 @ 30cc/hr and increase 10cc Q6 hours until goal rate of 50cc (total volume 1100ml) + 2 packs of Prosource TF (22gm protein)- TF + Prosource will provide 1730 calories/92gm protein.   2) Additional water flush @ 45cc h (total volume 990ml + free water from TF- total volume 1826ml/daily).   3) maintain aspiration precautions back of bed >35 degrees.   4) monitor labs/lytes replace as needed.   5) monitor weekly weights     PROVIDER Section:     By signing this assessment you are acknowledging and agree with the diagnosis/diagnoses assigned by the Registered Dietitian    Comments:

## 2019-06-12 NOTE — DIETITIAN INITIAL EVALUATION ADULT. - PERTINENT LABORATORY DATA
06-12 Na147 mmol/L<H> Glu 90 mg/dL K+ 3.8 mmol/L Cr  0.69 mg/dL BUN 9 mg/dL Phos n/a   Alb n/a   PAB n/a

## 2019-06-12 NOTE — PROGRESS NOTE ADULT - SUBJECTIVE AND OBJECTIVE BOX
CHIEF COMPLAINT/Diagnosis: sepsis/ PNA/ UGI w/ coffee ground emesis/afib    SUBJECTIVE: patient at baseline, dementia,     REVIEW OF SYSTEMS:  unattainable secondary to dementia    Vital Signs Last 24 Hrs  T(C): 36.4 (12 Jun 2019 16:28), Max: 37.1 (12 Jun 2019 12:10)  T(F): 97.6 (12 Jun 2019 16:28), Max: 98.7 (12 Jun 2019 12:10)  HR: 78 (12 Jun 2019 16:28) (77 - 88)  BP: 102/57 (12 Jun 2019 16:28) (92/41 - 116/70)  BP(mean): --  RR: 18 (12 Jun 2019 16:28) (17 - 19)  SpO2: 97% (12 Jun 2019 16:28) (93% - 97%)    I&O's Summary    11 Jun 2019 07:01  -  12 Jun 2019 07:00  --------------------------------------------------------  IN: 1150 mL / OUT: 100 mL / NET: 1050 mL    12 Jun 2019 07:01  -  12 Jun 2019 17:32  --------------------------------------------------------  IN: 769 mL / OUT: 0 mL / NET: 769 mL        CAPILLARY BLOOD GLUCOSE      POCT Blood Glucose.: 99 mg/dL (11 Jun 2019 21:50)      PHYSICAL EXAM:    Constitutional: NAD, awake and alert, well-developed  HEENT: PERR, EOMI, Normal Hearing, MMM  Neck: Soft and supple, No LAD, No JVD  Respiratory: Breath sounds are clear bilaterally, No wheezing, rales or rhonchi  Cardiovascular: S1 and S2, regular rate and rhythm, no Murmurs, gallops or rubs  Gastrointestinal: Bowel Sounds present, soft, nontender, nondistended, no guarding, no rebound  Extremities: No peripheral edema  Vascular: 2+ peripheral pulses  Neurological:  no focal deficits  Musculoskeletal: 5/5 strength b/l upper and lower extremities  Skin: No rashes    MEDICATIONS:  MEDICATIONS  (STANDING):  ALBUTerol/ipratropium for Nebulization 3 milliLiter(s) Nebulizer every 6 hours  meropenem  IVPB      meropenem  IVPB 1000 milliGRAM(s) IV Intermittent every 8 hours  pantoprazole  Injectable 40 milliGRAM(s) IV Push two times a day      LABS: All Labs Reviewed:                        8.3    6.20  )-----------( 227      ( 12 Jun 2019 07:28 )             27.9     06-12    147<H>  |  115<H>  |  9   ----------------------------<  90  3.8   |  27  |  0.69    Ca    7.8<L>      12 Jun 2019 07:28    TPro  5.9<L>  /  Alb  1.9<L>  /  TBili  0.3  /  DBili  x   /  AST  28  /  ALT  21  /  AlkPhos  69  06-11      CARDIAC MARKERS ( 10 Yemi 2019 17:55 )  <0.015 ng/mL / x     / x     / x     / x          Blood Culture: 06-10 @ 12:33  Organism --  Gram Stain Blood -- Gram Stain --  Specimen Source .Urine None  Culture-Blood --    06-10 @ 12:25  Organism --  Gram Stain Blood -- Gram Stain --  Specimen Source .Blood None  Culture-Blood --          Assessment:    77 y/o Male resident at [facility] with a PMHx of Gl bleed, dysphagia, unspecific arrythmia, COPD, Dementia, DM type II, Afib, DVT(on Eliquis), HTN, Osteomyelitis of left leg, PVD, prior PNA/past sepsis, recently discharged form  5/10 for sepsis/ UTI/ UGIB(gastric ulcer) and PEG tube, now   presents to ED BIBEMS sent in from [facility] for  vomiting burgundy colored, and fever of 102F at facility. Patient had dementia and all hx obtianed from chart. Currently with black post hemetemesis around mouth and pale. ROS is unattainable.        1-sepsis secondary to PNA  -pna possibly aspiration?  -imaging suspicous for pna  -hx of esbl c/w meropemen  -ID consult apprecaited    2-UGIB w/ coffee ground hematemesis  -gi consult appreciated  -conservative approace will be best in this patient with advanced dementia  -no further bleeding since arrival to ED.  -c./w IV bid protonix, consider taper to oral protonix if no bleeding   -monitor nex 24 hours on restart tube feeds  -hb stable    3-afib/dvt  -hold all AC, risks outweight the benefits    4-hx of dysphagia  -aspiration precautions  -45 degree head elevations, especially since recent upper gib    5-dvt prophy-scds b/l     Patient has MOLST form in chart, and is full code

## 2019-06-12 NOTE — PROGRESS NOTE ADULT - SUBJECTIVE AND OBJECTIVE BOX
Date of service: 19 @ 12:34    Lying in bed in NAD  Alert; non verbal  Has dry cough  No SOB at rest  No further bleeding noted    ROS: no fever or chills; unobtainable    MEDICATIONS  (STANDING):  ALBUTerol/ipratropium for Nebulization 3 milliLiter(s) Nebulizer every 6 hours  meropenem  IVPB      meropenem  IVPB 1000 milliGRAM(s) IV Intermittent every 8 hours  pantoprazole  Injectable 40 milliGRAM(s) IV Push two times a day      Vital Signs Last 24 Hrs  T(C): 37.1 (2019 12:10), Max: 37.1 (2019 12:10)  T(F): 98.7 (2019 12:10), Max: 98.7 (2019 12:10)  HR: 82 (2019 12:10) (77 - 88)  BP: 106/48 (2019 12:10) (92/41 - 116/70)  BP(mean): --  RR: 18 (2019 12:10) (17 - 19)  SpO2: 96% (2019 12:10) (93% - 96%)    Physical Exam:      Constitutional: frail looking  HEENT: NC/AT, EOMI, PERRLA, conjunctivae clear  Neck: supple; thyroid not palpable  Back: no tenderness  Respiratory: respiratory effort normal; crackles at bases  Cardiovascular: S1S2 regular, no murmurs  Abdomen: soft, not tender, not distended, positive BS; PEG in place  Genitourinary: no suprapubic tenderness  Lymphatic: no LN palpable  Musculoskeletal: no muscle tenderness, no joint swelling or tenderness  Extremities: no pedal edema  Neurological/ Psychiatric: confused, moving all extremities  Skin: no rashes; no palpable lesions    Labs: reviewed                        7.8    6.80  )-----------( 214      ( 2019 07:23 )             25.8     06-11    145  |  115<H>  |  15  ----------------------------<  108<H>  3.9   |  25  |  0.58    Ca    7.6<L>      2019 07:23    TPro  5.9<L>  /  Alb  1.9<L>  /  TBili  0.3  /  DBili  x   /  AST  28  /  ALT  21  /  AlkPhos  69  06-11                        10.2   14.00 )-----------( 312      ( 10 Yemi 2019 12:25 )             33.9     06-10    138  |  105  |  34<H>  ----------------------------<  130<H>  4.8   |  24  |  0.83    Ca    8.5      10 Yemi 2019 12:25    TPro  7.1  /  Alb  2.3<L>  /  TBili  0.2  /  DBili  x   /  AST  22  /  ALT  25  /  AlkPhos  98  06-10     LIVER FUNCTIONS - ( 10 Yemi 2019 12:25 )  Alb: 2.3 g/dL / Pro: 7.1 gm/dL / ALK PHOS: 98 U/L / ALT: 25 U/L / AST: 22 U/L / GGT: x           Urinalysis Basic - ( 10 Yemi 2019 12:33 )    Color: Yellow / Appearance: Clear / S.010 / pH: x  Gluc: x / Ketone: Negative  / Bili: Negative / Urobili: Negative mg/dL   Blood: x / Protein: 30 mg/dL / Nitrite: Negative   Leuk Esterase: Moderate / RBC: 3-5 /HPF / WBC 26-50   Sq Epi: x / Non Sq Epi: x / Bacteria: Moderate    Culture - Urine (19 @ 17:34)    -  Tobramycin: S <=4    -  Trimethoprim/Sulfamethoxazole: R >2/38    -  Ampicillin/Sulbactam: R <=8/4    -  Aztreonam: R >16    -  Cefazolin: R >16 For uncomplicated UTI with K. pneumoniae, E. coli, or P. mirablis: HOWIE <=16 is sensitive and HOWIE >=32 is resistant. This also predicts results for oral agents cefaclor, cefdinir, cefpodoxime, cefprozil, cefuroxime axetil, cephalexin and locarbef for uncomplicated UTI. Note that some isolates may be susceptible to these agents while testing resistant to cefazolin.    -  Cefepime: R >16    -  Cefoxitin: R >16    -  Ceftriaxone: R >32 Enterobacter, Citrobacter, and Serratia may develop resistance during prolonged therapy    -  Ciprofloxacin: R >2    -  Ertapenem: S <=1    -  Gentamicin: S <=4    -  Levofloxacin: R >4    -  Imipenem: S <=1    -  Meropenem: S <=1    -  Nitrofurantoin: S <=32 Should not be used to treat pyelonephritis    -  Piperacillin/Tazobactam: R <=16    -  Tigecycline: S <=2    -  Amikacin: S <=16    -  Ampicillin: R >16 These ampicillin results predict results for amoxicillin    Specimen Source: .Urine Clean Catch (Midstream)    Culture Results:   >100,000 CFU/ml Escherichia coli ESBL    Organism Identification: Escherichia coli ESBL    Organism: Escherichia coli ESBL    Method Type: HOWIE      Culture - Urine (collected 10 Yemi 2019 12:33)  Source: .Urine None  Final Report (2019 08:27):    No growth    Radiology: all available radiological tests reviewed    < from: Xray Chest 1 View-PORTABLE IMMEDIATE (06.10.19 @ 13:33) >  Minimal residual left lower lobe infiltrate?    < end of copied text >      Advanced directives addressed: full resuscitation

## 2019-06-12 NOTE — DIETITIAN INITIAL EVALUATION ADULT. - PERTINENT MEDS FT
MEDICATIONS  (STANDING):  ALBUTerol/ipratropium for Nebulization 3 milliLiter(s) Nebulizer every 6 hours  meropenem  IVPB      meropenem  IVPB 1000 milliGRAM(s) IV Intermittent every 8 hours  pantoprazole  Injectable 40 milliGRAM(s) IV Push two times a day    MEDICATIONS  (PRN):  ondansetron Injectable 4 milliGRAM(s) IV Push every 6 hours PRN Nausea

## 2019-06-12 NOTE — DIETITIAN INITIAL EVALUATION ADULT. - OTHER INFO
Nutrition consult for unspecified assessment. 77yo male admitted from [facility] with UTI/Sepsis and possible GI bleed. History of PEG, (placed on 5/2019), DM (diet controlled), severe dementia. Lab: HgA1c 5.1, POCT range 99-133mg/dl. Skin: wound: abscess left greater trochanter. R/L heel stage 1, B/L buttocks stage 1 w/ no edema documented. Danilo score= 12 (high risk of further skin breakdown). Current enteral feed formula Glucerna 1.5 not warranted due to labs indicating glycemic control/ ?DM diagnosis. May be beneficial for a polymeric formula with fiber for GI motility. Last hospital weight indicates 33# unintentional weight loss x 2 months (20.2%). BMI 20.9 NFPE indicates severe muscle wasting: temporal, clavicle, scapula, calves, knees, and quads. Severe fat loss: ocular, triceps, ribs, buccal. Pt meets criteria for severe protein/calorie malnutrition in context of chronic acute illness secondary to significant weight loss, severe muscle/fat wasting. Recommendations: 1) Initiate Jevity 1.5 @ 30cc/hr and increase 10cc Q6 hours until goal rate of 50cc (total volume 1100ml) + 2 packs of Prosource TF (22gm protein)- TF + Prosource will provide 1730 calories/92gm protein. 2) Additional water flush @ 45cc h (total volume 990ml + free water from TF- total volume 1826ml/daily). 3) maintain aspiration precautions back of bed >35 degrees. 4) monitor labs/lytes replace as needed. 5) monitor weekly weights

## 2019-06-12 NOTE — DIETITIAN INITIAL EVALUATION ADULT. - PHYSICAL APPEARANCE
emaciated/underweight/BMI 20.9 NFPE indicates severe muscle wasting: temporal, clavicle, scapula, calves, knees, and quads. Severe fat loss: ocular, triceps, ribs, buccal.

## 2019-06-13 LAB
ANION GAP SERPL CALC-SCNC: 6 MMOL/L — SIGNIFICANT CHANGE UP (ref 5–17)
BUN SERPL-MCNC: 11 MG/DL — SIGNIFICANT CHANGE UP (ref 7–23)
CALCIUM SERPL-MCNC: 7.6 MG/DL — LOW (ref 8.5–10.1)
CHLORIDE SERPL-SCNC: 112 MMOL/L — HIGH (ref 96–108)
CO2 SERPL-SCNC: 28 MMOL/L — SIGNIFICANT CHANGE UP (ref 22–31)
CREAT SERPL-MCNC: 0.71 MG/DL — SIGNIFICANT CHANGE UP (ref 0.5–1.3)
GLUCOSE BLDC GLUCOMTR-MCNC: 108 MG/DL — HIGH (ref 70–99)
GLUCOSE BLDC GLUCOMTR-MCNC: 118 MG/DL — HIGH (ref 70–99)
GLUCOSE BLDC GLUCOMTR-MCNC: 123 MG/DL — HIGH (ref 70–99)
GLUCOSE BLDC GLUCOMTR-MCNC: 133 MG/DL — HIGH (ref 70–99)
GLUCOSE SERPL-MCNC: 101 MG/DL — HIGH (ref 70–99)
HCT VFR BLD CALC: 28.3 % — LOW (ref 39–50)
HGB BLD-MCNC: 8.5 G/DL — LOW (ref 13–17)
MCHC RBC-ENTMCNC: 24.1 PG — LOW (ref 27–34)
MCHC RBC-ENTMCNC: 30 GM/DL — LOW (ref 32–36)
MCV RBC AUTO: 80.2 FL — SIGNIFICANT CHANGE UP (ref 80–100)
PLATELET # BLD AUTO: 256 K/UL — SIGNIFICANT CHANGE UP (ref 150–400)
POTASSIUM SERPL-MCNC: 3.9 MMOL/L — SIGNIFICANT CHANGE UP (ref 3.5–5.3)
POTASSIUM SERPL-SCNC: 3.9 MMOL/L — SIGNIFICANT CHANGE UP (ref 3.5–5.3)
RBC # BLD: 3.53 M/UL — LOW (ref 4.2–5.8)
RBC # FLD: 18.4 % — HIGH (ref 10.3–14.5)
SODIUM SERPL-SCNC: 146 MMOL/L — HIGH (ref 135–145)
WBC # BLD: 7.92 K/UL — SIGNIFICANT CHANGE UP (ref 3.8–10.5)
WBC # FLD AUTO: 7.92 K/UL — SIGNIFICANT CHANGE UP (ref 3.8–10.5)

## 2019-06-13 RX ADMIN — Medication 3 MILLILITER(S): at 14:06

## 2019-06-13 RX ADMIN — Medication 3 MILLILITER(S): at 09:36

## 2019-06-13 RX ADMIN — MEROPENEM 100 MILLIGRAM(S): 1 INJECTION INTRAVENOUS at 22:12

## 2019-06-13 RX ADMIN — Medication 3 MILLILITER(S): at 21:31

## 2019-06-13 RX ADMIN — PANTOPRAZOLE SODIUM 40 MILLIGRAM(S): 20 TABLET, DELAYED RELEASE ORAL at 17:03

## 2019-06-13 RX ADMIN — MEROPENEM 100 MILLIGRAM(S): 1 INJECTION INTRAVENOUS at 05:55

## 2019-06-13 RX ADMIN — PANTOPRAZOLE SODIUM 40 MILLIGRAM(S): 20 TABLET, DELAYED RELEASE ORAL at 05:55

## 2019-06-13 RX ADMIN — MEROPENEM 100 MILLIGRAM(S): 1 INJECTION INTRAVENOUS at 14:18

## 2019-06-13 NOTE — PROGRESS NOTE ADULT - SUBJECTIVE AND OBJECTIVE BOX
CC/reason for follow up: ***    S: ***    ROS: ***    T(C): 36.7 (06-13-19 @ 05:04), Max: 37.4 (06-12-19 @ 21:34)  HR: 81 (06-13-19 @ 14:06) (78 - 89)  BP: 129/57 (06-13-19 @ 05:04) (97/60 - 129/57)  RR: 18 (06-13-19 @ 05:04) (18 - 18)  SpO2: 97% (06-13-19 @ 14:06) (97% - 100%)    Gen - Pleasant, cooperative, in no acute distress  HEENT- PERRL, moist mucus membranes, OP clear  CV - RRR, No m/r/g, +pulses, *** edema  Lungs - Good effort, Clear to auscultation bilaterally  Abdomen - Soft, nontender, nondistended, +BS, No rebound/rigidity/guarding  Ext - No cyanosis/clubbing.   Skin - No rashes, No jaundice  Psych- Alert & oriented x 3  Neuro- ***    ALBUTerol/ipratropium for Nebulization 3 milliLiter(s) Nebulizer every 6 hours  meropenem  IVPB      meropenem  IVPB 1000 milliGRAM(s) IV Intermittent every 8 hours  ondansetron Injectable 4 milliGRAM(s) IV Push every 6 hours PRN  pantoprazole  Injectable 40 milliGRAM(s) IV Push two times a day      Diagnostic studies: personally reviewed  LABS: All Labs Reviewed:                        8.5    7.92  )-----------( 256      ( 13 Jun 2019 08:23 )             28.3     06-13    146<H>  |  112<H>  |  11  ----------------------------<  101<H>  3.9   |  28  |  0.71    Ca    7.6<L>      13 Jun 2019 08:23              Blood Culture: 06-10 @ 12:33  Organism --  Gram Stain Blood -- Gram Stain --  Specimen Source .Urine None  Culture-Blood --    06-10 @ 12:25  Organism --  Gram Stain Blood -- Gram Stain --  Specimen Source .Blood None  Culture-Blood --      RADIOLOGY/EKG:            Assessment and Plan:  79 y/o Male resident at [facility] with a PMHx of Gl bleed, dysphagia, unspecific arrythmia, COPD, Dementia, DM type II, Afib, DVT(on Eliquis), HTN, Osteomyelitis of left leg, PVD, prior PNA/past sepsis, recently discharged form  5/10 for sepsis/ UTI/ UGIB(gastric ulcer) and PEG tube, now   presents to ED BIBEMS sent in from [facility] for  vomiting burgundy colored, and fever of 102F at facility. Patient had dementia and all hx obtianed from chart. Currently with black post hemetemesis around mouth and pale. ROS is unattainable.        1-sepsis secondary to PNA  -pna possibly aspiration?  -imaging suspicous for pna  -hx of esbl c/w meropemen  -ID consult apprecaited    2-UGIB w/ coffee ground hematemesis  -gi consult appreciated  -conservative approace will be best in this patient with advanced dementia  -no further bleeding since arrival to ED.  -c./w IV bid protonix, consider taper to oral protonix if no bleeding   -monitor nex 24 hours on restart tube feeds  -hb stable    3-afib/dvt  -hold all AC, risks outweight the benefits    4-hx of dysphagia  -aspiration precautions  -45 degree head elevations, especially since recent upper gib    5-dvt prophy-scds b/l     Patient has MOLST form in chart, and is full code    Code status: ***  Dispo: ***  ELOS: ***    All questions/concerns were discussed with patient/family by bedside.    Case d/w ***    Total time spent: ***min. More than 50% of time was spent in counseling, discussion of medications, and coordination of care CC/reason for follow up: sepsis/ fever    S: unable to obtain due to dementia    ROS: unable to obtain due to dementia    T(C): 36.7 (06-13-19 @ 05:04), Max: 37.4 (06-12-19 @ 21:34)  HR: 81 (06-13-19 @ 14:06) (78 - 89)  BP: 129/57 (06-13-19 @ 05:04) (97/60 - 129/57)  RR: 18 (06-13-19 @ 05:04) (18 - 18)  SpO2: 97% (06-13-19 @ 14:06) (97% - 100%)    Gen - calm, resting in bed, opens eyes to verbal stimuli, in no acute distress  HEENT- PERRL, moist mucus membranes, OP clear  CV - RRR, No m/r/g, +pulses, no edema  Lungs - Good effort, Clear to auscultation bilaterally  Abdomen - Soft, nontender, nondistended, +BS, No rebound/rigidity/guarding  Ext - No cyanosis/clubbing.   Skin - No rashes, No jaundice  Psych- awake, calm, cannot assess orientation  Neuro- limited exam    ALBUTerol/ipratropium for Nebulization 3 milliLiter(s) Nebulizer every 6 hours  meropenem  IVPB      meropenem  IVPB 1000 milliGRAM(s) IV Intermittent every 8 hours  ondansetron Injectable 4 milliGRAM(s) IV Push every 6 hours PRN  pantoprazole  Injectable 40 milliGRAM(s) IV Push two times a day      Diagnostic studies: personally reviewed  LABS: All Labs Reviewed:                        8.5    7.92  )-----------( 256      ( 13 Jun 2019 08:23 )             28.3     06-13    146<H>  |  112<H>  |  11  ----------------------------<  101<H>  3.9   |  28  |  0.71    Ca    7.6<L>      13 Jun 2019 08:23              Blood Culture: 06-10 @ 12:33  Organism --  Gram Stain Blood -- Gram Stain --  Specimen Source .Urine None  Culture-Blood --    06-10 @ 12:25  Organism --  Gram Stain Blood -- Gram Stain --  Specimen Source .Blood None  Culture-Blood --      RADIOLOGY/EKG:  < from: Xray Chest 1 View-PORTABLE IMMEDIATE (06.10.19 @ 13:33) >  IMPRESSION:  Minimal residual left lower lobe infiltrate?    < end of copied text >      Assessment and Plan:  79 y/o Male resident at [facility] with a PMHx of Gl bleed, dysphagia, unspecific arrythmia, COPD, Dementia, DM type II, Afib, DVT(on Eliquis), HTN, Osteomyelitis of left leg, PVD, prior PNA/past sepsis, recently discharged form  5/10 for sepsis/ UTI/ UGIB(gastric ulcer) and PEG tube, now   presents to ED BIBEMS sent in from [facility] for  vomiting burgundy colored, and fever of 102F at facility. Patient had dementia and all hx obtianed from chart. Currently with black post hemetemesis around mouth and pale. ROS is unattainable.        1-sepsis secondary to PNA  -pna possibly aspiration?  -imaging suspicious for pna  -hx of esbl c/w meropenem  -ID consult appreciated    2-UGIB w/ coffee ground hematemesis  -gi consult appreciated  -conservative approach will be best in this patient with advanced dementia  -no further bleeding since arrival to ED.  -c./w IV bid protonix, consider taper to oral protonix if no bleeding   - restart tube feeds in AM  -hb stable    3-afib/dvt  -hold all AC, risks outweight the benefits    4-hx of dysphagia  -aspiration precautions  -45 degree head elevations, especially since recent upper gib    5-dvt prophy-scds b/l     Patient has MOLST form in chart, and is full code    Code status: full  Dispo: med floor  ELOS: tbd    All questions/concerns were discussed with patient/family by bedside.    Total time spent: 35min. More than 50% of time was spent in counseling, discussion of medications, and coordination of care CC/reason for follow up: sepsis/ fever    S: unable to obtain due to dementia    ROS: unable to obtain due to dementia    T(C): 36.7 (06-13-19 @ 05:04), Max: 37.4 (06-12-19 @ 21:34)  HR: 81 (06-13-19 @ 14:06) (78 - 89)  BP: 129/57 (06-13-19 @ 05:04) (97/60 - 129/57)  RR: 18 (06-13-19 @ 05:04) (18 - 18)  SpO2: 97% (06-13-19 @ 14:06) (97% - 100%)    Gen - calm, resting in bed, opens eyes to verbal stimuli, in no acute distress  HEENT- PERRL, moist mucus membranes, OP clear  CV - RRR, No m/r/g, +pulses, no edema  Lungs - Good effort, Clear to auscultation bilaterally  Abdomen - Soft, nontender, nondistended, +BS, No rebound/rigidity/guarding  Ext - No cyanosis/clubbing.   Skin - No rashes, No jaundice  Psych- awake, calm, cannot assess orientation  Neuro- limited exam    ALBUTerol/ipratropium for Nebulization 3 milliLiter(s) Nebulizer every 6 hours  meropenem  IVPB      meropenem  IVPB 1000 milliGRAM(s) IV Intermittent every 8 hours  ondansetron Injectable 4 milliGRAM(s) IV Push every 6 hours PRN  pantoprazole  Injectable 40 milliGRAM(s) IV Push two times a day      Diagnostic studies: personally reviewed  LABS: All Labs Reviewed:                        8.5    7.92  )-----------( 256      ( 13 Jun 2019 08:23 )             28.3     06-13    146<H>  |  112<H>  |  11  ----------------------------<  101<H>  3.9   |  28  |  0.71    Ca    7.6<L>      13 Jun 2019 08:23              Blood Culture: 06-10 @ 12:33  Organism --  Gram Stain Blood -- Gram Stain --  Specimen Source .Urine None  Culture-Blood --    06-10 @ 12:25  Organism --  Gram Stain Blood -- Gram Stain --  Specimen Source .Blood None  Culture-Blood --      RADIOLOGY/EKG:  < from: Xray Chest 1 View-PORTABLE IMMEDIATE (06.10.19 @ 13:33) >  IMPRESSION:  Minimal residual left lower lobe infiltrate?    < end of copied text >      Assessment and Plan:  79 y/o Male resident at [facility] with a PMHx of Gl bleed, dysphagia, unspecific arrythmia, COPD, Dementia, DM type II, Afib, DVT(on Eliquis), HTN, Osteomyelitis of left leg, PVD, prior PNA/past sepsis, recently discharged form  5/10 for sepsis/ UTI/ UGIB(gastric ulcer) and PEG tube, now   presents to ED BIBEMS sent in from [facility] for  vomiting burgundy colored, and fever of 102F at facility. Patient had dementia and all hx obtianed from chart. Currently with black post hemetemesis around mouth and pale. ROS is unattainable.        1-sepsis secondary to PNA  -pna possibly aspiration?  -imaging suspicious for pna  -hx of esbl c/w meropenem  -ID consult appreciated    2-UGIB w/ coffee ground hematemesis  -gi consult appreciated  -conservative approach will be best in this patient with advanced dementia  -no further bleeding since arrival to ED.  -c./w IV bid protonix, consider taper to oral protonix if no bleeding   -tube feeds restarted  -hb stable    3-afib/dvt  -hold all AC, risks outweight the benefits    4-hx of dysphagia  -aspiration precautions  -45 degree head elevations, especially since recent upper gib    5-dvt prophy-scds b/l     Patient has MOLST form in chart, and is full code    Code status: full  Dispo: med floor  ELOS: tbd    All questions/concerns were discussed with patient/family by bedside.    Total time spent: 35min. More than 50% of time was spent in counseling, discussion of medications, and coordination of care

## 2019-06-13 NOTE — PROGRESS NOTE ADULT - SUBJECTIVE AND OBJECTIVE BOX
Date of service: 19 @ 12:37    Lying in bed in NAD  Weak looking  Has dry cough  Has low grade fever    ROS: limited    MEDICATIONS  (STANDING):  ALBUTerol/ipratropium for Nebulization 3 milliLiter(s) Nebulizer every 6 hours  meropenem  IVPB      meropenem  IVPB 1000 milliGRAM(s) IV Intermittent every 8 hours  pantoprazole  Injectable 40 milliGRAM(s) IV Push two times a day      Vital Signs Last 24 Hrs  T(C): 36.7 (2019 05:04), Max: 37.4 (2019 21:34)  T(F): 98 (2019 05:04), Max: 99.4 (2019 21:34)  HR: 82 (2019 09:38) (78 - 89)  BP: 129/57 (2019 05:04) (97/60 - 129/57)  BP(mean): --  RR: 18 (2019 05:04) (18 - 18)  SpO2: 97% (2019 09:38) (97% - 100%)    Physical Exam:      Constitutional: frail looking  HEENT: NC/AT, EOMI, PERRLA, conjunctivae clear  Neck: supple; thyroid not palpable  Back: no tenderness  Respiratory: respiratory effort normal; few crackles at bases  Cardiovascular: S1S2 regular, no murmurs  Abdomen: soft, not tender, not distended, positive BS; PEG in place  Genitourinary: no suprapubic tenderness  Lymphatic: no LN palpable  Musculoskeletal: no muscle tenderness, no joint swelling or tenderness  Extremities: no pedal edema  Neurological/ Psychiatric: confused, moving all extremities  Skin: no rashes; no palpable lesions    Labs: reviewed                        8.5    7.92  )-----------( 256      ( 2019 08:23 )             28.3     06-13    146<H>  |  112<H>  |  11  ----------------------------<  101<H>  3.9   |  28  |  0.71    Ca    7.6<L>      2019 08:23                                   10.2   14.00 )-----------( 312      ( 10 Yemi 2019 12:25 )             33.9     06-10    138  |  105  |  34<H>  ----------------------------<  130<H>  4.8   |  24  |  0.83    Ca    8.5      10 Yemi 2019 12:25    TPro  7.1  /  Alb  2.3<L>  /  TBili  0.2  /  DBili  x   /  AST  22  /  ALT  25  /  AlkPhos  98  06-10     LIVER FUNCTIONS - ( 10 Yemi 2019 12:25 )  Alb: 2.3 g/dL / Pro: 7.1 gm/dL / ALK PHOS: 98 U/L / ALT: 25 U/L / AST: 22 U/L / GGT: x           Urinalysis Basic - ( 10 Yemi 2019 12:33 )    Color: Yellow / Appearance: Clear / S.010 / pH: x  Gluc: x / Ketone: Negative  / Bili: Negative / Urobili: Negative mg/dL   Blood: x / Protein: 30 mg/dL / Nitrite: Negative   Leuk Esterase: Moderate / RBC: 3-5 /HPF / WBC 26-50   Sq Epi: x / Non Sq Epi: x / Bacteria: Moderate    Culture - Urine (19 @ 17:34)    -  Tobramycin: S <=4    -  Trimethoprim/Sulfamethoxazole: R >2/38    -  Ampicillin/Sulbactam: R <=8/4    -  Aztreonam: R >16    -  Cefazolin: R >16 For uncomplicated UTI with K. pneumoniae, E. coli, or P. mirablis: HOWIE <=16 is sensitive and HOWIE >=32 is resistant. This also predicts results for oral agents cefaclor, cefdinir, cefpodoxime, cefprozil, cefuroxime axetil, cephalexin and locarbef for uncomplicated UTI. Note that some isolates may be susceptible to these agents while testing resistant to cefazolin.    -  Cefepime: R >16    -  Cefoxitin: R >16    -  Ceftriaxone: R >32 Enterobacter, Citrobacter, and Serratia may develop resistance during prolonged therapy    -  Ciprofloxacin: R >2    -  Ertapenem: S <=1    -  Gentamicin: S <=4    -  Levofloxacin: R >4    -  Imipenem: S <=1    -  Meropenem: S <=1    -  Nitrofurantoin: S <=32 Should not be used to treat pyelonephritis    -  Piperacillin/Tazobactam: R <=16    -  Tigecycline: S <=2    -  Amikacin: S <=16    -  Ampicillin: R >16 These ampicillin results predict results for amoxicillin    Specimen Source: .Urine Clean Catch (Midstream)    Culture Results:   >100,000 CFU/ml Escherichia coli ESBL    Organism Identification: Escherichia coli ESBL    Organism: Escherichia coli ESBL    Method Type: HOWIE      Culture - Urine (collected 10 Yemi 2019 12:33)  Source: .Urine None  Final Report (2019 08:27):    No growth    Radiology: all available radiological tests reviewed    < from: Xray Chest 1 View-PORTABLE IMMEDIATE (06.10.19 @ 13:33) >  Minimal residual left lower lobe infiltrate?    < end of copied text >      Advanced directives addressed: full resuscitation

## 2019-06-14 LAB
GLUCOSE BLDC GLUCOMTR-MCNC: 102 MG/DL — HIGH (ref 70–99)
GLUCOSE BLDC GLUCOMTR-MCNC: 114 MG/DL — HIGH (ref 70–99)
GLUCOSE BLDC GLUCOMTR-MCNC: 94 MG/DL — SIGNIFICANT CHANGE UP (ref 70–99)

## 2019-06-14 PROCEDURE — 74019 RADEX ABDOMEN 2 VIEWS: CPT | Mod: 26

## 2019-06-14 PROCEDURE — 74176 CT ABD & PELVIS W/O CONTRAST: CPT | Mod: 26

## 2019-06-14 RX ORDER — SODIUM CHLORIDE 9 MG/ML
1000 INJECTION INTRAMUSCULAR; INTRAVENOUS; SUBCUTANEOUS
Refills: 0 | Status: DISCONTINUED | OUTPATIENT
Start: 2019-06-14 | End: 2019-06-14

## 2019-06-14 RX ORDER — PANTOPRAZOLE SODIUM 20 MG/1
40 TABLET, DELAYED RELEASE ORAL ONCE
Refills: 0 | Status: COMPLETED | OUTPATIENT
Start: 2019-06-14 | End: 2019-06-14

## 2019-06-14 RX ORDER — PANTOPRAZOLE SODIUM 20 MG/1
40 TABLET, DELAYED RELEASE ORAL DAILY
Refills: 0 | Status: DISCONTINUED | OUTPATIENT
Start: 2019-06-14 | End: 2019-06-17

## 2019-06-14 RX ADMIN — Medication 3 MILLILITER(S): at 20:11

## 2019-06-14 RX ADMIN — PANTOPRAZOLE SODIUM 40 MILLIGRAM(S): 20 TABLET, DELAYED RELEASE ORAL at 12:48

## 2019-06-14 RX ADMIN — MEROPENEM 100 MILLIGRAM(S): 1 INJECTION INTRAVENOUS at 13:05

## 2019-06-14 RX ADMIN — MEROPENEM 100 MILLIGRAM(S): 1 INJECTION INTRAVENOUS at 21:12

## 2019-06-14 RX ADMIN — Medication 3 MILLILITER(S): at 14:10

## 2019-06-14 RX ADMIN — PANTOPRAZOLE SODIUM 40 MILLIGRAM(S): 20 TABLET, DELAYED RELEASE ORAL at 05:04

## 2019-06-14 RX ADMIN — SODIUM CHLORIDE 40 MILLILITER(S): 9 INJECTION INTRAMUSCULAR; INTRAVENOUS; SUBCUTANEOUS at 19:00

## 2019-06-14 RX ADMIN — Medication 3 MILLILITER(S): at 01:18

## 2019-06-14 RX ADMIN — MEROPENEM 100 MILLIGRAM(S): 1 INJECTION INTRAVENOUS at 05:04

## 2019-06-14 RX ADMIN — Medication 3 MILLILITER(S): at 07:58

## 2019-06-14 NOTE — PROGRESS NOTE ADULT - SUBJECTIVE AND OBJECTIVE BOX
Called back that abdomen is distended and PEG not functioning and concern for PEG migration.  CT a/p ordered.  Reviewed images in PACS. PEG in stomach and no bowel issues seen.  Resume TF and recall if any issues arise please.

## 2019-06-14 NOTE — PROGRESS NOTE ADULT - SUBJECTIVE AND OBJECTIVE BOX
Date of service: 19 @ 08:04    Lying in bed in NAD  Alert and confused  No SOB at rest  No bleeding noted    ROS: no fever or chills; unobtainable    MEDICATIONS  (STANDING):  ALBUTerol/ipratropium for Nebulization 3 milliLiter(s) Nebulizer every 6 hours  meropenem  IVPB      meropenem  IVPB 1000 milliGRAM(s) IV Intermittent every 8 hours  pantoprazole  Injectable 40 milliGRAM(s) IV Push two times a day      Vital Signs Last 24 Hrs  T(C): 36.6 (2019 05:06), Max: 36.7 (2019 16:58)  T(F): 97.9 (2019 05:06), Max: 98 (2019 16:58)  HR: 75 (2019 05:06) (72 - 85)  BP: 123/53 (2019 05:06) (97/50 - 123/53)  BP(mean): --  RR: 18 (2019 05:06) (18 - 18)  SpO2: 99% (2019 05:06) (97% - 100%)    Physical Exam:      Constitutional: frail looking  HEENT: NC/AT, EOMI, PERRLA, conjunctivae clear  Neck: supple; thyroid not palpable  Back: no tenderness  Respiratory: respiratory effort normal; few crackles at bases  Cardiovascular: S1S2 regular, no murmurs  Abdomen: soft, not tender, not distended, positive BS; PEG in place  Genitourinary: no suprapubic tenderness  Lymphatic: no LN palpable  Musculoskeletal: no muscle tenderness, no joint swelling or tenderness  Extremities: no pedal edema  Neurological/ Psychiatric: confused, moving all extremities  Skin: no rashes; no palpable lesions    Labs: reviewed                        8.5    7.92  )-----------( 256      ( 2019 08:23 )             28.3     06-13    146<H>  |  112<H>  |  11  ----------------------------<  101<H>  3.9   |  28  |  0.71    Ca    7.6<L>      2019 08:23                                   10.2   14.00 )-----------( 312      ( 10 Yemi 2019 12:25 )             33.9     06-10    138  |  105  |  34<H>  ----------------------------<  130<H>  4.8   |  24  |  0.83    Ca    8.5      10 Yemi 2019 12:25    TPro  7.1  /  Alb  2.3<L>  /  TBili  0.2  /  DBili  x   /  AST  22  /  ALT  25  /  AlkPhos  98  06-10     LIVER FUNCTIONS - ( 10 Yemi 2019 12:25 )  Alb: 2.3 g/dL / Pro: 7.1 gm/dL / ALK PHOS: 98 U/L / ALT: 25 U/L / AST: 22 U/L / GGT: x           Urinalysis Basic - ( 10 Yemi 2019 12:33 )    Color: Yellow / Appearance: Clear / S.010 / pH: x  Gluc: x / Ketone: Negative  / Bili: Negative / Urobili: Negative mg/dL   Blood: x / Protein: 30 mg/dL / Nitrite: Negative   Leuk Esterase: Moderate / RBC: 3-5 /HPF / WBC 26-50   Sq Epi: x / Non Sq Epi: x / Bacteria: Moderate    Culture - Urine (19 @ 17:34)    -  Tobramycin: S <=4    -  Trimethoprim/Sulfamethoxazole: R >2/38    -  Ampicillin/Sulbactam: R <=8/4    -  Aztreonam: R >16    -  Cefazolin: R >16 For uncomplicated UTI with K. pneumoniae, E. coli, or P. mirablis: HOWIE <=16 is sensitive and HOWIE >=32 is resistant. This also predicts results for oral agents cefaclor, cefdinir, cefpodoxime, cefprozil, cefuroxime axetil, cephalexin and locarbef for uncomplicated UTI. Note that some isolates may be susceptible to these agents while testing resistant to cefazolin.    -  Cefepime: R >16    -  Cefoxitin: R >16    -  Ceftriaxone: R >32 Enterobacter, Citrobacter, and Serratia may develop resistance during prolonged therapy    -  Ciprofloxacin: R >2    -  Ertapenem: S <=1    -  Gentamicin: S <=4    -  Levofloxacin: R >4    -  Imipenem: S <=1    -  Meropenem: S <=1    -  Nitrofurantoin: S <=32 Should not be used to treat pyelonephritis    -  Piperacillin/Tazobactam: R <=16    -  Tigecycline: S <=2    -  Amikacin: S <=16    -  Ampicillin: R >16 These ampicillin results predict results for amoxicillin    Specimen Source: .Urine Clean Catch (Midstream)    Culture Results:   >100,000 CFU/ml Escherichia coli ESBL    Organism Identification: Escherichia coli ESBL    Organism: Escherichia coli ESBL    Method Type: HOWIE      Culture - Urine (collected 10 Yemi 2019 12:33)  Source: .Urine None  Final Report (2019 08:27):    No growth    Radiology: all available radiological tests reviewed    < from: Xray Chest 1 View-PORTABLE IMMEDIATE (06.10.19 @ 13:33) >  Minimal residual left lower lobe infiltrate?    < end of copied text >      Advanced directives addressed: full resuscitation

## 2019-06-14 NOTE — PROGRESS NOTE ADULT - SUBJECTIVE AND OBJECTIVE BOX
GI    no events  marin TF  no reported hematemesis  hgb stable    ROS: unobtainable due to dementia    Physical Exam:   Vital Signs Last 24 Hrs  T(C): 36.6 (14 Jun 2019 05:06), Max: 36.7 (13 Jun 2019 16:58)  T(F): 97.9 (14 Jun 2019 05:06), Max: 98 (13 Jun 2019 16:58)  HR: 75 (14 Jun 2019 07:58) (72 - 85)  BP: 123/53 (14 Jun 2019 05:06) (97/50 - 123/53)  BP(mean): --  RR: 18 (14 Jun 2019 05:06) (18 - 18)  SpO2: 99% (14 Jun 2019 05:06) (97% - 100%)    	HEENT:   pupils equal and reactive, EOMI, no oropharyngeal lesions, erythema, exudates, oral thrush    	NECK:   supple, no carotid bruits, no palpable lymph nodes, no thyromegaly    	CV:  +S1, +S2, regular, no murmurs or rubs    	RESP:   lungs clear to auscultation bilaterally, no wheezing, rales, rhonchi, good air entry bilaterally    	GI:  abdomen soft, non-tender, non-distended, normal BS, no bruits, no abdominal masses, no palpable masses, PEG with ext bumper at 2 cm    	MSK:   normal muscle tone, no atrophy, no rigidity, no contractions    	EXT:   no clubbing, no cyanosis, no edema, no calf pain, swelling or erythema    	VASCULAR:  pulses equal and symmetric in the upper and lower extremities    	NEURO: awake, not alert or oriented    	SKIN:  no ulcers, lesions or rashes                                                                8.5    7.92  )-----------( 256      ( 13 Jun 2019 08:23 )             28.3

## 2019-06-14 NOTE — PROGRESS NOTE ADULT - SUBJECTIVE AND OBJECTIVE BOX
CC/reason for follow up: sepsis/ fever    S: 6/13-unable to obtain due to dementia  6/14- abdomen is more distended. PEG tube not flushing and not working well, **    ROS: unable to obtain due to dementia    T(C): 36.3 (06-14-19 @ 11:12), Max: 36.7 (06-13-19 @ 16:58)  HR: 69 (06-14-19 @ 14:10) (68 - 85)  BP: 120/47 (06-14-19 @ 11:12) (97/50 - 123/53)  RR: 17 (06-14-19 @ 11:12) (17 - 18)  SpO2: 95% (06-14-19 @ 11:12) (95% - 100%)    Gen - calm, resting in bed, opens eyes to verbal stimuli, in no acute distress  HEENT- PERRL, moist mucus membranes, OP clear  CV - RRR, No m/r/g, +pulses, no edema  Lungs - Good effort, Clear to auscultation bilaterally  Abdomen - Soft, tender, distended with bulging in LUQ. +BS, No rebound/rigidity/guarding, PEG tube intact  Ext - No cyanosis/clubbing.   Skin - L hip wound clean, no erythema, tiny wound dehiscence,  No jaundice  Psych- awake, calm, cannot assess orientation  Neuro- limited exam    ALBUTerol/ipratropium for Nebulization 3 milliLiter(s) Nebulizer every 6 hours  meropenem  IVPB      meropenem  IVPB 1000 milliGRAM(s) IV Intermittent every 8 hours  ondansetron Injectable 4 milliGRAM(s) IV Push every 6 hours PRN  pantoprazole  Injectable 40 milliGRAM(s) IV Push two times a day      Diagnostic studies: personally reviewed  LABS: All Labs Reviewed:                        8.5    7.92  )-----------( 256      ( 13 Jun 2019 08:23 )             28.3     06-13    146<H>  |  112<H>  |  11  ----------------------------<  101<H>  3.9   |  28  |  0.71    Ca    7.6<L>      13 Jun 2019 08:23              Blood Culture: 06-10 @ 12:33  Organism --  Gram Stain Blood -- Gram Stain --  Specimen Source .Urine None  Culture-Blood --    06-10 @ 12:25  Organism --  Gram Stain Blood -- Gram Stain --  Specimen Source .Blood None  Culture-Blood --      RADIOLOGY/EKG:  < from: Xray Chest 1 View-PORTABLE IMMEDIATE (06.10.19 @ 13:33) >  IMPRESSION:  Minimal residual left lower lobe infiltrate?    < end of copied text >      Assessment and Plan:  79 y/o Male resident at [facility] with a PMHx of Gl bleed, dysphagia, unspecific arrythmia, COPD, Dementia, DM type II, Afib, DVT(on Eliquis), HTN, Osteomyelitis of left leg, PVD, prior PNA/past sepsis, recently discharged form  5/10 for sepsis/ UTI/ UGIB(gastric ulcer) and PEG tube, now   presents to ED BIBEMS sent in from [facility] for  vomiting burgundy colored, and fever of 102F at facility. Patient had dementia and all hx obtianed from chart. Currently with black post hemetemesis around mouth and pale. ROS is unattainable.        1-sepsis secondary to PNA  -pna possibly aspiration?  -imaging suspicious for pna  -hx of esbl c/w meropenem  -ID consult appreciated    2-UGIB w/ coffee ground hematemesis  -gi consult appreciated  -conservative approach will be best in this patient with advanced dementia  -no further bleeding since arrival to ED.  -c./w IV bid protonix, consider taper to oral protonix if no bleeding   -tube feeds restarted  -hb stable  -check abd xrays >> ileus vs sbo >> peg not working well. d/w GI >> will do ct a/p as rec by GI***    3-afib/dvt  -hold all AC, risks outweight the benefits    4-hx of dysphagia  -aspiration precautions  -45 degree head elevations, especially since recent upper gib    5-dvt prophy-scds b/l     Patient has MOLST form in chart, and is full code    Code status: full  Dispo: med floor  ELOS: tbd    All questions/concerns were discussed with patient/family by bedside.    Total time spent: 35min. More than 50% of time was spent in counseling, discussion of medications, and coordination of care CC/reason for follow up: sepsis/ fever    S: 6/13-unable to obtain due to dementia  6/14- abdomen is more distended. PEG tube not flushing and not working well, pt is unable to provide history     ROS: unable to obtain due to dementia    T(C): 36.3 (06-14-19 @ 11:12), Max: 36.7 (06-13-19 @ 16:58)  HR: 69 (06-14-19 @ 14:10) (68 - 85)  BP: 120/47 (06-14-19 @ 11:12) (97/50 - 123/53)  RR: 17 (06-14-19 @ 11:12) (17 - 18)  SpO2: 95% (06-14-19 @ 11:12) (95% - 100%)    Gen - calm, opens eyes to verbal stimuli, in no acute distress  HEENT- PERRL, moist mucus membranes, OP clear  CV - RRR, No m/r/g, +pulses, no edema  Lungs - Good effort, Clear to auscultation bilaterally  Abdomen - Soft, tender, distended with bulging in LUQ. +BS, No rebound/rigidity/guarding, PEG tube intact. grimaces when palpating abdomen  Ext - No cyanosis/clubbing.   Skin - L hip wound clean, no erythema, tiny wound dehiscence,  No jaundice  Psych- awake, calm, cannot assess orientation  Neuro- limited exam    ALBUTerol/ipratropium for Nebulization 3 milliLiter(s) Nebulizer every 6 hours  meropenem  IVPB      meropenem  IVPB 1000 milliGRAM(s) IV Intermittent every 8 hours  ondansetron Injectable 4 milliGRAM(s) IV Push every 6 hours PRN  pantoprazole  Injectable 40 milliGRAM(s) IV Push two times a day      Diagnostic studies: personally reviewed  LABS: All Labs Reviewed:                        8.5    7.92  )-----------( 256      ( 13 Jun 2019 08:23 )             28.3     06-13    146<H>  |  112<H>  |  11  ----------------------------<  101<H>  3.9   |  28  |  0.71    Ca    7.6<L>      13 Jun 2019 08:23              Blood Culture: 06-10 @ 12:33  Organism --  Gram Stain Blood -- Gram Stain --  Specimen Source .Urine None  Culture-Blood --    06-10 @ 12:25  Organism --  Gram Stain Blood -- Gram Stain --  Specimen Source .Blood None  Culture-Blood --      RADIOLOGY/EKG:  < from: Xray Chest 1 View-PORTABLE IMMEDIATE (06.10.19 @ 13:33) >  IMPRESSION:  Minimal residual left lower lobe infiltrate?    < end of copied text >      Assessment and Plan:  77 y/o Male resident at [facility] with a PMHx of Gl bleed, dysphagia, unspecific arrythmia, COPD, Dementia, DM type II, Afib, DVT(on Eliquis), HTN, Osteomyelitis of left leg, PVD, prior PNA/past sepsis, recently discharged form  5/10 for sepsis/ UTI/ UGIB(gastric ulcer) and PEG tube, now   presents to ED BIBEMS sent in from [facility] for  vomiting burgundy colored, and fever of 102F at facility. Patient had dementia and all hx obtianed from chart. Currently with black post hemetemesis around mouth and pale. ROS is unattainable.        1-sepsis secondary to PNA. also probable UTI  -pna possibly aspiration?  -imaging suspicious for pna  -hx of esbl c/w meropenem  -ID consult appreciated    2-UGIB w/ coffee ground hematemesis  -gi consult appreciated  -conservative approach will be best in this patient with advanced dementia  -no further bleeding since arrival to ED.  -c./w IV bid protonix, consider taper to oral protonix if no bleeding   -tube feeds restarted  -hb stable  -check abd xrays >> ileus vs sbo >> peg not working well. d/w GI >> will do CT a/p as rec by GI. holding TF until PEG reevaluated by GI and would need to r/o obstruction     3-afib/dvt  -hold all AC, risks outweight the benefits    4-hx of dysphagia  -aspiration precautions  -45 degree head elevations, especially since recent upper gib    5-dvt prophy-scds b/l     Patient has MOLST form in chart, and is full code    Code status: full  Dispo: med floor  ELOS: tbd    All questions/concerns were discussed with patient/family by bedside.    case d/w Dr. Johnson and staff, RN, social work/ during rounds    Total time spent: 35min. More than 50% of time was spent in counseling, discussion of medications, and coordination of care

## 2019-06-14 NOTE — PROGRESS NOTE ADULT - SUBJECTIVE AND OBJECTIVE BOX
called to bedside that PEG not working   tube is clogged with TF/prosource  multiple passes made with scope brush and PEG cut shorter-eventually clog cleared  PEG ok to use  d/w RN at bedside    sum total of 35 min spent on direct pt care today

## 2019-06-15 LAB
ANION GAP SERPL CALC-SCNC: 5 MMOL/L — SIGNIFICANT CHANGE UP (ref 5–17)
BUN SERPL-MCNC: 12 MG/DL — SIGNIFICANT CHANGE UP (ref 7–23)
CALCIUM SERPL-MCNC: 8.2 MG/DL — LOW (ref 8.5–10.1)
CHLORIDE SERPL-SCNC: 107 MMOL/L — SIGNIFICANT CHANGE UP (ref 96–108)
CO2 SERPL-SCNC: 31 MMOL/L — SIGNIFICANT CHANGE UP (ref 22–31)
CREAT SERPL-MCNC: 0.54 MG/DL — SIGNIFICANT CHANGE UP (ref 0.5–1.3)
CULTURE RESULTS: SIGNIFICANT CHANGE UP
CULTURE RESULTS: SIGNIFICANT CHANGE UP
GLUCOSE BLDC GLUCOMTR-MCNC: 111 MG/DL — HIGH (ref 70–99)
GLUCOSE BLDC GLUCOMTR-MCNC: 114 MG/DL — HIGH (ref 70–99)
GLUCOSE BLDC GLUCOMTR-MCNC: 118 MG/DL — HIGH (ref 70–99)
GLUCOSE SERPL-MCNC: 101 MG/DL — HIGH (ref 70–99)
HCT VFR BLD CALC: 29.4 % — LOW (ref 39–50)
HGB BLD-MCNC: 8.7 G/DL — LOW (ref 13–17)
MCHC RBC-ENTMCNC: 23.7 PG — LOW (ref 27–34)
MCHC RBC-ENTMCNC: 29.6 GM/DL — LOW (ref 32–36)
MCV RBC AUTO: 80.1 FL — SIGNIFICANT CHANGE UP (ref 80–100)
PLATELET # BLD AUTO: 283 K/UL — SIGNIFICANT CHANGE UP (ref 150–400)
POTASSIUM SERPL-MCNC: 4 MMOL/L — SIGNIFICANT CHANGE UP (ref 3.5–5.3)
POTASSIUM SERPL-SCNC: 4 MMOL/L — SIGNIFICANT CHANGE UP (ref 3.5–5.3)
RBC # BLD: 3.67 M/UL — LOW (ref 4.2–5.8)
RBC # FLD: 18.3 % — HIGH (ref 10.3–14.5)
SODIUM SERPL-SCNC: 143 MMOL/L — SIGNIFICANT CHANGE UP (ref 135–145)
SPECIMEN SOURCE: SIGNIFICANT CHANGE UP
SPECIMEN SOURCE: SIGNIFICANT CHANGE UP
WBC # BLD: 8.42 K/UL — SIGNIFICANT CHANGE UP (ref 3.8–10.5)
WBC # FLD AUTO: 8.42 K/UL — SIGNIFICANT CHANGE UP (ref 3.8–10.5)

## 2019-06-15 RX ORDER — APIXABAN 2.5 MG/1
5 TABLET, FILM COATED ORAL EVERY 12 HOURS
Refills: 0 | Status: DISCONTINUED | OUTPATIENT
Start: 2019-06-15 | End: 2019-06-17

## 2019-06-15 RX ADMIN — MEROPENEM 100 MILLIGRAM(S): 1 INJECTION INTRAVENOUS at 13:09

## 2019-06-15 RX ADMIN — Medication 3 MILLILITER(S): at 09:07

## 2019-06-15 RX ADMIN — Medication 3 MILLILITER(S): at 02:34

## 2019-06-15 RX ADMIN — MEROPENEM 100 MILLIGRAM(S): 1 INJECTION INTRAVENOUS at 05:13

## 2019-06-15 RX ADMIN — APIXABAN 5 MILLIGRAM(S): 2.5 TABLET, FILM COATED ORAL at 17:37

## 2019-06-15 RX ADMIN — Medication 3 MILLILITER(S): at 19:53

## 2019-06-15 RX ADMIN — PANTOPRAZOLE SODIUM 40 MILLIGRAM(S): 20 TABLET, DELAYED RELEASE ORAL at 13:09

## 2019-06-15 RX ADMIN — MEROPENEM 100 MILLIGRAM(S): 1 INJECTION INTRAVENOUS at 21:46

## 2019-06-15 NOTE — PROGRESS NOTE ADULT - SUBJECTIVE AND OBJECTIVE BOX
CC/reason for follow up: sepsis/ fever    S: 6/13-unable to obtain due to dementia  6/14- abdomen is more distended. PEG tube not flushing and not working well, pt is unable to provide history   6/15-*    ROS: unable to obtain due to dementia    T(C): 36.9 (06-15-19 @ 11:32), Max: 36.9 (06-15-19 @ 05:02)  HR: 82 (06-15-19 @ 11:32) (63 - 82)  BP: 101/88 (06-15-19 @ 11:32) (101/88 - 108/52)  RR: 18 (06-15-19 @ 11:32) (18 - 18)  SpO2: 95% (06-15-19 @ 11:32) (95% - 97%)    Gen - calm, opens eyes to verbal stimuli, in no acute distress  HEENT- PERRL, moist mucus membranes, OP clear  CV - RRR, No m/r/g, +pulses, no edema  Lungs - Good effort, Clear to auscultation bilaterally  Abdomen - Soft, tender, distended with bulging in LUQ. +BS, No rebound/rigidity/guarding, PEG tube intact. grimaces when palpating abdomen  Ext - No cyanosis/clubbing.   Skin - L hip wound clean, no erythema, tiny wound dehiscence,  No jaundice  Psych- awake, calm, cannot assess orientation  Neuro- limited exam    ALBUTerol/ipratropium for Nebulization 3 milliLiter(s) Nebulizer every 6 hours  meropenem  IVPB      meropenem  IVPB 1000 milliGRAM(s) IV Intermittent every 8 hours  ondansetron Injectable 4 milliGRAM(s) IV Push every 6 hours PRN  pantoprazole  Injectable 40 milliGRAM(s) IV Push two times a day      Diagnostic studies: personally reviewed  LABS: All Labs Reviewed:                        8.5    7.92  )-----------( 256      ( 13 Jun 2019 08:23 )             28.3     06-13    146<H>  |  112<H>  |  11  ----------------------------<  101<H>  3.9   |  28  |  0.71    Ca    7.6<L>      13 Jun 2019 08:23              Blood Culture: 06-10 @ 12:33  Organism --  Gram Stain Blood -- Gram Stain --  Specimen Source .Urine None  Culture-Blood --    06-10 @ 12:25  Organism --  Gram Stain Blood -- Gram Stain --  Specimen Source .Blood None  Culture-Blood --      RADIOLOGY/EKG:  < from: Xray Chest 1 View-PORTABLE IMMEDIATE (06.10.19 @ 13:33) >  IMPRESSION:  Minimal residual left lower lobe infiltrate?    < end of copied text >      Assessment and Plan:  79 y/o Male resident at [facility] with a PMHx of Gl bleed, dysphagia, unspecific arrythmia, COPD, Dementia, DM type II, Afib, DVT(on Eliquis), HTN, Osteomyelitis of left leg, PVD, prior PNA/past sepsis, recently discharged form  5/10 for sepsis/ UTI/ UGIB(gastric ulcer) and PEG tube, now   presents to ED BIBEMS sent in from [facility] for  vomiting burgundy colored, and fever of 102F at facility. Patient had dementia and all hx obtianed from chart. Currently with black post hemetemesis around mouth and pale. ROS is unattainable.        1-sepsis secondary to PNA. also probable UTI  -pna possibly aspiration?  -imaging suspicious for pna  -hx of esbl c/w meropenem  -ID consult appreciated    2-UGIB w/ coffee ground hematemesis  -gi consult appreciated  -conservative approach will be best in this patient with advanced dementia  -no further bleeding since arrival to ED.  -c./w IV bid protonix, consider taper to oral protonix if no bleeding   -tube feeds restarted  -hb stable  -check abd xrays >> ileus vs sbo >> peg not working well. d/w GI >> will do CT a/p as rec by GI. holding TF until PEG reevaluated by GI and would need to r/o obstruction   -ok to start anticoag per GI. will monitor pt inhouse to make sure he doesn't rebleed    3-afib/dvt  -resuming eliquis as above    4-hx of dysphagia  -aspiration precautions  -45 degree head elevations, especially since recent upper gib    5-dvt prophy-scds b/l, eliquis    Patient has MOLST form in chart, and is full code    Code status: full  Dispo: med floor  ELOS: possible d/c to NH on Monday    All questions/concerns were discussed with patient/family by bedside.    case d/w Dr. Deutsch and ID    Total time spent: 30min. More than 50% of time was spent in counseling, discussion of medications, and coordination of care        Electronic Signatures:  Ashli Clay ()  (Signed 15-Yemi-2019 08:30)  	Authored: Progress Note, Reason for Admission, Subjective and Objective      Last Updated: 15-Yemi-2019 08:30 by Ashli Clay () CC/reason for follow up: sepsis/ fever    S: 6/13-unable to obtain due to dementia  6/14- abdomen is more distended. PEG tube not flushing and not working well, pt is unable to provide history   6/15-PEG working well. pt in bed, opens eyes to voice but doesn't follow commands. unable to obtain history    ROS: unable to obtain due to dementia    T(C): 36.9 (06-15-19 @ 11:32), Max: 36.9 (06-15-19 @ 05:02)  HR: 82 (06-15-19 @ 11:32) (63 - 82)  BP: 101/88 (06-15-19 @ 11:32) (101/88 - 108/52)  RR: 18 (06-15-19 @ 11:32) (18 - 18)  SpO2: 95% (06-15-19 @ 11:32) (95% - 97%)    Gen - calm, opens eyes to verbal stimuli, in no acute distress  HEENT- PERRL, moist mucus membranes, OP clear  CV - RRR, No m/r/g, +pulses, no edema  Lungs - Good effort, Clear to auscultation bilaterally  Abdomen - Soft, nontender, nondistended +BS, No rebound/rigidity/guarding, PEG tube intact.   Ext - No cyanosis/clubbing.   Skin - L hip wound clean, no erythema, tiny wound dehiscence,  No jaundice  Psych- awake, calm, cannot assess orientation  Neuro- limited exam    ALBUTerol/ipratropium for Nebulization 3 milliLiter(s) Nebulizer every 6 hours  meropenem  IVPB      meropenem  IVPB 1000 milliGRAM(s) IV Intermittent every 8 hours  ondansetron Injectable 4 milliGRAM(s) IV Push every 6 hours PRN  pantoprazole  Injectable 40 milliGRAM(s) IV Push two times a day      Diagnostic studies: personally reviewed  LABS: All Labs Reviewed:                                   8.7    8.42  )-----------( 283      ( 15 Yemi 2019 12:44 )             29.4   06-15    143  |  107  |  12  ----------------------------<  101<H>  4.0   |  31  |  0.54    Ca    8.2<L>      15 Yemi 2019 12:44            Blood Culture: 06-10 @ 12:33  Organism --  Gram Stain Blood -- Gram Stain --  Specimen Source .Urine None  Culture-Blood --    06-10 @ 12:25  Organism --  Gram Stain Blood -- Gram Stain --  Specimen Source .Blood None  Culture-Blood --      RADIOLOGY/EKG:  < from: Xray Chest 1 View-PORTABLE IMMEDIATE (06.10.19 @ 13:33) >  IMPRESSION:  Minimal residual left lower lobe infiltrate?    < end of copied text >      Assessment and Plan:  77 y/o Male resident at [facility] with a PMHx of Gl bleed, dysphagia, unspecific arrythmia, COPD, Dementia, DM type II, Afib, DVT(on Eliquis), HTN, Osteomyelitis of left leg, PVD, prior PNA/past sepsis, recently discharged form  5/10 for sepsis/ UTI/ UGIB(gastric ulcer) and PEG tube, now   presents to ED BIBEMS sent in from [facility] for  vomiting burgundy colored, and fever of 102F at facility. Patient had dementia and all hx obtianed from chart. Currently with black post hemetemesis around mouth and pale. ROS is unattainable.        1-sepsis secondary to PNA. also probable UTI  -pna possibly aspiration? pt w/ dysphagia and cannot clear secretions well  -imaging suspicious for pna  -hx of esbl c/w meropenem #6, ok to stop per ID when pt ready for discharge  -ID consult appreciated    2-UGIB w/ coffee ground hematemesis  -gi consult appreciated  -conservative approach will be best in this patient with advanced dementia  -no further bleeding since arrival to ED.  -protonix bid  -tube feeds restarted  -hb stable  -6/14 w/ abd distention, PEG not working, checked abd xrays >> ileus. pt passed a lot of gas per staff. CT a/p neg for obstruction and PEG tube in right position. PEG tube was flushed and manipulated by GI and now PEG ok to use.  -ok to start anticoag per GI. will monitor pt inhouse to make sure he doesn't rebleed    3-afib/dvt  -resuming eliquis as above    4-hx of dysphagia  -aspiration precautions  -45 degree head elevations, especially since recent upper gib    5-dvt prophy-scds b/l, eliquis    Patient has MOLST form in chart, and is full code    Code status: full  Dispo: med floor  ELOS: possible d/c to NH on Monday    All questions/concerns were discussed with patient/family by bedside.    case d/w Dr. Deutsch and ID    Total time spent: 30min. More than 50% of time was spent in counseling, discussion of medications, and coordination of care

## 2019-06-16 LAB
BASE EXCESS BLDA CALC-SCNC: 4 MMOL/L — HIGH (ref -2–2)
BASE EXCESS BLDA CALC-SCNC: 4.7 MMOL/L — HIGH (ref -2–2)
BLOOD GAS COMMENTS ARTERIAL: SIGNIFICANT CHANGE UP
BLOOD GAS COMMENTS ARTERIAL: SIGNIFICANT CHANGE UP
GAS PNL BLDA: SIGNIFICANT CHANGE UP
GLUCOSE BLDC GLUCOMTR-MCNC: 109 MG/DL — HIGH (ref 70–99)
GLUCOSE BLDC GLUCOMTR-MCNC: 110 MG/DL — HIGH (ref 70–99)
GLUCOSE BLDC GLUCOMTR-MCNC: 111 MG/DL — HIGH (ref 70–99)
GLUCOSE BLDC GLUCOMTR-MCNC: 135 MG/DL — HIGH (ref 70–99)
HCO3 BLDA-SCNC: 27 MMOL/L — SIGNIFICANT CHANGE UP (ref 21–29)
HCO3 BLDA-SCNC: 28 MMOL/L — SIGNIFICANT CHANGE UP (ref 21–29)
HCT VFR BLD CALC: 31.4 % — LOW (ref 39–50)
HGB BLD-MCNC: 9.4 G/DL — LOW (ref 13–17)
MCHC RBC-ENTMCNC: 23.5 PG — LOW (ref 27–34)
MCHC RBC-ENTMCNC: 29.9 GM/DL — LOW (ref 32–36)
MCV RBC AUTO: 78.5 FL — LOW (ref 80–100)
PCO2 BLDA: 36 MMHG — SIGNIFICANT CHANGE UP (ref 32–46)
PCO2 BLDA: 37 MMHG — SIGNIFICANT CHANGE UP (ref 32–46)
PH BLDA: 7.48 — HIGH (ref 7.35–7.45)
PH BLDA: 7.5 — HIGH (ref 7.35–7.45)
PLATELET # BLD AUTO: 292 K/UL — SIGNIFICANT CHANGE UP (ref 150–400)
PO2 BLDA: 127 MMHG — HIGH (ref 74–108)
PO2 BLDA: 131 MMHG — HIGH (ref 74–108)
RBC # BLD: 4 M/UL — LOW (ref 4.2–5.8)
RBC # FLD: 18 % — HIGH (ref 10.3–14.5)
SAO2 % BLDA: 99 % — HIGH (ref 92–96)
SAO2 % BLDA: 99 % — HIGH (ref 92–96)
WBC # BLD: 9.31 K/UL — SIGNIFICANT CHANGE UP (ref 3.8–10.5)
WBC # FLD AUTO: 9.31 K/UL — SIGNIFICANT CHANGE UP (ref 3.8–10.5)

## 2019-06-16 PROCEDURE — 71045 X-RAY EXAM CHEST 1 VIEW: CPT | Mod: 26

## 2019-06-16 RX ADMIN — PANTOPRAZOLE SODIUM 40 MILLIGRAM(S): 20 TABLET, DELAYED RELEASE ORAL at 13:02

## 2019-06-16 RX ADMIN — MEROPENEM 100 MILLIGRAM(S): 1 INJECTION INTRAVENOUS at 13:02

## 2019-06-16 RX ADMIN — Medication 3 MILLILITER(S): at 15:00

## 2019-06-16 RX ADMIN — APIXABAN 5 MILLIGRAM(S): 2.5 TABLET, FILM COATED ORAL at 06:50

## 2019-06-16 RX ADMIN — MEROPENEM 100 MILLIGRAM(S): 1 INJECTION INTRAVENOUS at 21:39

## 2019-06-16 RX ADMIN — Medication 3 MILLILITER(S): at 20:31

## 2019-06-16 RX ADMIN — Medication 3 MILLILITER(S): at 01:29

## 2019-06-16 RX ADMIN — Medication 3 MILLILITER(S): at 10:16

## 2019-06-16 RX ADMIN — APIXABAN 5 MILLIGRAM(S): 2.5 TABLET, FILM COATED ORAL at 17:13

## 2019-06-16 RX ADMIN — MEROPENEM 100 MILLIGRAM(S): 1 INJECTION INTRAVENOUS at 05:35

## 2019-06-16 NOTE — PROGRESS NOTE ADULT - SUBJECTIVE AND OBJECTIVE BOX
CC/reason for follow up: sepsis/ fever    S: 6/13-unable to obtain due to dementia  6/14- abdomen is more distended. PEG tube not flushing and not working well, pt is unable to provide history   6/15-PEG working well. pt in bed, opens eyes to voice but doesn't follow commands. unable to obtain history  6/16-*    ROS: unable to obtain due to dementia    T(C): 36.8 (06-16-19 @ 11:39), Max: 37 (06-16-19 @ 05:24)  HR: 73 (06-16-19 @ 15:01) (72 - 83)  BP: 98/82 (06-16-19 @ 11:39) (98/82 - 120/73)  RR: 20 (06-16-19 @ 04:59) (20 - 20)  SpO2: 96% (06-16-19 @ 15:01) (83% - 100%)    Gen - calm, opens eyes to verbal stimuli, in no acute distress  HEENT- PERRL, moist mucus membranes, OP clear  CV - RRR, No m/r/g, +pulses, no edema  Lungs - Good effort, Clear to auscultation bilaterally  Abdomen - Soft, nontender, nondistended +BS, No rebound/rigidity/guarding, PEG tube intact.   Ext - No cyanosis/clubbing.   Skin - L hip wound clean, no erythema, tiny wound dehiscence,  No jaundice  Psych- awake, calm, cannot assess orientation  Neuro- limited exam    ALBUTerol/ipratropium for Nebulization 3 milliLiter(s) Nebulizer every 6 hours  meropenem  IVPB      meropenem  IVPB 1000 milliGRAM(s) IV Intermittent every 8 hours  ondansetron Injectable 4 milliGRAM(s) IV Push every 6 hours PRN  pantoprazole  Injectable 40 milliGRAM(s) IV Push two times a day      Diagnostic studies: personally reviewed  LABS: All Labs Reviewed:                                   8.7    8.42  )-----------( 283      ( 15 Yemi 2019 12:44 )             29.4   06-15    143  |  107  |  12  ----------------------------<  101<H>  4.0   |  31  |  0.54    Ca    8.2<L>      15 Yemi 2019 12:44            Blood Culture: 06-10 @ 12:33  Organism --  Gram Stain Blood -- Gram Stain --  Specimen Source .Urine None  Culture-Blood --    06-10 @ 12:25  Organism --  Gram Stain Blood -- Gram Stain --  Specimen Source .Blood None  Culture-Blood --      RADIOLOGY/EKG:  < from: Xray Chest 1 View-PORTABLE IMMEDIATE (06.10.19 @ 13:33) >  IMPRESSION:  Minimal residual left lower lobe infiltrate?    < end of copied text >      Assessment and Plan:  77 y/o Male resident at [facility] with a PMHx of Gl bleed, dysphagia, unspecific arrythmia, COPD, Dementia, DM type II, Afib, DVT(on Eliquis), HTN, Osteomyelitis of left leg, PVD, prior PNA/past sepsis, recently discharged form  5/10 for sepsis/ UTI/ UGIB(gastric ulcer) and PEG tube, now   presents to ED BIBEMS sent in from [facility] for  vomiting burgundy colored, and fever of 102F at facility. Patient had dementia and all hx obtianed from chart. Currently with black post hemetemesis around mouth and pale. ROS is unattainable.        1-sepsis secondary to PNA. also probable UTI  -pna possibly aspiration? pt w/ dysphagia and cannot clear secretions well  -imaging suspicious for pna  -hx of esbl c/w meropenem #6, ok to stop per ID when pt ready for discharge  -ID consult appreciated    2-UGIB w/ coffee ground hematemesis  -gi consult appreciated  -conservative approach will be best in this patient with advanced dementia  -no further bleeding since arrival to ED.  -protonix bid  -tube feeds restarted  -hb stable  -6/14 w/ abd distention, PEG not working, checked abd xrays >> ileus. pt passed a lot of gas per staff. CT a/p neg for obstruction and PEG tube in right position. PEG tube was flushed and manipulated by GI and now PEG ok to use.  -ok to start anticoag per GI. will monitor pt inhouse to make sure he doesn't rebleed    3-afib/dvt  -resuming eliquis as above    4-hx of dysphagia  -aspiration precautions  -45 degree head elevations, especially since recent upper gib    5-dvt prophy-scds b/l, eliquis    Patient has MOLST form in chart, and is full code    Code status: full  Dispo: med floor  ELOS: possible d/c to NH on Monday    All questions/concerns were discussed with patient/family by bedside.    case d/w RN    Total time spent: 30min. More than 50% of time was spent in counseling, discussion of medications, and coordination of care CC/reason for follow up: sepsis/ fever    S: 6/13-unable to obtain due to dementia  6/14- abdomen is more distended. PEG tube not flushing and not working well, pt is unable to provide history   6/15-PEG working well. pt in bed, opens eyes to voice but doesn't follow commands. unable to obtain history  6/16-pt appears comfortable but noted to have low O2 sats when pulse ox was checked. pt does not appear to be in any resp distress    ROS: unable to obtain due to dementia    T(C): 36.8 (06-16-19 @ 11:39), Max: 37 (06-16-19 @ 05:24)  HR: 73 (06-16-19 @ 15:01) (72 - 83)  BP: 98/82 (06-16-19 @ 11:39) (98/82 - 120/73)  RR: 20 (06-16-19 @ 04:59) (20 - 20)  SpO2: 96% (06-16-19 @ 15:01) (83% - 100%)    Gen - calm, opens eyes to verbal stimuli, in no acute distress  HEENT- PERRL, moist mucus membranes, OP clear  CV - RRR, No m/r/g, +pulses, no edema  Lungs - Good effort, Clear to auscultation bilaterally  Abdomen - Soft, nontender, nondistended +BS, No rebound/rigidity/guarding, PEG tube intact.   Ext - No cyanosis/clubbing.   Skin - L hip wound clean, no erythema, tiny wound dehiscence,  No jaundice  Psych- awake, calm, cannot assess orientation  Neuro- limited exam    ALBUTerol/ipratropium for Nebulization 3 milliLiter(s) Nebulizer every 6 hours  meropenem  IVPB      meropenem  IVPB 1000 milliGRAM(s) IV Intermittent every 8 hours  ondansetron Injectable 4 milliGRAM(s) IV Push every 6 hours PRN  pantoprazole  Injectable 40 milliGRAM(s) IV Push two times a day      Diagnostic studies: personally reviewed  LABS: All Labs Reviewed:                                   8.7    8.42  )-----------( 283      ( 15 Yemi 2019 12:44 )             29.4   06-15    143  |  107  |  12  ----------------------------<  101<H>  4.0   |  31  |  0.54    Ca    8.2<L>      15 Yemi 2019 12:44            Blood Culture: 06-10 @ 12:33  Organism --  Gram Stain Blood -- Gram Stain --  Specimen Source .Urine None  Culture-Blood --    06-10 @ 12:25  Organism --  Gram Stain Blood -- Gram Stain --  Specimen Source .Blood None  Culture-Blood --      RADIOLOGY/EKG:  < from: Xray Chest 1 View-PORTABLE IMMEDIATE (06.10.19 @ 13:33) >  IMPRESSION:  Minimal residual left lower lobe infiltrate?    < end of copied text >      Assessment and Plan:  77 y/o Male resident at [facility] with a PMHx of Gl bleed, dysphagia, unspecific arrythmia, COPD, Dementia, DM type II, Afib, DVT(on Eliquis), HTN, Osteomyelitis of left leg, PVD, prior PNA/past sepsis, recently discharged form  5/10 for sepsis/ UTI/ UGIB(gastric ulcer) and PEG tube, now   presents to ED BIBEMS sent in from [facility] for  vomiting burgundy colored, and fever of 102F at facility. Patient had dementia and all hx obtianed from chart. Currently with black post hemetemesis around mouth and pale. ROS is unattainable.        1-sepsis secondary to PNA. also probable UTI  -pna possibly aspiration? pt w/ dysphagia and cannot clear secretions well  -imaging suspicious for pna  -hx of esbl c/w meropenem #6, ok to stop per ID when pt ready for discharge  -ID consult appreciated  -pulse ox readings low at times although pt looks comfortable without any s/sx of respiratory distress. checked ABG twice on room air showing normal or even high PO2 level and NO hypoxia. pulse ox readings appear to be incorrect and do not correlate to arterial blood gas.     2-UGIB w/ coffee ground hematemesis  -gi consult appreciated  -conservative approach will be best in this patient with advanced dementia  -no further bleeding since arrival to ED.  -protonix bid  -tube feeds restarted  -hb stable  -6/14 w/ abd distention, PEG not working, checked abd xrays >> ileus. pt passed a lot of gas per staff. CT a/p neg for obstruction and PEG tube in right position. PEG tube was flushed and manipulated by GI and now PEG ok to use.  -ok to start anticoag per GI. monitoring pt inhouse to make sure he doesn't rebleed. hgb has remained stable    3-afib/dvt  -resuming eliquis as above    4-hx of dysphagia  -aspiration precautions  -45 degree head elevations, especially since recent upper gib    5-dvt prophy-scds b/l, eliquis    Patient has MOLST form in chart, and is full code    Code status: full  Dispo: med floor  ELOS: possible d/c to NH on Monday    All questions/concerns were discussed with patient/family by bedside.    case d/w RN    Total time spent: 30min. More than 50% of time was spent in counseling, discussion of medications, and coordination of care

## 2019-06-16 NOTE — PROVIDER CONTACT NOTE (CHANGE IN STATUS NOTIFICATION) - SITUATION
pt hypoxic, o2 sat keeps dropping in the 80s. pt on ventimask 2L. mask removed. pt now 99% on room air.

## 2019-06-17 ENCOUNTER — TRANSCRIPTION ENCOUNTER (OUTPATIENT)
Age: 79
End: 2019-06-17

## 2019-06-17 VITALS
RESPIRATION RATE: 18 BRPM | DIASTOLIC BLOOD PRESSURE: 90 MMHG | OXYGEN SATURATION: 100 % | SYSTOLIC BLOOD PRESSURE: 102 MMHG | TEMPERATURE: 98 F | HEART RATE: 88 BPM

## 2019-06-17 LAB
GLUCOSE BLDC GLUCOMTR-MCNC: 113 MG/DL — HIGH (ref 70–99)
GLUCOSE BLDC GLUCOMTR-MCNC: 122 MG/DL — HIGH (ref 70–99)
HCT VFR BLD CALC: 29.4 % — LOW (ref 39–50)
HGB BLD-MCNC: 9 G/DL — LOW (ref 13–17)
MCHC RBC-ENTMCNC: 23.7 PG — LOW (ref 27–34)
MCHC RBC-ENTMCNC: 30.6 GM/DL — LOW (ref 32–36)
MCV RBC AUTO: 77.4 FL — LOW (ref 80–100)
PLATELET # BLD AUTO: 313 K/UL — SIGNIFICANT CHANGE UP (ref 150–400)
RBC # BLD: 3.8 M/UL — LOW (ref 4.2–5.8)
RBC # FLD: 17.7 % — HIGH (ref 10.3–14.5)
WBC # BLD: 10.91 K/UL — HIGH (ref 3.8–10.5)
WBC # FLD AUTO: 10.91 K/UL — HIGH (ref 3.8–10.5)

## 2019-06-17 RX ORDER — FAMOTIDINE 10 MG/ML
1 INJECTION INTRAVENOUS
Qty: 0 | Refills: 0 | DISCHARGE

## 2019-06-17 RX ORDER — FERROUS SULFATE 325(65) MG
5 TABLET ORAL
Qty: 0 | Refills: 0 | DISCHARGE

## 2019-06-17 RX ORDER — PANTOPRAZOLE SODIUM 20 MG/1
40 TABLET, DELAYED RELEASE ORAL
Qty: 0 | Refills: 0 | DISCHARGE
Start: 2019-06-17

## 2019-06-17 RX ORDER — APIXABAN 2.5 MG/1
1 TABLET, FILM COATED ORAL
Qty: 0 | Refills: 0 | DISCHARGE

## 2019-06-17 RX ORDER — CHOLECALCIFEROL (VITAMIN D3) 125 MCG
2 CAPSULE ORAL
Qty: 0 | Refills: 0 | DISCHARGE

## 2019-06-17 RX ORDER — ACETAMINOPHEN 500 MG
1 TABLET ORAL
Qty: 0 | Refills: 0 | DISCHARGE

## 2019-06-17 RX ADMIN — MEROPENEM 100 MILLIGRAM(S): 1 INJECTION INTRAVENOUS at 05:19

## 2019-06-17 RX ADMIN — Medication 3 MILLILITER(S): at 08:00

## 2019-06-17 RX ADMIN — APIXABAN 5 MILLIGRAM(S): 2.5 TABLET, FILM COATED ORAL at 05:19

## 2019-06-17 RX ADMIN — Medication 3 MILLILITER(S): at 14:01

## 2019-06-17 RX ADMIN — PANTOPRAZOLE SODIUM 40 MILLIGRAM(S): 20 TABLET, DELAYED RELEASE ORAL at 12:30

## 2019-06-17 NOTE — DISCHARGE NOTE NURSING/CASE MANAGEMENT/SOCIAL WORK - NSDCDPATPORTLINK_GEN_ALL_CORE
You can access the [facility] Patient Portal, offered by [facility], by registering with the following website: [email or web address]

## 2019-06-17 NOTE — PROGRESS NOTE ADULT - REASON FOR ADMISSION
sepsis/ hematemesis
sepsis/ hemetemesis
sepsis/ hematemesis
sepsis/ hemetemesis

## 2019-06-17 NOTE — DISCHARGE NOTE PROVIDER - CARE PROVIDER_API CALL
Noé Ryan)  Medicine  [address] [place] [address]  Phone: [phone]  Fax: [phone]  Follow Up Time: 1 week

## 2019-06-17 NOTE — DISCHARGE NOTE PROVIDER - NSDCFUADDINST_GEN_ALL_CORE_FT
PCP- follow up. Bring these papers with you to that appointment so your PCP can request records from your hospital stay.

## 2019-06-17 NOTE — PROGRESS NOTE ADULT - PROVIDER SPECIALTY LIST ADULT
Gastroenterology
Gastroenterology
Hospitalist
Infectious Disease
Internal Medicine
Gastroenterology
Infectious Disease
Gastroenterology
Infectious Disease

## 2019-06-17 NOTE — PROGRESS NOTE ADULT - SUBJECTIVE AND OBJECTIVE BOX
Date of service: 19 @ 13:17    Lying in bed in NAD  No SOB at rest  Alert and comfortable    ROS: no fever or chills; limited    MEDICATIONS  (STANDING):  ALBUTerol/ipratropium for Nebulization 3 milliLiter(s) Nebulizer every 6 hours  apixaban 5 milliGRAM(s) Oral every 12 hours  meropenem  IVPB      meropenem  IVPB 1000 milliGRAM(s) IV Intermittent every 8 hours  pantoprazole   Suspension 40 milliGRAM(s) Enteral Tube daily      Vital Signs Last 24 Hrs  T(C): 36.4 (2019 11:36), Max: 36.5 (2019 05:01)  T(F): 97.5 (2019 11:36), Max: 97.7 (2019 05:01)  HR: 88 (2019 11:36) (73 - 88)  BP: 102/90 (2019 11:36) (102/90 - 122/91)  BP(mean): --  RR: 18 (2019 11:36) (18 - 19)  SpO2: 100% (2019 11:36) (89% - 100%)    Physical Exam:      Constitutional: frail looking  HEENT: NC/AT, EOMI, PERRLA, conjunctivae clear  Neck: supple; thyroid not palpable  Back: no tenderness  Respiratory: respiratory effort normal; few crackles at bases  Cardiovascular: S1S2 regular, no murmurs  Abdomen: soft, not tender, not distended, positive BS; PEG in place  Genitourinary: no suprapubic tenderness  Lymphatic: no LN palpable  Musculoskeletal: no muscle tenderness, no joint swelling or tenderness  Extremities: no pedal edema  Neurological/ Psychiatric: confused, moving all extremities  Skin: no rashes; no palpable lesions    Labs: reviewed                        9.0    10.91 )-----------( 313      ( 2019 08:34 )             29.4                         8.5    7.92  )-----------( 256      ( 2019 08:23 )             28.3     06-13    146<H>  |  112<H>  |  11  ----------------------------<  101<H>  3.9   |  28  |  0.71    Ca    7.6<L>      2019 08:23                                   10.2   14.00 )-----------( 312      ( 10 Yemi 2019 12:25 )             33.9     06-10    138  |  105  |  34<H>  ----------------------------<  130<H>  4.8   |  24  |  0.83    Ca    8.5      10 Yemi 2019 12:25    TPro  7.1  /  Alb  2.3<L>  /  TBili  0.2  /  DBili  x   /  AST  22  /  ALT  25  /  AlkPhos  98  06-10     LIVER FUNCTIONS - ( 10 Yemi 2019 12:25 )  Alb: 2.3 g/dL / Pro: 7.1 gm/dL / ALK PHOS: 98 U/L / ALT: 25 U/L / AST: 22 U/L / GGT: x           Urinalysis Basic - ( 10 Yemi 2019 12:33 )    Color: Yellow / Appearance: Clear / S.010 / pH: x  Gluc: x / Ketone: Negative  / Bili: Negative / Urobili: Negative mg/dL   Blood: x / Protein: 30 mg/dL / Nitrite: Negative   Leuk Esterase: Moderate / RBC: 3-5 /HPF / WBC 26-50   Sq Epi: x / Non Sq Epi: x / Bacteria: Moderate    Culture - Urine (19 @ 17:34)    -  Tobramycin: S <=4    -  Trimethoprim/Sulfamethoxazole: R >2/38    -  Ampicillin/Sulbactam: R <=8/4    -  Aztreonam: R >16    -  Cefazolin: R >16 For uncomplicated UTI with K. pneumoniae, E. coli, or P. mirablis: HOWIE <=16 is sensitive and HOWIE >=32 is resistant. This also predicts results for oral agents cefaclor, cefdinir, cefpodoxime, cefprozil, cefuroxime axetil, cephalexin and locarbef for uncomplicated UTI. Note that some isolates may be susceptible to these agents while testing resistant to cefazolin.    -  Cefepime: R >16    -  Cefoxitin: R >16    -  Ceftriaxone: R >32 Enterobacter, Citrobacter, and Serratia may develop resistance during prolonged therapy    -  Ciprofloxacin: R >2    -  Ertapenem: S <=1    -  Gentamicin: S <=4    -  Levofloxacin: R >4    -  Imipenem: S <=1    -  Meropenem: S <=1    -  Nitrofurantoin: S <=32 Should not be used to treat pyelonephritis    -  Piperacillin/Tazobactam: R <=16    -  Tigecycline: S <=2    -  Amikacin: S <=16    -  Ampicillin: R >16 These ampicillin results predict results for amoxicillin    Specimen Source: .Urine Clean Catch (Midstream)    Culture Results:   >100,000 CFU/ml Escherichia coli ESBL    Organism Identification: Escherichia coli ESBL    Organism: Escherichia coli ESBL    Method Type: HOWIE      Culture - Urine (collected 10 Yemi 2019 12:33)  Source: .Urine None  Final Report (2019 08:27):    No growth    Radiology: all available radiological tests reviewed    < from: Xray Chest 1 View-PORTABLE IMMEDIATE (06.10.19 @ 13:33) >  Minimal residual left lower lobe infiltrate?    < end of copied text >      Advanced directives addressed: full resuscitation

## 2019-06-17 NOTE — PROGRESS NOTE ADULT - ASSESSMENT
78M s/p PEG placement 5/2019 (otherwise negative EGD except hiatal hernia) now presenting with sepsis, hematemesis. The hematemesis has since stopped and there is no melena.   Likely UGIB due to esophagitis, vs PUD. PEG site issues can also cause gastric bleeding.  Seems to have stopped bleeding now in hospital, and hgb stable.    Recommend:  -reduce PPI to once daily and continue indefinitely  -cont tube feeding   -no EGD  -no GI objection to d/c  -will follow as needed, please call with any issues
79 y/o male with h/o Afib on coumadin, dementia and anemia admitted on 6/10 from snf for vomiting coffee ground emesis and fever to 102F. History per medical record and patient unable to provide history. In ER the patient received meropenem.    1. Low grade fever. Pyuria. Probable UTI. Possible LLL pneumonia. Aspiration pneumonia. Prior UTI with ESBL E.coli.  -encephalopathy  -leukocytosis improving  -urine c/s shows no growth  -on meropenem 1 gm IV q8h # 4  -tolerating abx well so far; no side effects noted  -contact isolation  -continue abx coverage for now  -monitor temps  -f/u CBC  -supportive care  2. Other issues:   -care per medicine
77 y/o male with h/o Afib on coumadin, dementia and anemia admitted on 6/10 from snf for vomiting coffee ground emesis and fever to 102F. History per medical record and patient unable to provide history. In ER the patient received meropenem.    1. Febrile syndrome is improving. Possible sepsis. Pyuria. Probable UTI. Possible LLL pneumonia. Prior UTI with ESBL E.coli.  -encephalopathy  -leukocytosis improving  -urine c/s shows no growth  -on meropenem 1 gm IV q8h # 2  -tolerating abx well so far; no side effects noted  -contact isolation  -continue abx coverage  -monitor temps  -f/u CBC  -supportive care  2. Other issues:   -care per medicine
78M s/p PEG placement 5/2019 (otherwise negative EGD except hiatal hernia) now presenting with sepsis, hematemesis. The hematemesis has since stopped and there is no melena.   Likely UGIB due to esophagitis, vs PUD. PEG site issues can also cause gastric bleeding.  Seems to have stopped bleeding now in hospital, and hgb stable.    Recommend:  -trend CBC  -favor conservative approach  -BID PPI  -resume tube feeding today please  -d/w RN
77 y/o male with h/o Afib on coumadin, dementia and anemia admitted on 6/10 from snf for vomiting coffee ground emesis and fever to 102F. History per medical record and patient unable to provide history. In ER the patient received meropenem.    1. Low grade fever resolved. Pyuria. Probable UTI. Possible LLL pneumonia. Aspiration pneumonia resolving. Prior UTI with ESBL E.coli.  -encephalopathy - likely at baseline  -leukocytosis resolved  -urine c/s shows no growth  -on meropenem 1 gm IV q8h # 5  -tolerating abx well so far; no side effects noted  -contact isolation  -may complete abx therapy to day if ready for d/c to NH  -monitor temps  -f/u CBC  -supportive care  2. Other issues:   -care per medicine
77 y/o male with h/o Afib on coumadin, dementia and anemia admitted on 6/10 from snf for vomiting coffee ground emesis and fever to 102F. History per medical record and patient unable to provide history. In ER the patient received meropenem.    1. Pyuria. Probable UTI. Possible LLL pneumonia. Aspiration pneumonia resolving. Prior UTI with ESBL E.coli.  -encephalopathy - likely at baseline  -leukocytosis resolved  -urine c/s shows no growth  -on meropenem 1 gm IV q8h # 7-8  -tolerating abx well so far; no side effects noted  -contact isolation  -complete abx therapy today  -aspiration precautions  -monitor temps  -f/u CBC  -supportive care  2. Other issues:   -care per medicine
78 YOM with dementia and urosepsis not taking PO.  B/P lower  today although baseline is in  the 105- 94 range systolic.  Hypovolemia  relative hypotension 2ry to hypovolemia may be due to  decrease intake, melena or third spacing fluid.  will give bolus of Normal Saline and increase IV to D5 NS @ 100 cc/hr.  Urosepsis  on ABX fo ESBL   R/O GI Bleed   CBC and stool for occult blood ordered   Dementia  Supportive care
79 y/o male with h/o Afib on coumadin, dementia and anemia admitted on 6/10 from snf for vomiting coffee ground emesis and fever to 102F. History per medical record and patient unable to provide history. In ER the patient received meropenem.    1. Febrile syndrome is resolvingving. Possible sepsis. Pyuria. Probable UTI. Possible LLL pneumonia. Aspiration pneumonia. Prior UTI with ESBL E.coli.  -encephalopathy  -leukocytosis improving  -urine c/s shows no growth  -on meropenem 1 gm IV q8h # 3  -tolerating abx well so far; no side effects noted  -contact isolation  -continue abx coverage  -monitor temps  -f/u CBC  -supportive care  2. Other issues:   -care per medicine

## 2019-06-17 NOTE — DISCHARGE NOTE PROVIDER - NSDCCPCAREPLAN_GEN_ALL_CORE_FT
PRINCIPAL DISCHARGE DIAGNOSIS  Diagnosis: Sepsis, due to unspecified organism  Assessment and Plan of Treatment:       SECONDARY DISCHARGE DIAGNOSES  Diagnosis: UTI (urinary tract infection)  Assessment and Plan of Treatment:     Diagnosis: Aspiration pneumonia  Assessment and Plan of Treatment:     Diagnosis: Upper GI bleed  Assessment and Plan of Treatment:     Diagnosis: Anticoagulated by anticoagulation treatment  Assessment and Plan of Treatment:

## 2019-06-20 DIAGNOSIS — Z86.718 PERSONAL HISTORY OF OTHER VENOUS THROMBOSIS AND EMBOLISM: ICD-10-CM

## 2019-06-20 DIAGNOSIS — G93.40 ENCEPHALOPATHY, UNSPECIFIED: ICD-10-CM

## 2019-06-20 DIAGNOSIS — A41.9 SEPSIS, UNSPECIFIED ORGANISM: ICD-10-CM

## 2019-06-20 DIAGNOSIS — F03.90 UNSPECIFIED DEMENTIA WITHOUT BEHAVIORAL DISTURBANCE: ICD-10-CM

## 2019-06-20 DIAGNOSIS — B96.20 UNSPECIFIED ESCHERICHIA COLI [E. COLI] AS THE CAUSE OF DISEASES CLASSIFIED ELSEWHERE: ICD-10-CM

## 2019-06-20 DIAGNOSIS — I73.9 PERIPHERAL VASCULAR DISEASE, UNSPECIFIED: ICD-10-CM

## 2019-06-20 DIAGNOSIS — Z79.01 LONG TERM (CURRENT) USE OF ANTICOAGULANTS: ICD-10-CM

## 2019-06-20 DIAGNOSIS — E86.1 HYPOVOLEMIA: ICD-10-CM

## 2019-06-20 DIAGNOSIS — I10 ESSENTIAL (PRIMARY) HYPERTENSION: ICD-10-CM

## 2019-06-20 DIAGNOSIS — E11.69 TYPE 2 DIABETES MELLITUS WITH OTHER SPECIFIED COMPLICATION: ICD-10-CM

## 2019-06-20 DIAGNOSIS — J69.0 PNEUMONITIS DUE TO INHALATION OF FOOD AND VOMIT: ICD-10-CM

## 2019-06-20 DIAGNOSIS — M86.672 OTHER CHRONIC OSTEOMYELITIS, LEFT ANKLE AND FOOT: ICD-10-CM

## 2019-06-20 DIAGNOSIS — E43 UNSPECIFIED SEVERE PROTEIN-CALORIE MALNUTRITION: ICD-10-CM

## 2019-06-20 DIAGNOSIS — N39.0 URINARY TRACT INFECTION, SITE NOT SPECIFIED: ICD-10-CM

## 2019-06-20 DIAGNOSIS — Z79.899 OTHER LONG TERM (CURRENT) DRUG THERAPY: ICD-10-CM

## 2019-06-20 DIAGNOSIS — I48.2 CHRONIC ATRIAL FIBRILLATION: ICD-10-CM

## 2019-06-20 DIAGNOSIS — R13.10 DYSPHAGIA, UNSPECIFIED: ICD-10-CM

## 2019-06-20 DIAGNOSIS — K92.2 GASTROINTESTINAL HEMORRHAGE, UNSPECIFIED: ICD-10-CM

## 2019-06-24 ENCOUNTER — INPATIENT (INPATIENT)
Facility: HOSPITAL | Age: 79
LOS: 3 days | Discharge: NURSING, REHAB OR LONG-TERM CARE | End: 2019-06-28
Attending: INTERNAL MEDICINE | Admitting: FAMILY MEDICINE
Payer: MEDICARE

## 2019-06-24 VITALS
HEIGHT: 71 IN | WEIGHT: 184.97 LBS | HEART RATE: 120 BPM | DIASTOLIC BLOOD PRESSURE: 86 MMHG | RESPIRATION RATE: 32 BRPM | SYSTOLIC BLOOD PRESSURE: 106 MMHG | OXYGEN SATURATION: 92 %

## 2019-06-24 DIAGNOSIS — I80.219 PHLEBITIS AND THROMBOPHLEBITIS OF UNSPECIFIED ILIAC VEIN: ICD-10-CM

## 2019-06-24 LAB
ALBUMIN SERPL ELPH-MCNC: 2.4 G/DL — LOW (ref 3.3–5)
ALP SERPL-CCNC: 110 U/L — SIGNIFICANT CHANGE UP (ref 40–120)
ALT FLD-CCNC: 28 U/L — SIGNIFICANT CHANGE UP (ref 12–78)
ANION GAP SERPL CALC-SCNC: 5 MMOL/L — SIGNIFICANT CHANGE UP (ref 5–17)
ANISOCYTOSIS BLD QL: SLIGHT — SIGNIFICANT CHANGE UP
APPEARANCE UR: ABNORMAL
APTT BLD: 30.6 SEC — SIGNIFICANT CHANGE UP (ref 27.5–36.3)
AST SERPL-CCNC: 27 U/L — SIGNIFICANT CHANGE UP (ref 15–37)
BACTERIA # UR AUTO: ABNORMAL
BASOPHILS # BLD AUTO: 0.05 K/UL — SIGNIFICANT CHANGE UP (ref 0–0.2)
BASOPHILS NFR BLD AUTO: 0.4 % — SIGNIFICANT CHANGE UP (ref 0–2)
BILIRUB SERPL-MCNC: 0.5 MG/DL — SIGNIFICANT CHANGE UP (ref 0.2–1.2)
BILIRUB UR-MCNC: NEGATIVE — SIGNIFICANT CHANGE UP
BUN SERPL-MCNC: 37 MG/DL — HIGH (ref 7–23)
CALCIUM SERPL-MCNC: 8.8 MG/DL — SIGNIFICANT CHANGE UP (ref 8.5–10.1)
CHLORIDE SERPL-SCNC: 102 MMOL/L — SIGNIFICANT CHANGE UP (ref 96–108)
CO2 SERPL-SCNC: 32 MMOL/L — HIGH (ref 22–31)
COD CRY URNS QL: ABNORMAL
COLOR SPEC: YELLOW — SIGNIFICANT CHANGE UP
COMMENT - URINE: SIGNIFICANT CHANGE UP
CREAT SERPL-MCNC: 1.04 MG/DL — SIGNIFICANT CHANGE UP (ref 0.5–1.3)
DIFF PNL FLD: ABNORMAL
EOSINOPHIL # BLD AUTO: 0.01 K/UL — SIGNIFICANT CHANGE UP (ref 0–0.5)
EOSINOPHIL NFR BLD AUTO: 0.1 % — SIGNIFICANT CHANGE UP (ref 0–6)
EPI CELLS # UR: SIGNIFICANT CHANGE UP
GLUCOSE BLDC GLUCOMTR-MCNC: 142 MG/DL — HIGH (ref 70–99)
GLUCOSE SERPL-MCNC: 150 MG/DL — HIGH (ref 70–99)
GLUCOSE UR QL: NEGATIVE MG/DL — SIGNIFICANT CHANGE UP
HCT VFR BLD CALC: 34.8 % — LOW (ref 39–50)
HGB BLD-MCNC: 10.5 G/DL — LOW (ref 13–17)
HYPOCHROMIA BLD QL: SLIGHT — SIGNIFICANT CHANGE UP
IMM GRANULOCYTES NFR BLD AUTO: 0.4 % — SIGNIFICANT CHANGE UP (ref 0–1.5)
INR BLD: 1.65 RATIO — HIGH (ref 0.88–1.16)
KETONES UR-MCNC: NEGATIVE — SIGNIFICANT CHANGE UP
LACTATE SERPL-SCNC: 1.7 MMOL/L — SIGNIFICANT CHANGE UP (ref 0.7–2)
LEUKOCYTE ESTERASE UR-ACNC: ABNORMAL
LYMPHOCYTES # BLD AUTO: 0.73 K/UL — LOW (ref 1–3.3)
LYMPHOCYTES # BLD AUTO: 5.7 % — LOW (ref 13–44)
MANUAL SMEAR VERIFICATION: SIGNIFICANT CHANGE UP
MCHC RBC-ENTMCNC: 22.6 PG — LOW (ref 27–34)
MCHC RBC-ENTMCNC: 30.2 GM/DL — LOW (ref 32–36)
MCV RBC AUTO: 74.8 FL — LOW (ref 80–100)
MONOCYTES # BLD AUTO: 0.8 K/UL — SIGNIFICANT CHANGE UP (ref 0–0.9)
MONOCYTES NFR BLD AUTO: 6.2 % — SIGNIFICANT CHANGE UP (ref 2–14)
NEUTROPHILS # BLD AUTO: 11.23 K/UL — HIGH (ref 1.8–7.4)
NEUTROPHILS NFR BLD AUTO: 87.2 % — HIGH (ref 43–77)
NITRITE UR-MCNC: POSITIVE
PH UR: 6 — SIGNIFICANT CHANGE UP (ref 5–8)
PLAT MORPH BLD: NORMAL — SIGNIFICANT CHANGE UP
PLATELET # BLD AUTO: 377 K/UL — SIGNIFICANT CHANGE UP (ref 150–400)
POTASSIUM SERPL-MCNC: 4.5 MMOL/L — SIGNIFICANT CHANGE UP (ref 3.5–5.3)
POTASSIUM SERPL-SCNC: 4.5 MMOL/L — SIGNIFICANT CHANGE UP (ref 3.5–5.3)
PROT SERPL-MCNC: 8.1 GM/DL — SIGNIFICANT CHANGE UP (ref 6–8.3)
PROT UR-MCNC: 30 MG/DL
PROTHROM AB SERPL-ACNC: 18.6 SEC — HIGH (ref 10–12.9)
RBC # BLD: 4.65 M/UL — SIGNIFICANT CHANGE UP (ref 4.2–5.8)
RBC # FLD: 18 % — HIGH (ref 10.3–14.5)
RBC BLD AUTO: ABNORMAL
RBC CASTS # UR COMP ASSIST: ABNORMAL /HPF (ref 0–4)
SODIUM SERPL-SCNC: 139 MMOL/L — SIGNIFICANT CHANGE UP (ref 135–145)
SP GR SPEC: 1.02 — SIGNIFICANT CHANGE UP (ref 1.01–1.02)
UROBILINOGEN FLD QL: NEGATIVE MG/DL — SIGNIFICANT CHANGE UP
WBC # BLD: 12.87 K/UL — HIGH (ref 3.8–10.5)
WBC # FLD AUTO: 12.87 K/UL — HIGH (ref 3.8–10.5)
WBC UR QL: >50

## 2019-06-24 PROCEDURE — 74176 CT ABD & PELVIS W/O CONTRAST: CPT | Mod: 26

## 2019-06-24 PROCEDURE — 93010 ELECTROCARDIOGRAM REPORT: CPT

## 2019-06-24 PROCEDURE — 71250 CT THORAX DX C-: CPT | Mod: 26

## 2019-06-24 PROCEDURE — 71045 X-RAY EXAM CHEST 1 VIEW: CPT | Mod: 26

## 2019-06-24 PROCEDURE — 99285 EMERGENCY DEPT VISIT HI MDM: CPT

## 2019-06-24 RX ORDER — CHOLECALCIFEROL (VITAMIN D3) 125 MCG
1000 CAPSULE ORAL DAILY
Refills: 0 | Status: DISCONTINUED | OUTPATIENT
Start: 2019-06-24 | End: 2019-06-28

## 2019-06-24 RX ORDER — DEXTROSE 50 % IN WATER 50 %
25 SYRINGE (ML) INTRAVENOUS ONCE
Refills: 0 | Status: DISCONTINUED | OUTPATIENT
Start: 2019-06-24 | End: 2019-06-28

## 2019-06-24 RX ORDER — SENNA PLUS 8.6 MG/1
2 TABLET ORAL AT BEDTIME
Refills: 0 | Status: DISCONTINUED | OUTPATIENT
Start: 2019-06-24 | End: 2019-06-26

## 2019-06-24 RX ORDER — ACETAMINOPHEN 500 MG
325 TABLET ORAL ONCE
Refills: 0 | Status: DISCONTINUED | OUTPATIENT
Start: 2019-06-24 | End: 2019-06-28

## 2019-06-24 RX ORDER — SODIUM CHLORIDE 9 MG/ML
2000 INJECTION INTRAMUSCULAR; INTRAVENOUS; SUBCUTANEOUS ONCE
Refills: 0 | Status: COMPLETED | OUTPATIENT
Start: 2019-06-24 | End: 2019-06-24

## 2019-06-24 RX ORDER — PANTOPRAZOLE SODIUM 20 MG/1
40 TABLET, DELAYED RELEASE ORAL ONCE
Refills: 0 | Status: COMPLETED | OUTPATIENT
Start: 2019-06-24 | End: 2019-06-24

## 2019-06-24 RX ORDER — FERROUS SULFATE 325(65) MG
300 TABLET ORAL
Refills: 0 | Status: DISCONTINUED | OUTPATIENT
Start: 2019-06-24 | End: 2019-06-28

## 2019-06-24 RX ORDER — DOCUSATE SODIUM 100 MG
100 CAPSULE ORAL THREE TIMES A DAY
Refills: 0 | Status: DISCONTINUED | OUTPATIENT
Start: 2019-06-24 | End: 2019-06-26

## 2019-06-24 RX ORDER — INSULIN LISPRO 100/ML
VIAL (ML) SUBCUTANEOUS EVERY 6 HOURS
Refills: 0 | Status: DISCONTINUED | OUTPATIENT
Start: 2019-06-24 | End: 2019-06-28

## 2019-06-24 RX ORDER — MEROPENEM 1 G/30ML
500 INJECTION INTRAVENOUS ONCE
Refills: 0 | Status: COMPLETED | OUTPATIENT
Start: 2019-06-24 | End: 2019-06-24

## 2019-06-24 RX ORDER — GLUCAGON INJECTION, SOLUTION 0.5 MG/.1ML
1 INJECTION, SOLUTION SUBCUTANEOUS ONCE
Refills: 0 | Status: DISCONTINUED | OUTPATIENT
Start: 2019-06-24 | End: 2019-06-28

## 2019-06-24 RX ORDER — DEXTROSE 50 % IN WATER 50 %
15 SYRINGE (ML) INTRAVENOUS ONCE
Refills: 0 | Status: DISCONTINUED | OUTPATIENT
Start: 2019-06-24 | End: 2019-06-28

## 2019-06-24 RX ORDER — SODIUM CHLORIDE 9 MG/ML
1000 INJECTION, SOLUTION INTRAVENOUS
Refills: 0 | Status: DISCONTINUED | OUTPATIENT
Start: 2019-06-24 | End: 2019-06-28

## 2019-06-24 RX ORDER — SODIUM CHLORIDE 9 MG/ML
3 INJECTION INTRAMUSCULAR; INTRAVENOUS; SUBCUTANEOUS ONCE
Refills: 0 | Status: COMPLETED | OUTPATIENT
Start: 2019-06-24 | End: 2019-06-24

## 2019-06-24 RX ORDER — VANCOMYCIN HCL 1 G
1000 VIAL (EA) INTRAVENOUS ONCE
Refills: 0 | Status: COMPLETED | OUTPATIENT
Start: 2019-06-24 | End: 2019-06-24

## 2019-06-24 RX ORDER — DEXTROSE 50 % IN WATER 50 %
12.5 SYRINGE (ML) INTRAVENOUS ONCE
Refills: 0 | Status: DISCONTINUED | OUTPATIENT
Start: 2019-06-24 | End: 2019-06-28

## 2019-06-24 RX ORDER — SODIUM CHLORIDE 9 MG/ML
1000 INJECTION INTRAMUSCULAR; INTRAVENOUS; SUBCUTANEOUS
Refills: 0 | Status: COMPLETED | OUTPATIENT
Start: 2019-06-24 | End: 2019-06-25

## 2019-06-24 RX ORDER — ACETAMINOPHEN 500 MG
650 TABLET ORAL ONCE
Refills: 0 | Status: COMPLETED | OUTPATIENT
Start: 2019-06-24 | End: 2019-06-24

## 2019-06-24 RX ORDER — CEFEPIME 1 G/1
1000 INJECTION, POWDER, FOR SOLUTION INTRAMUSCULAR; INTRAVENOUS ONCE
Refills: 0 | Status: COMPLETED | OUTPATIENT
Start: 2019-06-24 | End: 2019-06-24

## 2019-06-24 RX ORDER — PANTOPRAZOLE SODIUM 20 MG/1
8 TABLET, DELAYED RELEASE ORAL
Qty: 80 | Refills: 0 | Status: DISCONTINUED | OUTPATIENT
Start: 2019-06-24 | End: 2019-06-26

## 2019-06-24 RX ADMIN — Medication 1000 MILLIGRAM(S): at 19:30

## 2019-06-24 RX ADMIN — PANTOPRAZOLE SODIUM 40 MILLIGRAM(S): 20 TABLET, DELAYED RELEASE ORAL at 18:20

## 2019-06-24 RX ADMIN — SODIUM CHLORIDE 1000 MILLILITER(S): 9 INJECTION INTRAMUSCULAR; INTRAVENOUS; SUBCUTANEOUS at 18:08

## 2019-06-24 RX ADMIN — Medication 250 MILLIGRAM(S): at 18:20

## 2019-06-24 RX ADMIN — Medication 650 MILLIGRAM(S): at 20:03

## 2019-06-24 RX ADMIN — SODIUM CHLORIDE 3 MILLILITER(S): 9 INJECTION INTRAMUSCULAR; INTRAVENOUS; SUBCUTANEOUS at 20:07

## 2019-06-24 RX ADMIN — SODIUM CHLORIDE 2000 MILLILITER(S): 9 INJECTION INTRAMUSCULAR; INTRAVENOUS; SUBCUTANEOUS at 19:55

## 2019-06-24 RX ADMIN — MEROPENEM 100 MILLIGRAM(S): 1 INJECTION INTRAVENOUS at 20:07

## 2019-06-24 RX ADMIN — CEFEPIME 1000 MILLIGRAM(S): 1 INJECTION, POWDER, FOR SOLUTION INTRAMUSCULAR; INTRAVENOUS at 18:20

## 2019-06-24 RX ADMIN — Medication 650 MILLIGRAM(S): at 21:03

## 2019-06-24 NOTE — ED PROVIDER NOTE - CLINICAL SUMMARY MEDICAL DECISION MAKING FREE TEXT BOX
Sepsis 2/2 pneumonia, UTI.  Treated with IV antibiotics.  UGI bleed, stable for now, given protonix.  Admit to medicine service for further management.

## 2019-06-24 NOTE — H&P ADULT - ASSESSMENT
77 y/o M with PMH of GI bleed, dysphagia, unspecific arrhythmia, COPD, dementia, DM2, a-fib, DVT (on eliquis), HTN, osteomyelitis, PVD, h/o PNA, h/o gastric ulcer s/p GI bleed, PEG placement, p/w fever and coffe-ground emesis.    *Sepsis 2/2 UTI vs aspiration PNA   -S/o Vanco / meropenem in ED  -Patient observed to have a wet cough at bedside, will order aspiration precautions and get CT chest further evaluation   -F/u blood / urine cultures  -ID consult  -Lactate = 1.7  -IVF  -CT abdomen / pelvis to check for complicated pyelo   -I/Os   -ESR / CRP   -Palliative care consult     *Coffee-ground emesis w/ h/o GI ulcers  -Protonix drip  -IVF  -GI consult  -Hold anticoagulation for a-fib/DVT temporarily until cleared by GI to resume   -NPO  -Trend H/H and transfuse PRN   -Iron panel / FOBT     *Elevated bicarb?  -Possible contraction alkalosis 3/3 dehydration?  -Will recheck after hydration with IVF to check for resolution     *DM2  -Humalog ISS    *H/o COPD /dementia / HTN / osteomyelitis / PVD   -C/w outpatient management if conditions remain stable during hospitalization       *DVT ppx  -SCDs for now 77 y/o M with PMH of GI bleed, dysphagia, unspecific arrhythmia, COPD, dementia, DM2, a-fib, DVT (on eliquis), HTN, osteomyelitis, PVD, h/o PNA, h/o gastric ulcer s/p GI bleed, PEG placement, p/w fever and coffe-ground emesis.    *Sepsis 2/2 UTI vs aspiration PNA   -S/o Vanco / meropenem in ED  -Patient observed to have a wet cough at bedside, will order aspiration precautions and get CT chest further evaluation   -F/u blood / urine cultures  -ID consult  -Lactate = 1.7  -IVF  -CT abdomen / pelvis to check for complicated pyelo   -I/Os   -ESR / CRP   -Palliative care consult     *Coffee-ground emesis w/ h/o GI ulcers  -Protonix drip  -IVF  -GI consult  -Hold anticoagulation for a-fib/DVT temporarily until cleared by GI to resume   -NPO  -Trend H/H and transfuse PRN   -Iron panel / FOBT     *Elevated bicarb?  -Possible contraction alkalosis 3/3 dehydration?  -Will recheck after hydration with IVF to check for resolution     *DM2  -Humalog ISS    *H/o COPD /dementia / HTN / osteomyelitis / PVD   -C/w outpatient management if conditions remain stable during hospitalization    *DVT ppx  -SCDs for now

## 2019-06-24 NOTE — PHARMACOTHERAPY INTERVENTION NOTE - COMMENTS
med history complete, reviewed medications with patient and confirmed with doctor first med profile med history complete, reviewed medications list from assisted living

## 2019-06-24 NOTE — ED PROVIDER NOTE - OBJECTIVE STATEMENT
79 y/o male with a PMHx of COPD, Dementia, DM, DVT, HTN, PVT  presents to the ED c/o fever. Per EMS, symptoms started at 9 am, fever of 101.3 at 4 pm, pt vomited.

## 2019-06-24 NOTE — ED PROVIDER NOTE - PROGRESS NOTE DETAILS
Ana Pearson for ED Attending Dr. Lauren: Pt will get 30 cc/kilo based on ideal body weight. Height is 5'8.

## 2019-06-24 NOTE — PROVIDER CONTACT NOTE (OTHER) - SITUATION
Service called for consult for pt with GI bleed.  Spoke to Yane who will give message to Dr. Johnson in the morning.

## 2019-06-24 NOTE — ED ADULT NURSE NOTE - OBJECTIVE STATEMENT
Pt BIBA from nursing home for fever, tachycardia, tachypnea and hemoptysis. Pt recently admitted to  for pneumonia and GI Bleed for which they were treating conservatively due to advanced dementia. Pt has a hx of ESBL in the urine and has been placed on contact precautions. Pt presents with a peg tube in place. Pt nonverbal, confused.

## 2019-06-24 NOTE — H&P ADULT - HISTORY OF PRESENT ILLNESS
79 y/o M with PMH of GI bleed, dysphagia, unspecific arrhythmia, COPD, dementia, DM2, a-fib, DVT (on eliquis), HTN, osteomyelitis, PVD, h/o PNA, h/o gastric ulcer s/p GI bleed, PEG placement, p/w fever and coffe-ground emesis at NH. Patient unable to provide any history. History obtained from ED chart. At bedside patient observed to have a wet cough     Last admission patient was treated with Meropenem, has a h/o UTI and possible aspiration PNA     PSH: Unable to obtain     Social hx: From [facility]     Family Hx: Unable to obtain

## 2019-06-24 NOTE — ED ADULT TRIAGE NOTE - CHIEF COMPLAINT QUOTE
Pt BIBEMS for evaluation of fever, tachycardia and tachypnea, with hematemesis. Symptoms started at 9 am per EMS and fever of 101.3 at 4 pm.

## 2019-06-25 LAB
ALBUMIN SERPL ELPH-MCNC: 2.1 G/DL — LOW (ref 3.3–5)
ALP SERPL-CCNC: 87 U/L — SIGNIFICANT CHANGE UP (ref 40–120)
ALT FLD-CCNC: 21 U/L — SIGNIFICANT CHANGE UP (ref 12–78)
ANION GAP SERPL CALC-SCNC: 4 MMOL/L — LOW (ref 5–17)
APTT BLD: 29.6 SEC — SIGNIFICANT CHANGE UP (ref 27.5–36.3)
AST SERPL-CCNC: 19 U/L — SIGNIFICANT CHANGE UP (ref 15–37)
BASOPHILS # BLD AUTO: 0.03 K/UL — SIGNIFICANT CHANGE UP (ref 0–0.2)
BASOPHILS NFR BLD AUTO: 0.5 % — SIGNIFICANT CHANGE UP (ref 0–2)
BILIRUB SERPL-MCNC: 0.5 MG/DL — SIGNIFICANT CHANGE UP (ref 0.2–1.2)
BUN SERPL-MCNC: 24 MG/DL — HIGH (ref 7–23)
CALCIUM SERPL-MCNC: 8.1 MG/DL — LOW (ref 8.5–10.1)
CHLORIDE SERPL-SCNC: 115 MMOL/L — HIGH (ref 96–108)
CO2 SERPL-SCNC: 29 MMOL/L — SIGNIFICANT CHANGE UP (ref 22–31)
CREAT SERPL-MCNC: 0.76 MG/DL — SIGNIFICANT CHANGE UP (ref 0.5–1.3)
CRP SERPL-MCNC: 16.7 MG/DL — HIGH (ref 0–0.4)
EOSINOPHIL # BLD AUTO: 0.24 K/UL — SIGNIFICANT CHANGE UP (ref 0–0.5)
EOSINOPHIL NFR BLD AUTO: 4 % — SIGNIFICANT CHANGE UP (ref 0–6)
ERYTHROCYTE [SEDIMENTATION RATE] IN BLOOD: 96 MM/HR — HIGH (ref 0–20)
FERRITIN SERPL-MCNC: 65 NG/ML — SIGNIFICANT CHANGE UP (ref 30–400)
GLUCOSE BLDC GLUCOMTR-MCNC: 108 MG/DL — HIGH (ref 70–99)
GLUCOSE BLDC GLUCOMTR-MCNC: 116 MG/DL — HIGH (ref 70–99)
GLUCOSE BLDC GLUCOMTR-MCNC: 130 MG/DL — HIGH (ref 70–99)
GLUCOSE BLDC GLUCOMTR-MCNC: 86 MG/DL — SIGNIFICANT CHANGE UP (ref 70–99)
GLUCOSE BLDC GLUCOMTR-MCNC: 94 MG/DL — SIGNIFICANT CHANGE UP (ref 70–99)
GLUCOSE SERPL-MCNC: 95 MG/DL — SIGNIFICANT CHANGE UP (ref 70–99)
HCT VFR BLD CALC: 27.5 % — LOW (ref 39–50)
HGB BLD-MCNC: 8 G/DL — LOW (ref 13–17)
IMM GRANULOCYTES NFR BLD AUTO: 0.2 % — SIGNIFICANT CHANGE UP (ref 0–1.5)
INR BLD: 1.64 RATIO — HIGH (ref 0.88–1.16)
IRON SATN MFR SERPL: 11 UG/DL — LOW (ref 45–165)
IRON SATN MFR SERPL: 5 % — LOW (ref 16–55)
LYMPHOCYTES # BLD AUTO: 1.15 K/UL — SIGNIFICANT CHANGE UP (ref 1–3.3)
LYMPHOCYTES # BLD AUTO: 19.2 % — SIGNIFICANT CHANGE UP (ref 13–44)
MAGNESIUM SERPL-MCNC: 2.2 MG/DL — SIGNIFICANT CHANGE UP (ref 1.6–2.6)
MCHC RBC-ENTMCNC: 22.9 PG — LOW (ref 27–34)
MCHC RBC-ENTMCNC: 29.1 GM/DL — LOW (ref 32–36)
MCV RBC AUTO: 78.6 FL — LOW (ref 80–100)
MONOCYTES # BLD AUTO: 0.62 K/UL — SIGNIFICANT CHANGE UP (ref 0–0.9)
MONOCYTES NFR BLD AUTO: 10.4 % — SIGNIFICANT CHANGE UP (ref 2–14)
NEUTROPHILS # BLD AUTO: 3.94 K/UL — SIGNIFICANT CHANGE UP (ref 1.8–7.4)
NEUTROPHILS NFR BLD AUTO: 65.7 % — SIGNIFICANT CHANGE UP (ref 43–77)
PHOSPHATE SERPL-MCNC: 2.3 MG/DL — LOW (ref 2.5–4.5)
PLATELET # BLD AUTO: 254 K/UL — SIGNIFICANT CHANGE UP (ref 150–400)
POTASSIUM SERPL-MCNC: 4.3 MMOL/L — SIGNIFICANT CHANGE UP (ref 3.5–5.3)
POTASSIUM SERPL-SCNC: 4.3 MMOL/L — SIGNIFICANT CHANGE UP (ref 3.5–5.3)
PROT SERPL-MCNC: 6.5 GM/DL — SIGNIFICANT CHANGE UP (ref 6–8.3)
PROTHROM AB SERPL-ACNC: 18.5 SEC — HIGH (ref 10–12.9)
RBC # BLD: 3.5 M/UL — LOW (ref 4.2–5.8)
RBC # FLD: 17.6 % — HIGH (ref 10.3–14.5)
SODIUM SERPL-SCNC: 148 MMOL/L — HIGH (ref 135–145)
TIBC SERPL-MCNC: 226 UG/DL — SIGNIFICANT CHANGE UP (ref 220–430)
UIBC SERPL-MCNC: 215 UG/DL — SIGNIFICANT CHANGE UP (ref 110–370)
WBC # BLD: 5.99 K/UL — SIGNIFICANT CHANGE UP (ref 3.8–10.5)
WBC # FLD AUTO: 5.99 K/UL — SIGNIFICANT CHANGE UP (ref 3.8–10.5)

## 2019-06-25 RX ORDER — CEFEPIME 1 G/1
1000 INJECTION, POWDER, FOR SOLUTION INTRAMUSCULAR; INTRAVENOUS EVERY 12 HOURS
Refills: 0 | Status: DISCONTINUED | OUTPATIENT
Start: 2019-06-25 | End: 2019-06-28

## 2019-06-25 RX ORDER — POLYETHYLENE GLYCOL 3350 17 G/17G
17 POWDER, FOR SOLUTION ORAL
Refills: 0 | Status: DISCONTINUED | OUTPATIENT
Start: 2019-06-25 | End: 2019-06-28

## 2019-06-25 RX ORDER — LACTULOSE 10 G/15ML
15 SOLUTION ORAL DAILY
Refills: 0 | Status: DISCONTINUED | OUTPATIENT
Start: 2019-06-25 | End: 2019-06-26

## 2019-06-25 RX ADMIN — SODIUM CHLORIDE 75 MILLILITER(S): 9 INJECTION INTRAMUSCULAR; INTRAVENOUS; SUBCUTANEOUS at 05:19

## 2019-06-25 RX ADMIN — Medication 300 MILLIGRAM(S): at 18:25

## 2019-06-25 RX ADMIN — Medication 10 MILLIGRAM(S): at 22:11

## 2019-06-25 RX ADMIN — CEFEPIME 1000 MILLIGRAM(S): 1 INJECTION, POWDER, FOR SOLUTION INTRAMUSCULAR; INTRAVENOUS at 18:26

## 2019-06-25 RX ADMIN — Medication 1000 UNIT(S): at 12:50

## 2019-06-25 RX ADMIN — POLYETHYLENE GLYCOL 3350 17 GRAM(S): 17 POWDER, FOR SOLUTION ORAL at 18:26

## 2019-06-25 RX ADMIN — PANTOPRAZOLE SODIUM 10 MG/HR: 20 TABLET, DELAYED RELEASE ORAL at 12:48

## 2019-06-25 RX ADMIN — PANTOPRAZOLE SODIUM 10 MG/HR: 20 TABLET, DELAYED RELEASE ORAL at 00:09

## 2019-06-25 RX ADMIN — SODIUM CHLORIDE 75 MILLILITER(S): 9 INJECTION INTRAMUSCULAR; INTRAVENOUS; SUBCUTANEOUS at 12:48

## 2019-06-25 RX ADMIN — Medication 300 MILLIGRAM(S): at 09:30

## 2019-06-25 RX ADMIN — PANTOPRAZOLE SODIUM 10 MG/HR: 20 TABLET, DELAYED RELEASE ORAL at 22:22

## 2019-06-25 RX ADMIN — Medication 300 MILLIGRAM(S): at 12:48

## 2019-06-25 NOTE — CONSULT NOTE ADULT - ASSESSMENT
77 y/o M with PMH of GI bleed, dysphagia, unspecific arrhythmia, COPD, dementia, DM2, a-fib, DVT (on eliquis), HTN, osteomyelitis, PVD, h/o PNA, h/o gastric ulcer s/p GI bleed, PEG placement, admitted on 6/24 from snf for evaluation of fever and coffee ground emesis; history per medical record as patient unable to provide history.   1. Patient admitted with pneumonia which most likely is aspiration pneumonia given emesis; also noted with leukocytosis most likely reactive to infection  - patient at risk for gram negative rods and other resistant bacteria   - oxygen and nebs as needed   - serial cbc and monitor temperature   - follow up cultures   - iv hydration and supportive care   - reviewed prior medical records to evaluate for resistant or atypical pathogens   - will optimize antibiotics to cefepime which will cover microaerophilic bacteria of aspiration  2. other issues; per medicine

## 2019-06-25 NOTE — PROGRESS NOTE ADULT - SUBJECTIVE AND OBJECTIVE BOX
77 y/o M with PMH of GI bleed, dysphagia, unspecific arrhythmia, COPD, dementia, DM2, a-fib, DVT (on eliquis), HTN, osteomyelitis, PVD, h/o PNA, h/o gastric ulcer s/p GI bleed, PEG placement, p/w fever and coffee-ground emesis.      6.25: confused      REVIEW OF SYSTEMS:  unable to obtain due to confusion        Vital Signs Last 24 Hrs  T(C): 36.8 (25 Jun 2019 12:08), Max: 38.8 (24 Jun 2019 18:00)  T(F): 98.3 (25 Jun 2019 12:08), Max: 101.8 (24 Jun 2019 18:00)  HR: 88 (25 Jun 2019 12:08) (57 - 122)  BP: 99/63 (25 Jun 2019 12:08) (99/63 - 127/46)  RR: 18 (25 Jun 2019 12:08) (18 - 33)  SpO2: 92% (25 Jun 2019 12:08) (92% - 100%)    PHYSICAL EXAM:    GENERAL: pale, weak appearing  HEENT:  NC/AT, EOMI, PERRLA, No scleral icterus, Moist mucous membranes  NECK: Supple, No JVD  CNS:  Alert & Oriented X0, Motor Strength 5/5 B/L upper and lower extremities; DTRs 2+ intact   LUNG: Normal Breath sounds, Clear to auscultation bilaterally, No rales, No rhonchi, No wheezing  HEART: RRR; No murmurs, No rubs  ABDOMEN: +BS, ST/ND/NT  GENITOURINARY: Voiding, Bladder not distended  EXTREMITIES:  2+ Peripheral Pulses, No clubbing, No cyanosis, No tibial edema  MUSCULOSKELTAL: Joints normal ROM, No TTP, No effusion  VAGINAL: deferred  SKIN: no rashes  RECTAL: deferred, not indicated  BREAST: deferred                          8.0    5.99  )-----------( 254      ( 25 Jun 2019 09:05 )             27.5     06-25    148<H>  |  115<H>  |  24<H>  ----------------------------<  95  4.3   |  29  |  0.76    Ca    8.1<L>      25 Jun 2019 09:05  Phos  2.3     06-25  Mg     2.2     06-25    TPro  6.5  /  Alb  2.1<L>  /  TBili  0.5  /  DBili  x   /  AST  19  /  ALT  21  /  AlkPhos  87  06-25    Vancomycin levels:   Cultures:     MEDICATIONS  (STANDING):  acetaminophen  Suppository .. 325 milliGRAM(s) Rectal once  cholecalciferol 1000 Unit(s) Oral daily  ferrous    sulfate Liquid 300 milliGRAM(s) Enteral Tube three times a day with meals  insulin lispro (HumaLOG) corrective regimen sliding scale   SubCutaneous every 6 hours  pantoprazole Infusion 8 mG/Hr (10 mL/Hr) IV Continuous <Continuous>    MEDICATIONS  (PRN):  dextrose 40% Gel 15 Gram(s) Oral once PRN Blood Glucose LESS THAN 70 milliGRAM(s)/deciliter  docusate sodium 100 milliGRAM(s) Oral three times a day PRN Constipation  glucagon  Injectable 1 milliGRAM(s) IntraMuscular once PRN Glucose LESS THAN 70 milligrams/deciliter  lactulose Syrup 15 Gram(s) Oral daily PRN constipation  senna 2 Tablet(s) Oral at bedtime PRN Constipation      all labs reviewed  all imaging reviewed      CT Chest No Cont (06.24.19 @ 23:12) >  IMPRESSION:   *  Dependent bibasilar opacity consistent with atelectasis and/or   infiltrate. Aspiration is a consideration.  *  Right-sided obstructive uropathy with 1.3 cm stone at the right UPJ.  *  Calcification the region of the right UVJ.On is made of the presence   of bladder calculus. Calcified bladder mass such as transitional cell   carcinoma is not excluded.  *  Cholelithiasis and choledocholithiasis.  *  Dilated stool-filled rectum, raising a question of stercoral colitis.        Assessment and Plan:       1. Sepsis due to aspiration PNA   -S/o Vanco / meropenem in ED  CT chest noted  f/u blood cx  -ID consult  -Lactate = 1.7      2. Coffee-ground emesis w/ h/o GI ulcers  likely upper GI bleeding  -Protonix drip  -IVF  -GI consult  -Hold anticoagulation for a-fib/DVT temporarily until cleared by GI to resume     3. Anemia of acute blood loss:  -Trend H/H and transfuse PRN   -Iron panel / FOBT     4. DM2  -Humalog ISS    5. Rt obstructive uropathy:   asymptomatic  Cr levels normal

## 2019-06-25 NOTE — CONSULT NOTE ADULT - ASSESSMENT
78M s/p PEG placement 5/2019 (otherwise negative EGD except hiatal hernia) now presenting with second occurrence of sepsis, hematemesis. Likely UGIB due to esophagitis, vs PUD. PEG site issues can also cause gastric bleeding.  Tried to manage conservatively on last hospitalization but now readmitted with same issue and would recommend EGD.  Incidentally noted possible CBD stone on CT.  Possible stercoral colitis noted on CT.    Recommend:  -trend CBC  -continue PPI  -will need EGD-have scheduled for tomorrow afternoon as long as otherwise stable  -holding anticoagulation  -unclear what to do about possible CBD stone seen on CT: consider watchful waiting vs transfer out for ERCP after acute issues managed. The LFTs are normal and he does not appear to have biliary sepsis.  -start miralax via PEG and dulcolax suppository daily for stercoral colitis-may need disimpaction if no results.  -d/w Dr. Cho at bedside  -call placed and message left for HCP Anshu Hernandez

## 2019-06-25 NOTE — CONSULT NOTE ADULT - SUBJECTIVE AND OBJECTIVE BOX
UROLOGY CONSULT    HPI: patient is 78y Male with multiple comorbidities admitted with pneumonia who was found to have R UPJ stone, right kidney, and possible bladder mass on CT scan done recently. Unable to obtain any subjective information from patient 2/2 dementia.     PMH/PSH  URINARY TRACT INFECTION  No pertinent family history in first degree relatives  No pertinent family history in first degree relatives  Osteomyelitis of left leg  Osteoarthritis  HTN (hypertension)  DVT (deep venous thrombosis)  PVD (peripheral vascular disease)  DM (diabetes mellitus)  COPD (chronic obstructive pulmonary disease)  Anemia  Dementia  No significant past surgical history  No significant past surgical history      Family History:  No pertinent family history in first degree relatives      Allergies  No Known Allergies      Medications  acetaminophen  Suppository .. 325 milliGRAM(s) Rectal once  bisacodyl Suppository 10 milliGRAM(s) Rectal at bedtime  cefepime  Injectable. 1000 milliGRAM(s) IV Push every 12 hours  cholecalciferol 1000 Unit(s) Oral daily  dextrose 40% Gel 15 Gram(s) Oral once PRN  dextrose 5%. 1000 milliLiter(s) IV Continuous <Continuous>  dextrose 50% Injectable 12.5 Gram(s) IV Push once  dextrose 50% Injectable 25 Gram(s) IV Push once  dextrose 50% Injectable 25 Gram(s) IV Push once  docusate sodium 100 milliGRAM(s) Oral three times a day PRN  ferrous    sulfate Liquid 300 milliGRAM(s) Enteral Tube three times a day with meals  glucagon  Injectable 1 milliGRAM(s) IntraMuscular once PRN  insulin lispro (HumaLOG) corrective regimen sliding scale   SubCutaneous every 6 hours  lactulose Syrup 15 Gram(s) Oral daily PRN  pantoprazole Infusion 8 mG/Hr IV Continuous <Continuous>  polyethylene glycol 3350 17 Gram(s) Oral two times a day  senna 2 Tablet(s) Oral at bedtime PRN    ROS  Unable to obtain    Vital Signs Last 24 Hrs  T(C): 36.8 (25 Jun 2019 12:08), Max: 36.8 (25 Jun 2019 05:17)  T(F): 98.3 (25 Jun 2019 12:08), Max: 98.3 (25 Jun 2019 12:08)  HR: 88 (25 Jun 2019 12:08) (57 - 102)  BP: 99/63 (25 Jun 2019 12:08) (99/63 - 127/46)  BP(mean): --  RR: 18 (25 Jun 2019 12:08) (18 - 30)  SpO2: 92% (25 Jun 2019 12:08) (92% - 100%)    PHYSICAL EXAM:  Constitutional:  NAD, lying in bed comfortably   Back: no CVA tenderness bilaterally  Respiratory: no labored breathing  Gastrointestinal: soft, non-tender, non-distended, no guarding, no rebound tenderness. Bladder non-palpable        I/O      Labs:  CBC Full  -  ( 25 Jun 2019 09:05 )  WBC Count : 5.99 K/uL  Hemoglobin : 8.0 g/dL  Hematocrit : 27.5 %  Platelet Count - Automated : 254 K/uL  Mean Cell Volume : 78.6 fl  Mean Cell Hemoglobin : 22.9 pg  Mean Cell Hemoglobin Concentration : 29.1 gm/dL  Auto Neutrophil # : 3.94 K/uL  Auto Lymphocyte # : 1.15 K/uL  Auto Monocyte # : 0.62 K/uL  Auto Eosinophil # : 0.24 K/uL  Auto Basophil # : 0.03 K/uL  Auto Neutrophil % : 65.7 %  Auto Lymphocyte % : 19.2 %  Auto Monocyte % : 10.4 %  Auto Eosinophil % : 4.0 %  Auto Basophil % : 0.5 %    06-25    148<H>  |  115<H>  |  24<H>  ----------------------------<  95  4.3   |  29  |  0.76    Ca    8.1<L>      25 Jun 2019 09:05  Phos  2.3     06-25  Mg     2.2     06-25    TPro  6.5  /  Alb  2.1<L>  /  TBili  0.5  /  DBili  x   /  AST  19  /  ALT  21  /  AlkPhos  87  06-25          Radiology:  CT scan reviewed

## 2019-06-25 NOTE — CONSULT NOTE ADULT - ASSESSMENT
1. R UPJ stone with mild hydronephrosis  2. UTI  3. ?bladder mass vs stone    Plan:    -no acute urologic intervention indicated at this time  -send urine cytology  -continue antibiotics as per medicine  -will eventually need cystoscopy to evaluate bladder for any possible lesions/stones. Will also eventually need ureteroscopy, laser lithotripsy for R UPJ stone. Both of these procedures can be done as outpatient since patient is hemodynamically stable with normal WBC, Cr. Will need consent from HCP for these elective procedures.   -can follow up with me as outpatient upon discharge. If patient becomes hemodynamically unstable, please reconsult for re-evaluation and potential urgent intervention.     Thank you for allowing me to assist in the care of this patient  Please feel free to call with any questions/concerns    Noé Santos MD  [facility]  W: [phone]

## 2019-06-25 NOTE — CONSULT NOTE ADULT - SUBJECTIVE AND OBJECTIVE BOX
79 y/o Male NH resident with a PMHx of Gl bleed, dysphagia, arrythmia, COPD, Dementia, DM type II, Afib, DVT(on Eliquis), HTN, Osteomyelitis of left leg, PVD, prior PNA/past sepsis, recently discharged from  a few weeks ago for UGIB and PNA, now admitted with coffee ground emesis and anemia.  Had PEG placed earlier this year.  Patient has advanced dementia and all hx obtained from chart.   Though to have aspiration PNA again.    PAST MEDICAL & SURGICAL HISTORY:  Osteomyelitis of left leg  Osteoarthritis  HTN (hypertension)  DVT (deep venous thrombosis)  PVD (peripheral vascular disease)  DM (diabetes mellitus)  COPD (chronic obstructive pulmonary disease)  Anemia  Dementia  No significant past surgical history    MEDICATIONS  (STANDING):  acetaminophen  Suppository .. 325 milliGRAM(s) Rectal once  cefepime  Injectable. 1000 milliGRAM(s) IV Push every 12 hours  cholecalciferol 1000 Unit(s) Oral daily  dextrose 5%. 1000 milliLiter(s) (50 mL/Hr) IV Continuous <Continuous>  dextrose 50% Injectable 12.5 Gram(s) IV Push once  dextrose 50% Injectable 25 Gram(s) IV Push once  dextrose 50% Injectable 25 Gram(s) IV Push once  ferrous    sulfate Liquid 300 milliGRAM(s) Enteral Tube three times a day with meals  insulin lispro (HumaLOG) corrective regimen sliding scale   SubCutaneous every 6 hours  pantoprazole Infusion 8 mG/Hr (10 mL/Hr) IV Continuous <Continuous>    Allergies    No Known Allergies    Intolerances    FAMILY HISTORY:  No pertinent family history in first degree relatives    Soc: unobtainable    ROS: unobtainable    Physical Exam:     Vital Signs Last 24 Hrs  T(C): 36.8 (25 Jun 2019 12:08), Max: 38.8 (24 Jun 2019 18:00)  T(F): 98.3 (25 Jun 2019 12:08), Max: 101.8 (24 Jun 2019 18:00)  HR: 88 (25 Jun 2019 12:08) (57 - 122)  BP: 99/63 (25 Jun 2019 12:08) (99/63 - 127/46)  BP(mean): --  RR: 18 (25 Jun 2019 12:08) (18 - 33)  SpO2: 92% (25 Jun 2019 12:08) (92% - 100%)    	HEENT:   pupils equal and reactive, EOMI, no oropharyngeal lesions, erythema, exudates, oral thrush    	NECK:   supple, no carotid bruits, no palpable lymph nodes, no thyromegaly    	CV:  +S1, +S2, regular, no murmurs or rubs    	RESP:   lungs clear to auscultation bilaterally, no wheezing, rales, rhonchi, good air entry bilaterally    	GI:  abdomen soft, non-tender, non-distended, normal BS, no bruits, no abdominal masses, no palpable masses, PEG with ext bumper at 2 cm    	MSK:   normal muscle tone, no atrophy, no rigidity, no contractions    	EXT:   no clubbing, no cyanosis, no edema, no calf pain, swelling or erythema    	VASCULAR:  pulses equal and symmetric in the upper and lower extremities    	NEURO: awake, not alert or oriented    	SKIN:  no ulcers, lesions or rashes                                             8.0    5.99  )-----------( 254      ( 25 Jun 2019 09:05 )             27.5     06-25    148<H>  |  115<H>  |  24<H>  ----------------------------<  95  4.3   |  29  |  0.76    Ca    8.1<L>      25 Jun 2019 09:05  Phos  2.3     06-25  Mg     2.2     06-25    TPro  6.5  /  Alb  2.1<L>  /  TBili  0.5  /  DBili  x   /  AST  19  /  ALT  21  /  AlkPhos  87  06-25      EXAM:  CT ABDOMEN AND PELVIS                          EXAM:  CT CHEST                            PROCEDURE DATE:  06/24/2019          INTERPRETATION:  CLINICAL INFORMATION: Sepsis. Cough. Urinary tract   infection.       COMPARISON: 4/22/2019    PROCEDURE:   CT of the Chest, Abdomen and Pelvis was performed without intravenous   contrast.   Intravenous contrast: None.  Oral contrast: None.  Sagittal and coronal reformats were performed.    FINDINGS:    CHEST:     LUNGS AND LARGE AIRWAYS: Patent central airways. No pulmonary nodules.   Patchy bibasilar opacity consistent with atelectasis and/or infiltrate.   Aspiration is a consideration. Small hiatal hernia.  PLEURA: No pleural effusion.  VESSELS: Within normal limits.  HEART: Heart size is normal. No pericardial effusion.  MEDIASTINUM AND HOLLY: Nonspecific mediastinal adenopathy. For reference,   an enlarged precarinal lymph node measures 1.5 cm. (2:27)  CHEST WALL AND LOWER NECK: Within normal limits.    ABDOMEN AND PELVIS:    LIVER: Small calcified granuloma. Multiple liver hypodensities are   incompletely characterized, but likely benign etiology such as cyst or   hemangioma.  BILE DUCTS: Normal caliber. The is at the upper limits of normal in   caliber for the patient's age, measuring 8 mm. Calcification in the   distal CBD, consistent with choledocholithiasis.  GALLBLADDER: Cholelithiasis.  SPLEEN: Within normal limits. Small splenule.  PANCREAS: Within normal limits.  ADRENALS: Within normal limits.  KIDNEYS/URETERS: Moderate right hydroureteronephrosis to the level of a   1.3 cm stone at the right UPJ. Nonobstructing stone in the upper pole of   the right kidney measures 1.5 cm. Unenhanced left kidney is unremarkable.    BLADDER: Hyperdensity at the level of the right UVJ partially obscured by   streak artifact related to the left total hip arthroplasty measuring 1.1   x 0.6 cm. Findings may reflect the presence of bladder calculus.   Calcified bladder mass such as transitional cell carcinoma is not   excluded.  REPRODUCTIVE ORGANS: Enlarged prostate.    BOWEL: Gastrostomy tube terminates in the stomach. No bowel obstruction.   Appendix is not visualized. Dilatation of the rectum with mild thickening   of the rectal wall, raising a question of stercoral colitis.   Diverticulosis.  PERITONEUM: No ascites.  VESSELS:  Atherosclerotic calcifications. IVC filter.  RETROPERITONEUM: No lymphadenopathy.    ABDOMINAL WALL: Fat-containing left inguinal hernia.  BONES: Left total hip arthroplasty. Degenerative changes.    IMPRESSION:   *  Dependent bibasilar opacity consistent with atelectasis and/or   infiltrate. Aspiration is a consideration.  *  Right-sided obstructive uropathy with 1.3 cm stone at the right UPJ.  *  Calcification the region of the right UVJ. On is made of the presence   of bladder calculus. Calcified bladder mass such as transitional cell   carcinoma is not excluded.  *  Cholelithiasis and choledocholithiasis.  *  Dilated stool-filled rectum, raising a question of stercoral colitis.  .                IDALMIS LOUIS M.D., ATTENDING RADIOLOGIST  This document has been electronically signed. Jun 25 2019 10:35AM

## 2019-06-25 NOTE — CONSULT NOTE ADULT - SUBJECTIVE AND OBJECTIVE BOX
Patient is a 78y old  Male who presents with a chief complaint of coffee ground emesis + fever (2019 13:29)    HPI:  77 y/o M with PMH of GI bleed, dysphagia, unspecific arrhythmia, COPD, dementia, DM2, a-fib, DVT (on eliquis), HTN, osteomyelitis, PVD, h/o PNA, h/o gastric ulcer s/p GI bleed, PEG placement, admitted on  from snf for evaluation of fever and coffee ground emesis; history per medical record as patient unable to provide history.       PMH: as above  PSH: as above  Meds: per reconciliation sheet, noted below  MEDICATIONS  (STANDING):  acetaminophen  Suppository .. 325 milliGRAM(s) Rectal once  cefepime  Injectable. 1000 milliGRAM(s) IV Push every 12 hours  cholecalciferol 1000 Unit(s) Oral daily  dextrose 5%. 1000 milliLiter(s) (50 mL/Hr) IV Continuous <Continuous>  dextrose 50% Injectable 12.5 Gram(s) IV Push once  dextrose 50% Injectable 25 Gram(s) IV Push once  dextrose 50% Injectable 25 Gram(s) IV Push once  ferrous    sulfate Liquid 300 milliGRAM(s) Enteral Tube three times a day with meals  insulin lispro (HumaLOG) corrective regimen sliding scale   SubCutaneous every 6 hours  pantoprazole Infusion 8 mG/Hr (10 mL/Hr) IV Continuous <Continuous>    MEDICATIONS  (PRN):  dextrose 40% Gel 15 Gram(s) Oral once PRN Blood Glucose LESS THAN 70 milliGRAM(s)/deciliter  docusate sodium 100 milliGRAM(s) Oral three times a day PRN Constipation  glucagon  Injectable 1 milliGRAM(s) IntraMuscular once PRN Glucose LESS THAN 70 milligrams/deciliter  lactulose Syrup 15 Gram(s) Oral daily PRN constipation  senna 2 Tablet(s) Oral at bedtime PRN Constipation    Allergies    No Known Allergies    Intolerances      Social: no smoking, no alcohol, no illegal drugs; no recent travel, no exposure to TB  FAMILY HISTORY:  No pertinent family history in first degree relatives     no history of premature cardiovascular disease in first degree relatives  ROS: unable to obtain secondary to patient medical condition     Vital Signs Last 24 Hrs  T(C): 36.8 (2019 12:08), Max: 38.8 (2019 18:00)  T(F): 98.3 (2019 12:08), Max: 101.8 (2019 18:00)  HR: 88 (2019 12:08) (57 - 122)  BP: 99/63 (2019 12:08) (99/63 - 127/46)  BP(mean): --  RR: 18 (2019 12:08) (18 - 33)  SpO2: 92% (2019 12:08) (92% - 100%)  Daily Height in cm: 180.34 (2019 17:30)    Daily     PE:    Constitutional: frail looking  HEENT: NC/AT, EOMI, PERRLA, conjunctivae clear; ears and nose atraumatic; pharynx clear  Neck: supple; thyroid not palpable  Back: no tenderness  Respiratory: respiratory effort normal; scattered coarse breath sound  Cardiovascular: S1S2 regular, no murmurs  Abdomen: soft, not tender, not distended, positive BS; no liver or spleen organomegaly  Genitourinary: no suprapubic tenderness  Musculoskeletal: no muscle tenderness, no joint swelling or tenderness  Neurological/ Psychiatric:  moving all extremities  Skin: no rashes; no palpable lesions    Labs: all available labs reviewed                        8.0    5.99  )-----------( 254      ( 2019 09:05 )             27.5     06-25    148<H>  |  115<H>  |  24<H>  ----------------------------<  95  4.3   |  29  |  0.76    Ca    8.1<L>      2019 09:05  Phos  2.3     06-25  Mg     2.2     06-25    TPro  6.5  /  Alb  2.1<L>  /  TBili  0.5  /  DBili  x   /  AST  19  /  ALT  21  /  AlkPhos  87  06-25     LIVER FUNCTIONS - ( 2019 09:05 )  Alb: 2.1 g/dL / Pro: 6.5 gm/dL / ALK PHOS: 87 U/L / ALT: 21 U/L / AST: 19 U/L / GGT: x           Urinalysis Basic - ( 2019 17:34 )    Color: Yellow / Appearance: very cloudy / S.020 / pH: x  Gluc: x / Ketone: Negative  / Bili: Negative / Urobili: Negative mg/dL   Blood: x / Protein: 30 mg/dL / Nitrite: Positive   Leuk Esterase: Moderate / RBC: 11-25 /HPF / WBC >50   Sq Epi: x / Non Sq Epi: Occasional / Bacteria: TNTC      < from: CT Chest No Cont (19 @ 23:12) >    EXAM:  CT ABDOMEN AND PELVIS                          EXAM:  CT CHEST                            PROCEDURE DATE:  2019          INTERPRETATION:  CLINICAL INFORMATION: Sepsis. Cough. Urinary tract   infection.       COMPARISON: 2019    PROCEDURE:   CT of the Chest, Abdomen and Pelvis was performed without intravenous   contrast.   Intravenous contrast: None.  Oral contrast: None.  Sagittal and coronal reformats were performed.    FINDINGS:    CHEST:     LUNGS AND LARGE AIRWAYS: Patentcentral airways. No pulmonary nodules.   Patchy bibasilar opacity consistent with atelectasis and/or infiltrate.   Aspiration is a consideration. Small hiatal hernia.  PLEURA: No pleural effusion.  VESSELS: Within normal limits.  HEART: Heart size isnormal. No pericardial effusion.  MEDIASTINUM AND HOLLY: Nonspecific mediastinal adenopathy. For reference,   an enlarged precarinal lymph node measures 1.5 cm. (2:27)  CHEST WALL AND LOWER NECK: Within normal limits.    ABDOMEN AND PELVIS:    LIVER: Small calcified granuloma. Multiple liver hypodensities are   incompletely characterized, but likely benign etiology such as cyst or   hemangioma.  BILE DUCTS: Normal caliber. The is at the upper limits of normal in   caliber for the patient's age, measuring 8 mm. Calcification in the   distal CBD, consistent with choledocholithiasis.  GALLBLADDER: Cholelithiasis.  SPLEEN: Within normal limits. Small splenule.  PANCREAS: Within normal limits.  ADRENALS: Within normal limits.  KIDNEYS/URETERS: Moderate right hydroureteronephrosis to the level of a   1.3 cm stone at the right UPJ. Nonobstructing stone in the upper pole of   the right kidney measures 1.5 cm. Unenhanced left kidney is unremarkable.    BLADDER: Hyperdensity at the level of the right UVJ partially obscured by   streak artifact related to the left total hip arthroplasty measuring 1.1   x 0.6 cm. Findings may reflect the presence of bladder calculus.   Calcified bladder mass such as transitional cell carcinoma is not   excluded.  REPRODUCTIVE ORGANS: Enlarged prostate.    BOWEL: Gastrostomy tube terminates in the stomach. No bowel obstruction.   Appendix is not visualized. Dilatation of the rectum with mild thickening   of the rectal wall, raising a question of stercoral colitis.   Diverticulosis.  PERITONEUM: No ascites.  VESSELS:  Atherosclerotic calcifications. IVC filter.  RETROPERITONEUM: No lymphadenopathy.    ABDOMINAL WALL: Fat-containing left inguinal hernia.  BONES: Left total hip arthroplasty. Degenerative changes.    IMPRESSION:   *  Dependent bibasilar opacity consistent with atelectasis and/or   infiltrate. Aspiration is a consideration.  *  Right-sided obstructive uropathy with 1.3 cm stone at the right UPJ.  *  Calcification the region of the right UVJ.On is made of the presence   of bladder calculus. Calcified bladder mass such as transitional cell   carcinoma is not excluded.  *  Cholelithiasis and choledocholithiasis.  *  Dilated stool-filled rectum, raising a question of stercoral colitis.  .    < end of copied text >      Radiology: all available radiological tests reviewed    Advanced directives addressed: full resuscitation

## 2019-06-25 NOTE — CONSULT NOTE ADULT - SUBJECTIVE AND OBJECTIVE BOX
HPI: Mr. Richter is a 78y old Male coming from [facility] with hx of Advanced Dementia (FAST 7d), Gl bleed, dysphagia (s/p PEG placement earlier in year), arrythmia, COPD, Dementia, DM type II, Afib, DVT(on Eliquis), HTN, Osteomyelitis of left leg, PVD, prior PNA/past sepsis, 3rd admission this year, recently discharged from  (6/10-6/17) for UGIB and PNA. Pt now re-admitted with coffee ground emesis and anemia. Found to have mild leukocytosis, and CTCAP showing bibasilar opacity likely c/w aspiration, CBD stone (normal LFTs) and calcified bladder mass. Palliative Care consulted to assist with establishing GOC, pt known to our service from May admission prior to PEG placement.     Met Mr. Richter this afternoon, no family at bedside. Pt awake and attends to interviewer, but mumbling and unable to gather further HPI.  As per RN, no issues aside from occasional calling out.     PAIN: ( )Yes   ( x)No- no nonverbal signs of pain  DYSPNEA: ( ) Yes  ( x) No- no nonverbal signs of dyspnea    PAST MEDICAL & SURGICAL HISTORY:  Osteomyelitis of left leg  Osteoarthritis  HTN (hypertension)  DVT (deep venous thrombosis)  PVD (peripheral vascular disease)  DM (diabetes mellitus)  COPD (chronic obstructive pulmonary disease)  Anemia  Dementia  No significant past surgical history      SOCIAL HX: unable to gather    Hx opiate tolerance ( )YES  (x)NO    Baseline ADLs  (Prior to Admission)- as per NH records, pt dependent for all ADLs  ( ) Independent   (x )Dependent    FAMILY HISTORY:  Unable to gather 2/2 to severe dementia      Review of Systems:  Unable to gather 2/2 to advanced dementia      PHYSICAL EXAM:    Vital Signs Last 24 Hrs  T(C): 36.8 (25 Jun 2019 12:08), Max: 38.8 (24 Jun 2019 18:00)  T(F): 98.3 (25 Jun 2019 12:08), Max: 101.8 (24 Jun 2019 18:00)  HR: 88 (25 Jun 2019 12:08) (57 - 122)  BP: 99/63 (25 Jun 2019 12:08) (99/63 - 127/46)  BP(mean): --  RR: 18 (25 Jun 2019 12:08) (18 - 33)  SpO2: 92% (25 Jun 2019 12:08) (92% - 100%)  Daily Height in cm: 180.34 (24 Jun 2019 17:30)    Daily     PPSV2:  20 %  FAST: 7d    General: Elderly male, thin, confused, garbled undecipherable speech, appears in NAD  Mental Status: AOx0  HEENT: dmm, ngt in place, +temporal wasting  Lungs: dec at bases bl  Cardiac: +s1 s2 rrr  GI: soft mild distention, +bs nt incontinent  : incontinent  Ext: moves extremities spontaneously, no edema, muscle/fat wasting throughout  Neuro: limited by cognition, pt unable to follow commands    LABS:                        8.0    5.99  )-----------( 254      ( 25 Jun 2019 09:05 )             27.5     06-25    148<H>  |  115<H>  |  24<H>  ----------------------------<  95  4.3   |  29  |  0.76    Ca    8.1<L>      25 Jun 2019 09:05  Phos  2.3     06-25  Mg     2.2     06-25    TPro  6.5  /  Alb  2.1<L>  /  TBili  0.5  /  DBili  x   /  AST  19  /  ALT  21  /  AlkPhos  87  06-25    PT/INR - ( 25 Jun 2019 09:05 )   PT: 18.5 sec;   INR: 1.64 ratio         PTT - ( 25 Jun 2019 09:05 )  PTT:29.6 sec  Albumin: Albumin, Serum: 2.1 g/dL (06-25 @ 09:05)      Allergies    No Known Allergies    Intolerances      MEDICATIONS  (STANDING):  acetaminophen  Suppository .. 325 milliGRAM(s) Rectal once  bisacodyl Suppository 10 milliGRAM(s) Rectal at bedtime  cefepime  Injectable. 1000 milliGRAM(s) IV Push every 12 hours  cholecalciferol 1000 Unit(s) Oral daily  dextrose 5%. 1000 milliLiter(s) (50 mL/Hr) IV Continuous <Continuous>  dextrose 50% Injectable 12.5 Gram(s) IV Push once  dextrose 50% Injectable 25 Gram(s) IV Push once  dextrose 50% Injectable 25 Gram(s) IV Push once  ferrous    sulfate Liquid 300 milliGRAM(s) Enteral Tube three times a day with meals  insulin lispro (HumaLOG) corrective regimen sliding scale   SubCutaneous every 6 hours  pantoprazole Infusion 8 mG/Hr (10 mL/Hr) IV Continuous <Continuous>  polyethylene glycol 3350 17 Gram(s) Oral two times a day    MEDICATIONS  (PRN):  dextrose 40% Gel 15 Gram(s) Oral once PRN Blood Glucose LESS THAN 70 milliGRAM(s)/deciliter  docusate sodium 100 milliGRAM(s) Oral three times a day PRN Constipation  glucagon  Injectable 1 milliGRAM(s) IntraMuscular once PRN Glucose LESS THAN 70 milligrams/deciliter  lactulose Syrup 15 Gram(s) Oral daily PRN constipation  senna 2 Tablet(s) Oral at bedtime PRN Constipation      RADIOLOGY/ADDITIONAL STUDIES:    EXAM:  XR CHEST PORTABLE URGENT 1V                        PROCEDURE DATE:  06/24/2019      IMPRESSION:  No consolidation or pleural effusion. Atelectasis lung bases.    Heart size within normal limits    SILVINO FARIA M.D. ATTENDING RADIOLOGIST  This document has been electronically signed. Jun 25 2019  7:41AM      EXAM:  CT ABDOMEN AND PELVIS                        EXAM:  CT CHEST                          PROCEDURE DATE:  06/24/2019      IMPRESSION:   *  Dependent bibasilar opacity consistent with atelectasis and/or   infiltrate. Aspiration is a consideration.  *  Right-sided obstructive uropathy with 1.3 cm stone at the right UPJ.  *  Calcification the region of the right UVJ.On is made of the presence   of bladder calculus. Calcified bladder mass such as transitional cell   carcinoma is not excluded.  *  Cholelithiasis and choledocholithiasis.  *  Dilated stool-filled rectum, raising a question of stercoral colitis.    IDALMIS LOUIS M.D., ATTENDING RADIOLOGIST  This document has been electronically signed. Jun 25 2019 10:35AM

## 2019-06-25 NOTE — CONSULT NOTE ADULT - ASSESSMENT
78y old Male coming from [facility] with hx of Advanced Dementia (FAST 7d), Gl bleed, dysphagia (s/p PEG placement earlier in year), arrythmia, COPD, Dementia, DM type II, Afib, DVT(on Eliquis), HTN, Osteomyelitis of left leg, PVD, prior PNA/past sepsis, 3rd admission this year, recently discharged from  (6/10-6/17) for UGIB and PNA. Pt now re-admitted with coffee ground emesis and anemia. Found to have mild leukocytosis, and CTCAP showing bibasilar opacity likely c/w aspiration, CBD stone (normal LFTs) and calcified bladder mass. Palliative Care consulted to assist with establishing GOC, pt known to our service from May admission prior to PEG placement.    1) AMS/Dementia  - as per nephew, pt with dementia for past 8 years  - now at end stages, FAST 7d  - fall and aspiration precautions  - reorienting as able    2) GIB  - coffee ground emesis on admission  - GI notes appreciated  - agree with miralax for stool retention  - EGD tomorrow to further evaluate GIB  - plan to monitor blood levels and also no sign of obstruction to cause need to further eval CBD stones     3) Infection/Aspiration PNA  - hx of dysphagia  - ID notes appreciated  - leukocytosis improved  - on IV abx  - UA also positive, with hx of urosepsis, awaiting cultures    4) Malnutrition/Debility  - fully dependent for all ADLs in setting of end stage dementia  - s/p PEG   - rec nutrition consult given previous finding of- severe protein calorie malnutrition and wasting on exam    6) Prognosis  - poor  - advanced, end stage dementia, with full dependence for all ADLs, dysphagia, documented severe protein calorie malnutrition without ability to maintain intake, recurrent infections, recurrent GIB, PPS<40%, calcified bladder mass?, and albumin 2.1 (<2.5 purporting poorer prognosis in setting of above) pt would fit criteria for hospice. However, family was not ready for this on last discussion, not wanting to set any limits, will readdress    7) GOC/Advanced Directives  - pt does not have capacity for decision making due to his advanced dementia  - no HCP on file, nephew Anshu Molina [phone] serving as surrogate  - MOLST completed 5/7/19 with no limits: CPR, no limits, trial of intubation/ventillation, send to hospital, ok for feeding tube, trial of IVF, use abx, will review  - [facility] meeting to be scheduled with family- of note nephew was not interested in setting any limits during last conversation (see GO note 5/7/19). Will readdress wishes    Thank you for including us in Mr. Richter's care. Will continue to follow with you.    Zelda Nelson MD  Palliative Care Attending

## 2019-06-26 ENCOUNTER — RESULT REVIEW (OUTPATIENT)
Age: 79
End: 2019-06-26

## 2019-06-26 LAB
-  AMIKACIN: SIGNIFICANT CHANGE UP
-  AMPICILLIN/SULBACTAM: SIGNIFICANT CHANGE UP
-  AMPICILLIN: SIGNIFICANT CHANGE UP
-  AZTREONAM: SIGNIFICANT CHANGE UP
-  CEFAZOLIN: SIGNIFICANT CHANGE UP
-  CEFEPIME: SIGNIFICANT CHANGE UP
-  CEFOXITIN: SIGNIFICANT CHANGE UP
-  CEFTRIAXONE: SIGNIFICANT CHANGE UP
-  CIPROFLOXACIN: SIGNIFICANT CHANGE UP
-  ERTAPENEM: SIGNIFICANT CHANGE UP
-  IMIPENEM: SIGNIFICANT CHANGE UP
-  LEVOFLOXACIN: SIGNIFICANT CHANGE UP
-  MEROPENEM: SIGNIFICANT CHANGE UP
-  NITROFURANTOIN: SIGNIFICANT CHANGE UP
-  PIPERACILLIN/TAZOBACTAM: SIGNIFICANT CHANGE UP
-  TIGECYCLINE: SIGNIFICANT CHANGE UP
-  TRIMETHOPRIM/SULFAMETHOXAZOLE: SIGNIFICANT CHANGE UP
ANION GAP SERPL CALC-SCNC: 8 MMOL/L — SIGNIFICANT CHANGE UP (ref 5–17)
BASOPHILS # BLD AUTO: 0.04 K/UL — SIGNIFICANT CHANGE UP (ref 0–0.2)
BASOPHILS NFR BLD AUTO: 0.9 % — SIGNIFICANT CHANGE UP (ref 0–2)
BLD GP AB SCN SERPL QL: SIGNIFICANT CHANGE UP
BUN SERPL-MCNC: 18 MG/DL — SIGNIFICANT CHANGE UP (ref 7–23)
CALCIUM SERPL-MCNC: 8.1 MG/DL — LOW (ref 8.5–10.1)
CHLORIDE SERPL-SCNC: 118 MMOL/L — HIGH (ref 96–108)
CO2 SERPL-SCNC: 24 MMOL/L — SIGNIFICANT CHANGE UP (ref 22–31)
CREAT SERPL-MCNC: 0.71 MG/DL — SIGNIFICANT CHANGE UP (ref 0.5–1.3)
CULTURE RESULTS: SIGNIFICANT CHANGE UP
EOSINOPHIL # BLD AUTO: 0.23 K/UL — SIGNIFICANT CHANGE UP (ref 0–0.5)
EOSINOPHIL NFR BLD AUTO: 5.1 % — SIGNIFICANT CHANGE UP (ref 0–6)
GLUCOSE BLDC GLUCOMTR-MCNC: 81 MG/DL — SIGNIFICANT CHANGE UP (ref 70–99)
GLUCOSE BLDC GLUCOMTR-MCNC: 84 MG/DL — SIGNIFICANT CHANGE UP (ref 70–99)
GLUCOSE BLDC GLUCOMTR-MCNC: 90 MG/DL — SIGNIFICANT CHANGE UP (ref 70–99)
GLUCOSE BLDC GLUCOMTR-MCNC: 93 MG/DL — SIGNIFICANT CHANGE UP (ref 70–99)
GLUCOSE SERPL-MCNC: 82 MG/DL — SIGNIFICANT CHANGE UP (ref 70–99)
HCT VFR BLD CALC: 26.4 % — LOW (ref 39–50)
HGB BLD-MCNC: 7.6 G/DL — LOW (ref 13–17)
IMM GRANULOCYTES NFR BLD AUTO: 0.2 % — SIGNIFICANT CHANGE UP (ref 0–1.5)
LACTATE SERPL-SCNC: 0.8 MMOL/L — SIGNIFICANT CHANGE UP (ref 0.7–2)
LACTATE SERPL-SCNC: 3 MMOL/L — HIGH (ref 0.7–2)
LYMPHOCYTES # BLD AUTO: 1.27 K/UL — SIGNIFICANT CHANGE UP (ref 1–3.3)
LYMPHOCYTES # BLD AUTO: 28 % — SIGNIFICANT CHANGE UP (ref 13–44)
MCHC RBC-ENTMCNC: 22.6 PG — LOW (ref 27–34)
MCHC RBC-ENTMCNC: 28.8 GM/DL — LOW (ref 32–36)
MCV RBC AUTO: 78.6 FL — LOW (ref 80–100)
METHOD TYPE: SIGNIFICANT CHANGE UP
MONOCYTES # BLD AUTO: 0.52 K/UL — SIGNIFICANT CHANGE UP (ref 0–0.9)
MONOCYTES NFR BLD AUTO: 11.5 % — SIGNIFICANT CHANGE UP (ref 2–14)
NEUTROPHILS # BLD AUTO: 2.47 K/UL — SIGNIFICANT CHANGE UP (ref 1.8–7.4)
NEUTROPHILS NFR BLD AUTO: 54.3 % — SIGNIFICANT CHANGE UP (ref 43–77)
OB PNL STL: NEGATIVE — SIGNIFICANT CHANGE UP
ORGANISM # SPEC MICROSCOPIC CNT: SIGNIFICANT CHANGE UP
ORGANISM # SPEC MICROSCOPIC CNT: SIGNIFICANT CHANGE UP
PLATELET # BLD AUTO: 240 K/UL — SIGNIFICANT CHANGE UP (ref 150–400)
POTASSIUM SERPL-MCNC: 3.9 MMOL/L — SIGNIFICANT CHANGE UP (ref 3.5–5.3)
POTASSIUM SERPL-SCNC: 3.9 MMOL/L — SIGNIFICANT CHANGE UP (ref 3.5–5.3)
RBC # BLD: 3.36 M/UL — LOW (ref 4.2–5.8)
RBC # FLD: 17.8 % — HIGH (ref 10.3–14.5)
SODIUM SERPL-SCNC: 150 MMOL/L — HIGH (ref 135–145)
SPECIMEN SOURCE: SIGNIFICANT CHANGE UP
TYPE + AB SCN PNL BLD: SIGNIFICANT CHANGE UP
WBC # BLD: 4.54 K/UL — SIGNIFICANT CHANGE UP (ref 3.8–10.5)
WBC # FLD AUTO: 4.54 K/UL — SIGNIFICANT CHANGE UP (ref 3.8–10.5)

## 2019-06-26 PROCEDURE — 88305 TISSUE EXAM BY PATHOLOGIST: CPT | Mod: 26

## 2019-06-26 PROCEDURE — 88313 SPECIAL STAINS GROUP 2: CPT | Mod: 26

## 2019-06-26 RX ORDER — SODIUM CHLORIDE 9 MG/ML
1000 INJECTION, SOLUTION INTRAVENOUS
Refills: 0 | Status: DISCONTINUED | OUTPATIENT
Start: 2019-06-26 | End: 2019-06-27

## 2019-06-26 RX ORDER — SODIUM CHLORIDE 9 MG/ML
1000 INJECTION INTRAMUSCULAR; INTRAVENOUS; SUBCUTANEOUS
Refills: 0 | Status: DISCONTINUED | OUTPATIENT
Start: 2019-06-26 | End: 2019-06-26

## 2019-06-26 RX ORDER — SODIUM CHLORIDE 9 MG/ML
1000 INJECTION INTRAMUSCULAR; INTRAVENOUS; SUBCUTANEOUS ONCE
Refills: 0 | Status: COMPLETED | OUTPATIENT
Start: 2019-06-26 | End: 2019-06-26

## 2019-06-26 RX ORDER — METOCLOPRAMIDE HCL 10 MG
5 TABLET ORAL
Refills: 0 | Status: DISCONTINUED | OUTPATIENT
Start: 2019-06-26 | End: 2019-06-28

## 2019-06-26 RX ORDER — PANTOPRAZOLE SODIUM 20 MG/1
40 TABLET, DELAYED RELEASE ORAL
Refills: 0 | Status: DISCONTINUED | OUTPATIENT
Start: 2019-06-26 | End: 2019-06-28

## 2019-06-26 RX ADMIN — Medication 300 MILLIGRAM(S): at 18:08

## 2019-06-26 RX ADMIN — SODIUM CHLORIDE 1000 MILLILITER(S): 9 INJECTION INTRAMUSCULAR; INTRAVENOUS; SUBCUTANEOUS at 06:28

## 2019-06-26 RX ADMIN — SODIUM CHLORIDE 75 MILLILITER(S): 9 INJECTION INTRAMUSCULAR; INTRAVENOUS; SUBCUTANEOUS at 01:43

## 2019-06-26 RX ADMIN — Medication 10 MILLIGRAM(S): at 21:40

## 2019-06-26 RX ADMIN — PANTOPRAZOLE SODIUM 10 MG/HR: 20 TABLET, DELAYED RELEASE ORAL at 11:53

## 2019-06-26 RX ADMIN — SODIUM CHLORIDE 75 MILLILITER(S): 9 INJECTION INTRAMUSCULAR; INTRAVENOUS; SUBCUTANEOUS at 08:18

## 2019-06-26 RX ADMIN — CEFEPIME 1000 MILLIGRAM(S): 1 INJECTION, POWDER, FOR SOLUTION INTRAMUSCULAR; INTRAVENOUS at 06:29

## 2019-06-26 RX ADMIN — PANTOPRAZOLE SODIUM 40 MILLIGRAM(S): 20 TABLET, DELAYED RELEASE ORAL at 18:09

## 2019-06-26 RX ADMIN — SODIUM CHLORIDE 75 MILLILITER(S): 9 INJECTION, SOLUTION INTRAVENOUS at 11:53

## 2019-06-26 RX ADMIN — CEFEPIME 1000 MILLIGRAM(S): 1 INJECTION, POWDER, FOR SOLUTION INTRAMUSCULAR; INTRAVENOUS at 18:09

## 2019-06-26 RX ADMIN — Medication 300 MILLIGRAM(S): at 08:18

## 2019-06-26 RX ADMIN — Medication 5 MILLIGRAM(S): at 18:08

## 2019-06-26 NOTE — PROGRESS NOTE ADULT - ASSESSMENT
79 y/o M with PMH of GI bleed, dysphagia, unspecific arrhythmia, COPD, dementia, DM2, a-fib, DVT (on eliquis), HTN, osteomyelitis, PVD, h/o PNA, h/o gastric ulcer s/p GI bleed, PEG placement, admitted on 6/24 from [facility] for evaluation of fever and coffee ground emesis; history per medical record as patient unable to provide history.   1. Patient admitted with pneumonia which most likely is aspiration pneumonia given emesis; also noted with leukocytosis most likely reactive to infection  - patient at risk for gram negative rods and other resistant bacteria   - oxygen and nebs as needed   - serial cbc and monitor temperature   - follow up cultures   - iv hydration and supportive care   - reviewed prior medical records to evaluate for resistant or atypical pathogens   - day #2 cefepime  - tolerating antibiotics without rashes or side effects   - for EGD  2. other issues; per medicine

## 2019-06-26 NOTE — PROGRESS NOTE ADULT - ASSESSMENT
78y old Male coming from [facility] with hx of Advanced Dementia (FAST 7d), Gl bleed, dysphagia (s/p PEG placement earlier in year), arrythmia, COPD, Dementia, DM type II, Afib, DVT(on Eliquis), HTN, Osteomyelitis of left leg, PVD, prior PNA/past sepsis, 3rd admission this year, recently discharged from  (6/10-6/17) for UGIB and PNA. Pt now re-admitted with coffee ground emesis and anemia. Found to have mild leukocytosis, and CTCAP showing bibasilar opacity likely c/w aspiration, CBD stone (normal LFTs) and calcified bladder mass. Palliative Care consulted to assist with establishing GOC, pt known to our service from May admission prior to PEG placement.    1) AMS/Dementia  - as per nephew, pt with dementia for past 8 years  - now at end stages, FAST 7d  - fall and aspiration precautions  - reorienting as able    2) GIB  - coffee ground emesis on admission  - GI notes appreciated  - agree with miralax for stool retention  - EGD today to further evaluate GIB?  - plan to monitor blood levels and also no sign of obstruction to cause need to further eval CBD stones     3) Infection/Aspiration PNA  - hx of dysphagia  - ID notes appreciated  - leukocytosis improved but now with elevated lactate today  - on IV abx  - UA also positive, with hx of urosepsis, awaiting cultures    4) Malnutrition/Debility  - fully dependent for all ADLs in setting of end stage dementia  - s/p PEG   - rec nutrition consult given previous finding of- severe protein calorie malnutrition and wasting on exam    5) bladder stones/mass  - urology notes appreciated  - plan for outpt workup if interested    6) Prognosis  - poor  - advanced, end stage dementia, with full dependence for all ADLs, dysphagia, documented severe protein calorie malnutrition without ability to maintain intake, recurrent infections, recurrent GIB, PPS<40%, calcified bladder mass?, and albumin 2.1 (<2.5 purporting poorer prognosis in setting of above) pt would fit criteria for hospice. However, family was not ready for this on last discussion, not wanting to set any limits, will readdress    7) GOC/Advanced Directives  - pt does not have capacity for decision making due to his advanced dementia  - no HCP on file, nephew Anshu Molina [phone] serving as surrogate  - MOLST completed 5/7/19 with no limits: CPR, no limits, trial of intubation/ventillation, send to hospital, ok for feeding tube, trial of IVF, use abx, will review  - GOC meeting to be scheduled with family, will call nephew/HCP today- of note nephew was not interested in setting any limits during last conversation (see GOC note 5/7/19). Will readdress wishes    Thank you for including us in Mr. Richter's care. Will continue to follow with you.    Zelda Nelson MD  Palliative Care Attending 78y old Male coming from [facility] with hx of Advanced Dementia (FAST 7d), Gl bleed, dysphagia (s/p PEG placement earlier in year), arrythmia, COPD, Dementia, DM type II, Afib, DVT(on Eliquis), HTN, Osteomyelitis of left leg, PVD, prior PNA/past sepsis, 3rd admission this year, recently discharged from  (6/10-6/17) for UGIB and PNA. Pt now re-admitted with coffee ground emesis and anemia. Found to have mild leukocytosis, and CTCAP showing bibasilar opacity likely c/w aspiration, CBD stone (normal LFTs) and calcified bladder mass. Palliative Care consulted to assist with establishing GOC, pt known to our service from May admission prior to PEG placement.    1) AMS/Dementia  - as per nephew, pt with dementia for past 8 years  - now at end stages, FAST 7d  - fall and aspiration precautions  - reorienting as able    2) GIB  - coffee ground emesis on admission  - GI notes appreciated  - agree with miralax for stool retention  - EGD today to further evaluate GIB?  - plan to monitor blood levels and also no sign of obstruction to cause need to further eval CBD stones     3) Infection/Aspiration PNA  - hx of dysphagia  - ID notes appreciated  - leukocytosis improved   - elevated lactate earlier today, now normalized  - on IV abx  - UA also positive, with hx of urosepsis, cx also positive for gram negative rods  - BP also improved with 1 L bolus    4) Malnutrition/Debility  - fully dependent for all ADLs in setting of end stage dementia  - s/p PEG   - rec nutrition consult given previous finding of- severe protein calorie malnutrition and wasting on exam    5) bladder stones/mass  - urology notes appreciated  - plan for outpt workup if interested    6) Prognosis  - poor  - advanced, end stage dementia, with full dependence for all ADLs, dysphagia, documented severe protein calorie malnutrition without ability to maintain intake, recurrent infections, recurrent GIB, PPS<40%, calcified bladder mass?, and albumin 2.1 (<2.5 purporting poorer prognosis in setting of above) pt would fit criteria for hospice. However, family was not ready for this on last discussion, not wanting to set any limits, will readdress    7) GOC/Advanced Directives  - pt does not have capacity for decision making due to his advanced dementia  - no HCP on file, nephew Anshu Molina [phone] serving as surrogate  - MOLST completed 5/7/19 with no limits: CPR, no limits, trial of intubation/ventillation, send to hospital, ok for feeding tube, trial of IVF, use abx, will review  - [facility] meeting to be scheduled with family, will call nephew/HCP today- of note nephew was not interested in setting any limits during last conversation (see GOC note 5/7/19). Will readdress wishes    Thank you for including us in Mr. Richter's care. Will continue to follow with you.    Zelda Nelson MD  Palliative Care Attending

## 2019-06-26 NOTE — CHART NOTE - NSCHARTNOTEFT_GEN_A_CORE
Called by RN to evaluate pt with BP of 85/50. patient without complains    Vital Signs Last 24 Hrs  T(C): 36.6 (26 Jun 2019 05:07), Max: 37.3 (25 Jun 2019 22:16)  T(F): 97.9 (26 Jun 2019 05:07), Max: 99.1 (25 Jun 2019 22:16)  HR: 76 (26 Jun 2019 05:07) (76 - 88)  BP: 85/50 (26 Jun 2019 05:07) (85/50 - 99/63)  RR: 17 (26 Jun 2019 05:07) (17 - 18)  SpO2: 93% (26 Jun 2019 05:07) (92% - 99%)    Gen: thin elderly male in no acute distress  Cardiac: S1 S2 regular  Lungs: CTA b/l  Abd: + BS, soft, ND, NT    Hypotension  -Will give 1 L NS bolus   -Repeat CBC, lactate, blood cx, BMP  -Am hospitalist to f/u

## 2019-06-26 NOTE — PROGRESS NOTE ADULT - SUBJECTIVE AND OBJECTIVE BOX
HPI: Pt seen and examined this am in follow up for sx. Pt lethargic today, not arousing for more than a moment to barely open his eyes. Ill-appearing, but no sign of discomfort. Events overnight noted, low BP and also lactate 3.0.    PAIN: ( )Yes   ( x)No- no nonverbal signs of pain  DYSPNEA: ( ) Yes  ( x) No- no nonverbal signs of dyspnea    Review of Systems:  Unable to gather 2/2 to advanced dementia    PHYSICAL EXAM:    Vital Signs Last 24 Hrs  T(C): 36.3 (26 Jun 2019 11:19), Max: 37.3 (25 Jun 2019 22:16)  T(F): 97.3 (26 Jun 2019 11:19), Max: 99.1 (25 Jun 2019 22:16)  HR: 84 (26 Jun 2019 11:19) (76 - 88)  BP: 126/75 (26 Jun 2019 11:19) (85/50 - 126/75)  BP(mean): 87 (26 Jun 2019 11:19) (87 - 87)  RR: 18 (26 Jun 2019 11:19) (17 - 18)  SpO2: 98% (26 Jun 2019 11:19) (92% - 99%)    PPSV2:  20 %  FAST: 7d    General: Elderly male, thin, lethargic, asleep with mouth agape, appears in NAD  Mental Status: unable to gather  HEENT: dmm, ngt in place, +temporal wasting  Lungs: dec at bases bl  Cardiac: +s1 s2 rrr  GI: soft mild distention, +bs nt incontinent  : incontinent  Ext: moves extremities spontaneously, no edema, muscle/fat wasting throughout  Neuro: unable to gather 2/2 to AMS, cannot follow command    LABS:                        7.6    4.54  )-----------( 240      ( 26 Jun 2019 06:47 )             26.4     06-26    150<H>  |  118<H>  |  18  ----------------------------<  82  3.9   |  24  |  0.71    Ca    8.1<L>      26 Jun 2019 06:47  Phos  2.3     06-25  Mg     2.2     06-25    TPro  6.5  /  Alb  2.1<L>  /  TBili  0.5  /  DBili  x   /  AST  19  /  ALT  21  /  AlkPhos  87  06-25    PT/INR - ( 25 Jun 2019 09:05 )   PT: 18.5 sec;   INR: 1.64 ratio         PTT - ( 25 Jun 2019 09:05 )  PTT:29.6 sec  Albumin: Albumin, Serum: 2.1 g/dL (06-25 @ 09:05)      Allergies    No Known Allergies    Intolerances      MEDICATIONS  (STANDING):  acetaminophen  Suppository .. 325 milliGRAM(s) Rectal once  bisacodyl Suppository 10 milliGRAM(s) Rectal at bedtime  cefepime  Injectable. 1000 milliGRAM(s) IV Push every 12 hours  cholecalciferol 1000 Unit(s) Oral daily  dextrose 5%. 1000 milliLiter(s) (50 mL/Hr) IV Continuous <Continuous>  dextrose 50% Injectable 12.5 Gram(s) IV Push once  dextrose 50% Injectable 25 Gram(s) IV Push once  dextrose 50% Injectable 25 Gram(s) IV Push once  ferrous    sulfate Liquid 300 milliGRAM(s) Enteral Tube three times a day with meals  insulin lispro (HumaLOG) corrective regimen sliding scale   SubCutaneous every 6 hours  pantoprazole Infusion 8 mG/Hr (10 mL/Hr) IV Continuous <Continuous>  polyethylene glycol 3350 17 Gram(s) Oral two times a day  sodium chloride 0.45%. 1000 milliLiter(s) (75 mL/Hr) IV Continuous <Continuous>    MEDICATIONS  (PRN):  dextrose 40% Gel 15 Gram(s) Oral once PRN Blood Glucose LESS THAN 70 milliGRAM(s)/deciliter  docusate sodium 100 milliGRAM(s) Oral three times a day PRN Constipation  glucagon  Injectable 1 milliGRAM(s) IntraMuscular once PRN Glucose LESS THAN 70 milligrams/deciliter  lactulose Syrup 15 Gram(s) Oral daily PRN constipation  senna 2 Tablet(s) Oral at bedtime PRN Constipation

## 2019-06-26 NOTE — PROGRESS NOTE ADULT - SUBJECTIVE AND OBJECTIVE BOX
79 y/o M with PMH of GI bleed, dysphagia, unspecific arrhythmia, COPD, dementia, DM2, a-fib, DVT (on eliquis), HTN, osteomyelitis, PVD, h/o PNA, h/o gastric ulcer s/p GI bleed, PEG placement, p/w fever and coffee-ground emesis.      6.25: confused  6.26: no bleeding, no vomiting, pale, no distress      REVIEW OF SYSTEMS:  unable to obtain due to confusion        Vital Signs Last 24 Hrs  T(C): 36.3 (26 Jun 2019 11:19), Max: 37.3 (25 Jun 2019 22:16)  T(F): 97.3 (26 Jun 2019 11:19), Max: 99.1 (25 Jun 2019 22:16)  HR: 84 (26 Jun 2019 11:19) (76 - 84)  BP: 126/75 (26 Jun 2019 11:19) (85/50 - 126/75)  BP(mean): 87 (26 Jun 2019 11:19) (87 - 87)  RR: 18 (26 Jun 2019 11:19) (17 - 18)  SpO2: 98% (26 Jun 2019 11:19) (93% - 99%)    PHYSICAL EXAM:    GENERAL: pale, weak appearing  HEENT:  NC/AT, EOMI, PERRLA, No scleral icterus, Moist mucous membranes  NECK: Supple, No JVD  CNS:  Alert & Oriented X0, Motor Strength 5/5 B/L upper and lower extremities; DTRs 2+ intact   LUNG: Normal Breath sounds, Clear to auscultation bilaterally, No rales, No rhonchi, No wheezing  HEART: RRR; No murmurs, No rubs  ABDOMEN: +BS, ST/ND/NT  GENITOURINARY: Voiding, Bladder not distended  EXTREMITIES:  2+ Peripheral Pulses, No clubbing, No cyanosis, No tibial edema  MUSCULOSKELTAL: Joints normal ROM, No TTP, No effusion  SKIN: no rashes  RECTAL: deferred, not indicated  BREAST: deferred               Labs:                        7.6    4.54  )-----------( 240      ( 26 Jun 2019 06:47 )             26.4       150<H>  |  118<H>  |  18  ----------------------------<  82  3.9   |  24  |  0.71    Ca    8.1<L>      26 Jun 2019 06:47  Phos  2.3     06-25  Mg     2.2     06-25    TPro  6.5  /  Alb  2.1<L>  /  TBili  0.5  /  DBili  x   /  AST  19  /  ALT  21  /  AlkPhos  87  06-25      MEDICATIONS  (STANDING):  acetaminophen  Suppository .. 325 milliGRAM(s) Rectal once  bisacodyl Suppository 10 milliGRAM(s) Rectal at bedtime  cefepime  Injectable. 1000 milliGRAM(s) IV Push every 12 hours  cholecalciferol 1000 Unit(s) Oral daily  ferrous    sulfate Liquid 300 milliGRAM(s) Enteral Tube three times a day with meals  insulin lispro (HumaLOG) corrective regimen sliding scale   SubCutaneous every 6 hours  polyethylene glycol 3350 17 Gram(s) Oral two times a day  sodium chloride 0.45%. 1000 milliLiter(s) (75 mL/Hr) IV Continuous <Continuous>        all labs reviewed  all imaging reviewed      CT Chest No Cont (06.24.19 @ 23:12) >  IMPRESSION:   *  Dependent bibasilar opacity consistent with atelectasis and/or   infiltrate. Aspiration is a consideration.  *  Right-sided obstructive uropathy with 1.3 cm stone at the right UPJ.  *  Calcification the region of the right UVJ.On is made of the presence   of bladder calculus. Calcified bladder mass such as transitional cell   carcinoma is not excluded.  *  Cholelithiasis and choledocholithiasis.  *  Dilated stool-filled rectum, raising a question of stercoral colitis.        Assessment and Plan:       1. Sepsis due to aspiration PNA, suspect GNR etiology   on Cefepime IV day#2  CT chest noted  f/u blood cx  -ID consult      2. Coffee-ground emesis w/ h/o GI ulcers  likely upper GI bleeding  -Protonix drip  -IVF  -GI consult  -Hold anticoagulation for a-fib/DVT temporarily until cleared by GI to resume     3. Anemia of acute blood loss:  -Trend H/H and transfuse PRN   -Iron panel / FOBT     4. DM2  -Humalog ISS    5. Rt obstructive uropathy:   asymptomatic  Cr levels normal

## 2019-06-26 NOTE — CHART NOTE - NSCHARTNOTEFT_GEN_A_CORE
This SW spoke with pts nephew Anshu Tracy [phone] via phone to schedule a family meeting. He is available to speak with our team by phone tomorrow at 12PM. Our team will continue to follow. Statement Selected

## 2019-06-26 NOTE — PROGRESS NOTE ADULT - SUBJECTIVE AND OBJECTIVE BOX
Patient is a 78y old  Male who presents with a chief complaint of coffee ground emesis + fever (25 Jun 2019 13:29)    Date of service: 06-26-19 @ 11:16      Patient lying in bed; afebrile  Nonverbal      ROS unable to obtain secondary to patient medical condition     MEDICATIONS  (STANDING):  acetaminophen  Suppository .. 325 milliGRAM(s) Rectal once  bisacodyl Suppository 10 milliGRAM(s) Rectal at bedtime  cefepime  Injectable. 1000 milliGRAM(s) IV Push every 12 hours  cholecalciferol 1000 Unit(s) Oral daily  dextrose 5%. 1000 milliLiter(s) (50 mL/Hr) IV Continuous <Continuous>  dextrose 50% Injectable 12.5 Gram(s) IV Push once  dextrose 50% Injectable 25 Gram(s) IV Push once  dextrose 50% Injectable 25 Gram(s) IV Push once  ferrous    sulfate Liquid 300 milliGRAM(s) Enteral Tube three times a day with meals  insulin lispro (HumaLOG) corrective regimen sliding scale   SubCutaneous every 6 hours  pantoprazole Infusion 8 mG/Hr (10 mL/Hr) IV Continuous <Continuous>  polyethylene glycol 3350 17 Gram(s) Oral two times a day  sodium chloride 0.45%. 1000 milliLiter(s) (75 mL/Hr) IV Continuous <Continuous>    MEDICATIONS  (PRN):  dextrose 40% Gel 15 Gram(s) Oral once PRN Blood Glucose LESS THAN 70 milliGRAM(s)/deciliter  docusate sodium 100 milliGRAM(s) Oral three times a day PRN Constipation  glucagon  Injectable 1 milliGRAM(s) IntraMuscular once PRN Glucose LESS THAN 70 milligrams/deciliter  lactulose Syrup 15 Gram(s) Oral daily PRN constipation  senna 2 Tablet(s) Oral at bedtime PRN Constipation      Vital Signs Last 24 Hrs  T(C): 36.6 (26 Jun 2019 05:07), Max: 37.3 (25 Jun 2019 22:16)  T(F): 97.9 (26 Jun 2019 05:07), Max: 99.1 (25 Jun 2019 22:16)  HR: 76 (26 Jun 2019 05:07) (76 - 88)  BP: 85/50 (26 Jun 2019 05:07) (85/50 - 99/63)  BP(mean): --  RR: 17 (26 Jun 2019 05:07) (17 - 18)  SpO2: 93% (26 Jun 2019 05:07) (92% - 99%)    Physical Exam:        PE:    Constitutional: frail looking  HEENT: NC/AT, EOMI, PERRLA, conjunctivae clear; ears and nose atraumatic; pharynx clear  Neck: supple; thyroid not palpable  Back: no tenderness  Respiratory: respiratory effort normal; scattered coarse breath sound  Cardiovascular: S1S2 regular, no murmurs  Abdomen: soft, not tender, not distended, positive BS; no liver or spleen organomegaly  Genitourinary: no suprapubic tenderness  Musculoskeletal: no muscle tenderness, no joint swelling or tenderness  Neurological/ Psychiatric:  moving all extremities  Skin: no rashes; no palpable lesions    Labs: all available labs reviewed                        Labs:                        7.6    4.54  )-----------( 240      ( 26 Jun 2019 06:47 )             26.4     06-26    150<H>  |  118<H>  |  18  ----------------------------<  82  3.9   |  24  |  0.71    Ca    8.1<L>      26 Jun 2019 06:47  Phos  2.3     06-25  Mg     2.2     06-25    TPro  6.5  /  Alb  2.1<L>  /  TBili  0.5  /  DBili  x   /  AST  19  /  ALT  21  /  AlkPhos  87  06-25           Cultures:       Culture - Urine (collected 06-24-19 @ 17:34)  Source: .Urine Catheterized  Preliminary Report (06-25-19 @ 20:42):    >100,000 CFU/ml Gram Negative Rods    Culture - Blood (collected 06-24-19 @ 17:34)  Source: .Blood Blood-Peripheral  Preliminary Report (06-26-19 @ 01:02):    No growth to date.    Culture - Blood (collected 06-24-19 @ 17:34)  Source: .Blood Blood-Peripheral  Preliminary Report (06-26-19 @ 01:02):    No growth to date.        < from: CT Chest No Cont (06.24.19 @ 23:12) >    EXAM:  CT ABDOMEN AND PELVIS                          EXAM:  CT CHEST                            PROCEDURE DATE:  06/24/2019          INTERPRETATION:  CLINICAL INFORMATION: Sepsis. Cough. Urinary tract   infection.       COMPARISON: 4/22/2019    PROCEDURE:   CT of the Chest, Abdomen and Pelvis was performed without intravenous   contrast.   Intravenous contrast: None.  Oral contrast: None.  Sagittal and coronal reformats were performed.    FINDINGS:    CHEST:     LUNGS AND LARGE AIRWAYS: Patentcentral airways. No pulmonary nodules.   Patchy bibasilar opacity consistent with atelectasis and/or infiltrate.   Aspiration is a consideration. Small hiatal hernia.  PLEURA: No pleural effusion.  VESSELS: Within normal limits.  HEART: Heart size isnormal. No pericardial effusion.  MEDIASTINUM AND HOLLY: Nonspecific mediastinal adenopathy. For reference,   an enlarged precarinal lymph node measures 1.5 cm. (2:27)  CHEST WALL AND LOWER NECK: Within normal limits.    ABDOMEN AND PELVIS:    LIVER: Small calcified granuloma. Multiple liver hypodensities are   incompletely characterized, but likely benign etiology such as cyst or   hemangioma.  BILE DUCTS: Normal caliber. The is at the upper limits of normal in   caliber for the patient's age, measuring 8 mm. Calcification in the   distal CBD, consistent with choledocholithiasis.  GALLBLADDER: Cholelithiasis.  SPLEEN: Within normal limits. Small splenule.  PANCREAS: Within normal limits.  ADRENALS: Within normal limits.  KIDNEYS/URETERS: Moderate right hydroureteronephrosis to the level of a   1.3 cm stone at the right UPJ. Nonobstructing stone in the upper pole of   the right kidney measures 1.5 cm. Unenhanced left kidney is unremarkable.    BLADDER: Hyperdensity at the level of the right UVJ partially obscured by   streak artifact related to the left total hip arthroplasty measuring 1.1   x 0.6 cm. Findings may reflect the presence of bladder calculus.   Calcified bladder mass such as transitional cell carcinoma is not   excluded.  REPRODUCTIVE ORGANS: Enlarged prostate.    BOWEL: Gastrostomy tube terminates in the stomach. No bowel obstruction.   Appendix is not visualized. Dilatation of the rectum with mild thickening   of the rectal wall, raising a question of stercoral colitis.   Diverticulosis.  PERITONEUM: No ascites.  VESSELS:  Atherosclerotic calcifications. IVC filter.  RETROPERITONEUM: No lymphadenopathy.    ABDOMINAL WALL: Fat-containing left inguinal hernia.  BONES: Left total hip arthroplasty. Degenerative changes.    IMPRESSION:   *  Dependent bibasilar opacity consistent with atelectasis and/or   infiltrate. Aspiration is a consideration.  *  Right-sided obstructive uropathy with 1.3 cm stone at the right UPJ.  *  Calcification the region of the right UVJ.On is made of the presence   of bladder calculus. Calcified bladder mass such as transitional cell   carcinoma is not excluded.  *  Cholelithiasis and choledocholithiasis.  *  Dilated stool-filled rectum, raising a question of stercoral colitis.  .    < end of copied text >      Radiology: all available radiological tests reviewed    Advanced directives addressed: full resuscitation

## 2019-06-27 LAB
ANION GAP SERPL CALC-SCNC: 7 MMOL/L — SIGNIFICANT CHANGE UP (ref 5–17)
BUN SERPL-MCNC: 12 MG/DL — SIGNIFICANT CHANGE UP (ref 7–23)
CALCIUM SERPL-MCNC: 7.6 MG/DL — LOW (ref 8.5–10.1)
CHLORIDE SERPL-SCNC: 114 MMOL/L — HIGH (ref 96–108)
CO2 SERPL-SCNC: 24 MMOL/L — SIGNIFICANT CHANGE UP (ref 22–31)
CREAT SERPL-MCNC: 0.51 MG/DL — SIGNIFICANT CHANGE UP (ref 0.5–1.3)
GLUCOSE BLDC GLUCOMTR-MCNC: 101 MG/DL — HIGH (ref 70–99)
GLUCOSE BLDC GLUCOMTR-MCNC: 106 MG/DL — HIGH (ref 70–99)
GLUCOSE BLDC GLUCOMTR-MCNC: 118 MG/DL — HIGH (ref 70–99)
GLUCOSE BLDC GLUCOMTR-MCNC: 120 MG/DL — HIGH (ref 70–99)
GLUCOSE SERPL-MCNC: 89 MG/DL — SIGNIFICANT CHANGE UP (ref 70–99)
HCT VFR BLD CALC: 32 % — LOW (ref 39–50)
HGB BLD-MCNC: 9.7 G/DL — LOW (ref 13–17)
POTASSIUM SERPL-MCNC: 3.7 MMOL/L — SIGNIFICANT CHANGE UP (ref 3.5–5.3)
POTASSIUM SERPL-SCNC: 3.7 MMOL/L — SIGNIFICANT CHANGE UP (ref 3.5–5.3)
SODIUM SERPL-SCNC: 145 MMOL/L — SIGNIFICANT CHANGE UP (ref 135–145)

## 2019-06-27 RX ADMIN — Medication 5 MILLIGRAM(S): at 05:48

## 2019-06-27 RX ADMIN — POLYETHYLENE GLYCOL 3350 17 GRAM(S): 17 POWDER, FOR SOLUTION ORAL at 17:43

## 2019-06-27 RX ADMIN — SODIUM CHLORIDE 75 MILLILITER(S): 9 INJECTION, SOLUTION INTRAVENOUS at 00:30

## 2019-06-27 RX ADMIN — Medication 300 MILLIGRAM(S): at 08:51

## 2019-06-27 RX ADMIN — CEFEPIME 1000 MILLIGRAM(S): 1 INJECTION, POWDER, FOR SOLUTION INTRAMUSCULAR; INTRAVENOUS at 17:42

## 2019-06-27 RX ADMIN — Medication 5 MILLIGRAM(S): at 18:51

## 2019-06-27 RX ADMIN — CEFEPIME 1000 MILLIGRAM(S): 1 INJECTION, POWDER, FOR SOLUTION INTRAMUSCULAR; INTRAVENOUS at 05:47

## 2019-06-27 RX ADMIN — Medication 1000 UNIT(S): at 11:55

## 2019-06-27 RX ADMIN — Medication 300 MILLIGRAM(S): at 17:41

## 2019-06-27 RX ADMIN — PANTOPRAZOLE SODIUM 40 MILLIGRAM(S): 20 TABLET, DELAYED RELEASE ORAL at 05:48

## 2019-06-27 RX ADMIN — POLYETHYLENE GLYCOL 3350 17 GRAM(S): 17 POWDER, FOR SOLUTION ORAL at 05:47

## 2019-06-27 RX ADMIN — Medication 10 MILLIGRAM(S): at 22:48

## 2019-06-27 RX ADMIN — Medication 300 MILLIGRAM(S): at 11:52

## 2019-06-27 RX ADMIN — PANTOPRAZOLE SODIUM 40 MILLIGRAM(S): 20 TABLET, DELAYED RELEASE ORAL at 17:41

## 2019-06-27 NOTE — PROGRESS NOTE ADULT - SUBJECTIVE AND OBJECTIVE BOX
GI    no events  marin TF  +BM  hgb stable  d/w RN at bedside    ROS: unobtainable    Physical Exam:   Vital Signs Last 24 Hrs  T(C): 36.8 (27 Jun 2019 06:14), Max: 36.8 (27 Jun 2019 00:35)  T(F): 98.3 (27 Jun 2019 06:14), Max: 98.3 (27 Jun 2019 06:14)  HR: 78 (27 Jun 2019 06:14) (72 - 86)  BP: 111/67 (27 Jun 2019 06:14) (107/71 - 131/64)  BP(mean): --  RR: 17 (27 Jun 2019 06:14) (16 - 17)  SpO2: 98% (27 Jun 2019 06:14) (96% - 100%)    	HEENT:   pupils equal and reactive, EOMI, no oropharyngeal lesions, erythema, exudates, oral thrush    	NECK:   supple, no carotid bruits, no palpable lymph nodes, no thyromegaly    	CV:  +S1, +S2, regular, no murmurs or rubs    	RESP:   lungs clear to auscultation bilaterally, no wheezing, rales, rhonchi, good air entry bilaterally    	GI:  abdomen soft, non-tender, non-distended, normal BS, no bruits, no abdominal masses, no palpable masses, PEG with ext bumper at 2 cm    	MSK:   normal muscle tone, no atrophy, no rigidity, no contractions    	EXT:   no clubbing, no cyanosis, no edema, no calf pain, swelling or erythema    	VASCULAR:  pulses equal and symmetric in the upper and lower extremities    	NEURO: awake, not alert or oriented    	SKIN:  no ulcers, lesions or rashes                                 9.7    x     )-----------( x        ( 27 Jun 2019 07:32 )             32.0

## 2019-06-27 NOTE — DIETITIAN INITIAL EVALUATION ADULT. - ENTERAL
Jevity 1.5 @ goal rate of 50 mL/hr w/ 2 packets of prosource daily. Consider free H20 flush 45 mL/hr to meet hydration needs.

## 2019-06-27 NOTE — PROGRESS NOTE ADULT - ASSESSMENT
78M s/p PEG placement 5/2019 (otherwise negative EGD except hiatal hernia) now presenting with second occurrence of sepsis, hematemesis.   EGD showed severe esophagitis as source of bleed.  Incidentally noted possible CBD stone on CT.  Possible stercoral colitis noted on CT.    Recommend:  -trend CBC  -continue PPI BID and reglan 5 mg BID indefinitely (promote motility as patient always found supine)  -will follow up EGD bx  -OK to resume anticoagulation if needed  -unclear what to do about possible CBD stone seen on CT: consider watchful waiting vs transfer out for ERCP after acute issues managed. The LFTs are normal and he does not appear to have biliary sepsis. Favor conservative management.  -cont miralax via PEG  -will follow prn, please call with GI issues

## 2019-06-27 NOTE — PROGRESS NOTE ADULT - SUBJECTIVE AND OBJECTIVE BOX
HPI: Pt seen and examined this am in follow up for sx and GOC, no family at bedside. Pt remains unable to contribute to HPI due to severe dementia, appears comfortable. Spoke with Dr. Johnson who joined at bedside and noted severe esophagitis on EGD yesterday. Plan for GOC meeting via phone with nephew/HCP today at noon. See GOC note that will follow.     PAIN: ( )Yes   ( x)No- no nonverbal signs of pain  DYSPNEA: ( ) Yes  ( x) No- no nonverbal signs of dyspnea    Review of Systems:  Unable to gather 2/2 to advanced dementia    PHYSICAL EXAM:    Vital Signs Last 24 Hrs  T(C): 36.8 (27 Jun 2019 06:14), Max: 36.8 (27 Jun 2019 00:35)  T(F): 98.3 (27 Jun 2019 06:14), Max: 98.3 (27 Jun 2019 06:14)  HR: 78 (27 Jun 2019 06:14) (72 - 86)  BP: 111/67 (27 Jun 2019 06:14) (107/71 - 131/64)  BP(mean): 87 (26 Jun 2019 11:19) (87 - 87)  RR: 17 (27 Jun 2019 06:14) (16 - 18)  SpO2: 98% (27 Jun 2019 06:14) (96% - 100%)    PPSV2:  20 %  FAST: 7d    General: Elderly male, thin, awake, alert, not following commands or speaking other than garbled, appears in NAD  Mental Status: unable to gather  HEENT: dmm, ngt in place, +temporal wasting  Lungs: dec at bases bl  Cardiac: +s1 s2 rrr  GI: soft mild distention, +bs nt incontinent, peg in place  : incontinent  Ext: moves extremities spontaneously, no edema, muscle/fat wasting throughout  Neuro: unable to gather 2/2 to AMS, cannot follow command    LABS:                        9.7    x     )-----------( x        ( 27 Jun 2019 07:32 )             32.0     06-27    145  |  114<H>  |  12  ----------------------------<  89  3.7   |  24  |  0.51    Ca    7.6<L>      27 Jun 2019 07:32        Albumin: Albumin, Serum: 2.1 g/dL (06-25 @ 09:05)      Allergies    No Known Allergies    Intolerances      MEDICATIONS  (STANDING):  acetaminophen  Suppository .. 325 milliGRAM(s) Rectal once  bisacodyl Suppository 10 milliGRAM(s) Rectal at bedtime  cefepime  Injectable. 1000 milliGRAM(s) IV Push every 12 hours  cholecalciferol 1000 Unit(s) Oral daily  dextrose 5%. 1000 milliLiter(s) (50 mL/Hr) IV Continuous <Continuous>  dextrose 50% Injectable 12.5 Gram(s) IV Push once  dextrose 50% Injectable 25 Gram(s) IV Push once  dextrose 50% Injectable 25 Gram(s) IV Push once  ferrous    sulfate Liquid 300 milliGRAM(s) Enteral Tube three times a day with meals  insulin lispro (HumaLOG) corrective regimen sliding scale   SubCutaneous every 6 hours  metoclopramide 5 milliGRAM(s) Oral two times a day  pantoprazole   Suspension 40 milliGRAM(s) Oral two times a day before meals  polyethylene glycol 3350 17 Gram(s) Oral two times a day  sodium chloride 0.45%. 1000 milliLiter(s) (75 mL/Hr) IV Continuous <Continuous>    MEDICATIONS  (PRN):  dextrose 40% Gel 15 Gram(s) Oral once PRN Blood Glucose LESS THAN 70 milliGRAM(s)/deciliter  glucagon  Injectable 1 milliGRAM(s) IntraMuscular once PRN Glucose LESS THAN 70 milligrams/deciliter

## 2019-06-27 NOTE — DIETITIAN INITIAL EVALUATION ADULT. - OTHER INFO
PT is a 77 yo M w/ PMH  GI bleed, dysphagia, unspecific arrhythmia, COPD, dementia, DM2, a-fib, DVT (on eliquis), HTN, osteomyelitis, PVD, h/o PNA, h/o gastric ulcer s/p GI bleed, PEG placement, p/w fever and coffe-ground emesis at NH. Upon visit TF running at current goal rate of 40 mL/hr. LOS x 3 days; TF restarted yesterday afternoon. TF monitor at bedside shows pt has received 576 mL over past 24 hours. Pt unable to provide any hx d/t hx of dementia. GI noted UGIB d/t esophagitis. Also noted on CT scan possible CBD stone and possible stercoral colitis. Pt currently on PPI, reglan, and miralax. Noted + BM and tolerating TF. NFPE significant for muscle wasting: severe: temporal, clavicle, deltoid; severe fat depletion: occipital, buccal, triceps, and ribs. Admission wt shows 83.9kg however pt recently admitted to  w/ wt of 59kg revealing 55# wt gain over 18days. Bedscale (not zeroed out and w/ items on the bed) show 68.5kg. Recommend obtaining new wt and monitoring biweekly to track/trend. Based on above pt meets criteria for severe malnutrition in the context of chronic illness AEB severe muscle/fat wasting. Recommend changing TF order to goal rate of Jevity 1.5 @ goal rate of 50mL/hr w/ 2 packets of prosource. This TF order will provide 1730 calories and 92gm protein meeting 100% of estimated protein and calorie needs. Recommend free H20 flush 45 mL/hr to meet hydration needs. TF formula provides 836 mL H20 plus free H20 flush (990 mL H20) for a total volume 1826 mL daily. No noted skin breakdown; noy 11. Last BM noted 6/26.

## 2019-06-27 NOTE — GOALS OF CARE CONVERSATION - PERSONAL ADVANCE DIRECTIVE - TREATMENT GUIDELINE COMMENT
MOLST decisions: CPR, no limits, trial of intubation/ventillation, send to hospital, ok for feeding tube, trial of IVF, use abx, will review    *Spent 22 minutes discussing GOC with family including Advance care planning, explanation and discussion of advance directives, reviewed all treatment/dispo options, and MOLST.

## 2019-06-27 NOTE — PROGRESS NOTE ADULT - ASSESSMENT
78y old Male coming from [facility] with hx of Advanced Dementia (FAST 7d), Gl bleed, dysphagia (s/p PEG placement earlier in year), arrythmia, COPD, Dementia, DM type II, Afib, DVT(on Eliquis), HTN, Osteomyelitis of left leg, PVD, prior PNA/past sepsis, 3rd admission this year, recently discharged from  (6/10-6/17) for UGIB and PNA. Pt now re-admitted with coffee ground emesis and anemia. Found to have mild leukocytosis, and CTCAP showing bibasilar opacity likely c/w aspiration, CBD stone (normal LFTs) and calcified bladder mass. Palliative Care consulted to assist with establishing GOC, pt known to our service from May admission prior to PEG placement.    1) AMS/Dementia  - as per nephew, pt with dementia for past 8 years  - now at end stages, FAST 7d  - fall and aspiration precautions  - reorienting as able    2) GIB  - coffee ground emesis on admission  - GI notes appreciated  - agree with miralax for stool retention and reglan for reflux  - H/H improved today s/p 1U PRBCs 6/26  - EGD done 6/26- severe esophagitis  - plan to monitor blood levels and also no sign of obstruction to cause need to further eval CBD stones     3) Infection/Aspiration PNA  - hx of dysphagia  - ID notes appreciated  - leukocytosis improved   - elevated lactate earlier today, now normalized  - on IV abx  - UA also positive, with hx of urosepsis, cx also positive for gram negative rods  - BP also improved with 1 L bolus    4) Malnutrition/Debility  - fully dependent for all ADLs in setting of end stage dementia  - s/p PEG   - rec nutrition consult given previous finding of- severe protein calorie malnutrition and wasting on exam    5) bladder stones/mass  - urology notes appreciated  - plan for outpt workup if interested    6) Prognosis  - poor  - advanced, end stage dementia, with full dependence for all ADLs, dysphagia, documented severe protein calorie malnutrition without ability to maintain intake, recurrent infections, recurrent GIB, PPS<40%, calcified bladder mass?, and albumin 2.1 (<2.5 purporting poorer prognosis in setting of above) pt would fit criteria for hospice. However, family was not ready for this on last discussion, not wanting to set any limits, will readdress    7) GOC/Advanced Directives  - pt does not have capacity for decision making due to his advanced dementia  - no HCP on file, nephace Wilsonor [phone] serving as surrogate  - MOLST completed 5/7/19 with no limits: CPR, no limits, trial of intubation/ventillation, send to hospital, ok for feeding tube, trial of IVF, use abx, will review  - GOC meeting today at noon. See GOC note to follow    Thank you for including us in Mr. Richter's care. Will continue to follow with you.    Zelda Nelson MD  Palliative Care Attending

## 2019-06-27 NOTE — CHART NOTE - NSCHARTNOTEFT_GEN_A_CORE
Upon Nutritional Assessment by the Registered Dietitian your patient was determined to meet criteria / has evidence of the following diagnosis/diagnoses:          [ ]  Mild Protein Calorie Malnutrition        [ ]  Moderate Protein Calorie Malnutrition        [x ] Severe Protein Calorie Malnutrition        [ ] Unspecified Protein Calorie Malnutrition        [ ] Underweight / BMI <19        [ ] Morbid Obesity / BMI > 40      Findings:    Upon visit TF running at current goal rate of 40 mL/hr. LOS x 3 days; TF restarted yesterday afternoon. TF monitor at bedside shows pt has received 576 mL over past 24 hours. Pt unable to provide any hx d/t hx of dementia. GI noted UGIB d/t esophagitis. Also noted on CT scan possible CBD stone and possible stercoral colitis. Pt currently on PPI, reglan, and miralax. Noted + BM and tolerating TF. NFPE significant for muscle wasting: severe: temporal, clavicle, deltoid; severe fat depletion: occipital, buccal, triceps, and ribs. Admission wt shows 83.9kg however pt recently admitted to  w/ wt of 59kg revealing 55# wt gain over 18days. Bedscale (not zeroed out and w/ items on the bed) show 68.5kg. Recommend obtaining new wt and monitoring biweekly to track/trend. Based on above pt meets criteria for severe malnutrition in the context of chronic illness AEB severe muscle/fat wasting. Recommend changing TF order to goal rate of Jevity 1.5 @ goal rate of 50mL/hr w/ 2 packets of prosource. This TF order will provide 1730 calories and 92gm protein meeting 100% of estimated protein and calorie needs. Recommend free H20 flush 45 mL/hr to meet hydration needs. TF formula provides 836 mL H20 plus free H20 flush (990 mL H20) for a total volume 1826 mL daily. No noted skin breakdown; noy 11.     Findings as based on:  •  Comprehensive nutrition assessment and consultation  •  Calorie counts (nutrient intake analysis)  •  Food acceptance and intake status from observations by staff  •  Follow up  •  Patient education  •  Intervention secondary to interdisciplinary rounds  •   concerns      Treatment:      1. Increased goal rate of TF to Jevity 1.5 @ 50 mL/hr w/ 2 packets of prosource daily.   2. Consider free H20 flush 45 mL/hr to meet hydration needs.   3. Maintain aspiration precautions and keep HOB >45degrees   4. monitor for s/s intolerance   5. obtain new wt and monitor weekly to track/trend.      The following diet has been recommended:      PROVIDER Section:     By signing this assessment you are acknowledging and agree with the diagnosis/diagnoses assigned by the Registered Dietitian    Comments:

## 2019-06-27 NOTE — GOALS OF CARE CONVERSATION - PERSONAL ADVANCE DIRECTIVE - CONVERSATION DETAILS
Called nephew to follow up on goals since last conversation 2 admissions prior. He notes nothing much has changed since then. He acknowledges pt continues to return to the hospital for recurrent issues, which do not seem to stay fixed for long. He is beginning to consider pt's QOL and possibility that a more conservative approach might be better, but is not ready to pursue this yet. We agreed with considering transition to comfort via hospice, clarifying for him that hospice could happen at [facility] (up until now he thought pt would need to stop tube feeds and to be relocated to a hospice facility to pursue this option). He also notes being asked about pt's code status and several clinicians suggesting DNR. We also recommended this, noting poor likelihood that pt would survive such an intervention, and higher likelihood that he would have to make the more difficult decision to take away life prolonging interventions. Near the end of the conversation, after hearing all impressions, fe noted that he will be reconnecting with the rest of the family in a few weeks where he hopes to share all of this for support in making new decisions for his uncle (including hospice). In the meantime, he would like to keep all interventions the same, reviewing no-limits MOLST with us and wanting to keep choices the same, including CPR. Pt is to return to [facility] with all of these wishes unchanged in place when medically ready for discharge. We let him know that he could let [facility] staff know if he ever wants to change the focus of care and if pt returns and he needs assistance with this that he could always ask for us. He expressed his gratitude and we wished him well.    **Given sx managed and GOC clear, will sign off**

## 2019-06-27 NOTE — PROGRESS NOTE ADULT - SUBJECTIVE AND OBJECTIVE BOX
77 y/o M with PMH of GI bleed, dysphagia, unspecific arrhythmia, COPD, dementia, DM2, a-fib, DVT (on eliquis), HTN, osteomyelitis, PVD, h/o PNA, h/o gastric ulcer s/p GI bleed, PEG placement, p/w fever and coffee-ground emesis.      6.25: confused  6.26: no bleeding, no vomiting, pale, no distress  6.27: no distress, s/p 1u RBC      REVIEW OF SYSTEMS:  unable to obtain due to confusion        Vital Signs Last 24 Hrs  T(C): 36.9 (27 Jun 2019 11:48), Max: 36.9 (27 Jun 2019 11:48)  T(F): 98.5 (27 Jun 2019 11:48), Max: 98.5 (27 Jun 2019 11:48)  HR: 71 (27 Jun 2019 11:48) (71 - 86)  BP: 104/86 (27 Jun 2019 11:48) (104/86 - 131/64)  RR: 16 (27 Jun 2019 11:48) (16 - 17)  SpO2: 97% (27 Jun 2019 11:48) (96% - 100%)    PHYSICAL EXAM:    GENERAL: pale, weak appearing  HEENT:  NC/AT, EOMI, PERRLA, No scleral icterus, Moist mucous membranes  NECK: Supple, No JVD  CNS:  Alert & Oriented X0, Motor Strength 5/5 B/L upper and lower extremities; DTRs 2+ intact   LUNG: Normal Breath sounds, Clear to auscultation bilaterally, No rales, No rhonchi, No wheezing  HEART: RRR; No murmurs, No rubs  ABDOMEN: +BS, ST/ND/NT  GENITOURINARY: Voiding, Bladder not distended  EXTREMITIES:  2+ Peripheral Pulses, No clubbing, No cyanosis, No tibial edema  MUSCULOSKELTAL: Joints normal ROM, No TTP, No effusion  SKIN: no rashes  RECTAL: deferred, not indicated  BREAST: deferred                               9.7    x     )-----------( x        ( 27 Jun 2019 07:32 )             32.0       145  |  114<H>  |  12  ----------------------------<  89  3.7   |  24  |  0.51    Ca    7.6<L>      27 Jun 2019 07:32        MEDICATIONS  (STANDING):  acetaminophen  Suppository .. 325 milliGRAM(s) Rectal once  bisacodyl Suppository 10 milliGRAM(s) Rectal at bedtime  cefepime  Injectable. 1000 milliGRAM(s) IV Push every 12 hours  cholecalciferol 1000 Unit(s) Oral daily  ferrous    sulfate Liquid 300 milliGRAM(s) Enteral Tube three times a day with meals  insulin lispro (HumaLOG) corrective regimen sliding scale   SubCutaneous every 6 hours  polyethylene glycol 3350 17 Gram(s) Oral two times a day  sodium chloride 0.45%. 1000 milliLiter(s) (75 mL/Hr) IV Continuous <Continuous>        all labs reviewed  all imaging reviewed      CT Chest No Cont (06.24.19 @ 23:12) >  IMPRESSION:   *  Dependent bibasilar opacity consistent with atelectasis and/or   infiltrate. Aspiration is a consideration.  *  Right-sided obstructive uropathy with 1.3 cm stone at the right UPJ.  *  Calcification the region of the right UVJ.On is made of the presence   of bladder calculus. Calcified bladder mass such as transitional cell   carcinoma is not excluded.  *  Cholelithiasis and choledocholithiasis.  *  Dilated stool-filled rectum, raising a question of stercoral colitis.        Assessment and Plan:       1. Sepsis due to aspiration PNA, suspect GNR etiology   on Cefepime IV day#3  CT chest noted  f/u blood cx  -ID consult      2. Coffee-ground emesis w/ h/o GI ulcers  likely upper GI bleeding due to esophagitis   -Protonix po Bid  -GI consult noted: s/p EGD  -Hold anticoagulation for a-fib/DVT temporarily until cleared by GI    3. Anemia of acute blood loss:  -s/p 1 u RBC    4. DM2  -Humalog ISS    5. Rt obstructive uropathy:   asymptomatic  Cr levels normal, outpt f/u 79 y/o M with PMH of GI bleed, dysphagia, unspecific arrhythmia, COPD, dementia, DM2, a-fib, DVT (on eliquis), HTN, osteomyelitis, PVD, h/o PNA, h/o gastric ulcer s/p GI bleed, PEG placement, p/w fever and coffee-ground emesis.      6.25: confused  6.26: no bleeding, no vomiting, pale, no distress  6.27: no distress, s/p 1u RBC      REVIEW OF SYSTEMS:  unable to obtain due to confusion        Vital Signs Last 24 Hrs  T(C): 36.9 (27 Jun 2019 11:48), Max: 36.9 (27 Jun 2019 11:48)  T(F): 98.5 (27 Jun 2019 11:48), Max: 98.5 (27 Jun 2019 11:48)  HR: 71 (27 Jun 2019 11:48) (71 - 86)  BP: 104/86 (27 Jun 2019 11:48) (104/86 - 131/64)  RR: 16 (27 Jun 2019 11:48) (16 - 17)  SpO2: 97% (27 Jun 2019 11:48) (96% - 100%)    PHYSICAL EXAM:    GENERAL: pale, weak appearing  HEENT:  NC/AT, EOMI, PERRLA, No scleral icterus, Moist mucous membranes  NECK: Supple, No JVD  CNS:  Alert & Oriented X0, Motor Strength 5/5 B/L upper and lower extremities; DTRs 2+ intact   LUNG: Normal Breath sounds, Clear to auscultation bilaterally, No rales, No rhonchi, No wheezing  HEART: RRR; No murmurs, No rubs  ABDOMEN: +BS, ST/ND/NT, +PEG present  GENITOURINARY: Voiding, Bladder not distended  EXTREMITIES:  2+ Peripheral Pulses, No clubbing, No cyanosis, No tibial edema  MUSCULOSKELTAL: Joints normal ROM, No TTP, No effusion  SKIN: no rashes  RECTAL: deferred, not indicated  BREAST: deferred                               9.7    x     )-----------( x        ( 27 Jun 2019 07:32 )             32.0       145  |  114<H>  |  12  ----------------------------<  89  3.7   |  24  |  0.51    Ca    7.6<L>      27 Jun 2019 07:32        MEDICATIONS  (STANDING):  acetaminophen  Suppository .. 325 milliGRAM(s) Rectal once  bisacodyl Suppository 10 milliGRAM(s) Rectal at bedtime  cefepime  Injectable. 1000 milliGRAM(s) IV Push every 12 hours  cholecalciferol 1000 Unit(s) Oral daily  ferrous    sulfate Liquid 300 milliGRAM(s) Enteral Tube three times a day with meals  insulin lispro (HumaLOG) corrective regimen sliding scale   SubCutaneous every 6 hours  metoclopramide 5 milliGRAM(s) Oral two times a day  pantoprazole   Suspension 40 milliGRAM(s) Oral two times a day before meals  polyethylene glycol 3350 17 Gram(s) Oral two times a day    MEDICATIONS  (PRN):  dextrose 40% Gel 15 Gram(s) Oral once PRN Blood Glucose LESS THAN 70 milliGRAM(s)/deciliter  glucagon  Injectable 1 milliGRAM(s) IntraMuscular once PRN Glucose LESS THAN 70 milligrams/deciliter        all labs reviewed  all imaging reviewed      CT Chest No Cont (06.24.19 @ 23:12) >  IMPRESSION:   *  Dependent bibasilar opacity consistent with atelectasis and/or   infiltrate. Aspiration is a consideration.  *  Right-sided obstructive uropathy with 1.3 cm stone at the right UPJ.  *  Calcification the region of the right UVJ.On is made of the presence   of bladder calculus. Calcified bladder mass such as transitional cell   carcinoma is not excluded.  *  Cholelithiasis and choledocholithiasis.  *  Dilated stool-filled rectum, raising a question of stercoral colitis.        Assessment and Plan:       1. Sepsis due to aspiration PNA, suspect GNR etiology   on Cefepime IV day#3  CT chest noted  f/u blood cx  -ID consult      2. Coffee-ground emesis w/ h/o GI ulcers  likely upper GI bleeding due to esophagitis   -Protonix po Bid  -GI consult noted: s/p EGD  -Hold anticoagulation for a-fib/DVT temporarily until cleared by GI    3. Anemia of acute blood loss:  -s/p 1 u RBC    4. DM2  -Humalog ISS    5. Rt obstructive uropathy:   asymptomatic  Cr levels normal, outpt f/u

## 2019-06-27 NOTE — DIETITIAN INITIAL EVALUATION ADULT. - ENERGY NEEDS
Ht.66"        Wt. 59kg           BMI 20.9                 IBW   64.5#               Pt is at   91 %  IBW

## 2019-06-27 NOTE — DIETITIAN INITIAL EVALUATION ADULT. - ADD RECOMMEND
1. Increased goal rate of TF to Jevity 1.5 @ 50 mL/hr w/ 2 packets of prosource daily. 2. Consider free H20 flush 45 mL/hr to meet hydration needs. 3. Maintain aspiration precautions and keep HOB >45degrees 4. monitor for s/s intolerance 5. obtain new wt and monitor weekly to track/trend.

## 2019-06-27 NOTE — DIETITIAN INITIAL EVALUATION ADULT. - PERTINENT MEDS FT
MEDICATIONS  (STANDING):  acetaminophen  Suppository .. 325 milliGRAM(s) Rectal once  bisacodyl Suppository 10 milliGRAM(s) Rectal at bedtime  cefepime  Injectable. 1000 milliGRAM(s) IV Push every 12 hours  cholecalciferol 1000 Unit(s) Oral daily  dextrose 5%. 1000 milliLiter(s) (50 mL/Hr) IV Continuous <Continuous>  dextrose 50% Injectable 12.5 Gram(s) IV Push once  dextrose 50% Injectable 25 Gram(s) IV Push once  dextrose 50% Injectable 25 Gram(s) IV Push once  ferrous    sulfate Liquid 300 milliGRAM(s) Enteral Tube three times a day with meals  insulin lispro (HumaLOG) corrective regimen sliding scale   SubCutaneous every 6 hours  metoclopramide 5 milliGRAM(s) Oral two times a day  pantoprazole   Suspension 40 milliGRAM(s) Oral two times a day before meals  polyethylene glycol 3350 17 Gram(s) Oral two times a day    MEDICATIONS  (PRN):  dextrose 40% Gel 15 Gram(s) Oral once PRN Blood Glucose LESS THAN 70 milliGRAM(s)/deciliter  glucagon  Injectable 1 milliGRAM(s) IntraMuscular once PRN Glucose LESS THAN 70 milligrams/deciliter

## 2019-06-27 NOTE — DIETITIAN INITIAL EVALUATION ADULT. - PERTINENT LABORATORY DATA
06-27 Na145 mmol/L Glu 89 mg/dL K+ 3.7 mmol/L Cr  0.51 mg/dL BUN 12 mg/dL Phos n/a   Alb n/a   PAB n/a

## 2019-06-27 NOTE — DIETITIAN INITIAL EVALUATION ADULT. - PHYSICAL APPEARANCE
underweight/other (specify) NFPE significant for muscle wasting: severe: temporal, clavicle, deltoid; severe fat depletion: occipital, buccal, triceps, and ribs.

## 2019-06-27 NOTE — DIETITIAN INITIAL EVALUATION ADULT. - ENERGY INTAKE
Receiving tube feeding and tolerating @ goal rate/recommend increasing goal rate to 50 mL/hr and adding 2 packets of prosource daily

## 2019-06-28 ENCOUNTER — TRANSCRIPTION ENCOUNTER (OUTPATIENT)
Age: 79
End: 2019-06-28

## 2019-06-28 VITALS
RESPIRATION RATE: 16 BRPM | HEART RATE: 82 BPM | DIASTOLIC BLOOD PRESSURE: 65 MMHG | SYSTOLIC BLOOD PRESSURE: 102 MMHG | TEMPERATURE: 98 F | OXYGEN SATURATION: 99 %

## 2019-06-28 LAB
GLUCOSE BLDC GLUCOMTR-MCNC: 115 MG/DL — HIGH (ref 70–99)
GLUCOSE BLDC GLUCOMTR-MCNC: 97 MG/DL — SIGNIFICANT CHANGE UP (ref 70–99)

## 2019-06-28 RX ORDER — OMEPRAZOLE 10 MG/1
10 CAPSULE, DELAYED RELEASE ORAL
Qty: 0 | Refills: 0 | DISCHARGE

## 2019-06-28 RX ORDER — ACETAMINOPHEN 500 MG
1 TABLET ORAL
Qty: 0 | Refills: 0 | DISCHARGE

## 2019-06-28 RX ORDER — CEFUROXIME AXETIL 250 MG
1 TABLET ORAL
Qty: 14 | Refills: 0
Start: 2019-06-28

## 2019-06-28 RX ORDER — METOCLOPRAMIDE HCL 10 MG
1 TABLET ORAL
Qty: 0 | Refills: 0 | DISCHARGE
Start: 2019-06-28

## 2019-06-28 RX ORDER — PANTOPRAZOLE SODIUM 20 MG/1
40 TABLET, DELAYED RELEASE ORAL
Qty: 0 | Refills: 0 | DISCHARGE
Start: 2019-06-28

## 2019-06-28 RX ORDER — INSULIN LISPRO 100/ML
2 VIAL (ML) SUBCUTANEOUS
Qty: 0 | Refills: 0 | DISCHARGE
Start: 2019-06-28

## 2019-06-28 RX ADMIN — CEFEPIME 1000 MILLIGRAM(S): 1 INJECTION, POWDER, FOR SOLUTION INTRAMUSCULAR; INTRAVENOUS at 05:16

## 2019-06-28 RX ADMIN — Medication 5 MILLIGRAM(S): at 05:16

## 2019-06-28 RX ADMIN — Medication 300 MILLIGRAM(S): at 09:31

## 2019-06-28 RX ADMIN — Medication 300 MILLIGRAM(S): at 11:36

## 2019-06-28 RX ADMIN — PANTOPRAZOLE SODIUM 40 MILLIGRAM(S): 20 TABLET, DELAYED RELEASE ORAL at 05:22

## 2019-06-28 RX ADMIN — POLYETHYLENE GLYCOL 3350 17 GRAM(S): 17 POWDER, FOR SOLUTION ORAL at 05:16

## 2019-06-28 RX ADMIN — Medication 1000 UNIT(S): at 11:36

## 2019-06-28 NOTE — DISCHARGE NOTE PROVIDER - NSDCCPCAREPLAN_GEN_ALL_CORE_FT
PRINCIPAL DISCHARGE DIAGNOSIS  Diagnosis: PNA (pneumonia)  Assessment and Plan of Treatment:       SECONDARY DISCHARGE DIAGNOSES  Diagnosis: Coffee ground emesis  Assessment and Plan of Treatment:

## 2019-06-28 NOTE — PROGRESS NOTE ADULT - ASSESSMENT
79 y/o M with PMH of GI bleed, dysphagia, unspecific arrhythmia, COPD, dementia, DM2, a-fib, DVT (on eliquis), HTN, osteomyelitis, PVD, h/o PNA, h/o gastric ulcer s/p GI bleed, PEG placement, admitted on 6/24 from snf for evaluation of fever and coffee ground emesis; history per medical record as patient unable to provide history.   1. Patient admitted with pneumonia which most likely is aspiration pneumonia given emesis; also noted with leukocytosis most likely reactive to infection  - patient at risk for gram negative rods and other resistant bacteria   - oxygen and nebs as needed   - serial cbc and monitor temperature   - follow up cultures   - iv hydration and supportive care   - reviewed prior medical records to evaluate for resistant or atypical pathogens   - day #4 cefepime  - tolerating antibiotics without rashes or side effects   - cefepime will cover Providencia in urine as well  2. other issues; per medicine

## 2019-06-28 NOTE — PROGRESS NOTE ADULT - SUBJECTIVE AND OBJECTIVE BOX
77 y/o M with PMH of GI bleed, dysphagia, unspecific arrhythmia, COPD, dementia, DM2, a-fib, DVT (on eliquis), HTN, osteomyelitis, PVD, h/o PNA, h/o gastric ulcer s/p GI bleed, PEG placement, p/w fever and coffee-ground emesis.  -s/p 1u RBC    6.25: confused  6.26: no bleeding, no vomiting, pale, no distress  6.27: no distress, s/p 1u RBC  6.28: no distress, mentation at baseline, confused due to dementia       REVIEW OF SYSTEMS:  unable to obtain due to confusion        Vital Signs Last 24 Hrs  T(C): 36.9 (27 Jun 2019 11:48), Max: 36.9 (27 Jun 2019 11:48)  T(F): 98.5 (27 Jun 2019 11:48), Max: 98.5 (27 Jun 2019 11:48)  HR: 71 (27 Jun 2019 11:48) (71 - 86)  BP: 104/86 (27 Jun 2019 11:48) (104/86 - 131/64)  RR: 16 (27 Jun 2019 11:48) (16 - 17)  SpO2: 97% (27 Jun 2019 11:48) (96% - 100%)    PHYSICAL EXAM:    GENERAL: pale, weak appearing  HEENT:  NC/AT, EOMI, PERRLA, No scleral icterus, Moist mucous membranes  NECK: Supple, No JVD  CNS:  Alert & Oriented X0, Motor Strength 5/5 B/L upper and lower extremities; DTRs 2+ intact   LUNG: Normal Breath sounds, Clear to auscultation bilaterally, No rales, No rhonchi, No wheezing  HEART: RRR; No murmurs, No rubs  ABDOMEN: +BS, ST/ND/NT, +PEG present  GENITOURINARY: Voiding, Bladder not distended  EXTREMITIES:  2+ Peripheral Pulses, No clubbing, No cyanosis, No tibial edema  MUSCULOSKELTAL: Joints normal ROM, No TTP, No effusion  SKIN: no rashes  RECTAL: deferred, not indicated  BREAST: deferred                     Assessment and Plan:       1. Sepsis due to aspiration PNA, suspect GNR etiology   on Cefepime IV day#4, will change to Ceftin po bid x 7 days  CT chest noted        2. Coffee-ground emesis w/ h/o GI ulcers  likely upper GI bleeding due to esophagitis   -Protonix po Bid  -GI consult noted: s/p EGD  -Hold anticoagulation for a-fib/DVT temporarily until cleared by GI    3. Anemia of acute blood loss:  -s/p 1 u RBC    4. DM2  -Humalog ISS    5. Rt obstructive uropathy:   asymptomatic  Cr levels normal, outpt f/u    dc planning to rehab, time 39min

## 2019-06-28 NOTE — PROGRESS NOTE ADULT - SUBJECTIVE AND OBJECTIVE BOX
Patient is a 78y old  Male who presents with a chief complaint of coffee ground emesis + fever (25 Jun 2019 13:29)    Date of service: 06-28-19 @ 13:02      Patient lying in bed  afebrile            ROS unable to obtain secondary to patient medical condition     MEDICATIONS  (STANDING):  acetaminophen  Suppository .. 325 milliGRAM(s) Rectal once  cefepime  Injectable. 1000 milliGRAM(s) IV Push every 12 hours  cholecalciferol 1000 Unit(s) Oral daily  dextrose 5%. 1000 milliLiter(s) (50 mL/Hr) IV Continuous <Continuous>  dextrose 50% Injectable 12.5 Gram(s) IV Push once  dextrose 50% Injectable 25 Gram(s) IV Push once  dextrose 50% Injectable 25 Gram(s) IV Push once  ferrous    sulfate Liquid 300 milliGRAM(s) Enteral Tube three times a day with meals  insulin lispro (HumaLOG) corrective regimen sliding scale   SubCutaneous every 6 hours  metoclopramide 5 milliGRAM(s) Oral two times a day  pantoprazole   Suspension 40 milliGRAM(s) Oral two times a day before meals  polyethylene glycol 3350 17 Gram(s) Oral two times a day    MEDICATIONS  (PRN):  dextrose 40% Gel 15 Gram(s) Oral once PRN Blood Glucose LESS THAN 70 milliGRAM(s)/deciliter  glucagon  Injectable 1 milliGRAM(s) IntraMuscular once PRN Glucose LESS THAN 70 milligrams/deciliter      Vital Signs Last 24 Hrs  T(C): 36.7 (28 Jun 2019 11:16), Max: 36.8 (27 Jun 2019 20:20)  T(F): 98 (28 Jun 2019 11:16), Max: 98.2 (27 Jun 2019 20:20)  HR: 82 (28 Jun 2019 11:16) (69 - 82)  BP: 102/65 (28 Jun 2019 11:16) (102/65 - 127/66)  BP(mean): --  RR: 16 (28 Jun 2019 11:16) (16 - 16)  SpO2: 99% (28 Jun 2019 11:16) (99% - 100%)          PE:    Constitutional: frail looking  HEENT: NC/AT, EOMI, PERRLA, conjunctivae clear; ears and nose atraumatic; pharynx clear  Neck: supple; thyroid not palpable  Back: no tenderness  Respiratory: respiratory effort normal; scattered coarse breath sound  Cardiovascular: S1S2 regular, no murmurs  Abdomen: soft, not tender, not distended, positive BS; no liver or spleen organomegaly  Genitourinary: no suprapubic tenderness  Musculoskeletal: no muscle tenderness, no joint swelling or tenderness  Neurological/ Psychiatric:  moving all extremities  Skin: no rashes; no palpable lesions    Labs: all available labs reviewed                Labs:                        9.7    x     )-----------( x        ( 27 Jun 2019 07:32 )             32.0     06-27    145  |  114<H>  |  12  ----------------------------<  89  3.7   |  24  |  0.51    Ca    7.6<L>      27 Jun 2019 07:32             Cultures:       Culture - Blood (collected 06-26-19 @ 06:47)  Source: .Blood None  Preliminary Report (06-27-19 @ 13:01):    No growth to date.    Culture - Blood (collected 06-26-19 @ 06:47)  Source: .Blood None  Preliminary Report (06-27-19 @ 13:01):    No growth to date.    Culture - Urine (collected 06-24-19 @ 17:34)  Source: .Urine Catheterized  Final Report (06-26-19 @ 17:37):    >100,000 CFU/ml Providencia stuartii  Organism: Providencia stuartii (06-26-19 @ 17:37)  Organism: Providencia stuartii (06-26-19 @ 17:37)      -  Amikacin: S <=16      -  Ampicillin: R >16 These ampicillin results predict results for amoxicillin      -  Ampicillin/Sulbactam: I 16/8 Enterobacter, Citrobacter, and Serratia may develop resistance during prolonged therapy (3-4 days)      -  Aztreonam: S <=4      -  Cefazolin: R >16      -  Cefepime: S <=4      -  Cefoxitin: I 16      -  Ceftriaxone: S <=1 Enterobacter, Citrobacter, and Serratia may develop resistance during prolonged therapy      -  Ciprofloxacin: R >2      -  Ertapenem: S <=1      -  Imipenem: S <=1      -  Levofloxacin: R >4      -  Meropenem: S <=1      -  Nitrofurantoin: R >64 Should not be used to treat pyelonephritis      -  Piperacillin/Tazobactam: S <=16      -  Tigecycline: S <=2      -  Trimethoprim/Sulfamethoxazole: S <=2/38      Method Type: HOWIE    Culture - Blood (collected 06-24-19 @ 17:34)  Source: .Blood Blood-Peripheral  Preliminary Report (06-26-19 @ 01:02):    No growth to date.    Culture - Blood (collected 06-24-19 @ 17:34)  Source: .Blood Blood-Peripheral  Preliminary Report (06-26-19 @ 01:02):    No growth to date.            < from: CT Chest No Cont (06.24.19 @ 23:12) >    EXAM:  CT ABDOMEN AND PELVIS                          EXAM:  CT CHEST                            PROCEDURE DATE:  06/24/2019          INTERPRETATION:  CLINICAL INFORMATION: Sepsis. Cough. Urinary tract   infection.       COMPARISON: 4/22/2019    PROCEDURE:   CT of the Chest, Abdomen and Pelvis was performed without intravenous   contrast.   Intravenous contrast: None.  Oral contrast: None.  Sagittal and coronal reformats were performed.    FINDINGS:    CHEST:     LUNGS AND LARGE AIRWAYS: Patentcentral airways. No pulmonary nodules.   Patchy bibasilar opacity consistent with atelectasis and/or infiltrate.   Aspiration is a consideration. Small hiatal hernia.  PLEURA: No pleural effusion.  VESSELS: Within normal limits.  HEART: Heart size isnormal. No pericardial effusion.  MEDIASTINUM AND HOLLY: Nonspecific mediastinal adenopathy. For reference,   an enlarged precarinal lymph node measures 1.5 cm. (2:27)  CHEST WALL AND LOWER NECK: Within normal limits.    ABDOMEN AND PELVIS:    LIVER: Small calcified granuloma. Multiple liver hypodensities are   incompletely characterized, but likely benign etiology such as cyst or   hemangioma.  BILE DUCTS: Normal caliber. The is at the upper limits of normal in   caliber for the patient's age, measuring 8 mm. Calcification in the   distal CBD, consistent with choledocholithiasis.  GALLBLADDER: Cholelithiasis.  SPLEEN: Within normal limits. Small splenule.  PANCREAS: Within normal limits.  ADRENALS: Within normal limits.  KIDNEYS/URETERS: Moderate right hydroureteronephrosis to the level of a   1.3 cm stone at the right UPJ. Nonobstructing stone in the upper pole of   the right kidney measures 1.5 cm. Unenhanced left kidney is unremarkable.    BLADDER: Hyperdensity at the level of the right UVJ partially obscured by   streak artifact related to the left total hip arthroplasty measuring 1.1   x 0.6 cm. Findings may reflect the presence of bladder calculus.   Calcified bladder mass such as transitional cell carcinoma is not   excluded.  REPRODUCTIVE ORGANS: Enlarged prostate.    BOWEL: Gastrostomy tube terminates in the stomach. No bowel obstruction.   Appendix is not visualized. Dilatation of the rectum with mild thickening   of the rectal wall, raising a question of stercoral colitis.   Diverticulosis.  PERITONEUM: No ascites.  VESSELS:  Atherosclerotic calcifications. IVC filter.  RETROPERITONEUM: No lymphadenopathy.    ABDOMINAL WALL: Fat-containing left inguinal hernia.  BONES: Left total hip arthroplasty. Degenerative changes.    IMPRESSION:   *  Dependent bibasilar opacity consistent with atelectasis and/or   infiltrate. Aspiration is a consideration.  *  Right-sided obstructive uropathy with 1.3 cm stone at the right UPJ.  *  Calcification the region of the right UVJ.On is made of the presence   of bladder calculus. Calcified bladder mass such as transitional cell   carcinoma is not excluded.  *  Cholelithiasis and choledocholithiasis.  *  Dilated stool-filled rectum, raising a question of stercoral colitis.  .    < end of copied text >      Radiology: all available radiological tests reviewed    Advanced directives addressed: full resuscitation

## 2019-06-28 NOTE — DISCHARGE NOTE NURSING/CASE MANAGEMENT/SOCIAL WORK - NSDCPNINST_GEN_ALL_CORE
Return to ER or call MD if patient experiences fever greater than 101, vomiting of blood, or inability to tolerate tube feedings. Continue medications and feedings as prescribed.

## 2019-06-28 NOTE — PROGRESS NOTE ADULT - REASON FOR ADMISSION
coffee ground emesis + fever

## 2019-06-28 NOTE — DISCHARGE NOTE PROVIDER - HOSPITAL COURSE
77 y/o M with PMH of GI bleed, dysphagia, unspecific arrhythmia, COPD, dementia, DM2, a-fib, DVT (on eliquis), HTN, osteomyelitis, PVD, h/o PNA, h/o gastric ulcer s/p GI bleed, PEG placement, p/w fever and coffee-ground emesis.    -s/p 1u RBC        6.25: confused    6.26: no bleeding, no vomiting, pale, no distress    6.27: no distress, s/p 1u RBC    6.28: no distress, mentation at baseline, confused due to dementia             REVIEW OF SYSTEMS:    unable to obtain due to confusion                Vital Signs Last 24 Hrs    T(C): 36.9 (27 Jun 2019 11:48), Max: 36.9 (27 Jun 2019 11:48)    T(F): 98.5 (27 Jun 2019 11:48), Max: 98.5 (27 Jun 2019 11:48)    HR: 71 (27 Jun 2019 11:48) (71 - 86)    BP: 104/86 (27 Jun 2019 11:48) (104/86 - 131/64)    RR: 16 (27 Jun 2019 11:48) (16 - 17)    SpO2: 97% (27 Jun 2019 11:48) (96% - 100%)        PHYSICAL EXAM:        GENERAL: pale, weak appearing    HEENT:  NC/AT, EOMI, PERRLA, No scleral icterus, Moist mucous membranes    NECK: Supple, No JVD    CNS:  Alert & Oriented X0, Motor Strength 5/5 B/L upper and lower extremities; DTRs 2+ intact     LUNG: Normal Breath sounds, Clear to auscultation bilaterally, No rales, No rhonchi, No wheezing    HEART: RRR; No murmurs, No rubs    ABDOMEN: +BS, ST/ND/NT, +PEG present    GENITOURINARY: Voiding, Bladder not distended    EXTREMITIES:  2+ Peripheral Pulses, No clubbing, No cyanosis, No tibial edema    MUSCULOSKELTAL: Joints normal ROM, No TTP, No effusion    SKIN: no rashes    RECTAL: deferred, not indicated    BREAST: deferred           MEDICATIONS  (STANDING):    acetaminophen  Suppository .. 325 milliGRAM(s) Rectal once    cefepime  Injectable. 1000 milliGRAM(s) IV Push every 12 hours    cholecalciferol 1000 Unit(s) Oral daily    ferrous    sulfate Liquid 300 milliGRAM(s) Enteral Tube three times a day with meals    insulin lispro (HumaLOG) corrective regimen sliding scale   SubCutaneous every 6 hours    metoclopramide 5 milliGRAM(s) Oral two times a day    pantoprazole   Suspension 40 milliGRAM(s) Oral two times a day before meals    polyethylene glycol 3350 17 Gram(s) Oral two times a day                        Assessment and Plan:             1. Sepsis due to aspiration PNA, suspect GNR etiology     on Cefepime IV day#4, will change to Ceftin po bid x 7 days    CT chest noted                2. Coffee-ground emesis w/ h/o GI ulcers    likely upper GI bleeding due to esophagitis     -Protonix po Bid    -GI consult noted: s/p EGD        3. Anemia of acute blood loss:    -s/p 1 u RBC        4. DM2    -Humalog ISS        5. Rt obstructive uropathy:     asymptomatic    Cr levels normal, outpt f/u        6. Afib: ok to restart Apixaban        dc planning to rehab, time 39min

## 2019-06-28 NOTE — DISCHARGE NOTE NURSING/CASE MANAGEMENT/SOCIAL WORK - NSDCDPATPORTLINK_GEN_ALL_CORE
You can access the [facility] Portal, offered by [facility], by registering with the following website: [email or web address]

## 2019-06-30 LAB
CULTURE RESULTS: SIGNIFICANT CHANGE UP
CULTURE RESULTS: SIGNIFICANT CHANGE UP
SPECIMEN SOURCE: SIGNIFICANT CHANGE UP
SPECIMEN SOURCE: SIGNIFICANT CHANGE UP

## 2019-07-01 LAB
CULTURE RESULTS: SIGNIFICANT CHANGE UP
CULTURE RESULTS: SIGNIFICANT CHANGE UP
SPECIMEN SOURCE: SIGNIFICANT CHANGE UP
SPECIMEN SOURCE: SIGNIFICANT CHANGE UP
SURGICAL PATHOLOGY STUDY: SIGNIFICANT CHANGE UP

## 2019-07-03 DIAGNOSIS — E43 UNSPECIFIED SEVERE PROTEIN-CALORIE MALNUTRITION: ICD-10-CM

## 2019-07-03 DIAGNOSIS — E11.51 TYPE 2 DIABETES MELLITUS WITH DIABETIC PERIPHERAL ANGIOPATHY WITHOUT GANGRENE: ICD-10-CM

## 2019-07-03 DIAGNOSIS — I48.91 UNSPECIFIED ATRIAL FIBRILLATION: ICD-10-CM

## 2019-07-03 DIAGNOSIS — A41.50 GRAM-NEGATIVE SEPSIS, UNSPECIFIED: ICD-10-CM

## 2019-07-03 DIAGNOSIS — K92.0 HEMATEMESIS: ICD-10-CM

## 2019-07-03 DIAGNOSIS — K20.9 ESOPHAGITIS, UNSPECIFIED: ICD-10-CM

## 2019-07-03 DIAGNOSIS — E87.3 ALKALOSIS: ICD-10-CM

## 2019-07-03 DIAGNOSIS — J44.0 CHRONIC OBSTRUCTIVE PULMONARY DISEASE WITH (ACUTE) LOWER RESPIRATORY INFECTION: ICD-10-CM

## 2019-07-03 DIAGNOSIS — J69.0 PNEUMONITIS DUE TO INHALATION OF FOOD AND VOMIT: ICD-10-CM

## 2019-07-03 DIAGNOSIS — K80.50 CALCULUS OF BILE DUCT WITHOUT CHOLANGITIS OR CHOLECYSTITIS WITHOUT OBSTRUCTION: ICD-10-CM

## 2019-07-03 DIAGNOSIS — M19.90 UNSPECIFIED OSTEOARTHRITIS, UNSPECIFIED SITE: ICD-10-CM

## 2019-07-03 DIAGNOSIS — N20.0 CALCULUS OF KIDNEY: ICD-10-CM

## 2019-07-03 DIAGNOSIS — Z93.1 GASTROSTOMY STATUS: ICD-10-CM

## 2019-07-03 DIAGNOSIS — A41.9 SEPSIS, UNSPECIFIED ORGANISM: ICD-10-CM

## 2019-07-03 DIAGNOSIS — I10 ESSENTIAL (PRIMARY) HYPERTENSION: ICD-10-CM

## 2019-07-03 DIAGNOSIS — Z86.718 PERSONAL HISTORY OF OTHER VENOUS THROMBOSIS AND EMBOLISM: ICD-10-CM

## 2019-07-03 DIAGNOSIS — F03.90 UNSPECIFIED DEMENTIA WITHOUT BEHAVIORAL DISTURBANCE: ICD-10-CM

## 2019-07-03 DIAGNOSIS — N39.0 URINARY TRACT INFECTION, SITE NOT SPECIFIED: ICD-10-CM

## 2019-07-03 DIAGNOSIS — D62 ACUTE POSTHEMORRHAGIC ANEMIA: ICD-10-CM

## 2019-07-03 DIAGNOSIS — E86.0 DEHYDRATION: ICD-10-CM

## 2019-07-28 NOTE — PATIENT PROFILE ADULT. - URINARY CATHETER
0800 spoke with MD Mendez concerning consult, MD will activate surgical team per house supervisor. Family notified.  0900 Called and spoke with MD Lofton informed of cardiology consult.   1137 pt arrived to ICU from surgery. On ventilator, anesthesia at bedside, report received. RT at bedside to set up vent. New orders received from MD Garcia. Called and spoke with MD Mathews concerning pt arrival post surgery, new orders placed. Call placed to pulm MD for new consult. Family updated.  1335 Called and updated MD mathews on lab results. MD will assess. Informed MD pt's bp labile, new orders received to start precedex. Propofol currently off pt agitated, titrating up on levophed to maintain map >65.   1500 Called and spoke with MD Mathews concerning pt with agitation intermittently, MD will place orders for sedation.  1624 called md mendez to inform of pt's order for lovenox, informed of red/pink colored urine post procedure, MD recommends lovenox start date tomorrow.  called and informed MD Mathews, informed of pt with pink/red tinged urine post stent placement, new orders received to start lovenox tomorrow.    no

## 2019-08-21 NOTE — ED ADULT NURSE REASSESSMENT NOTE - NURSING NEURO ORIENTATION
Pt fell down two steps. Unwitnessed. Pt with laceration to back of head. Pt vomiting. C collar in place. Pt takes baby ASA. disoriented to place/AxOx1/disoriented to time/situation

## 2019-11-18 ENCOUNTER — EMERGENCY (EMERGENCY)
Facility: HOSPITAL | Age: 79
LOS: 0 days | Discharge: HOME | End: 2019-11-18
Attending: EMERGENCY MEDICINE
Payer: MEDICARE

## 2019-11-18 VITALS
SYSTOLIC BLOOD PRESSURE: 145 MMHG | TEMPERATURE: 98 F | RESPIRATION RATE: 16 BRPM | OXYGEN SATURATION: 95 % | HEART RATE: 82 BPM | DIASTOLIC BLOOD PRESSURE: 88 MMHG

## 2019-11-18 VITALS
SYSTOLIC BLOOD PRESSURE: 135 MMHG | OXYGEN SATURATION: 95 % | HEART RATE: 84 BPM | HEIGHT: 66 IN | TEMPERATURE: 98 F | RESPIRATION RATE: 16 BRPM | WEIGHT: 110.01 LBS | DIASTOLIC BLOOD PRESSURE: 91 MMHG

## 2019-11-18 DIAGNOSIS — K94.23 GASTROSTOMY MALFUNCTION: ICD-10-CM

## 2019-11-18 DIAGNOSIS — F03.90 UNSPECIFIED DEMENTIA WITHOUT BEHAVIORAL DISTURBANCE: ICD-10-CM

## 2019-11-18 DIAGNOSIS — J44.9 CHRONIC OBSTRUCTIVE PULMONARY DISEASE, UNSPECIFIED: ICD-10-CM

## 2019-11-18 DIAGNOSIS — Z79.4 LONG TERM (CURRENT) USE OF INSULIN: ICD-10-CM

## 2019-11-18 DIAGNOSIS — I10 ESSENTIAL (PRIMARY) HYPERTENSION: ICD-10-CM

## 2019-11-18 DIAGNOSIS — Z79.899 OTHER LONG TERM (CURRENT) DRUG THERAPY: ICD-10-CM

## 2019-11-18 DIAGNOSIS — E11.9 TYPE 2 DIABETES MELLITUS WITHOUT COMPLICATIONS: ICD-10-CM

## 2019-11-18 PROCEDURE — 99283 EMERGENCY DEPT VISIT LOW MDM: CPT

## 2019-11-18 PROCEDURE — 43762 RPLC GTUBE NO REVJ TRC: CPT

## 2019-11-18 PROCEDURE — 74241: CPT | Mod: 26

## 2019-11-18 PROCEDURE — 99285 EMERGENCY DEPT VISIT HI MDM: CPT | Mod: 25

## 2019-11-18 PROCEDURE — 74241: CPT

## 2019-11-18 NOTE — ED ADULT NURSE NOTE - NSIMPLEMENTINTERV_GEN_ALL_ED
Implemented All Fall Risk Interventions:  [place] to call system. Call bell, personal items and telephone within reach. Instruct patient to call for assistance. Room bathroom lighting operational. Non-slip footwear when patient is off stretcher. Physically safe environment: no spills, clutter or unnecessary equipment. Stretcher in lowest position, wheels locked, appropriate side rails in place. Provide visual cue, wrist band, yellow gown, etc. Monitor gait and stability. Monitor for mental status changes and reorient to person, place, and time. Review medications for side effects contributing to fall risk. Reinforce activity limits and safety measures with patient and family.

## 2019-11-18 NOTE — ED PROVIDER NOTE - GASTROINTESTINAL, MLM
Abdomen soft, non-tender, no guarding. g tube in place without with clean dressing, no areas of d/c or erythema. Decreased BS.

## 2019-11-18 NOTE — ED PROVIDER NOTE - ATTENDING CONTRIBUTION TO CARE
I, Lewis Oconnell MD, personally saw the patient with the PA, and completed the key components of the history and physical exam. I then discussed the management plan with the PA.

## 2019-11-18 NOTE — ED PROVIDER NOTE - PATIENT PORTAL LINK FT
You can access the FollowMyHealth Patient Portal offered by [facility] by registering at the following website: [email or web address]. By joining [employer]’s FollowMyHealth portal, you will also be able to view your health information using other applications (apps) compatible with our system.

## 2019-11-18 NOTE — ED PROVIDER NOTE - OBJECTIVE STATEMENT
80 yo male with a PMH of copd, anemia, htn, g tube placement, pvd, dementia (nonverbal), was sent from [facility] nursing and rehab presents with G tube malfunctioning. Spoke with Nursing Supervisor at [facility] and she states the G tube was not working since last night and attempted to try again this mornign without resolution. Pt unable to give a god history due to his condition and nonverbal.

## 2019-11-18 NOTE — ED PROVIDER NOTE - PROGRESS NOTE DETAILS
Ana CRAIN for ED attending, Dr. Oconnell: 80 y/o male with a PMHx of COPD, anemia, HTN, G-tube placement, PVD, dementia (nonverbal) presents to the ED sent from [facility] and rehab regarding G-tube malfunctioning. G-tube has not been working since last night. Hx not obtainable, pt has dementia and is nonverbal at baseline. On physical exam: elderly white male, alert, awake, no acute distress. GI: G-tube in place, soft and NT abdomen. ENT: oropharynx clear, mucous membranes mildly dry.

## 2019-11-18 NOTE — ED PROVIDER NOTE - NSFOLLOWUPINSTRUCTIONS_ED_ALL_ED_FT
G-Tube replaced & Gastrograffin XR study confirms it to be in proper position.  New feeding tube is cleared to be used for feedings & medications as needed.  Continue regular medications as per routine.  Follow up with own doctor this week.  Dressing changes to feeding tube daily.      ED evaluation and management discussed with the patient and family (if available) in detail.  Close PMD follow up encouraged.  Strict ED return instructions discussed in detail and patient given the opportunity to ask any questions about their discharge diagnosis and instructions. Patient verbalized understanding.

## 2019-11-18 NOTE — ED PROVIDER NOTE - CLINICAL SUMMARY MEDICAL DECISION MAKING FREE TEXT BOX
79 male was BIBA for G tube malfunction. RN attempted to aspirate and flush the G tube without success. WIll replace the G tube. -Phoenix Hawkins PA-C

## 2019-11-20 ENCOUNTER — EMERGENCY (EMERGENCY)
Facility: HOSPITAL | Age: 79
LOS: 0 days | Discharge: NURSING, REHAB OR LONG-TERM CARE | End: 2019-11-21
Attending: FAMILY MEDICINE
Payer: MEDICARE

## 2019-11-20 VITALS
HEART RATE: 76 BPM | HEIGHT: 68 IN | SYSTOLIC BLOOD PRESSURE: 106 MMHG | DIASTOLIC BLOOD PRESSURE: 71 MMHG | WEIGHT: 184.97 LBS | OXYGEN SATURATION: 96 % | RESPIRATION RATE: 20 BRPM

## 2019-11-20 DIAGNOSIS — K31.84 GASTROPARESIS: ICD-10-CM

## 2019-11-20 DIAGNOSIS — K94.23 GASTROSTOMY MALFUNCTION: ICD-10-CM

## 2019-11-20 DIAGNOSIS — E11.43 TYPE 2 DIABETES MELLITUS WITH DIABETIC AUTONOMIC (POLY)NEUROPATHY: ICD-10-CM

## 2019-11-20 DIAGNOSIS — R13.10 DYSPHAGIA, UNSPECIFIED: ICD-10-CM

## 2019-11-20 DIAGNOSIS — Z79.4 LONG TERM (CURRENT) USE OF INSULIN: ICD-10-CM

## 2019-11-20 DIAGNOSIS — F03.90 UNSPECIFIED DEMENTIA WITHOUT BEHAVIORAL DISTURBANCE: ICD-10-CM

## 2019-11-20 PROCEDURE — 43762 RPLC GTUBE NO REVJ TRC: CPT

## 2019-11-20 PROCEDURE — 99285 EMERGENCY DEPT VISIT HI MDM: CPT

## 2019-11-20 PROCEDURE — 74241: CPT

## 2019-11-20 PROCEDURE — 99285 EMERGENCY DEPT VISIT HI MDM: CPT | Mod: 25

## 2019-11-21 VITALS
RESPIRATION RATE: 20 BRPM | TEMPERATURE: 98 F | SYSTOLIC BLOOD PRESSURE: 133 MMHG | HEART RATE: 75 BPM | DIASTOLIC BLOOD PRESSURE: 84 MMHG | OXYGEN SATURATION: 93 %

## 2019-11-21 PROCEDURE — 74241: CPT | Mod: 26

## 2019-11-21 NOTE — ED PROVIDER NOTE - CARE PROVIDER_API CALL
Nura Nicole (MD)  Gastroenterology; Internal Medicine  [address] [facility], [place] [address]  Phone: [phone]  Fax: [phone]  Follow Up Time:

## 2019-11-21 NOTE — ED ADULT NURSE NOTE - OBJECTIVE STATEMENT
Pt BIBA from [facility] for pulling out his G-tube. hx of dementia, patient not alert and oriented to person, place, time or situation. patient a poor historian d/t baseline dementia. patient came in to  ED with 20 Haitian espinoza catheter in the place of G-tube.

## 2019-11-21 NOTE — ED PROVIDER NOTE - NSFOLLOWUPINSTRUCTIONS_ED_ALL_ED_FT
Jennifer instructions on gastric tube literature. keep patient from grabbing tube as best as possible.

## 2019-11-21 NOTE — ED PROVIDER NOTE - PATIENT PORTAL LINK FT
You can access the FollowMyHealth Patient Portal offered by [facility] by registering at the following website: [email or web address]. By joining [employer]’s FollowMyHealth portal, you will also be able to view your health information using other applications (apps) compatible with our system.

## 2019-11-21 NOTE — ED PROVIDER NOTE - OBJECTIVE STATEMENT
pt is an 78 yo wm with hx of dementia, gtube, gastroparesisi, afib, uti copd,htn, pvd sepsis, dysphagia, left hip wound, [facility],who pulled gtube out tonight.Pt is full code. Pt unable to relate any history. Unknown what time tube came out. Nonsmoker nondrinker no drugs. Pt has espinoza catheter in ostomy site.

## 2019-11-21 NOTE — ED PROCEDURE NOTE - GENERAL PROCEDURE DETAILS
gostrostomy cleansed with betadine, espinoza deflated and removed, 20 f gastrostomy tube inserted 9cc sterile water to balloon.

## 2019-12-05 ENCOUNTER — INPATIENT (INPATIENT)
Facility: HOSPITAL | Age: 79
LOS: 4 days | Discharge: NURSING, REHAB OR LONG-TERM CARE | DRG: 391 | End: 2019-12-10
Attending: HOSPITALIST | Admitting: HOSPITALIST
Payer: MEDICARE

## 2019-12-05 VITALS
RESPIRATION RATE: 18 BRPM | DIASTOLIC BLOOD PRESSURE: 82 MMHG | HEART RATE: 92 BPM | TEMPERATURE: 98 F | SYSTOLIC BLOOD PRESSURE: 132 MMHG | HEIGHT: 64 IN | OXYGEN SATURATION: 94 % | WEIGHT: 134.92 LBS

## 2019-12-05 DIAGNOSIS — E11.69 TYPE 2 DIABETES MELLITUS WITH OTHER SPECIFIED COMPLICATION: ICD-10-CM

## 2019-12-05 DIAGNOSIS — E87.2 ACIDOSIS: ICD-10-CM

## 2019-12-05 DIAGNOSIS — E87.0 HYPEROSMOLALITY AND HYPERNATREMIA: ICD-10-CM

## 2019-12-05 DIAGNOSIS — K92.2 GASTROINTESTINAL HEMORRHAGE, UNSPECIFIED: ICD-10-CM

## 2019-12-05 DIAGNOSIS — M86.28 SUBACUTE OSTEOMYELITIS, OTHER SITE: ICD-10-CM

## 2019-12-05 LAB
ALBUMIN SERPL ELPH-MCNC: 2 G/DL — LOW (ref 3.3–5)
ALP SERPL-CCNC: 81 U/L — SIGNIFICANT CHANGE UP (ref 40–120)
ALT FLD-CCNC: 24 U/L — SIGNIFICANT CHANGE UP (ref 12–78)
ANION GAP SERPL CALC-SCNC: 4 MMOL/L — LOW (ref 5–17)
APTT BLD: 34.5 SEC — SIGNIFICANT CHANGE UP (ref 27.5–36.3)
AST SERPL-CCNC: 18 U/L — SIGNIFICANT CHANGE UP (ref 15–37)
BASOPHILS # BLD AUTO: 0.09 K/UL — SIGNIFICANT CHANGE UP (ref 0–0.2)
BASOPHILS NFR BLD AUTO: 0.4 % — SIGNIFICANT CHANGE UP (ref 0–2)
BILIRUB SERPL-MCNC: 0.2 MG/DL — SIGNIFICANT CHANGE UP (ref 0.2–1.2)
BUN SERPL-MCNC: 49 MG/DL — HIGH (ref 7–23)
CALCIUM SERPL-MCNC: 8.1 MG/DL — LOW (ref 8.5–10.1)
CHLORIDE SERPL-SCNC: 107 MMOL/L — SIGNIFICANT CHANGE UP (ref 96–108)
CO2 SERPL-SCNC: 27 MMOL/L — SIGNIFICANT CHANGE UP (ref 22–31)
CREAT SERPL-MCNC: 0.75 MG/DL — SIGNIFICANT CHANGE UP (ref 0.5–1.3)
EOSINOPHIL # BLD AUTO: 0.26 K/UL — SIGNIFICANT CHANGE UP (ref 0–0.5)
EOSINOPHIL NFR BLD AUTO: 1.3 % — SIGNIFICANT CHANGE UP (ref 0–6)
GLUCOSE SERPL-MCNC: 162 MG/DL — HIGH (ref 70–99)
HCT VFR BLD CALC: 29.7 % — LOW (ref 39–50)
HGB BLD-MCNC: 8.6 G/DL — LOW (ref 13–17)
HGB BLD-MCNC: 9.1 G/DL — LOW (ref 13–17)
IMM GRANULOCYTES NFR BLD AUTO: 0.8 % — SIGNIFICANT CHANGE UP (ref 0–1.5)
INR BLD: 2 RATIO — HIGH (ref 0.88–1.16)
LACTATE SERPL-SCNC: 1 MMOL/L — SIGNIFICANT CHANGE UP (ref 0.7–2)
LACTATE SERPL-SCNC: 4.3 MMOL/L — CRITICAL HIGH (ref 0.7–2)
LYMPHOCYTES # BLD AUTO: 11.6 % — LOW (ref 13–44)
LYMPHOCYTES # BLD AUTO: 2.39 K/UL — SIGNIFICANT CHANGE UP (ref 1–3.3)
MCHC RBC-ENTMCNC: 22.9 PG — LOW (ref 27–34)
MCHC RBC-ENTMCNC: 29 GM/DL — LOW (ref 32–36)
MCV RBC AUTO: 79 FL — LOW (ref 80–100)
MONOCYTES # BLD AUTO: 1.22 K/UL — HIGH (ref 0–0.9)
MONOCYTES NFR BLD AUTO: 5.9 % — SIGNIFICANT CHANGE UP (ref 2–14)
NEUTROPHILS # BLD AUTO: 16.47 K/UL — HIGH (ref 1.8–7.4)
NEUTROPHILS NFR BLD AUTO: 80 % — HIGH (ref 43–77)
PLATELET # BLD AUTO: 334 K/UL — SIGNIFICANT CHANGE UP (ref 150–400)
POTASSIUM SERPL-MCNC: 5 MMOL/L — SIGNIFICANT CHANGE UP (ref 3.5–5.3)
POTASSIUM SERPL-SCNC: 5 MMOL/L — SIGNIFICANT CHANGE UP (ref 3.5–5.3)
PROT SERPL-MCNC: 6.1 GM/DL — SIGNIFICANT CHANGE UP (ref 6–8.3)
PROTHROM AB SERPL-ACNC: 22.7 SEC — HIGH (ref 10–12.9)
RBC # BLD: 3.76 M/UL — LOW (ref 4.2–5.8)
RBC # FLD: 21.2 % — HIGH (ref 10.3–14.5)
SODIUM SERPL-SCNC: 138 MMOL/L — SIGNIFICANT CHANGE UP (ref 135–145)
WBC # BLD: 20.59 K/UL — HIGH (ref 3.8–10.5)
WBC # FLD AUTO: 20.59 K/UL — HIGH (ref 3.8–10.5)

## 2019-12-05 PROCEDURE — 86923 COMPATIBILITY TEST ELECTRIC: CPT

## 2019-12-05 PROCEDURE — 85018 HEMOGLOBIN: CPT

## 2019-12-05 PROCEDURE — 36415 COLL VENOUS BLD VENIPUNCTURE: CPT

## 2019-12-05 PROCEDURE — 85014 HEMATOCRIT: CPT

## 2019-12-05 PROCEDURE — 85730 THROMBOPLASTIN TIME PARTIAL: CPT

## 2019-12-05 PROCEDURE — 71045 X-RAY EXAM CHEST 1 VIEW: CPT | Mod: 26

## 2019-12-05 PROCEDURE — 85610 PROTHROMBIN TIME: CPT

## 2019-12-05 PROCEDURE — 80048 BASIC METABOLIC PNL TOTAL CA: CPT

## 2019-12-05 PROCEDURE — 85027 COMPLETE CBC AUTOMATED: CPT

## 2019-12-05 PROCEDURE — 99223 1ST HOSP IP/OBS HIGH 75: CPT

## 2019-12-05 PROCEDURE — 83605 ASSAY OF LACTIC ACID: CPT

## 2019-12-05 PROCEDURE — C9113: CPT

## 2019-12-05 PROCEDURE — 82962 GLUCOSE BLOOD TEST: CPT

## 2019-12-05 PROCEDURE — P9016: CPT

## 2019-12-05 PROCEDURE — 93010 ELECTROCARDIOGRAM REPORT: CPT

## 2019-12-05 PROCEDURE — 93970 EXTREMITY STUDY: CPT

## 2019-12-05 PROCEDURE — 36430 TRANSFUSION BLD/BLD COMPNT: CPT

## 2019-12-05 RX ORDER — PANTOPRAZOLE SODIUM 20 MG/1
80 TABLET, DELAYED RELEASE ORAL ONCE
Refills: 0 | Status: COMPLETED | OUTPATIENT
Start: 2019-12-05 | End: 2019-12-05

## 2019-12-05 RX ORDER — ONDANSETRON 8 MG/1
4 TABLET, FILM COATED ORAL EVERY 6 HOURS
Refills: 0 | Status: DISCONTINUED | OUTPATIENT
Start: 2019-12-05 | End: 2019-12-10

## 2019-12-05 RX ORDER — SODIUM CHLORIDE 9 MG/ML
1000 INJECTION INTRAMUSCULAR; INTRAVENOUS; SUBCUTANEOUS ONCE
Refills: 0 | Status: COMPLETED | OUTPATIENT
Start: 2019-12-05 | End: 2019-12-05

## 2019-12-05 RX ORDER — PROTHROMBIN COMPLEX CONCENTRATE (HUMAN) 25.5; 16.5; 24; 22; 22; 26 [IU]/ML; [IU]/ML; [IU]/ML; [IU]/ML; [IU]/ML; [IU]/ML
2000 POWDER, FOR SOLUTION INTRAVENOUS ONCE
Refills: 0 | Status: COMPLETED | OUTPATIENT
Start: 2019-12-05 | End: 2019-12-05

## 2019-12-05 RX ORDER — SODIUM CHLORIDE 9 MG/ML
1000 INJECTION INTRAMUSCULAR; INTRAVENOUS; SUBCUTANEOUS
Refills: 0 | Status: DISCONTINUED | OUTPATIENT
Start: 2019-12-05 | End: 2019-12-07

## 2019-12-05 RX ORDER — METOCLOPRAMIDE HCL 10 MG
5 TABLET ORAL EVERY 6 HOURS
Refills: 0 | Status: DISCONTINUED | OUTPATIENT
Start: 2019-12-05 | End: 2019-12-06

## 2019-12-05 RX ORDER — CHOLECALCIFEROL (VITAMIN D3) 125 MCG
2 CAPSULE ORAL
Qty: 0 | Refills: 0 | DISCHARGE

## 2019-12-05 RX ORDER — PANTOPRAZOLE SODIUM 20 MG/1
8 TABLET, DELAYED RELEASE ORAL
Qty: 80 | Refills: 0 | Status: DISCONTINUED | OUTPATIENT
Start: 2019-12-05 | End: 2019-12-06

## 2019-12-05 RX ADMIN — PROTHROMBIN COMPLEX CONCENTRATE (HUMAN) 2000 INTERNATIONAL UNIT(S): 25.5; 16.5; 24; 22; 22; 26 POWDER, FOR SOLUTION INTRAVENOUS at 13:44

## 2019-12-05 RX ADMIN — PANTOPRAZOLE SODIUM 10 MG/HR: 20 TABLET, DELAYED RELEASE ORAL at 22:51

## 2019-12-05 RX ADMIN — PANTOPRAZOLE SODIUM 10 MG/HR: 20 TABLET, DELAYED RELEASE ORAL at 13:34

## 2019-12-05 RX ADMIN — SODIUM CHLORIDE 1000 MILLILITER(S): 9 INJECTION INTRAMUSCULAR; INTRAVENOUS; SUBCUTANEOUS at 13:25

## 2019-12-05 RX ADMIN — SODIUM CHLORIDE 75 MILLILITER(S): 9 INJECTION INTRAMUSCULAR; INTRAVENOUS; SUBCUTANEOUS at 18:20

## 2019-12-05 RX ADMIN — PANTOPRAZOLE SODIUM 80 MILLIGRAM(S): 20 TABLET, DELAYED RELEASE ORAL at 13:25

## 2019-12-05 RX ADMIN — PROTHROMBIN COMPLEX CONCENTRATE (HUMAN) 400 INTERNATIONAL UNIT(S): 25.5; 16.5; 24; 22; 22; 26 POWDER, FOR SOLUTION INTRAVENOUS at 13:32

## 2019-12-05 RX ADMIN — SODIUM CHLORIDE 1000 MILLILITER(S): 9 INJECTION INTRAMUSCULAR; INTRAVENOUS; SUBCUTANEOUS at 14:26

## 2019-12-05 NOTE — PROGRESS NOTE ADULT - SUBJECTIVE AND OBJECTIVE BOX
pt d/w Dr Orozco (ED MD) in initially approx 13:00 - plan for Kcentra and PRBC transfusion and f/u after completion to assess hemodynamics and need for critical care intervention.    d/w Dr Yoder as well - plan for endoscopy 12/6.    Came back to see pt in ED at approx. 16:45 - No further evidence of bleeding and hemodynamics improved post-transfusion.    D/w Dr Magallanes fo the hospitalist service - pt already has been admitted by Dr Cho as noted.    Per records and d/w staff, pt is 80 y/o M admitted 12/5 - PMHx of Anemia, COPD, Dementia, DM, DVT on Apixaban, HTN, OA, PVD, LLE OM on doxycycline,  who was sent to ED from [facility] for upper GI bleeding.   Initially was tachycardic and hypotensive - treated with IVF, Kcentra, PRBC as noted.    Pt offers no specific complaints and is not particularly interactive.    Past Medical History:  Anemia    COPD (chronic obstructive pulmonary disease)    Dementia    DM (diabetes mellitus)    DVT (deep venous thrombosis)    HTN (hypertension)    Osteoarthritis    Osteomyelitis of left leg    PVD (peripheral vascular disease).    Past Surgical History:  s/p PEG    Family History:   unable to obtain due to dementia    Social History:  lives in [facility]        Allergies    No Known Allergies    Height (cm): 162.56 (12-05 @ 12:02)  Weight (kg): 64 (12-05 @ 19:03)  BMI (kg/m2): 24.2 (12-05 @ 19:03)    ICU Vital Signs Last 24 Hrs  T(C): 36.3 (05 Dec 2019 19:03), Max: 37.2 (05 Dec 2019 13:35)  T(F): 97.3 (05 Dec 2019 19:03), Max: 98.9 (05 Dec 2019 13:35)  HR: 87 (05 Dec 2019 19:03) (79 - 110)  BP: 132/54 (05 Dec 2019 19:03) (96/53 - 132/82)  RR: 18 (05 Dec 2019 19:03) (18 - 20)  SpO2: 100% (05 Dec 2019 19:03) (94% - 100%)          I&O's Summary    05 Dec 2019 07:01  -  05 Dec 2019 19:35  --------------------------------------------------------  IN: 277 mL / OUT: 0 mL / NET: 277 mL                              8.6    20.59 )-----------( 334      ( 05 Dec 2019 12:40 )             29.7       12-05    138  |  107  |  49<H>  ----------------------------<  162<H>  5.0   |  27  |  0.75    Ca    8.1<L>      05 Dec 2019 12:40    TPro  6.1  /  Alb  2.0<L>  /  TBili  0.2  /  DBili  x   /  AST  18  /  ALT  24  /  AlkPhos  81  12-05      LIVER FUNCTIONS - ( 05 Dec 2019 12:40 )  Alb: 2.0 g/dL / Pro: 6.1 gm/dL / ALK PHOS: 81 U/L / ALT: 24 U/L / AST: 18 U/L / GGT: x             PT/INR - ( 05 Dec 2019 12:40 )   PT: 22.7 sec;   INR: 2.00 ratio         PTT - ( 05 Dec 2019 12:40 )  PTT:34.5 sec      MEDICATIONS  (STANDING):  doxycycline hyclate Capsule 100 milliGRAM(s) Oral every 12 hours  metoclopramide 5 milliGRAM(s) Oral every 6 hours  pantoprazole Infusion 8 mG/Hr (10 mL/Hr) IV Continuous <Continuous>  sodium chloride 0.9%. 1000 milliLiter(s) (75 mL/Hr) IV Continuous <Continuous>    MEDICATIONS  (PRN):  aluminum hydroxide/magnesium hydroxide/simethicone Suspension 30 milliLiter(s) Oral every 4 hours PRN Dyspepsia  ondansetron Injectable 4 milliGRAM(s) IV Push every 6 hours PRN Nausea          DVT Prophylaxis: Chemoprophylaxis contraindicated due to GI hemorrhage.    Visit Information: Consultation Dr Contreras (ED MD)    ** Time is exclusive of billed procedures and/or teaching and/or routine family updates.

## 2019-12-05 NOTE — ED PROVIDER NOTE - NEUROLOGICAL, MLM
Alert and oriented, no focal deficits, no motor or sensory deficits. Not oriented to person, place or time, no focal deficits, no motor or sensory deficits.

## 2019-12-05 NOTE — ED PROVIDER NOTE - CONSTITUTIONAL, MLM
normal... Awake, alert, not oriented to person, place, time/situation. Awake, not oriented to person, place, time/situation.

## 2019-12-05 NOTE — ED PROVIDER NOTE - GASTROINTESTINAL, MLM
Abdomen soft, non-tender, no guarding. Stool guaiac performed by Dr. Orozco. Lot #: [id number]; Result: guaiac positive, black stool. Exp 12/31 Abdomen soft, non-tender, no guarding. +PEG tube present. Stool guaiac performed by Dr. Orozco. Lot #: [id number]; Result: guaiac positive, black stool. Exp 12/31

## 2019-12-05 NOTE — PHARMACOTHERAPY INTERVENTION NOTE - COMMENTS
med history complete, reviewed medications with patients med list from assisted living and confirmed with doctor first med profile

## 2019-12-05 NOTE — ED PROVIDER NOTE - OBJECTIVE STATEMENT
78 y/o male with a PMHx of Anemia, COPD, Dementia DM, DVT, HTN, Osteoarthritis, PVD, presents to the ED BIBEMS from [facility] for hemoptysis to r/o GI bleed. As per EMS, pt has hx of GI bleed. As per transfer note, pt has a g-tube. Pt on Eliquis. A&O x0. Pt presents with MOLST form. Hx is limited secondary to pt's baseline status. 80 y/o male with a PMHx of Anemia, COPD, Dementia DM, DVT, HTN, Osteoarthritis, PVD, presents to the ED BIBEMS from [facility] for ?hemoptysis or ?hematemesis to r/o GI bleed. As per EMS, pt has hx of GI bleed. Pt on Eliquis. As per transfer note, pt has a g-tube. Pt is full code. Pt is A&O x0. Hx is limited secondary to pt's baseline status. 80 y/o male with a PMHx of Anemia, COPD, Dementia DM, DVT, HTN, Osteoarthritis, PVD, presents to the ED BIBEMS from [facility] for ?hemoptysis or ?hematemesis to r/o GI bleed. Pt presents with dry blood to mouth. As per EMS, pt has hx of GI bleed. Pt on Eliquis. As per transfer note, pt has a g-tube. Pt is full code. Pt is A&O x0. Hx is limited secondary to pt's baseline status.

## 2019-12-05 NOTE — CONSULT NOTE ADULT - ASSESSMENT
78yo male with COPD, dementia nonerbal and resident of [facility] with UGI bleed  1. ugi bleed   serial h/h and transfuse as needed  bleeding seems to be improving  pantoprazole  plan egd tomorrow pending clinical course    he was given k centra in the er  ?related to recent g tube change by report    will d/w pt health care proxy - by report wishes all measures to be taken

## 2019-12-05 NOTE — ED PROVIDER NOTE - CRITICAL CARE PROVIDED
additional history taking/consultation with other physicians/direct patient care (not related to procedure)

## 2019-12-05 NOTE — ED PROVIDER NOTE - PROGRESS NOTE DETAILS
Ana ALEXANDER for Dr. Orozco: called to bedside found pt with large amount of blood to mouth, rectal exam showed black stool, guaiac +, spoke to nephew, Anshu Hernandez from code, and he confirmed pt is full code. Ana ALEXANDER for Dr. Orozco: spoke to GI, Dr. Yoder, aware and will consult. Mollyibjaye ALEXANDER for Dr. Orozco: will reverse Eliquis at this time as pt is hypotensive will start Protonix and drip. Ana ALEXANDER for Dr. Orozco: spoke with Dr. Potter, will consult. Ana ALEXANDER for Dr. Orozco: called to bedside found pt with large amount of blood to mouth, rectal exam showed black stool, guaiac +, spoke to nephew, Anshu Hernandez- he confirmed pt is full code. Scribe CATHERINE for Dr. Orozco: will reverse Eliquis at this time as pt is hypotensive; will start Protonix and drip. Ernesto JEWELL: Endorsed to hospitalist for admission.

## 2019-12-05 NOTE — H&P ADULT - HISTORY OF PRESENT ILLNESS
80 y/o male with a PMHx of Anemia, COPD, Dementia, DM, DVT on Apixaban, HTN, OA, PVD, LLE OM on doxycycline,  presents to the ED BIBEMS from [facility] for upper GI bleeding. Pt presented with dry blood to mouth.  Patient has advanced Dementia and cannot answer questions; he has a PEG, which was recently replaced.      Past Medical History:  Anemia    COPD (chronic obstructive pulmonary disease)    Dementia    DM (diabetes mellitus)    DVT (deep venous thrombosis)    HTN (hypertension)    Osteoarthritis    Osteomyelitis of left leg    PVD (peripheral vascular disease).    Past Surgical History:  s/p PEG    Family History:   unable to obtain due to dementia    Social History:  lives in [facility]            REVIEW OF SYSTEMS:    CONSTITUTIONAL: No weakness, No fevers or chills  ENT: No ear ache, No sorethroat  NECK: No pain, No stiffness  RESPIRATORY: No cough, No wheezing, No hemoptysis; No dyspnea  CARDIOVASCULAR: No chest pain, No palpitations  GASTROINTESTINAL: No abd pain, No nausea, No vomiting, No hematemesis, No diarrhea or constipation. No melena, No hematochezia.  GENITOURINARY: No dysuria, No  hematuria  NEUROLOGICAL: No diplopia, No paresthesia, No motor dysfunction  MUSCULOSKELETAL: No arthralgia, No myalgia  SKIN: No rashes, or lesions   PSYCH: no anxiety, no suicidal ideation    All other review of systems is negative unless indicated above    Vital Signs Last 24 Hrs  T(C): 36.7 (05 Dec 2019 14:19), Max: 37.2 (05 Dec 2019 13:35)  T(F): 98.1 (05 Dec 2019 14:19), Max: 98.9 (05 Dec 2019 13:35)  HR: 93 (05 Dec 2019 14:19) (79 - 110)  BP: 105/63 (05 Dec 2019 14:19) (96/53 - 132/82)  BP(mean): --  RR: 18 (05 Dec 2019 14:19) (18 - 18)  SpO2: 100% (05 Dec 2019 14:19) (94% - 100%)    PHYSICAL EXAM:    GENERAL: NAD, Well nourished  HEENT:  NC/AT, EOMI, PERRLA, No scleral icterus, Moist mucous membranes  NECK: Supple, No JVD  CNS:  Alert & Oriented X3, Motor Strength 5/5 B/L upper and lower extremities; DTRs 2+ intact   LUNG: Normal Breath sounds, Clear to auscultation bilaterally, No rales, No rhonchi, No wheezing  HEART: RRR; No murmurs, No rubs  ABDOMEN: +BS, ST/ND/NT  GENITOURINARY: Voiding, Bladder not distended  EXTREMITIES:  2+ Peripheral Pulses, No clubbing, No cyanosis, No tibial edema  MUSCULOSKELTAL: Joints normal ROM, No TTP, No effusion  VAGINAL: deferred  SKIN: no rashes  RECTAL: deferred, not indicated  BREAST: deferred                          8.6    20.59 )-----------( 334      ( 05 Dec 2019 12:40 )             29.7     12-05    138  |  107  |  49<H>  ----------------------------<  162<H>  5.0   |  27  |  0.75    Ca    8.1<L>      05 Dec 2019 12:40    TPro  6.1  /  Alb  2.0<L>  /  TBili  0.2  /  DBili  x   /  AST  18  /  ALT  24  /  AlkPhos  81  12-05    Vancomycin levels:   Cultures:     MEDICATIONS  (STANDING):  doxycycline hyclate Capsule 100 milliGRAM(s) Oral every 12 hours  metoclopramide 5 milliGRAM(s) Oral every 6 hours  pantoprazole Infusion 8 mG/Hr (10 mL/Hr) IV Continuous <Continuous>    MEDICATIONS  (PRN):  aluminum hydroxide/magnesium hydroxide/simethicone Suspension 30 milliLiter(s) Oral every 4 hours PRN Dyspepsia  ondansetron Injectable 4 milliGRAM(s) IV Push every 6 hours PRN Nausea      all labs reviewed  all imaging reviewed    a/p:    1. UGIB:  hold anticoagulation   PPI drip  NPO  check Hb tonight and in AM  GI evaluation  Gentle IV fluids    2. Elevated Lactate due to dehydration    3. Prerenal azotemia:  due to dehydration and Upper GI bleeding  gentle hydration    4. Anemia:  due to blood loss  check serial Hb  transfuse for Hb<7.5 78 y/o male with a PMHx of Anemia, COPD, Dementia, DM, DVT on Apixaban, HTN, OA, PVD, LLE OM on doxycycline,  presents to the ED BIBEMS from [facility] for upper GI bleeding. Pt presented with dry blood to mouth.  Patient has advanced Dementia and cannot answer questions; he has a PEG, which was recently replaced.  -s/p K centra in the ED, started on PPI    Past Medical History:  Anemia    COPD (chronic obstructive pulmonary disease)    Dementia    DM (diabetes mellitus)    DVT (deep venous thrombosis)    HTN (hypertension)    Osteoarthritis    Osteomyelitis of left leg    PVD (peripheral vascular disease).    Past Surgical History:  s/p PEG    Family History:   unable to obtain due to dementia    Social History:  lives in [facility]            REVIEW OF SYSTEMS:    CONSTITUTIONAL: No weakness, No fevers or chills  ENT: No ear ache, No sorethroat  NECK: No pain, No stiffness  RESPIRATORY: No cough, No wheezing, No hemoptysis; No dyspnea  CARDIOVASCULAR: No chest pain, No palpitations  GASTROINTESTINAL: No abd pain, No nausea, No vomiting, No hematemesis, No diarrhea or constipation. No melena, No hematochezia.  GENITOURINARY: No dysuria, No  hematuria  NEUROLOGICAL: No diplopia, No paresthesia, No motor dysfunction  MUSCULOSKELETAL: No arthralgia, No myalgia  SKIN: No rashes, or lesions   PSYCH: no anxiety, no suicidal ideation    All other review of systems is negative unless indicated above    Vital Signs Last 24 Hrs  T(C): 36.7 (05 Dec 2019 14:19), Max: 37.2 (05 Dec 2019 13:35)  T(F): 98.1 (05 Dec 2019 14:19), Max: 98.9 (05 Dec 2019 13:35)  HR: 93 (05 Dec 2019 14:19) (79 - 110)  BP: 105/63 (05 Dec 2019 14:19) (96/53 - 132/82)  BP(mean): --  RR: 18 (05 Dec 2019 14:19) (18 - 18)  SpO2: 100% (05 Dec 2019 14:19) (94% - 100%)    PHYSICAL EXAM:    GENERAL: NAD, Well nourished  HEENT:  NC/AT, EOMI, PERRLA, No scleral icterus, Moist mucous membranes  NECK: Supple, No JVD  CNS:  Alert & Oriented X3, Motor Strength 5/5 B/L upper and lower extremities; DTRs 2+ intact   LUNG: Normal Breath sounds, Clear to auscultation bilaterally, No rales, No rhonchi, No wheezing  HEART: RRR; No murmurs, No rubs  ABDOMEN: +BS, ST/ND/NT  GENITOURINARY: Voiding, Bladder not distended  EXTREMITIES:  2+ Peripheral Pulses, No clubbing, No cyanosis, No tibial edema  MUSCULOSKELTAL: Joints normal ROM, No TTP, No effusion  VAGINAL: deferred  SKIN: no rashes  RECTAL: deferred, not indicated  BREAST: deferred                          8.6    20.59 )-----------( 334      ( 05 Dec 2019 12:40 )             29.7     12-05    138  |  107  |  49<H>  ----------------------------<  162<H>  5.0   |  27  |  0.75    Ca    8.1<L>      05 Dec 2019 12:40    TPro  6.1  /  Alb  2.0<L>  /  TBili  0.2  /  DBili  x   /  AST  18  /  ALT  24  /  AlkPhos  81  12-05    Vancomycin levels:   Cultures:     MEDICATIONS  (STANDING):  doxycycline hyclate Capsule 100 milliGRAM(s) Oral every 12 hours  metoclopramide 5 milliGRAM(s) Oral every 6 hours  pantoprazole Infusion 8 mG/Hr (10 mL/Hr) IV Continuous <Continuous>    MEDICATIONS  (PRN):  aluminum hydroxide/magnesium hydroxide/simethicone Suspension 30 milliLiter(s) Oral every 4 hours PRN Dyspepsia  ondansetron Injectable 4 milliGRAM(s) IV Push every 6 hours PRN Nausea      all labs reviewed  all imaging reviewed    a/p:    1. UGIB:  hold anticoagulation   PPI drip  NPO  check Hb tonight and in AM  GI evaluation  Gentle IV fluids    2. Elevated Lactate due to dehydration  repeat after Iv fluids    3. Prerenal azotemia:  due to dehydration and Upper GI bleeding  gentle hydration    4. Anemia:  due to blood loss  check serial Hb  transfuse for Hb<7.5 80 y/o male with a PMHx of Anemia, COPD, Dementia, DM, DVT on Apixaban, HTN, OA, PVD, LLE OM on doxycycline,  presents to the ED BIBEMS from [facility] for upper GI bleeding. Pt presented with dry blood to mouth.  Patient has advanced Dementia and cannot answer questions; he has a PEG, which was recently replaced.  -s/p K centra in the ED, started on PPI    Past Medical History:  Anemia    COPD (chronic obstructive pulmonary disease)    Dementia    DM (diabetes mellitus)    DVT (deep venous thrombosis)    HTN (hypertension)    Osteoarthritis    Osteomyelitis of left leg    PVD (peripheral vascular disease).    Past Surgical History:  s/p PEG    Family History:   unable to obtain due to dementia    Social History:  lives in [facility]            REVIEW OF SYSTEMS:  unable to obtain due to advanced dementia     Vital Signs Last 24 Hrs  T(C): 36.7 (05 Dec 2019 14:19), Max: 37.2 (05 Dec 2019 13:35)  T(F): 98.1 (05 Dec 2019 14:19), Max: 98.9 (05 Dec 2019 13:35)  HR: 93 (05 Dec 2019 14:19) (79 - 110)  BP: 105/63 (05 Dec 2019 14:19) (96/53 - 132/82)  BP(mean): --  RR: 18 (05 Dec 2019 14:19) (18 - 18)  SpO2: 100% (05 Dec 2019 14:19) (94% - 100%)    PHYSICAL EXAM:    GENERAL: NAD  HEENT:  NC/AT, EOMI, PERRLA, No scleral icterus,dry mucous membranes, poor dentition   NECK: Supple, No JVD  CNS:  Alert & Oriented X0, moves all extremities   LUNG: Normal Breath sounds, Clear to auscultation bilaterally, No rales, No rhonchi, No wheezing  HEART: RRR; No murmurs, No rubs  ABDOMEN: +BS, ST/ND/NT, +PEG in place   GENITOURINARY: Voiding, Bladder not distended  EXTREMITIES:  2+ Peripheral Pulses, No clubbing, No cyanosis, No tibial edema  MUSCULOSKELTAL: Joints normal ROM, No TTP, No effusion  SKIN: no rashes  RECTAL: deferred, not indicated  BREAST: deferred                          8.6    20.59 )-----------( 334      ( 05 Dec 2019 12:40 )             29.7     12-05    138  |  107  |  49<H>  ----------------------------<  162<H>  5.0   |  27  |  0.75    Ca    8.1<L>      05 Dec 2019 12:40    TPro  6.1  /  Alb  2.0<L>  /  TBili  0.2  /  DBili  x   /  AST  18  /  ALT  24  /  AlkPhos  81  12-05    Vancomycin levels:   Cultures:     MEDICATIONS  (STANDING):  doxycycline hyclate Capsule 100 milliGRAM(s) Oral every 12 hours  metoclopramide 5 milliGRAM(s) Oral every 6 hours  pantoprazole Infusion 8 mG/Hr (10 mL/Hr) IV Continuous <Continuous>    MEDICATIONS  (PRN):  aluminum hydroxide/magnesium hydroxide/simethicone Suspension 30 milliLiter(s) Oral every 4 hours PRN Dyspepsia  ondansetron Injectable 4 milliGRAM(s) IV Push every 6 hours PRN Nausea      all labs reviewed  all imaging reviewed    a/p:    1. UGIB:  s/p K centra , hold anticoagulation   PPI drip  NPO  check Hb tonight and in AM  GI evaluation  Gentle IV fluids    2. Elevated Lactate due to dehydration  repeat after Iv fluids    3. Prerenal azotemia:  due to dehydration and Upper GI bleeding  gentle hydration    4. Anemia:  due to blood loss  check serial Hb  transfuse for Hb<7.5

## 2019-12-05 NOTE — ED PROVIDER NOTE - ENMT, MLM
Airway patent, Nasal mucosa clear. Mouth with normal mucosa. Throat has no vesicles, no oropharyngeal exudates and uvula is midline. Airway patent, Nasal mucosa clear. Dry blood to mouth. Throat has no vesicles, no oropharyngeal exudates and uvula is midline.

## 2019-12-05 NOTE — CONSULT NOTE ADULT - SUBJECTIVE AND OBJECTIVE BOX
Patient is a 79y old  Male who presents with a chief complaint of     HPI:  78 y/o male with a PMHx of Anemia, COPD, Dementia, DM, DVT on Apixaban, HTN, OA, PVD, LLE OM on doxycycline,  presents to the ED [facility] from [facility] for upper GI bleeding.   Patient has advanced Dementia and cannot answer questions; he has a PEG, which was recently replaced.  -s/p K centra in the ED, started on PPI empirically  He has not had any further hematemesis in last 3 hours    PAST MEDICAL & SURGICAL HISTORY:  Osteomyelitis of left leg  Osteoarthritis  HTN (hypertension)  DVT (deep venous thrombosis)  PVD (peripheral vascular disease)  DM (diabetes mellitus)  COPD (chronic obstructive pulmonary disease)  Anemia  Dementia  No significant past surgical history    MEDICATIONS  (STANDING):  doxycycline hyclate Capsule 100 milliGRAM(s) Oral every 12 hours  metoclopramide 5 milliGRAM(s) Oral every 6 hours  pantoprazole Infusion 8 mG/Hr (10 mL/Hr) IV Continuous <Continuous>  sodium chloride 0.9%. 1000 milliLiter(s) (75 mL/Hr) IV Continuous <Continuous>    MEDICATIONS  (PRN):  aluminum hydroxide/magnesium hydroxide/simethicone Suspension 30 milliLiter(s) Oral every 4 hours PRN Dyspepsia  ondansetron Injectable 4 milliGRAM(s) IV Push every 6 hours PRN Nausea    Allergies  No Known Allergies    SOCIAL HISTORY: lives in [facility]    FAMILY HISTORY:  No pertinent family history in first degree relatives      REVIEW OF SYSTEMS:  not obtained due to pt mental status    Vital Signs Last 24 Hrs  T(C): 36.7 (05 Dec 2019 14:19), Max: 37.2 (05 Dec 2019 13:35)  T(F): 98.1 (05 Dec 2019 14:19), Max: 98.9 (05 Dec 2019 13:35)  HR: 93 (05 Dec 2019 14:19) (79 - 110)  BP: 105/63 (05 Dec 2019 14:19) (96/53 - 132/82)  BP(mean): --  RR: 18 (05 Dec 2019 14:19) (18 - 18)  SpO2: 100% (05 Dec 2019 14:19) (94% - 100%)    PHYSICAL EXAM:  Constitutional: thin appears contracted and chronically ill  HEENT: dry blood around lips  Neck: No LAD  Respiratory: occ rhonchi  Cardiovascular: S1 and S2  Gastrointestinal: BS+, soft, button g tube in place  Extremities: No peripheral edema  Vascular: 1+ peripheral pulses      LABS:                        8.6    20.59 )-----------( 334      ( 05 Dec 2019 12:40 )             29.7     12-05    138  |  107  |  49<H>  ----------------------------<  162<H>  5.0   |  27  |  0.75    Ca    8.1<L>      05 Dec 2019 12:40    TPro  6.1  /  Alb  2.0<L>  /  TBili  0.2  /  DBili  x   /  AST  18  /  ALT  24  /  AlkPhos  81  12-05    PT/INR - ( 05 Dec 2019 12:40 )   PT: 22.7 sec;   INR: 2.00 ratio         PTT - ( 05 Dec 2019 12:40 )  PTT:34.5 sec  LIVER FUNCTIONS - ( 05 Dec 2019 12:40 )  Alb: 2.0 g/dL / Pro: 6.1 gm/dL / ALK PHOS: 81 U/L / ALT: 24 U/L / AST: 18 U/L / GGT: x             RADIOLOGY & ADDITIONAL STUDIES:

## 2019-12-05 NOTE — ED PROVIDER NOTE - MUSCULOSKELETAL, MLM
Spine appears normal, range of motion is not limited, no muscle or joint tenderness +contractions of upper and lower extremities. Spine appears normal,  no muscle or joint tenderness +contractions of upper and lower extremities.

## 2019-12-06 DIAGNOSIS — K94.23 GASTROSTOMY MALFUNCTION: Chronic | ICD-10-CM

## 2019-12-06 LAB
ANION GAP SERPL CALC-SCNC: 1 MMOL/L — LOW (ref 5–17)
APTT BLD: 30.3 SEC — SIGNIFICANT CHANGE UP (ref 27.5–36.3)
BUN SERPL-MCNC: 40 MG/DL — HIGH (ref 7–23)
CALCIUM SERPL-MCNC: 7.3 MG/DL — LOW (ref 8.5–10.1)
CHLORIDE SERPL-SCNC: 117 MMOL/L — HIGH (ref 96–108)
CO2 SERPL-SCNC: 30 MMOL/L — SIGNIFICANT CHANGE UP (ref 22–31)
CREAT SERPL-MCNC: 0.7 MG/DL — SIGNIFICANT CHANGE UP (ref 0.5–1.3)
GLUCOSE SERPL-MCNC: 111 MG/DL — HIGH (ref 70–99)
HCT VFR BLD CALC: 22.7 % — LOW (ref 39–50)
HCT VFR BLD CALC: 23.6 % — LOW (ref 39–50)
HGB BLD-MCNC: 6.9 G/DL — CRITICAL LOW (ref 13–17)
HGB BLD-MCNC: 7.1 G/DL — LOW (ref 13–17)
INR BLD: 1.35 RATIO — HIGH (ref 0.88–1.16)
MCHC RBC-ENTMCNC: 24.2 PG — LOW (ref 27–34)
MCHC RBC-ENTMCNC: 30.1 GM/DL — LOW (ref 32–36)
MCV RBC AUTO: 80.5 FL — SIGNIFICANT CHANGE UP (ref 80–100)
PLATELET # BLD AUTO: 245 K/UL — SIGNIFICANT CHANGE UP (ref 150–400)
POTASSIUM SERPL-MCNC: 4.7 MMOL/L — SIGNIFICANT CHANGE UP (ref 3.5–5.3)
POTASSIUM SERPL-SCNC: 4.7 MMOL/L — SIGNIFICANT CHANGE UP (ref 3.5–5.3)
PROTHROM AB SERPL-ACNC: 15.1 SEC — HIGH (ref 10–12.9)
RBC # BLD: 2.93 M/UL — LOW (ref 4.2–5.8)
RBC # FLD: 19.5 % — HIGH (ref 10.3–14.5)
SODIUM SERPL-SCNC: 148 MMOL/L — HIGH (ref 135–145)
WBC # BLD: 11.41 K/UL — HIGH (ref 3.8–10.5)
WBC # FLD AUTO: 11.41 K/UL — HIGH (ref 3.8–10.5)

## 2019-12-06 PROCEDURE — 43235 EGD DIAGNOSTIC BRUSH WASH: CPT

## 2019-12-06 RX ORDER — PANTOPRAZOLE SODIUM 20 MG/1
40 TABLET, DELAYED RELEASE ORAL
Refills: 0 | Status: DISCONTINUED | OUTPATIENT
Start: 2019-12-06 | End: 2019-12-09

## 2019-12-06 RX ORDER — METOCLOPRAMIDE HCL 10 MG
5 TABLET ORAL EVERY 6 HOURS
Refills: 0 | Status: DISCONTINUED | OUTPATIENT
Start: 2019-12-06 | End: 2019-12-09

## 2019-12-06 RX ADMIN — Medication 110 MILLIGRAM(S): at 17:50

## 2019-12-06 RX ADMIN — SODIUM CHLORIDE 75 MILLILITER(S): 9 INJECTION INTRAMUSCULAR; INTRAVENOUS; SUBCUTANEOUS at 08:01

## 2019-12-06 RX ADMIN — PANTOPRAZOLE SODIUM 40 MILLIGRAM(S): 20 TABLET, DELAYED RELEASE ORAL at 17:48

## 2019-12-06 RX ADMIN — Medication 5 MILLIGRAM(S): at 17:48

## 2019-12-06 RX ADMIN — PANTOPRAZOLE SODIUM 10 MG/HR: 20 TABLET, DELAYED RELEASE ORAL at 07:56

## 2019-12-06 RX ADMIN — Medication 5 MILLIGRAM(S): at 12:41

## 2019-12-06 NOTE — DIETITIAN INITIAL EVALUATION ADULT. - PHYSICAL APPEARANCE
other (specify) NFPE performed and significant for muscle wasting: severe: quad, patellar, calf regions  moderate: temporal, clavicle, deltoid; fat depletion: moderate: occipital, buccal. Severe muscle wasting observed in LE likely d/t lack of use as pt appears to be improving in muscle/fat wasting since PEG placed.  Danilo Bob  no noted edema

## 2019-12-06 NOTE — DIETITIAN INITIAL EVALUATION ADULT. - PERTINENT MEDS FT
MEDICATIONS  (STANDING):  doxycycline IVPB 100 milliGRAM(s) IV Intermittent every 12 hours  metoclopramide Injectable 5 milliGRAM(s) IV Push every 6 hours  pantoprazole Infusion 8 mG/Hr (10 mL/Hr) IV Continuous <Continuous>  sodium chloride 0.9%. 1000 milliLiter(s) (75 mL/Hr) IV Continuous <Continuous>    MEDICATIONS  (PRN):  aluminum hydroxide/magnesium hydroxide/simethicone Suspension 30 milliLiter(s) Oral every 4 hours PRN Dyspepsia  ondansetron Injectable 4 milliGRAM(s) IV Push every 6 hours PRN Nausea

## 2019-12-06 NOTE — DIETITIAN INITIAL EVALUATION ADULT. - MALNUTRITION
Pt meets criteria for moderate malnutrition in the context of chronic illness AEB moderate muscle wasting, moderate fat depletion

## 2019-12-06 NOTE — DIETITIAN INITIAL EVALUATION ADULT. - ENERGY INTAKE
Pt s/p PEG placement 5/2019; pt appears to have gained wt since previous admission and reduced signs of muscle/fat wasting. Currently NPO < 5 days/Recommend as medically feasible initiate TF Glucerna 1.5 @ 20 mL/hr and increased as tolerated 20 mL q6hrs to goal rate of 55 mL/hr. This order w/ provide 1815kcal and 100 g protein/day meeting 100% estimated protein and kcal needs. This order provides 918 mL free H20. Pt currently receiving IVF, if d/cd recommend free H20 flush 40 mL/hr to meet estimated hydration needs.

## 2019-12-06 NOTE — CHART NOTE - NSCHARTNOTEFT_GEN_A_CORE
Upon Nutritional Assessment by the Registered Dietitian your patient was determined to meet criteria / has evidence of the following diagnosis/diagnoses:          [ ]  Mild Protein Calorie Malnutrition        [x]  Moderate Protein Calorie Malnutrition        [ ] Severe Protein Calorie Malnutrition        [ ] Unspecified Protein Calorie Malnutrition        [ ] Underweight / BMI <19          Upon Nutritional Assessment by the Registered Dietitian your patient was determined to meet criteria / has evidence of the following diagnosis/diagnoses:          [ ]  Mild Protein Calorie Malnutrition        [x ]  Moderate Protein Calorie Malnutrition        [ ] Severe Protein Calorie Malnutrition        [ ] Unspecified Protein Calorie Malnutrition        [ ] Underweight / BMI <19        [ ] Morbid Obesity / BMI > 40      Findings:    Pt meets criteria for moderate malnutrition in the context of chronic illness AEB moderate muscle wasting, moderate fat depletion      Findings as based on:  •  Comprehensive nutrition assessment and consultation  •  Calorie counts (nutrient intake analysis)  •  Food acceptance and intake status from observations by staff  •  Follow up  •  Patient education  •  Intervention secondary to interdisciplinary rounds  •   concerns      Treatment:      1. Recommend as medically feasible initiate TF Glucerna 1.5 @ 20 mL/hr and increased as tolerated 20 mL q6hrs to goal rate of 55 mL/hr.   2. If IVF d/cd recommend adding free H20 flush 40 mL/hr to meet estimated hydration needs.   3. keep HOB > 45 degrees and monitor for s/s intolerance   4. weekly wt.      The following diet has been recommended:      PROVIDER Section:     By signing this assessment you are acknowledging and agree with the diagnosis/diagnoses assigned by the Registered Dietitian    Comments:

## 2019-12-06 NOTE — DIETITIAN INITIAL EVALUATION ADULT. - PERTINENT LABORATORY DATA
12-06 Na148 mmol/L<H> Glu 111 mg/dL<H> K+ 4.7 mmol/L Cr  0.70 mg/dL BUN 40 mg/dL<H> Phos n/a   Alb n/a   PAB n/a

## 2019-12-06 NOTE — DIETITIAN INITIAL EVALUATION ADULT. - OTHER INFO
Pt is a 80 yo M w/ PMH Anemia, COPD, Dementia, DM, DVT on Apixaban, HTN, OA, PVD, LLE OM on doxycycline,  presents to the ED BIBEMS from [facility] for upper GI bleeding. Pt presented with dry blood to mouth. Patient has advanced Dementia and cannot answer questions; he has a PEG, which was recently replaced.-s/p K centra in the ED, started on PPI.     Pt seen by RD, lying in bed pt w/ hx of dementia and is a very poor historian. Pt is currently NPO 2/2 upper GIB and he is planned for EGD today. Pt s/p PEG tube placement, as medically feasible recommend initiating TF, recommendations below.

## 2019-12-06 NOTE — DIETITIAN INITIAL EVALUATION ADULT. - FACTORS AFF FOOD INTAKE
change in mental status/difficulty feeding self/difficulty swallowing/pt is s/p PEG placement d/t difficulty swallowing

## 2019-12-06 NOTE — DIETITIAN INITIAL EVALUATION ADULT. - ADD RECOMMEND
1. Recommend as medically feasible initiate TF Glucerna 1.5 @ 20 mL/hr and increased as tolerated 20 mL q6hrs to goal rate of 55 mL/hr. 2. If IVF d/cd recommend adding free H20 flush 40 mL/hr to meet estimated hydration needs. 3. keep HOB > 45 degrees and monitor for s/s intolerance 4. weekly wt.

## 2019-12-06 NOTE — PROGRESS NOTE ADULT - SUBJECTIVE AND OBJECTIVE BOX
CC: Hematemesis   History of Present Illness: 	  78 y/o male with a PMHx of Anemia, COPD, Dementia, hx DM (a1c 5.2), DVT on Apixaban, HTN, OA, PVD, LLE OM on doxycycline,  presents to the ED BIBEMS from [facility] for upper GI bleeding. Pt presented with dry blood to mouth. Patient has advanced Dementia and cannot answer questions; he has a PEG, which was recently replaced. s/p K centra in the ED, started on PPI.      12/6: Pt is lying in bed, confused, unable to obtain history.  He will be getting 1u prbc for dropping hemoglobin.  He is awaiting EGD.      ROS: Unable to determine due to dementia    Vital Signs Last 24 Hrs  T(C): 37 (06 Dec 2019 15:11), Max: 37.1 (05 Dec 2019 18:14)  T(F): 98.6 (06 Dec 2019 15:11), Max: 98.8 (05 Dec 2019 18:14)  HR: 114 (06 Dec 2019 15:11) (87 - 114)  BP: 104/51 (06 Dec 2019 15:11) (98/53 - 132/54)  BP(mean): --  RR: 18 (06 Dec 2019 15:11) (17 - 20)  SpO2: 98% (06 Dec 2019 15:11) (97% - 100%)    PHYSICAL EXAM:    GENERAL: NAD  HEENT:  NC/AT, EOMI, PERRLA, No scleral icterus,dry mucous membranes, poor dentition   NECK: Supple, No JVD  CNS:  Alert & Oriented X0, moves all extremities   LUNG: Normal Breath sounds, Clear to auscultation bilaterally, No rales, No rhonchi, No wheezing  HEART: RRR; No murmurs, No rubs  ABDOMEN: +BS, ST/ND/NT, +PEG in place   GENITOURINARY: Voiding, Bladder not distended  EXTREMITIES:  2+ Peripheral Pulses, No clubbing, No cyanosis, No tibial edema  MUSCULOSKELTAL: Joints normal ROM, No TTP, No effusion  SKIN: no rashes  RECTAL: deferred, not indicated  BREAST: deferred    MEDICATIONS  (STANDING):  doxycycline IVPB 100 milliGRAM(s) IV Intermittent every 12 hours  metoclopramide Injectable 5 milliGRAM(s) IV Push every 6 hours  pantoprazole Infusion 8 mG/Hr (10 mL/Hr) IV Continuous <Continuous>  sodium chloride 0.9%. 1000 milliLiter(s) (75 mL/Hr) IV Continuous <Continuous>    MEDICATIONS  (PRN):  aluminum hydroxide/magnesium hydroxide/simethicone Suspension 30 milliLiter(s) Oral every 4 hours PRN Dyspepsia  ondansetron Injectable 4 milliGRAM(s) IV Push every 6 hours PRN Nausea                              6.9    x     )-----------( x        ( 06 Dec 2019 13:02 )             22.7     12-06    148<H>  |  117<H>  |  40<H>  ----------------------------<  111<H>  4.7   |  30  |  0.70    Ca    7.3<L>      06 Dec 2019 09:01    TPro  6.1  /  Alb  2.0<L>  /  TBili  0.2  /  DBili  x   /  AST  18  /  ALT  24  /  AlkPhos  81  12-05    CAPILLARY BLOOD GLUCOSE      POCT Blood Glucose.: 121 mg/dL (05 Dec 2019 23:24)    LIVER FUNCTIONS - ( 05 Dec 2019 12:40 )  Alb: 2.0 g/dL / Pro: 6.1 gm/dL / ALK PHOS: 81 U/L / ALT: 24 U/L / AST: 18 U/L / GGT: x           PT/INR - ( 06 Dec 2019 09:45 )   PT: 15.1 sec;   INR: 1.35 ratio         PTT - ( 06 Dec 2019 09:45 )  PTT:30.3 sec             a/p: 79 year old man with advanced dementia, PEG presents with hematemesis     Hematemesis: Possible upper GI bleed with acute blood loss anemia  -hx of recurrent GI bleed  -s/p K centra , hold anticoagulation   -PPI drip  -NPO  -EGD today  -s/p 1u prbc, will give another now  -monitor H+H    Chronic OM / leukocytosis:  -change oral doxy to IV  -BCx NGTD  -monitor leukocytosis    Hx DVT / afib:  -hold apixaban in setting of acute bleed     Elevated Lactate due to dehydration/bleeding:  -normalized after fluids and prbc transfusion     severe protein calorie malnutrition:  -nutrition eval    Advanced dementia w/ hx of aspiration now with PEG:  -supportive care  -poor overall prognosis, may need to re-address goals of care if continues to decline on this admission.     DVT ppx:  -hold anticoagulation in setting of bleed

## 2019-12-06 NOTE — DIETITIAN INITIAL EVALUATION ADULT. - ENTERAL
Recommend as medically feasible initiate TF Glucerna 1.5 @ 20 mL/hr and increased as tolerated 20 mL q6hrs to goal rate of 55 mL/hr.

## 2019-12-07 LAB
ANION GAP SERPL CALC-SCNC: 4 MMOL/L — LOW (ref 5–17)
BUN SERPL-MCNC: 21 MG/DL — SIGNIFICANT CHANGE UP (ref 7–23)
CALCIUM SERPL-MCNC: 7.4 MG/DL — LOW (ref 8.5–10.1)
CHLORIDE SERPL-SCNC: 122 MMOL/L — HIGH (ref 96–108)
CO2 SERPL-SCNC: 25 MMOL/L — SIGNIFICANT CHANGE UP (ref 22–31)
CREAT SERPL-MCNC: 0.69 MG/DL — SIGNIFICANT CHANGE UP (ref 0.5–1.3)
GLUCOSE SERPL-MCNC: 107 MG/DL — HIGH (ref 70–99)
HCT VFR BLD CALC: 25.9 % — LOW (ref 39–50)
HGB BLD-MCNC: 7.9 G/DL — LOW (ref 13–17)
INR BLD: 1.28 RATIO — HIGH (ref 0.88–1.16)
MCHC RBC-ENTMCNC: 25.6 PG — LOW (ref 27–34)
MCHC RBC-ENTMCNC: 30.5 GM/DL — LOW (ref 32–36)
MCV RBC AUTO: 84.1 FL — SIGNIFICANT CHANGE UP (ref 80–100)
PLATELET # BLD AUTO: 213 K/UL — SIGNIFICANT CHANGE UP (ref 150–400)
POTASSIUM SERPL-MCNC: 4 MMOL/L — SIGNIFICANT CHANGE UP (ref 3.5–5.3)
POTASSIUM SERPL-SCNC: 4 MMOL/L — SIGNIFICANT CHANGE UP (ref 3.5–5.3)
PROTHROM AB SERPL-ACNC: 14.3 SEC — HIGH (ref 10–12.9)
RBC # BLD: 3.08 M/UL — LOW (ref 4.2–5.8)
RBC # FLD: 19.3 % — HIGH (ref 10.3–14.5)
SODIUM SERPL-SCNC: 151 MMOL/L — HIGH (ref 135–145)
WBC # BLD: 8.22 K/UL — SIGNIFICANT CHANGE UP (ref 3.8–10.5)
WBC # FLD AUTO: 8.22 K/UL — SIGNIFICANT CHANGE UP (ref 3.8–10.5)

## 2019-12-07 RX ORDER — SODIUM CHLORIDE 9 MG/ML
1000 INJECTION, SOLUTION INTRAVENOUS
Refills: 0 | Status: COMPLETED | OUTPATIENT
Start: 2019-12-07 | End: 2019-12-07

## 2019-12-07 RX ORDER — SODIUM CHLORIDE 9 MG/ML
1000 INJECTION, SOLUTION INTRAVENOUS
Refills: 0 | Status: DISCONTINUED | OUTPATIENT
Start: 2019-12-07 | End: 2019-12-08

## 2019-12-07 RX ADMIN — SODIUM CHLORIDE 65 MILLILITER(S): 9 INJECTION, SOLUTION INTRAVENOUS at 13:45

## 2019-12-07 RX ADMIN — Medication 5 MILLIGRAM(S): at 05:35

## 2019-12-07 RX ADMIN — PANTOPRAZOLE SODIUM 40 MILLIGRAM(S): 20 TABLET, DELAYED RELEASE ORAL at 05:35

## 2019-12-07 RX ADMIN — SODIUM CHLORIDE 65 MILLILITER(S): 9 INJECTION, SOLUTION INTRAVENOUS at 19:28

## 2019-12-07 RX ADMIN — Medication 110 MILLIGRAM(S): at 17:16

## 2019-12-07 RX ADMIN — Medication 5 MILLIGRAM(S): at 17:14

## 2019-12-07 RX ADMIN — Medication 110 MILLIGRAM(S): at 05:36

## 2019-12-07 RX ADMIN — Medication 5 MILLIGRAM(S): at 00:22

## 2019-12-07 RX ADMIN — Medication 5 MILLIGRAM(S): at 13:33

## 2019-12-07 RX ADMIN — SODIUM CHLORIDE 75 MILLILITER(S): 9 INJECTION INTRAMUSCULAR; INTRAVENOUS; SUBCUTANEOUS at 00:24

## 2019-12-07 RX ADMIN — PANTOPRAZOLE SODIUM 40 MILLIGRAM(S): 20 TABLET, DELAYED RELEASE ORAL at 17:14

## 2019-12-07 NOTE — PROGRESS NOTE ADULT - SUBJECTIVE AND OBJECTIVE BOX
Patient is a 79y old  Male who presents with a chief complaint of Hematemesis (06 Dec 2019 15:31)    HPI:  pt awake nonverbal  no apparent distress    PAST MEDICAL & SURGICAL HISTORY:  Osteomyelitis of left leg  Osteoarthritis  HTN (hypertension)  DVT (deep venous thrombosis)  PVD (peripheral vascular disease)  DM (diabetes mellitus)  COPD (chronic obstructive pulmonary disease)  Anemia  Dementia  PEG tube malfunction    MEDICATIONS  (STANDING):  doxycycline IVPB 100 milliGRAM(s) IV Intermittent every 12 hours  metoclopramide Injectable 5 milliGRAM(s) IV Push every 6 hours  pantoprazole  Injectable 40 milliGRAM(s) IV Push two times a day  sodium chloride 0.9%. 1000 milliLiter(s) (75 mL/Hr) IV Continuous <Continuous>    MEDICATIONS  (PRN):  aluminum hydroxide/magnesium hydroxide/simethicone Suspension 30 milliLiter(s) Oral every 4 hours PRN Dyspepsia  ondansetron Injectable 4 milliGRAM(s) IV Push every 6 hours PRN Nausea    Allergies  No Known Allergies    REVIEW OF SYSTEMS:    not obtained due to pt mental status    Vital Signs Last 24 Hrs  T(C): 37 (07 Dec 2019 07:59), Max: 37.1 (06 Dec 2019 11:19)  T(F): 98.6 (07 Dec 2019 07:59), Max: 98.8 (06 Dec 2019 11:19)  HR: 89 (07 Dec 2019 07:59) (89 - 114)  BP: 102/45 (07 Dec 2019 07:59) (98/71 - 123/96)  BP(mean): --  RR: 18 (07 Dec 2019 07:59) (17 - 22)  SpO2: 91% (07 Dec 2019 07:59) (91% - 98%)    PHYSICAL EXAM:  Constitutional: contracted and chronically ill  Respiratory: CTA  Cardiovascular: S1 and S2  Gastrointestinal: BS+, soft, button peg in place      LABS:                        7.9    8.22  )-----------( 213      ( 07 Dec 2019 09:08 )             25.9     12-07    151<H>  |  122<H>  |  21  ----------------------------<  107<H>  4.0   |  25  |  0.69    Ca    7.4<L>      07 Dec 2019 09:08    TPro  6.1  /  Alb  2.0<L>  /  TBili  0.2  /  DBili  x   /  AST  18  /  ALT  24  /  AlkPhos  81  12-05    PT/INR - ( 07 Dec 2019 09:08 )   PT: 14.3 sec;   INR: 1.28 ratio         PTT - ( 06 Dec 2019 09:45 )  PTT:30.3 sec  LIVER FUNCTIONS - ( 05 Dec 2019 12:40 )  Alb: 2.0 g/dL / Pro: 6.1 gm/dL / ALK PHOS: 81 U/L / ALT: 24 U/L / AST: 18 U/L / GGT: x             RADIOLOGY & ADDITIONAL STUDIES:

## 2019-12-07 NOTE — PROGRESS NOTE ADULT - SUBJECTIVE AND OBJECTIVE BOX
CC: Hematemesis   History of Present Illness: 	  80 y/o male with a PMHx of Anemia, COPD, Dementia, hx DM (a1c 5.2), DVT on Apixaban, HTN, OA, PVD, LLE OM on doxycycline,  presents to the ED BIBEMS from [facility] for upper GI bleeding. Pt presented with dry blood to mouth. Patient has advanced Dementia and cannot answer questions; he has a PEG, which was recently replaced. s/p K centra in the ED, started on PPI.      12/6: Pt is lying in bed, confused, unable to obtain history.  He will be getting 1u prbc for dropping hemoglobin.  He is awaiting EGD.    12/7: Lying in bed, status quo, confused, lethargic appearing.  Found to have reflux esophagitis on EGD.  Hb stable after unit prbc yesterday.      ROS: Unable to determine due to dementia    Vital Signs Last 24 Hrs  T(C): 37 (07 Dec 2019 07:59), Max: 37 (06 Dec 2019 15:11)  T(F): 98.6 (07 Dec 2019 07:59), Max: 98.6 (06 Dec 2019 15:11)  HR: 89 (07 Dec 2019 12:25) (89 - 114)  BP: 96/54 (07 Dec 2019 12:25) (96/54 - 123/96)  BP(mean): --  RR: 18 (07 Dec 2019 07:59) (17 - 22)  SpO2: 91% (07 Dec 2019 07:59) (91% - 98%)    PHYSICAL EXAM:    GENERAL: NAD  HEENT:  NC/AT, EOMI, PERRLA, No scleral icterus, dry mucous membranes, poor dentition   NECK: Supple, No JVD  CNS:  Alert & Oriented X0,   LUNG: Normal Breath sounds, Clear to auscultation bilaterally, No rales, No rhonchi, No wheezing  HEART: RRR; No murmurs, No rubs  ABDOMEN: +BS, ST/ND/NT, +PEG in place   GENITOURINARY: Voiding, Bladder not distended  EXTREMITIES:  2+ Peripheral Pulses, No clubbing, No cyanosis, No tibial edema  MUSCULOSKELTAL: Joints normal ROM, No TTP, No effusion    MEDICATIONS  (STANDING):  doxycycline IVPB 100 milliGRAM(s) IV Intermittent every 12 hours  metoclopramide Injectable 5 milliGRAM(s) IV Push every 6 hours  pantoprazole  Injectable 40 milliGRAM(s) IV Push two times a day    MEDICATIONS  (PRN):  aluminum hydroxide/magnesium hydroxide/simethicone Suspension 30 milliLiter(s) Oral every 4 hours PRN Dyspepsia  ondansetron Injectable 4 milliGRAM(s) IV Push every 6 hours PRN Nausea                              7.9    8.22  )-----------( 213      ( 07 Dec 2019 09:08 )             25.9     12-07    151<H>  |  122<H>  |  21  ----------------------------<  107<H>  4.0   |  25  |  0.69    Ca    7.4<L>      07 Dec 2019 09:08      CAPILLARY BLOOD GLUCOSE          PT/INR - ( 07 Dec 2019 09:08 )   PT: 14.3 sec;   INR: 1.28 ratio         PTT - ( 06 Dec 2019 09:45 )  PTT:30.3 sec             a/p: 79 year old man with advanced dementia, PEG presents with hematemesis     Hematemesis: Due to likely severe esophagitis with acute blood loss anemia  -hx of recurrent GI bleed  -s/p K centra , hold anticoagulation   -PPI drip switched to IV BID  -EGD shows esophagitits due to reflux   -s/p 2u PRBC total   -monitor H+H    Hypernatremia / moderate protein calorie malnutrition:  -restart tube feeds per dietary recommendations  -1L D5W  -monitor lytes    Chronic OM / leukocytosis:  -changed oral doxy to IV  -BCx NGTD  -monitor leukocytosis    Hx DVT / afib:  -hold apixaban in setting of acute bleed     Elevated Lactate due to dehydration/bleeding:  -normalized after fluids and prbc transfusion     Advanced dementia w/ hx of aspiration now with PEG:  -supportive care  -poor overall prognosis, may need to re-address goals of care if continues to decline on this admission.     DVT ppx:  -hold anticoagulation in setting of bleed

## 2019-12-08 LAB
ANION GAP SERPL CALC-SCNC: 4 MMOL/L — LOW (ref 5–17)
BUN SERPL-MCNC: 13 MG/DL — SIGNIFICANT CHANGE UP (ref 7–23)
CALCIUM SERPL-MCNC: 7.7 MG/DL — LOW (ref 8.5–10.1)
CHLORIDE SERPL-SCNC: 119 MMOL/L — HIGH (ref 96–108)
CO2 SERPL-SCNC: 25 MMOL/L — SIGNIFICANT CHANGE UP (ref 22–31)
CREAT SERPL-MCNC: 0.66 MG/DL — SIGNIFICANT CHANGE UP (ref 0.5–1.3)
GLUCOSE SERPL-MCNC: 96 MG/DL — SIGNIFICANT CHANGE UP (ref 70–99)
HCT VFR BLD CALC: 26.7 % — LOW (ref 39–50)
HGB BLD-MCNC: 8.1 G/DL — LOW (ref 13–17)
MCHC RBC-ENTMCNC: 25.6 PG — LOW (ref 27–34)
MCHC RBC-ENTMCNC: 30.3 GM/DL — LOW (ref 32–36)
MCV RBC AUTO: 84.5 FL — SIGNIFICANT CHANGE UP (ref 80–100)
PLATELET # BLD AUTO: 200 K/UL — SIGNIFICANT CHANGE UP (ref 150–400)
POTASSIUM SERPL-MCNC: 3.9 MMOL/L — SIGNIFICANT CHANGE UP (ref 3.5–5.3)
POTASSIUM SERPL-SCNC: 3.9 MMOL/L — SIGNIFICANT CHANGE UP (ref 3.5–5.3)
RBC # BLD: 3.16 M/UL — LOW (ref 4.2–5.8)
RBC # FLD: 19.8 % — HIGH (ref 10.3–14.5)
SODIUM SERPL-SCNC: 148 MMOL/L — HIGH (ref 135–145)
WBC # BLD: 7.6 K/UL — SIGNIFICANT CHANGE UP (ref 3.8–10.5)
WBC # FLD AUTO: 7.6 K/UL — SIGNIFICANT CHANGE UP (ref 3.8–10.5)

## 2019-12-08 PROCEDURE — 93970 EXTREMITY STUDY: CPT | Mod: 26

## 2019-12-08 PROCEDURE — 99231 SBSQ HOSP IP/OBS SF/LOW 25: CPT

## 2019-12-08 RX ADMIN — Medication 5 MILLIGRAM(S): at 17:38

## 2019-12-08 RX ADMIN — Medication 5 MILLIGRAM(S): at 23:06

## 2019-12-08 RX ADMIN — Medication 110 MILLIGRAM(S): at 17:38

## 2019-12-08 RX ADMIN — Medication 5 MILLIGRAM(S): at 05:58

## 2019-12-08 RX ADMIN — Medication 110 MILLIGRAM(S): at 05:58

## 2019-12-08 RX ADMIN — Medication 5 MILLIGRAM(S): at 11:35

## 2019-12-08 RX ADMIN — PANTOPRAZOLE SODIUM 40 MILLIGRAM(S): 20 TABLET, DELAYED RELEASE ORAL at 17:38

## 2019-12-08 RX ADMIN — PANTOPRAZOLE SODIUM 40 MILLIGRAM(S): 20 TABLET, DELAYED RELEASE ORAL at 05:58

## 2019-12-08 NOTE — PROGRESS NOTE ADULT - SUBJECTIVE AND OBJECTIVE BOX
Patient is a 79y old  Male who presents with a chief complaint of Hematemesis (07 Dec 2019 14:44)      HPI:  78 y/o male with a PMHx of Anemia, COPD, Dementia, DM, DVT on Apixaban, HTN, OA, PVD, LLE OM on doxycycline,  presents to the ED BIBEMS from [facility] for upper GI bleeding. Pt presented with dry blood to mouth.  Patient has advanced Dementia and cannot answer questions; he has a PEG, which was recently replaced.  -s/p K centra in the ED, started on PPI    Upper endoscopy - reflux esophagitis. No bleeding overnight.    Past Medical History:  Anemia    COPD (chronic obstructive pulmonary disease)    Dementia    DM (diabetes mellitus)    DVT (deep venous thrombosis)    HTN (hypertension)    Osteoarthritis    Osteomyelitis of left leg    PVD (peripheral vascular disease).    Past Surgical History:  s/p PEG    Family History:   unable to obtain due to dementia    Social History:  lives in [facility]              Vital Signs Last 24 Hrs  T(C): 36.7 (05 Dec 2019 14:19), Max: 37.2 (05 Dec 2019 13:35)  T(F): 98.1 (05 Dec 2019 14:19), Max: 98.9 (05 Dec 2019 13:35)  HR: 93 (05 Dec 2019 14:19) (79 - 110)  BP: 105/63 (05 Dec 2019 14:19) (96/53 - 132/82)  BP(mean): --  RR: 18 (05 Dec 2019 14:19) (18 - 18)  SpO2: 100% (05 Dec 2019 14:19) (94% - 100%)    PHYSICAL EXAM:    LUNG: Normal Breath sounds, Clear to auscultation bilaterally, No rales, No rhonchi, No wheezing  HEART: RRR; No murmurs, No rubs  ABDOMEN: +BS, ST/ND/NT, +PEG in place                           8.6    20.59 )-----------( 334      ( 05 Dec 2019 12:40 )             29.7     12-05    138  |  107  |  49<H>  ----------------------------<  162<H>  5.0   |  27  |  0.75    Ca    8.1<L>      05 Dec 2019 12:40    TPro  6.1  /  Alb  2.0<L>  /  TBili  0.2  /  DBili  x   /  AST  18  /  ALT  24  /  AlkPhos  81  12-05    Vancomycin levels:   Cultures:     MEDICATIONS  (STANDING):  doxycycline hyclate Capsule 100 milliGRAM(s) Oral every 12 hours  metoclopramide 5 milliGRAM(s) Oral every 6 hours  pantoprazole Infusion 8 mG/Hr (10 mL/Hr) IV Continuous <Continuous>    MEDICATIONS  (PRN):  aluminum hydroxide/magnesium hydroxide/simethicone Suspension 30 milliLiter(s) Oral every 4 hours PRN Dyspepsia  ondansetron Injectable 4 milliGRAM(s) IV Push every 6 hours PRN Nausea      all labs reviewed  all imaging reviewed    a/p:    1. UGIB:  s/p K centra , hold anticoagulation   PPI drip  NPO  check Hb tonight and in AM  GI evaluation  Gentle IV fluids    2. Elevated Lactate due to dehydration  repeat after Iv fluids    3. Prerenal azotemia:  due to dehydration and Upper GI bleeding  gentle hydration    4. Anemia:  due to blood loss  check serial Hb  transfuse for Hb<7.5 (05 Dec 2019 16:46)      PAST MEDICAL & SURGICAL HISTORY:  Osteomyelitis of left leg  Osteoarthritis  HTN (hypertension)  DVT (deep venous thrombosis)  PVD (peripheral vascular disease)  DM (diabetes mellitus)  COPD (chronic obstructive pulmonary disease)  Anemia  Dementia  PEG tube malfunction      MEDICATIONS  (STANDING):  dextrose 5%. 1000 milliLiter(s) (65 mL/Hr) IV Continuous <Continuous>  doxycycline IVPB 100 milliGRAM(s) IV Intermittent every 12 hours  metoclopramide Injectable 5 milliGRAM(s) IV Push every 6 hours  pantoprazole  Injectable 40 milliGRAM(s) IV Push two times a day    MEDICATIONS  (PRN):  aluminum hydroxide/magnesium hydroxide/simethicone Suspension 30 milliLiter(s) Oral every 4 hours PRN Dyspepsia  ondansetron Injectable 4 milliGRAM(s) IV Push every 6 hours PRN Nausea      Allergies    No Known Allergies    Intolerances        REVIEW OF SYSTEMS:    CONSTITUTIONAL: No weakness, fevers or chills  RESPIRATORY: No cough, wheezing, hemoptysis; No shortness of breath  CARDIOVASCULAR: No chest pain or palpitations  GASTROINTESTINAL: No abdominal or epigastric pain. No nausea, vomiting, or hematemesis; No diarrhea or constipation. No melena or hematochezia.  All other review of systems is negative unless indicated above.    Vital Signs Last 24 Hrs  T(C): 36.4 (08 Dec 2019 04:19), Max: 36.9 (07 Dec 2019 16:38)  T(F): 97.6 (08 Dec 2019 04:19), Max: 98.5 (07 Dec 2019 21:56)  HR: 82 (08 Dec 2019 04:19) (82 - 89)  BP: 120/52 (08 Dec 2019 04:19) (92/61 - 120/75)  BP(mean): --  RR: 17 (08 Dec 2019 04:19) (17 - 18)  SpO2: 98% (08 Dec 2019 04:19) (95% - 100%)    PHYSICAL EXAM:    Constitutional: NAD, well-developed  Respiratory: CTAB  Cardiovascular: S1 and S2, RRR, no M/G/R  Gastrointestinal: BS+, soft, NT/ND  Extremities: No peripheral edema  Psychiatric: Normal mood, normal affect  Skin: No rashes    LABS:                        8.1    7.60  )-----------( 200      ( 08 Dec 2019 08:46 )             26.7     12-08    148<H>  |  119<H>  |  13  ----------------------------<  96  3.9   |  25  |  0.66    Ca    7.7<L>      08 Dec 2019 08:46      PT/INR - ( 07 Dec 2019 09:08 )   PT: 14.3 sec;   INR: 1.28 ratio               RADIOLOGY & ADDITIONAL STUDIES:

## 2019-12-08 NOTE — PROGRESS NOTE ADULT - ASSESSMENT
78 yo male with advanced dementia and PEG. No obvious bleeding. Can change pantoprazole to via PEG. Stable for return to NH. Please call with questions.
78yo male with dementia and multiple medical problems admitted with ugi bleed    EGD showed severe reflux esophagitis  continue PPI BID  h/h stable this am after transfusion last pm  I have restarted tube feeds  he came in on DOAC - this should be held for 1 week to enable some further healing of his esophagitis prior to restarting  he will need dvt prophylaxis - i am ok with prophylactic dose of subq heparin vs mechanical
78 yo M admitted 12/5 suffering from acute UGIB  anemia due to acute hemorrhage/blood loss  hemorrhagic shock/hypovolemic shock - responded to IVF/transfusion  coagulopathy due to Apixaban   dehydration and hypovolemia leading to metabolic lactic acidosis  Hemodynamics and respiratory function reasonable s/p IVF and PRBC transfusion      Plan at this time should include:  hydration  serial H/H - transfuse as appropriate   GI and DVT prophylaxis as appropriate  HOB 30 to prevent aspiration  NPO except meds as needed via PEG in prep for endoscopy 12/6  GI input noted and appreciated  PPI  NO AC for now  Supportive care    No need for acute intervention at this time  please reconsult CCM should situation change or new issues develop.

## 2019-12-08 NOTE — PROGRESS NOTE ADULT - SUBJECTIVE AND OBJECTIVE BOX
CC: Hematemesis   History of Present Illness: 	  80 y/o male with a PMHx of Anemia, COPD, Dementia, hx DM (a1c 5.2), DVT on Apixaban, HTN, OA, PVD, LLE OM on doxycycline,  presents to the ED BIBEMS from [facility] for upper GI bleeding. Pt presented with dry blood to mouth. Patient has advanced Dementia and cannot answer questions; he has a PEG, which was recently replaced. s/p K centra in the ED, started on PPI.      12/6: Pt is lying in bed, confused, unable to obtain history.  He will be getting 1u prbc for dropping hemoglobin.  He is awaiting EGD.    12/7: Lying in bed, status quo, confused, lethargic appearing.  Found to have reflux esophagitis on EGD.  Hb stable after unit prbc yesterday.    12/8: Status quo, a bit more alert today.  Tolerating tube feeds.  No fever, chills, n, v, emesis.    ROS: Unable to determine due to dementia    Vital Signs Last 24 Hrs  T(C): 36.4 (08 Dec 2019 04:19), Max: 36.9 (07 Dec 2019 16:38)  T(F): 97.6 (08 Dec 2019 04:19), Max: 98.5 (07 Dec 2019 21:56)  HR: 82 (08 Dec 2019 04:19) (82 - 89)  BP: 120/52 (08 Dec 2019 04:19) (92/61 - 120/75)  BP(mean): --  RR: 17 (08 Dec 2019 04:19) (17 - 18)  SpO2: 98% (08 Dec 2019 04:19) (95% - 100%)    PHYSICAL EXAM:    GENERAL: NAD  HEENT:  NC/AT, EOMI, PERRLA, No scleral icterus, dry mucous membranes, poor dentition   NECK: Supple, No JVD  CNS:  Alert & Oriented X0,   LUNG: Normal Breath sounds, Clear to auscultation bilaterally, No rales, No rhonchi, No wheezing  HEART: RRR; No murmurs, No rubs  ABDOMEN: +BS, ST/ND/NT, +PEG in place   GENITOURINARY: Voiding, Bladder not distended  EXTREMITIES:  2+ Peripheral Pulses, No clubbing, No cyanosis, No tibial edema  MUSCULOSKELTAL: Joints normal ROM, No TTP, No effusion    MEDICATIONS  (STANDING):  doxycycline IVPB 100 milliGRAM(s) IV Intermittent every 12 hours  metoclopramide Injectable 5 milliGRAM(s) IV Push every 6 hours  pantoprazole  Injectable 40 milliGRAM(s) IV Push two times a day    MEDICATIONS  (PRN):  aluminum hydroxide/magnesium hydroxide/simethicone Suspension 30 milliLiter(s) Oral every 4 hours PRN Dyspepsia  ondansetron Injectable 4 milliGRAM(s) IV Push every 6 hours PRN Nausea                              8.1    7.60  )-----------( 200      ( 08 Dec 2019 08:46 )             26.7     12-08    148<H>  |  119<H>  |  13  ----------------------------<  96  3.9   |  25  |  0.66    Ca    7.7<L>      08 Dec 2019 08:46      CAPILLARY BLOOD GLUCOSE          PT/INR - ( 07 Dec 2019 09:08 )   PT: 14.3 sec;   INR: 1.28 ratio                  a/p: 79 year old man with advanced dementia, PEG presents with hematemesis     Hematemesis: Due to likely severe esophagitis with acute blood loss anemia  -hx of recurrent GI bleed  -s/p K centra , hold anticoagulation   -PPI drip switched to IV BID  -EGD shows esophagitis due to reflux   -s/p 2u PRBC total   -monitor H+H --> stable    Hypernatremia / moderate protein calorie malnutrition:  -Na levels normalizing  -restarted tube feeds per dietary recommendations  -1L D5W  -monitor lytes    Chronic OM / leukocytosis:  -changed oral doxy to IV  -BCx NGTD  -monitor leukocytosis    Hx DVT / afib:  -hold apixaban in setting of acute bleed   -repeat dopplers of legs    Elevated Lactate due to dehydration/bleeding:  -normalized after fluids and prbc transfusion     Advanced dementia w/ hx of aspiration now with PEG:  -supportive care  -poor overall prognosis, may need to re-address goals of care if continues to decline on this admission.     DVT ppx:  -hold anticoagulation in setting of bleed

## 2019-12-09 LAB
ANION GAP SERPL CALC-SCNC: 3 MMOL/L — LOW (ref 5–17)
BUN SERPL-MCNC: 16 MG/DL — SIGNIFICANT CHANGE UP (ref 7–23)
CALCIUM SERPL-MCNC: 7.9 MG/DL — LOW (ref 8.5–10.1)
CHLORIDE SERPL-SCNC: 117 MMOL/L — HIGH (ref 96–108)
CO2 SERPL-SCNC: 28 MMOL/L — SIGNIFICANT CHANGE UP (ref 22–31)
CREAT SERPL-MCNC: 0.68 MG/DL — SIGNIFICANT CHANGE UP (ref 0.5–1.3)
GLUCOSE SERPL-MCNC: 105 MG/DL — HIGH (ref 70–99)
HCT VFR BLD CALC: 29.9 % — LOW (ref 39–50)
HGB BLD-MCNC: 8.9 G/DL — LOW (ref 13–17)
MCHC RBC-ENTMCNC: 25.5 PG — LOW (ref 27–34)
MCHC RBC-ENTMCNC: 29.8 GM/DL — LOW (ref 32–36)
MCV RBC AUTO: 85.7 FL — SIGNIFICANT CHANGE UP (ref 80–100)
PLATELET # BLD AUTO: 235 K/UL — SIGNIFICANT CHANGE UP (ref 150–400)
POTASSIUM SERPL-MCNC: 4.3 MMOL/L — SIGNIFICANT CHANGE UP (ref 3.5–5.3)
POTASSIUM SERPL-SCNC: 4.3 MMOL/L — SIGNIFICANT CHANGE UP (ref 3.5–5.3)
RBC # BLD: 3.49 M/UL — LOW (ref 4.2–5.8)
RBC # FLD: 20.6 % — HIGH (ref 10.3–14.5)
SODIUM SERPL-SCNC: 148 MMOL/L — HIGH (ref 135–145)
WBC # BLD: 8.31 K/UL — SIGNIFICANT CHANGE UP (ref 3.8–10.5)
WBC # FLD AUTO: 8.31 K/UL — SIGNIFICANT CHANGE UP (ref 3.8–10.5)

## 2019-12-09 PROCEDURE — 99232 SBSQ HOSP IP/OBS MODERATE 35: CPT

## 2019-12-09 RX ORDER — METOCLOPRAMIDE HCL 10 MG
5 TABLET ORAL EVERY 8 HOURS
Refills: 0 | Status: DISCONTINUED | OUTPATIENT
Start: 2019-12-09 | End: 2019-12-10

## 2019-12-09 RX ORDER — PANTOPRAZOLE SODIUM 20 MG/1
40 TABLET, DELAYED RELEASE ORAL
Refills: 0 | Status: DISCONTINUED | OUTPATIENT
Start: 2019-12-09 | End: 2019-12-09

## 2019-12-09 RX ORDER — PANTOPRAZOLE SODIUM 20 MG/1
40 TABLET, DELAYED RELEASE ORAL
Refills: 0 | Status: DISCONTINUED | OUTPATIENT
Start: 2019-12-09 | End: 2019-12-10

## 2019-12-09 RX ORDER — SODIUM CHLORIDE 9 MG/ML
1000 INJECTION, SOLUTION INTRAVENOUS
Refills: 0 | Status: COMPLETED | OUTPATIENT
Start: 2019-12-09 | End: 2019-12-09

## 2019-12-09 RX ADMIN — Medication 5 MILLIGRAM(S): at 05:15

## 2019-12-09 RX ADMIN — Medication 100 MILLIGRAM(S): at 17:53

## 2019-12-09 RX ADMIN — SODIUM CHLORIDE 65 MILLILITER(S): 9 INJECTION, SOLUTION INTRAVENOUS at 11:00

## 2019-12-09 RX ADMIN — Medication 5 MILLIGRAM(S): at 11:01

## 2019-12-09 RX ADMIN — Medication 110 MILLIGRAM(S): at 05:26

## 2019-12-09 RX ADMIN — Medication 5 MILLIGRAM(S): at 21:15

## 2019-12-09 RX ADMIN — PANTOPRAZOLE SODIUM 40 MILLIGRAM(S): 20 TABLET, DELAYED RELEASE ORAL at 05:15

## 2019-12-09 RX ADMIN — PANTOPRAZOLE SODIUM 40 MILLIGRAM(S): 20 TABLET, DELAYED RELEASE ORAL at 17:35

## 2019-12-09 NOTE — PROGRESS NOTE ADULT - SUBJECTIVE AND OBJECTIVE BOX
CC: Hematemesis   History of Present Illness: 	  80 y/o male with a PMHx of Anemia, COPD, Dementia, hx DM (a1c 5.2), DVT on Apixaban, HTN, OA, PVD, LLE OM on doxycycline,  presents to the ED BIBEMS from [facility] for upper GI bleeding. Pt presented with dry blood to mouth. Patient has advanced Dementia and cannot answer questions; he has a PEG, which was recently replaced. s/p K centra in the ED, started on PPI.      12/6: Pt is lying in bed, confused, unable to obtain history.  He will be getting 1u prbc for dropping hemoglobin.  He is awaiting EGD.    12/7: Lying in bed, status quo, confused, lethargic appearing.  Found to have reflux esophagitis on EGD.  Hb stable after unit prbc yesterday.    12/8: Status quo, a bit more alert today.  Tolerating tube feeds.  No fever, chills, n, v, emesis.  12/9: Tolerating tube feeds, no fever, chills, n, v.  Stable.     ROS: Unable to determine due to dementia    Vital Signs Last 24 Hrs  T(C): 36.8 (09 Dec 2019 10:46), Max: 36.9 (08 Dec 2019 23:39)  T(F): 98.2 (09 Dec 2019 10:46), Max: 98.4 (08 Dec 2019 23:39)  HR: 93 (09 Dec 2019 10:46) (86 - 93)  BP: 104/87 (09 Dec 2019 10:46) (104/87 - 119/49)  BP(mean): --  RR: 18 (09 Dec 2019 10:46) (17 - 18)  SpO2: 96% (09 Dec 2019 10:46) (94% - 96%)    PHYSICAL EXAM:    GENERAL: NAD  HEENT:  NC/AT, EOMI, PERRLA, No scleral icterus, dry mucous membranes, poor dentition   NECK: Supple, No JVD  CNS:  Alert & Oriented X0,   LUNG: Normal Breath sounds, Clear to auscultation bilaterally, No rales, No rhonchi, No wheezing  HEART: RRR; No murmurs, No rubs  ABDOMEN: +BS, ST/ND/NT, +PEG in place   GENITOURINARY: Voiding, Bladder not distended  EXTREMITIES:  2+ Peripheral Pulses, No clubbing, No cyanosis, No tibial edema  MUSCULOSKELTAL: Joints normal ROM, No TTP, No effusion    MEDICATIONS  (STANDING):  doxycycline hyclate Capsule 100 milliGRAM(s) Oral every 12 hours  metoclopramide 5 milliGRAM(s) Oral every 8 hours  pantoprazole  Injectable 40 milliGRAM(s) IV Push two times a day    MEDICATIONS  (PRN):  aluminum hydroxide/magnesium hydroxide/simethicone Suspension 30 milliLiter(s) Oral every 4 hours PRN Dyspepsia  ondansetron Injectable 4 milliGRAM(s) IV Push every 6 hours PRN Nausea                              8.9    8.31  )-----------( 235      ( 09 Dec 2019 07:59 )             29.9     12-09    148<H>  |  117<H>  |  16  ----------------------------<  105<H>  4.3   |  28  |  0.68    Ca    7.9<L>      09 Dec 2019 07:59      CAPILLARY BLOOD GLUCOSE                a/p: 79 year old man with advanced dementia, PEG presents with hematemesis     Hematemesis: Due to likely severe esophagitis with acute blood loss anemia  -hx of recurrent GI bleed  -s/p K centra , hold anticoagulation   -EGD shows esophagitis due to reflux   -PPI drip switched to IV BID to oral BID today  -s/p 2u PRBC total   -monitor H+H --> stable    Hypernatremia / moderate protein calorie malnutrition:  -Na levels normalizing  -restarted tube feeds per dietary recommendations  -c/w D5W IV  -monitor lytes    Chronic OM / leukocytosis:  -c/w doxy  -BCx NGTD  -leukocytosis resolved    Hx DVT / afib:  -repeat dopplers of legs negative for DVT  -Pt risk for life threatening bleeding higher than risk of stroke, thus will continue to stop apixaban.     Elevated Lactate due to dehydration/bleeding:  -normalized after fluids and prbc transfusion     Advanced dementia w/ hx of aspiration now with PEG:  -supportive care  -poor overall prognosis, may need to re-address goals of care if continues to decline on this admission.     DVT ppx:  -hold anticoagulation in setting of bleed    Dispo:  -d/c tomorrow if remains HD stable

## 2019-12-10 ENCOUNTER — TRANSCRIPTION ENCOUNTER (OUTPATIENT)
Age: 79
End: 2019-12-10

## 2019-12-10 VITALS
SYSTOLIC BLOOD PRESSURE: 95 MMHG | RESPIRATION RATE: 18 BRPM | OXYGEN SATURATION: 94 % | TEMPERATURE: 98 F | DIASTOLIC BLOOD PRESSURE: 63 MMHG | HEART RATE: 81 BPM

## 2019-12-10 LAB
ANION GAP SERPL CALC-SCNC: 2 MMOL/L — LOW (ref 5–17)
BUN SERPL-MCNC: 16 MG/DL — SIGNIFICANT CHANGE UP (ref 7–23)
CALCIUM SERPL-MCNC: 7.9 MG/DL — LOW (ref 8.5–10.1)
CHLORIDE SERPL-SCNC: 113 MMOL/L — HIGH (ref 96–108)
CO2 SERPL-SCNC: 26 MMOL/L — SIGNIFICANT CHANGE UP (ref 22–31)
CREAT SERPL-MCNC: 0.64 MG/DL — SIGNIFICANT CHANGE UP (ref 0.5–1.3)
CULTURE RESULTS: SIGNIFICANT CHANGE UP
CULTURE RESULTS: SIGNIFICANT CHANGE UP
GLUCOSE SERPL-MCNC: 102 MG/DL — HIGH (ref 70–99)
POTASSIUM SERPL-MCNC: 4.3 MMOL/L — SIGNIFICANT CHANGE UP (ref 3.5–5.3)
POTASSIUM SERPL-SCNC: 4.3 MMOL/L — SIGNIFICANT CHANGE UP (ref 3.5–5.3)
SODIUM SERPL-SCNC: 141 MMOL/L — SIGNIFICANT CHANGE UP (ref 135–145)
SPECIMEN SOURCE: SIGNIFICANT CHANGE UP
SPECIMEN SOURCE: SIGNIFICANT CHANGE UP

## 2019-12-10 PROCEDURE — 99231 SBSQ HOSP IP/OBS SF/LOW 25: CPT

## 2019-12-10 RX ORDER — FERROUS SULFATE 325(65) MG
5 TABLET ORAL
Qty: 0 | Refills: 0 | DISCHARGE

## 2019-12-10 RX ORDER — RANITIDINE HYDROCHLORIDE 150 MG/1
10 TABLET, FILM COATED ORAL
Qty: 0 | Refills: 0 | DISCHARGE

## 2019-12-10 RX ORDER — PANTOPRAZOLE SODIUM 20 MG/1
40 TABLET, DELAYED RELEASE ORAL
Qty: 0 | Refills: 0 | DISCHARGE
Start: 2019-12-10

## 2019-12-10 RX ORDER — APIXABAN 2.5 MG/1
1 TABLET, FILM COATED ORAL
Qty: 0 | Refills: 0 | DISCHARGE

## 2019-12-10 RX ADMIN — Medication 5 MILLIGRAM(S): at 14:16

## 2019-12-10 RX ADMIN — PANTOPRAZOLE SODIUM 40 MILLIGRAM(S): 20 TABLET, DELAYED RELEASE ORAL at 05:10

## 2019-12-10 RX ADMIN — Medication 5 MILLIGRAM(S): at 05:09

## 2019-12-10 RX ADMIN — Medication 100 MILLIGRAM(S): at 05:10

## 2019-12-10 NOTE — DISCHARGE NOTE PROVIDER - NSDCCPCAREPLAN_GEN_ALL_CORE_FT
PRINCIPAL DISCHARGE DIAGNOSIS  Diagnosis: Chronic esophagitis  Assessment and Plan of Treatment: Likely etiology of bleed.  Continue pantoprazile twice a day.  Stop apixaban.  Anemia is stable.      SECONDARY DISCHARGE DIAGNOSES  Diagnosis: Severe dementia  Assessment and Plan of Treatment: continue supportive care.  continue tube feeds.

## 2019-12-10 NOTE — DISCHARGE NOTE PROVIDER - HOSPITAL COURSE
CC: Hematemesis     History of Present Illness:     78 y/o male with a PMHx of Anemia, COPD, Dementia, hx DM (a1c 5.2), DVT on Apixaban, HTN, OA, PVD, LLE OM on doxycycline,  presents to the ED BIBEMS from [facility] for upper GI bleeding. Pt presented with dry blood to mouth. Patient has advanced Dementia and cannot answer questions; he has a PEG, which was recently replaced. s/p K centra in the ED, started on PPI.   Pt had EGD which showed  reflux esophagitis.  Hb stable after total of 2 unit prbc.  Pt restarted on tube feeds and tolerating.  Pt is status quo.  Sodium normalized with D5w IV.  Tolerating tube feeds and meds.  No bleeding noted, hemoglobin stable.   Pt had repeat leg dopplers neg for DVT.  He is currently in sinus.  Apixaban stopped due to risks/benefits, higher risk of rebleeding.         ROS: Unable to determine due to dementia        Vital Signs Last 24 Hrs    T(C): 36.5 (10 Dec 2019 11:08), Max: 36.5 (09 Dec 2019 21:30)    T(F): 97.7 (10 Dec 2019 11:08), Max: 97.7 (09 Dec 2019 21:30)    HR: 81 (10 Dec 2019 11:08) (81 - 88)    BP: 95/63 (10 Dec 2019 11:08) (95/63 - 129/65)    BP(mean): --    RR: 18 (10 Dec 2019 11:08) (18 - 18)    SpO2: 94% (10 Dec 2019 11:08) (94% - 95%)        PHYSICAL EXAM:        GENERAL: NAD    HEENT:  NC/AT, EOMI, PERRLA, No scleral icterus, dry mucous membranes, poor dentition     NECK: Supple, No JVD    CNS:  Alert & Oriented X0,     LUNG: Normal Breath sounds, Clear to auscultation bilaterally, No rales, No rhonchi, No wheezing    HEART: RRR; No murmurs, No rubs    ABDOMEN: +BS, ST/ND/NT, +PEG in place     GENITOURINARY: Voiding, Bladder not distended    EXTREMITIES:  2+ Peripheral Pulses, No clubbing, No cyanosis, No tibial edema    MUSCULOSKELTAL: Joints normal ROM, No TTP, No effusion        med/labs: Reviewed and interpreted         a/p: 79 year old man with advanced dementia, PEG presents with hematemesis         Hematemesis: Due to likely severe esophagitis with acute blood loss anemia    -hx of recurrent GI bleed    -s/p K centra , hold anticoagulation     -EGD shows esophagitis due to reflux     -PPI drip switched to IV BID to oral BID - continue.     -s/p 2u PRBC total     -H+H --> stable        Hypernatremia / moderate protein calorie malnutrition:    -Na levels normalized s/p D5W IV    -restarted tube feeds per dietary recommendations        Chronic OM / leukocytosis:    -c/w doxy    -BCx NGTD    -leukocytosis resolved        Hx DVT / chronic paroxysmal afib:    -repeat dopplers of legs negative for DVT    -EKG currently in sinus.     -Pt risk for life threatening bleeding higher than risk of stroke, thus will continue to stop apixaban.  Multiple calls made out to family but unable to get in touch with.         Elevated Lactate due to dehydration/bleeding:    -normalized after fluids and prbc transfusion         Advanced dementia w/ hx of aspiration now with PEG:    -supportive care    -poor overall prognosis, however no readily available family to discuss hospice care.         Attending Statement: 40 minutes spent on total encounter and discharge planning.

## 2019-12-10 NOTE — DISCHARGE NOTE PROVIDER - NSDCMRMEDTOKEN_GEN_ALL_CORE_FT
cholecalciferol 1000 intl units oral tablet, chewable: 2 tab(s) by gastrostomy tube once a day  doxycycline hyclate 100 mg oral capsule: 1 cap(s) by gastrostomy tube 2 times a day  ferrous sulfate 220 mg/5 mL (44 mg elemental iron) oral elixir: 5 milliliter(s) by gastrostomy tube once a day  metoclopramide 5 mg oral tablet: 1 tab(s) by gastrostomy tube every 6 hours  pantoprazole 40 mg oral granule, delayed release: 40 milligram(s) by gastrostomy tube 2 times a day

## 2019-12-10 NOTE — DISCHARGE NOTE PROVIDER - INSTRUCTIONS
NPO with tube feeds  glucerna 1.5 shahla via gastrostomy  continuous starting tube feed rate: 20  increase tube feed rate by 20 every 6 hours until goal tube feed rate of 55 ml/hr  tube feed duration 24 hours  free H20 flush 40 mL/hr to meet estimated hydration needs.

## 2019-12-10 NOTE — DISCHARGE NOTE NURSING/CASE MANAGEMENT/SOCIAL WORK - PATIENT PORTAL LINK FT
You can access the FollowMyHealth Patient Portal offered by [facility] by registering at the following website: [email or web address]. By joining [employer]’s FollowMyHealth portal, you will also be able to view your health information using other applications (apps) compatible with our system.

## 2019-12-16 ENCOUNTER — EMERGENCY (EMERGENCY)
Facility: HOSPITAL | Age: 79
LOS: 0 days | Discharge: HOME | End: 2019-12-16
Attending: EMERGENCY MEDICINE
Payer: MEDICARE

## 2019-12-16 VITALS
SYSTOLIC BLOOD PRESSURE: 105 MMHG | RESPIRATION RATE: 18 BRPM | HEART RATE: 87 BPM | OXYGEN SATURATION: 98 % | TEMPERATURE: 98 F | WEIGHT: 134.92 LBS | DIASTOLIC BLOOD PRESSURE: 56 MMHG

## 2019-12-16 VITALS
HEART RATE: 90 BPM | DIASTOLIC BLOOD PRESSURE: 86 MMHG | SYSTOLIC BLOOD PRESSURE: 110 MMHG | RESPIRATION RATE: 18 BRPM | OXYGEN SATURATION: 95 %

## 2019-12-16 DIAGNOSIS — Z86.718 PERSONAL HISTORY OF OTHER VENOUS THROMBOSIS AND EMBOLISM: ICD-10-CM

## 2019-12-16 DIAGNOSIS — K94.23 GASTROSTOMY MALFUNCTION: Chronic | ICD-10-CM

## 2019-12-16 DIAGNOSIS — K92.0 HEMATEMESIS: ICD-10-CM

## 2019-12-16 DIAGNOSIS — E11.59 TYPE 2 DIABETES MELLITUS WITH OTHER CIRCULATORY COMPLICATIONS: ICD-10-CM

## 2019-12-16 DIAGNOSIS — F03.90 UNSPECIFIED DEMENTIA WITHOUT BEHAVIORAL DISTURBANCE: ICD-10-CM

## 2019-12-16 DIAGNOSIS — J44.9 CHRONIC OBSTRUCTIVE PULMONARY DISEASE, UNSPECIFIED: ICD-10-CM

## 2019-12-16 DIAGNOSIS — I10 ESSENTIAL (PRIMARY) HYPERTENSION: ICD-10-CM

## 2019-12-16 DIAGNOSIS — Z79.01 LONG TERM (CURRENT) USE OF ANTICOAGULANTS: ICD-10-CM

## 2019-12-16 DIAGNOSIS — M19.90 UNSPECIFIED OSTEOARTHRITIS, UNSPECIFIED SITE: ICD-10-CM

## 2019-12-16 LAB
ALBUMIN SERPL ELPH-MCNC: 2.3 G/DL — LOW (ref 3.3–5)
ALP SERPL-CCNC: 103 U/L — SIGNIFICANT CHANGE UP (ref 40–120)
ALT FLD-CCNC: 17 U/L — SIGNIFICANT CHANGE UP (ref 12–78)
ANION GAP SERPL CALC-SCNC: 2 MMOL/L — LOW (ref 5–17)
AST SERPL-CCNC: 22 U/L — SIGNIFICANT CHANGE UP (ref 15–37)
BASOPHILS # BLD AUTO: 0.07 K/UL — SIGNIFICANT CHANGE UP (ref 0–0.2)
BASOPHILS NFR BLD AUTO: 0.6 % — SIGNIFICANT CHANGE UP (ref 0–2)
BILIRUB SERPL-MCNC: 0.2 MG/DL — SIGNIFICANT CHANGE UP (ref 0.2–1.2)
BUN SERPL-MCNC: 35 MG/DL — HIGH (ref 7–23)
CALCIUM SERPL-MCNC: 8.4 MG/DL — LOW (ref 8.5–10.1)
CHLORIDE SERPL-SCNC: 110 MMOL/L — HIGH (ref 96–108)
CO2 SERPL-SCNC: 34 MMOL/L — HIGH (ref 22–31)
CREAT SERPL-MCNC: 0.71 MG/DL — SIGNIFICANT CHANGE UP (ref 0.5–1.3)
EOSINOPHIL # BLD AUTO: 0.22 K/UL — SIGNIFICANT CHANGE UP (ref 0–0.5)
EOSINOPHIL NFR BLD AUTO: 1.8 % — SIGNIFICANT CHANGE UP (ref 0–6)
GLUCOSE SERPL-MCNC: 121 MG/DL — HIGH (ref 70–99)
HCT VFR BLD CALC: 32.6 % — LOW (ref 39–50)
HGB BLD-MCNC: 9.4 G/DL — LOW (ref 13–17)
IMM GRANULOCYTES NFR BLD AUTO: 0.4 % — SIGNIFICANT CHANGE UP (ref 0–1.5)
LYMPHOCYTES # BLD AUTO: 1.07 K/UL — SIGNIFICANT CHANGE UP (ref 1–3.3)
LYMPHOCYTES # BLD AUTO: 8.6 % — LOW (ref 13–44)
MCHC RBC-ENTMCNC: 24.3 PG — LOW (ref 27–34)
MCHC RBC-ENTMCNC: 28.8 GM/DL — LOW (ref 32–36)
MCV RBC AUTO: 84.2 FL — SIGNIFICANT CHANGE UP (ref 80–100)
MONOCYTES # BLD AUTO: 0.88 K/UL — SIGNIFICANT CHANGE UP (ref 0–0.9)
MONOCYTES NFR BLD AUTO: 7.1 % — SIGNIFICANT CHANGE UP (ref 2–14)
NEUTROPHILS # BLD AUTO: 10.13 K/UL — HIGH (ref 1.8–7.4)
NEUTROPHILS NFR BLD AUTO: 81.5 % — HIGH (ref 43–77)
PLATELET # BLD AUTO: 346 K/UL — SIGNIFICANT CHANGE UP (ref 150–400)
POTASSIUM SERPL-MCNC: 4.5 MMOL/L — SIGNIFICANT CHANGE UP (ref 3.5–5.3)
POTASSIUM SERPL-SCNC: 4.5 MMOL/L — SIGNIFICANT CHANGE UP (ref 3.5–5.3)
PROT SERPL-MCNC: 7 GM/DL — SIGNIFICANT CHANGE UP (ref 6–8.3)
RBC # BLD: 3.87 M/UL — LOW (ref 4.2–5.8)
RBC # FLD: 19.5 % — HIGH (ref 10.3–14.5)
SODIUM SERPL-SCNC: 146 MMOL/L — HIGH (ref 135–145)
WBC # BLD: 12.42 K/UL — HIGH (ref 3.8–10.5)
WBC # FLD AUTO: 12.42 K/UL — HIGH (ref 3.8–10.5)

## 2019-12-16 PROCEDURE — 86901 BLOOD TYPING SEROLOGIC RH(D): CPT

## 2019-12-16 PROCEDURE — 85025 COMPLETE CBC W/AUTO DIFF WBC: CPT

## 2019-12-16 PROCEDURE — 86850 RBC ANTIBODY SCREEN: CPT

## 2019-12-16 PROCEDURE — 99284 EMERGENCY DEPT VISIT MOD MDM: CPT | Mod: 25

## 2019-12-16 PROCEDURE — 99284 EMERGENCY DEPT VISIT MOD MDM: CPT

## 2019-12-16 PROCEDURE — 80053 COMPREHEN METABOLIC PANEL: CPT

## 2019-12-16 PROCEDURE — 36415 COLL VENOUS BLD VENIPUNCTURE: CPT

## 2019-12-16 PROCEDURE — 86900 BLOOD TYPING SEROLOGIC ABO: CPT

## 2019-12-16 PROCEDURE — C9113: CPT

## 2019-12-16 PROCEDURE — 96374 THER/PROPH/DIAG INJ IV PUSH: CPT

## 2019-12-16 RX ORDER — PANTOPRAZOLE SODIUM 20 MG/1
40 TABLET, DELAYED RELEASE ORAL ONCE
Refills: 0 | Status: COMPLETED | OUTPATIENT
Start: 2019-12-16 | End: 2019-12-16

## 2019-12-16 RX ORDER — SODIUM CHLORIDE 9 MG/ML
1000 INJECTION INTRAMUSCULAR; INTRAVENOUS; SUBCUTANEOUS
Refills: 0 | Status: DISCONTINUED | OUTPATIENT
Start: 2019-12-16 | End: 2019-12-16

## 2019-12-16 RX ADMIN — SODIUM CHLORIDE 125 MILLILITER(S): 9 INJECTION INTRAMUSCULAR; INTRAVENOUS; SUBCUTANEOUS at 12:19

## 2019-12-16 RX ADMIN — PANTOPRAZOLE SODIUM 40 MILLIGRAM(S): 20 TABLET, DELAYED RELEASE ORAL at 12:19

## 2019-12-16 NOTE — ED PROVIDER NOTE - NSFOLLOWUPINSTRUCTIONS_ED_ALL_ED_FT
Continue carafate and protonix  Follow-up with Dr. Yoder (GI) as needed  Return with any recurrent/worsening symptoms

## 2019-12-16 NOTE — ED PROVIDER NOTE - OBJECTIVE STATEMENT
80 y/o M with a PMHx of HTN, OA, DVT, PVD, DM, COPD, GI bleed, anemia, dementia presents to the ED BIBEMS from [facility] regarding 1 episode of coffee ground emesis at approximately 9AM this morning. Facility did a guaiac test which was positive. Pt has had hematemesis in the past. Pt was recently d/c from  after being admitted for GI bleed. Per MOL, pt is a full code. Pt is anticoagulated on Eliquis.

## 2019-12-16 NOTE — ED PROVIDER NOTE - PATIENT PORTAL LINK FT
You can access the FollowMyHealth Patient Portal offered by [facility] by registering at the following website: [email or web address]. By joining [employer]’s FollowMyHealth portal, you will also be able to view your health information using other applications (apps) compatible with our system.

## 2019-12-16 NOTE — ED PROVIDER NOTE - PROGRESS NOTE DETAILS
Ana BRIONES for ED attending, Dr. Kessler: Spoke to MD Yoder who said if labs are unchanged, pt can return to [place] and continue Protonix and Carafate. HgB stable. D/W Shelby -- ok for d/c back to [place].  Will continue GI meds

## 2019-12-16 NOTE — ED ADULT TRIAGE NOTE - CHIEF COMPLAINT QUOTE
Patient brought in by EMS for 1 episode of coffee ground emesis at approx. 9am this morning. Facility did guaiac test which was positive. Patient at baseline mental status. hx of hematemesis.

## 2019-12-16 NOTE — ED ADULT NURSE NOTE - DRUG PRE-SCREENING (DAST -1)
Subjective:       Patient ID: Daniel Colindres is a 16 m.o. male.    Chief Complaint: Rash; Vomiting; and Diarrhea    HPI vomitting for 24 hours: 2x yesterday, 1x today.  Diarrhea every 1 hr for 24 hours. No blood or mucous in the stool. Tugging at left ear. Teething molars. Fever to 99.8. Appetite ok, no cough, no rhinorrhea. Giving juice to drink and 1 bottle of milk yesterday  Review of Systems   All other systems reviewed and are negative.      Objective:      Physical Exam   Constitutional: He appears well-developed and well-nourished. He is active. No distress.   HENT:   Head: Atraumatic. No signs of injury.   Right Ear: Tympanic membrane normal.   Left Ear: Tympanic membrane normal.   Nose: Nose normal. No nasal discharge.   Mouth/Throat: Mucous membranes are moist. Dentition is normal. No tonsillar exudate. Pharynx is abnormal.   Throat red with enlarged tonsils.   Eyes: Conjunctivae and EOM are normal. Pupils are equal, round, and reactive to light. Right eye exhibits no discharge. Left eye exhibits no discharge.   Neck: Normal range of motion. Neck supple. No no neck rigidity.   Cardiovascular: Normal rate, regular rhythm, S1 normal and S2 normal.  Pulses are strong and palpable.    Pulmonary/Chest: Effort normal and breath sounds normal. No nasal flaring or stridor. No respiratory distress. Expiration is prolonged. He has no wheezes. He has no rhonchi. He has no rales. He exhibits no retraction.   Abdominal: Soft. He exhibits no distension and no mass. Bowel sounds are increased. There is no hepatosplenomegaly. There is no tenderness. There is no rebound and no guarding. No hernia.   Genitourinary: Penis normal.   Musculoskeletal: Normal range of motion.   Lymphadenopathy: No occipital adenopathy is present.     He has cervical adenopathy (shotty).   Neurological: He is alert.   Skin: Skin is warm and moist. Capillary refill takes less than 2 seconds. Rash (fine papular rash on trunk.  ) noted. He is not  diaphoretic.   Nursing note and vitals reviewed.    Weight down 8.5 ounces since 5/31/17  Assessment:       1. Fever, unspecified fever cause    2. Acute gastroenteritis        Plan:       Daniel was seen today for rash, vomiting and diarrhea.    Diagnoses and all orders for this visit:    Fever, unspecified fever cause  -     POCT rapid strep A; Standing  -     POCT rapid strep A    Acute gastroenteritis    Stop juice and milk for 3-5 days.  Add starchy foods  Zofran for vomiting (pt has this at home).last  Tylenol for fever or teething pain.     Component      Latest Ref Rng & Units 6/21/2017   Rapid Strep A Screen      Negative Negative      Statement Selected

## 2019-12-16 NOTE — ED ADULT NURSE NOTE - OBJECTIVE STATEMENT
78 y/o M presents to the ED for coffee ground emesis. Pt non verbal. Sent in from [facility].

## 2019-12-17 DIAGNOSIS — Z93.1 GASTROSTOMY STATUS: ICD-10-CM

## 2019-12-17 DIAGNOSIS — E11.9 TYPE 2 DIABETES MELLITUS WITHOUT COMPLICATIONS: ICD-10-CM

## 2019-12-17 DIAGNOSIS — D62 ACUTE POSTHEMORRHAGIC ANEMIA: ICD-10-CM

## 2019-12-17 DIAGNOSIS — R57.1 HYPOVOLEMIC SHOCK: ICD-10-CM

## 2019-12-17 DIAGNOSIS — F03.90 UNSPECIFIED DEMENTIA WITHOUT BEHAVIORAL DISTURBANCE: ICD-10-CM

## 2019-12-17 DIAGNOSIS — Z79.4 LONG TERM (CURRENT) USE OF INSULIN: ICD-10-CM

## 2019-12-17 DIAGNOSIS — K92.2 GASTROINTESTINAL HEMORRHAGE, UNSPECIFIED: ICD-10-CM

## 2019-12-17 DIAGNOSIS — I48.20 CHRONIC ATRIAL FIBRILLATION, UNSPECIFIED: ICD-10-CM

## 2019-12-17 DIAGNOSIS — K22.8 OTHER SPECIFIED DISEASES OF ESOPHAGUS: ICD-10-CM

## 2019-12-17 DIAGNOSIS — K21.0 GASTRO-ESOPHAGEAL REFLUX DISEASE WITH ESOPHAGITIS: ICD-10-CM

## 2019-12-17 DIAGNOSIS — E87.1 HYPO-OSMOLALITY AND HYPONATREMIA: ICD-10-CM

## 2019-12-17 DIAGNOSIS — E44.0 MODERATE PROTEIN-CALORIE MALNUTRITION: ICD-10-CM

## 2019-12-17 DIAGNOSIS — J44.9 CHRONIC OBSTRUCTIVE PULMONARY DISEASE, UNSPECIFIED: ICD-10-CM

## 2019-12-17 DIAGNOSIS — E86.0 DEHYDRATION: ICD-10-CM

## 2019-12-17 DIAGNOSIS — R57.8 OTHER SHOCK: ICD-10-CM

## 2020-02-21 NOTE — PATIENT PROFILE ADULT - NSPROEXTENSIONSOFSELF_GEN_A_NUR
none

## 2020-09-14 NOTE — ED ADULT NURSE REASSESSMENT NOTE - COMFORT CARE
wait time explained/plan of care explained/side rails up/warm blanket provided PROVIDER:[TOKEN:[[id number]],ESTABLISHEDPATIENT:[T]]

## 2020-10-08 NOTE — PROGRESS NOTE ADULT - ASSESSMENT
IMP:    77 y/o male with advance dementia and AF on coumadin admitted on 4/22 to SDU for possible GIB and UTI sepsis--found to have R non obst renal stone and ESBL UTI pyelo/possible Aspiration PNA and colitis.  Tx to ICU for septic shock, toxic encephalopathy, Hypernatremia, ?? GIB and MIGUEL (KDIGO 1) with baseline Cr 0.9     Does not appear he has active GIB  Chronic L hip OM on doxy as outpt  I suspect he will need PEG as there has been limited improvement in mentation   MIGUEL resolved     Plan:    Observe off pressors  IV Vanco/ pip/mayra (2 Stopped)--[place] (5)  NPO--TF to goal--free water to 60/hr  HOB > 30  Aspiration precautions   PPI q12  Coumadin 3 tonight   DVT prophy  Restraint to prevent self harm and removal of medical devices  Replete lytes as needed    ICU care--d/w ICU staff Statement Selected

## 2021-03-22 NOTE — PATIENT PROFILE ADULT - ANY SIGNIFICANT CHANGE IN ABILITY TO PERFORM THE FOLLOWING ADL SINCE THE ONSET OF PRESENT ILLNESS?
----- Message from Shelbie Ayala RN sent at 3/22/2021  4:55 PM CDT -----  FYI: Reports a 2 week hx of pain in right foot 5 th metatarsal area that has been improving. Does not recall injury, however, concerned  with a clot? Denies calf pain. On exam rt foot is warm, non tender, no redness, edema or discoloration. Pedal/post tibial pulses palpable. Instructed pt to contact PCP if pain worsens or foot becomes red, tender to touch. Continue TWD of 55mg/wk. INR 3-4 weeks. Rt foot concerns forwarded to PCP. no

## 2022-06-17 NOTE — PROGRESS NOTE ADULT - ASSESSMENT
79 y/o male w/ septic shock and superimposed GI bleed requiring pressor support    Septic Shock 2/2 UTI and or aspiration PNA  - resolved    GI Bleed  - Coffee ground emesis noted at NH and positive guaiac in ED  -Will continue Monitoring H&H, which is currently stable  - NPO  - Protonix IV BID  - GI consult appreciated   - No active bleeding at this time    Right upper extremity DVT  - imaging as above  - continue lovenox  - will hold evening dose Sunday night for PEG tube placement Monday 5/6    # Normocytic anemia  -likely secondary to GI bleed  -H&H stable  -will trend cbc       Hypernatremia  -resolved    Atrial Fibrillation   - CHADS2 Vasc score of 6  - continue to hold coumadin - will resume per GI recommendations after PEG tube placement  - on lovenox- will hold after [place] night for planned procedure  - after PEG tube placement will continue warfarin and lovenox till INR is 2-3  - rate controlled     DM2  - Non-insulin dependent   - 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

## 2022-09-25 NOTE — PATIENT PROFILE ADULT - NSPROGENBLOODRESTRICT_GEN_A_NUR
Gen: NAD, AOx3  Head: NCAT  HEENT: PERRL, oral mucosa moist, normal conjunctiva, oropharynx clear without exudate or erythema  Lung: CTAB, no respiratory distress, no wheezing, rales, rhonchi  CV: normal s1/s2, rrr, Normal perfusion, pulses 2+ throughout  Abd: soft, NTND, L CVA tenderness  Genitourinary: no pelvic tenderness  MSK: No edema, no visible deformities, full range of motion in all 4 extremities  Neuro: No focal neurologic deficits  Skin: No rash   Psych: normal affect
unable to assess, pt confused

## 2022-10-01 NOTE — ED ADULT NURSE NOTE - NSFALLRSKASSESSDT_ED_ALL_ED
IDPH queried done, TDAP vaccine updated. 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 I went to [place] [id number] in [address] [facility]. I want you to know I was diagnostic with covid I wasn't  feeling good so I went for a rapid test, and it came out Positive I am taking Tylenol every 4 hours and Robitussin for my cough. I am doing okay just very tired and weak.
Patient stated that tested positive for COVID on 9/28/2022 with a rapid test. Has been taking robitussin DM and taking tylenol. Drinking a lot of tea with lemon. Cough seems to be getting better. Patient understands that if has any shortness of breath or chest pain to go to the ER.
21-Nov-2019 02:24

## 2023-02-13 NOTE — PROGRESS NOTE ADULT - SUBJECTIVE AND OBJECTIVE BOX
75 y/o male with PMHx of PAF, Anemia, GERD, Left Hip Fistula, COPD, NIDDM, PVD, HTN, Dementia and Chronic Left Hip Osteomyelitis. Patient is sent from [facility] for acute onset of left chest redness over the site of his Mediport. Per patient records, the patient had left Dixon catheter placed Jan 2017 after a chronic post surgical left hip osteomyelitis for which he had previously received IV antibiotics for.  Per records the port has not been used since February but has been flushed every month.  Last flush was 8/29/17 and patient was noted to have increasing erythema and warmth over a 4 cm area over the site of the Dixon port.  There have been no reported episodes of fever, chills, or malaise.      9/1: Comfortable, lying in bed. Pleasantly confused. Offers no complaints.  9/2: No complaints, status quo.   9/3: Lying in bed, pleasantly confused. Remains HD stable.  9/4: s/p port removal. Tolerated procedure well. No complaints at bedside.  9/5: Status quo. HD stable. Site of port erythematous.  9/6 : Status quo. HD stable. Dr. Paulino changed port removal dressing/packing.    REVIEW OF SYSTEMS: all negative except for comments above.    Vital Signs Last 24 Hrs  T(C): 36.6 (06 Sep 2017 11:10), Max: 36.8 (06 Sep 2017 05:29)  T(F): 97.8 (06 Sep 2017 11:10), Max: 98.3 (06 Sep 2017 05:29)  HR: 81 (06 Sep 2017 11:10) (61 - 81)  BP: 142/63 (06 Sep 2017 11:10) (110/44 - 142/63)  BP(mean): --  RR: 18 (06 Sep 2017 05:29) (18 - 18)  SpO2: 100% (06 Sep 2017 11:10) (98% - 100%)    MEDICATIONS  (STANDING):  Dakins Solution - 1/2 Strength 1 Application(s) Topical daily  ferrous    sulfate Liquid 300 milliGRAM(s) Oral daily  senna 2 Tablet(s) Oral at bedtime  lactobacillus acidophilus 1 Tablet(s) Oral daily  cholecalciferol 2000 Unit(s) Oral daily  piperacillin/tazobactam IVPB. 3.375 Gram(s) IV Intermittent every 8 hours  vancomycin  IVPB 750 milliGRAM(s) IV Intermittent every 12 hours  heparin  Injectable 5000 Unit(s) SubCutaneous every 12 hours  warfarin 5 milliGRAM(s) Oral daily    MEDICATIONS  (PRN):  polyethylene glycol 3350 17 Gram(s) Oral daily PRN Constipation      PHYSICAL EXAM:    Constitutional: NAD, awake and alert, well-developed  HEENT: PERR, EOMI, Normal Hearing, MMM  Neck: Soft and supple  Respiratory: Breath sounds are clear bilaterally, No wheezing, rales or rhonchi  Cardiovascular: S1 and S2, regular rate and rhythm, no Murmurs, gallops or rubs  Gastrointestinal: Bowel Sounds present, soft, nontender, nondistended, no guarding, no rebound  Extremities: No peripheral edema  Neurological: A/O x 3, no focal deficits  Skin: No rashes, chest wall erythematous s/p port removal with bandage in place.                          13.6   10.0  )-----------( 223      ( 05 Sep 2017 06:14 )             42.4   09-05    137  |  104  |  13  ----------------------------<  113<H>  3.7   |  26  |  1.12    Ca    8.7      05 Sep 2017 06:13    PT/INR - ( 06 Sep 2017 07:32 )   PT: 14.5 sec;   INR: 1.33 ratio      MEDICATIONS  (STANDING):  Dakins Solution - 1/2 Strength 1 Application(s) Topical daily  ferrous    sulfate Liquid 300 milliGRAM(s) Oral daily  senna 2 Tablet(s) Oral at bedtime  lactobacillus acidophilus 1 Tablet(s) Oral daily  cholecalciferol 2000 Unit(s) Oral daily  piperacillin/tazobactam IVPB. 3.375 Gram(s) IV Intermittent every 8 hours  vancomycin  IVPB 750 milliGRAM(s) IV Intermittent every 12 hours  heparin  Injectable 5000 Unit(s) SubCutaneous every 12 hours  warfarin 5 milliGRAM(s) Oral daily    MEDICATIONS  (PRN):  polyethylene glycol 3350 17 Gram(s) Oral daily PRN Constipation         Assessment and Plan:     75y/o male with PMHx as above that presents with new onset left chest cellulitis.      # Cellulitis of chest wall secondary to infected mediport.  - s/p removal of port day #2  - dressing changed by Dr. 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  - c/w Dakin's solution, wound care  - will need lifelong/prolonged suppressive abx if prosthetic remains in place    # COPD  - stable, no acute management.     # DM2  - accucheck QAC and HS     # Essential hypertension  - hold BP meds for now.     # PAF: STABLE  - continue coumadin for goal INR 2-3  - subc heparin 77 y/o male with PMHx of PAF, Anemia, GERD, Left Hip Fistula, COPD, NIDDM, PVD, HTN, Dementia and Chronic Left Hip Osteomyelitis. Patient is sent from [facility] for acute onset of left chest redness over the site of his Mediport. Per patient records, the patient had left Dixon catheter placed Jan 2017 after a chronic post surgical left hip osteomyelitis for which he had previously received IV antibiotics for.  Per records the port has not been used since February but has been flushed every month.  Last flush was 8/29/17 and patient was noted to have increasing erythema and warmth over a 4 cm area over the site of the Dixon port.  There have been no reported episodes of fever, chills, or malaise.      9/1: Comfortable, lying in bed. Pleasantly confused. Offers no complaints.  9/2: No complaints, status quo.   9/3: Lying in bed, pleasantly confused. Remains HD stable.  9/4: s/p port removal. Tolerated procedure well. No complaints at bedside.  9/5: Status quo. HD stable. Site of port erythematous.  9/6 : Status quo. HD stable. Dr. Paulino changed port removal dressing/packing.    REVIEW OF SYSTEMS: all negative except for comments above.    Vital Signs Last 24 Hrs  T(C): 36.6 (06 Sep 2017 11:10), Max: 36.8 (06 Sep 2017 05:29)  T(F): 97.8 (06 Sep 2017 11:10), Max: 98.3 (06 Sep 2017 05:29)  HR: 81 (06 Sep 2017 11:10) (61 - 81)  BP: 142/63 (06 Sep 2017 11:10) (110/44 - 142/63)  BP(mean): --  RR: 18 (06 Sep 2017 05:29) (18 - 18)  SpO2: 100% (06 Sep 2017 11:10) (98% - 100%)      PHYSICAL EXAM:    Constitutional: NAD, awake and alert, well-developed  HEENT: PERR, EOMI, Normal Hearing, MMM  Neck: Soft and supple  Respiratory: Breath sounds are clear bilaterally, No wheezing, rales or rhonchi  Cardiovascular: S1 and S2, regular rate and rhythm, no Murmurs, gallops or rubs  Gastrointestinal: Bowel Sounds present, soft, nontender, nondistended, no guarding, no rebound  Extremities: No peripheral edema  Neurological: A/O x 3, no focal deficits  Skin: No rashes, chest wall erythematous s/p port removal with bandage in place.                          13.6   10.0  )-----------( 223      ( 05 Sep 2017 06:14 )             42.4   09-05    137  |  104  |  13  ----------------------------<  113<H>  3.7   |  26  |  1.12    Ca    8.7      05 Sep 2017 06:13    PT/INR - ( 06 Sep 2017 07:32 )   PT: 14.5 sec;   INR: 1.33 ratio      MEDICATIONS  (STANDING):  Dakins Solution - 1/2 Strength 1 Application(s) Topical daily  ferrous    sulfate Liquid 300 milliGRAM(s) Oral daily  senna 2 Tablet(s) Oral at bedtime  lactobacillus acidophilus 1 Tablet(s) Oral daily  cholecalciferol 2000 Unit(s) Oral daily  piperacillin/tazobactam IVPB. 3.375 Gram(s) IV Intermittent every 8 hours  vancomycin  IVPB 750 milliGRAM(s) IV Intermittent every 12 hours  heparin  Injectable 5000 Unit(s) SubCutaneous every 12 hours  warfarin 5 milliGRAM(s) Oral daily    MEDICATIONS  (PRN):  polyethylene glycol 3350 17 Gram(s) Oral daily PRN Constipation         Assessment and Plan:     77y/o male with PMHx as above that presents with new onset left chest cellulitis.      # Cellulitis of chest wall secondary to infected mediport.  - s/p removal of port day #2  - dressing changed by Dr. 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  - c/w Dakin's solution, wound care  - will need lifelong/prolonged suppressive abx if prosthetic remains in place    # COPD  - stable, no acute management.     # DM2  - accucheck QAC and HS     # Essential hypertension  - hold BP meds for now.     # PAF: STABLE  - continue coumadin for goal INR 2-3  - subc heparin 77 y/o male with PMHx of PAF, Anemia, GERD, Left Hip Fistula, COPD, NIDDM, PVD, HTN, Dementia and Chronic Left Hip Osteomyelitis. Patient is sent from [facility] for acute onset of left chest redness over the site of his Mediport. Per patient records, the patient had left Dixon catheter placed Jan 2017 after a chronic post surgical left hip osteomyelitis for which he had previously received IV antibiotics for.  Per records the port has not been used since February but has been flushed every month.  Last flush was 8/29/17 and patient was noted to have increasing erythema and warmth over a 4 cm area over the site of the Dixon port.  There have been no reported episodes of fever, chills, or malaise.      9/1: Comfortable, lying in bed. Pleasantly confused. Offers no complaints.  9/2: No complaints, status quo.   9/3: Lying in bed, pleasantly confused. Remains HD stable.  9/4: s/p port removal. Tolerated procedure well. No complaints at bedside.  9/5: Status quo. HD stable. Site of port erythematous.  9/6 : Status quo. HD stable. Dr. Paulino changed port removal dressing/packing.    REVIEW OF SYSTEMS: all negative except for comments above.    Vital Signs Last 24 Hrs  T(C): 36.6 (06 Sep 2017 11:10), Max: 36.8 (06 Sep 2017 05:29)  T(F): 97.8 (06 Sep 2017 11:10), Max: 98.3 (06 Sep 2017 05:29)  HR: 81 (06 Sep 2017 11:10) (61 - 81)  BP: 142/63 (06 Sep 2017 11:10) (110/44 - 142/63)  BP(mean): --  RR: 18 (06 Sep 2017 05:29) (18 - 18)  SpO2: 100% (06 Sep 2017 11:10) (98% - 100%)      PHYSICAL EXAM:    Constitutional: NAD, awake and alert, well-developed  HEENT: PERR, EOMI, Normal Hearing, MMM  Neck: Soft and supple  Respiratory: Breath sounds are clear bilaterally, No wheezing, rales or rhonchi  Cardiovascular: S1 and S2, regular rate and rhythm, no Murmurs, gallops or rubs  Gastrointestinal: Bowel Sounds present, soft, nontender, nondistended, no guarding, no rebound  Extremities: No peripheral edema  Neurological: A/O x 3, no focal deficits  Skin: No rashes, chest wall erythematous s/p port removal with bandage in place.                          13.6   10.0  )-----------( 223      ( 05 Sep 2017 06:14 )             42.4   09-05    137  |  104  |  13  ----------------------------<  113<H>  3.7   |  26  |  1.12    Ca    8.7      05 Sep 2017 06:13    PT/INR - ( 06 Sep 2017 07:32 )   PT: 14.5 sec;   INR: 1.33 ratio      MEDICATIONS  (STANDING):  Dakins Solution - 1/2 Strength 1 Application(s) Topical daily  ferrous    sulfate Liquid 300 milliGRAM(s) Oral daily  senna 2 Tablet(s) Oral at bedtime  lactobacillus acidophilus 1 Tablet(s) Oral daily  cholecalciferol 2000 Unit(s) Oral daily  piperacillin/tazobactam IVPB. 3.375 Gram(s) IV Intermittent every 8 hours  vancomycin  IVPB 750 milliGRAM(s) IV Intermittent every 12 hours  heparin  Injectable 5000 Unit(s) SubCutaneous every 12 hours  warfarin 5 milliGRAM(s) Oral daily    MEDICATIONS  (PRN):  polyethylene glycol 3350 17 Gram(s) Oral daily PRN Constipation         Assessment and Plan:     77y/o male with PMHx as above that presents with new onset left chest cellulitis.      # Cellulitis of chest wall secondary to infected mediport.  - s/p removal of port day #2  - dressing changed by Dr. 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  - c/w Dakin's solution, wound care  - will need lifelong/prolonged suppressive abx if prosthetic remains in place    # COPD  - stable, no acute management.     # DM2  - accucheck QAC and HS     # Essential hypertension: STABLE  - hold BP meds for now.     # PAF: STABLE  - continue coumadin for goal INR 2-3  - subc heparin MOLECULAR PCR

## 2023-04-04 NOTE — ED ADULT NURSE NOTE - CARDIO ASSESSMENT
(Not in a hospital admission)      Review of patient's allergies indicates:   Allergen Reactions    Measles (rubeola) vaccines      No live virus vaccines in transplant recipients    Nsaids (non-steroidal anti-inflammatory drug)      Renal failure with transplant medications    Varicella vaccines      Live virus vaccine    Grapefruit      Interacts with transplant medications    Thymoglobulin [anti-thymocyte glob (rabbit)] Other (See Comments)     Total body pain, likely from Rabbit Abs. If needs anti-thymocyte in the future recommend using horse ATGAM       Past Medical History:   Diagnosis Date    Antibody mediated rejection of transplanted heart     CHF (congestive heart failure)     Coronary artery disease     Diabetes mellitus     Dilated cardiomyopathy 2019    Encounter for blood transfusion     Oppositional defiant disorder 5/14/2021    Organ transplant     TAPVR (total anomalous pulmonary venous return) 2004     Past Surgical History:   Procedure Laterality Date    ANGIOGRAM, PULMONARY, PEDIATRIC  1/24/2023    Procedure: Angiogram, Pulmonary, Pediatric;  Surgeon: Claudia Roberts MD;  Location: [facility] CATH LAB;  Service: Cardiology;;    APPLICATION OF WOUND VACUUM-ASSISTED CLOSURE DEVICE Right 2/2/2021    Procedure: APPLICATION, WOUND VAC;  Surgeon: AMADO Lu II, MD;  Location: [facility] OR [address] [place] [address];  Service: Vascular;  Laterality: Right;    BIOPSY, CARDIAC, PEDIATRIC N/A 12/30/2022    Procedure: BIOPSY, CARDIAC, PEDIATRIC;  Surgeon: Xavi Alfaro Jr., MD;  Location: [facility] CATH LAB;  Service: Pediatric Cardiology;  Laterality: N/A;    BIOPSY, CARDIAC, PEDIATRIC N/A 1/24/2023    Procedure: BIOPSY, CARDIAC, PEDIATRIC;  Surgeon: Claudia Roberts MD;  Location: [facility] CATH LAB;  Service: Cardiology;  Laterality: N/A;    BIOPSY, CARDIAC, PEDIATRIC N/A 2/28/2023    Procedure: BIOPSY, CARDIAC, PEDIATRIC;  Surgeon: Xavi Alfaro Jr., MD;  Location: [facility] CATH LAB;  Service: Cardiology;  Laterality: N/A;     CARDIAC SURGERY      CATHETERIZATION OF RIGHT HEART WITH BIOPSY N/A 7/1/2021    Procedure: CATHETERIZATION, HEART, RIGHT, WITH BIOPSY;  Surgeon: Claudia Roberts MD;  Location: [facility] CATH LAB;  Service: Cardiology;  Laterality: N/A;  pedi heart    CATHETERIZATION, RIGHT, HEART, PEDIATRIC N/A 12/30/2022    Procedure: CATHETERIZATION, RIGHT, HEART, PEDIATRIC;  Surgeon: Xavi Alfaro Jr., MD;  Location: [facility] CATH LAB;  Service: Pediatric Cardiology;  Laterality: N/A;    CATHETERIZATION, RIGHT, HEART, PEDIATRIC N/A 1/24/2023    Procedure: CATHETERIZATION, RIGHT, HEART, PEDIATRIC;  Surgeon: Claudia Roberts MD;  Location: [facility] CATH LAB;  Service: Cardiology;  Laterality: N/A;    CLOSURE OF WOUND Right 10/9/2020    Procedure: CLOSURE, WOUND;  Surgeon: AMADO Lu II, MD;  Location: [facility] OR [address] [place] [address];  Service: Cardiovascular;  Laterality: Right;    COMBINED RIGHT AND RETROGRADE LEFT HEART CATHETERIZATION FOR CONGENITAL HEART DEFECT N/A 1/24/2019    Procedure: CATHETERIZATION, HEART, COMBINED RIGHT AND RETROGRADE LEFT, FOR CONGENITAL HEART DEFECT;  Surgeon: Claudia Roberts MD;  Location: [facility] CATH LAB;  Service: Cardiology;  Laterality: N/A;  Pedi Heart    COMBINED RIGHT AND RETROGRADE LEFT HEART CATHETERIZATION FOR CONGENITAL HEART DEFECT N/A 1/29/2019    Procedure: CATHETERIZATION, HEART, COMBINED RIGHT AND RETROGRADE LEFT, FOR CONGENITAL HEART DEFECT;  Surgeon: Xavi Alfaro Jr., MD;  Location: [facility] CATH LAB;  Service: Cardiology;  Laterality: N/A;  Pedi Heart    COMBINED RIGHT AND RETROGRADE LEFT HEART CATHETERIZATION FOR CONGENITAL HEART DEFECT N/A 4/3/2019    Procedure: CATHETERIZATION, HEART, COMBINED RIGHT AND RETROGRADE LEFT, FOR CONGENITAL HEART DEFECT;  Surgeon: Claudia Roberts MD;  Location: [facility] CATH LAB;  Service: Cardiology;  Laterality: N/A;    COMBINED RIGHT AND RETROGRADE LEFT HEART CATHETERIZATION FOR CONGENITAL HEART DEFECT N/A 5/19/2021    Procedure: CATHETERIZATION,  HEART, COMBINED RIGHT AND RETROGRADE LEFT, FOR CONGENITAL HEART DEFECT;  Surgeon: Claudia Roberts MD;  Location: [facility] CATH LAB;  Service: Cardiology;  Laterality: N/A;  pedi heart    COMBINED RIGHT AND RETROGRADE LEFT HEART CATHETERIZATION FOR CONGENITAL HEART DEFECT N/A 10/25/2021    Procedure: CATHETERIZATION, HEART, COMBINED RIGHT AND RETROGRADE LEFT, FOR CONGENITAL HEART DEFECT;  Surgeon: Xavi Alfaro Jr., MD;  Location: [facility] CATH LAB;  Service: Cardiology;  Laterality: N/A;  Pedi Heart    COMBINED RIGHT AND RETROGRADE LEFT HEART CATHETERIZATION FOR CONGENITAL HEART DEFECT N/A 11/30/2021    Procedure: CATHETERIZATION, HEART, COMBINED RIGHT AND RETROGRADE LEFT, FOR CONGENITAL HEART DEFECT;  Surgeon: Claudia Roberts MD;  Location: [facility] CATH LAB;  Service: Cardiology;  Laterality: N/A;  ped heart    COMBINED RIGHT AND RETROGRADE LEFT HEART CATHETERIZATION FOR CONGENITAL HEART DEFECT N/A 6/14/2022    Procedure: CATHETERIZATION, HEART, COMBINED RIGHT AND RETROGRADE LEFT, FOR CONGENITAL HEART DEFECT;  Surgeon: Claudia Roberts MD;  Location: [facility] CATH LAB;  Service: Cardiology;  Laterality: N/A;  Pedi Heart    COMBINED RIGHT AND TRANSSEPTAL LEFT HEART CATHETERIZATION  1/29/2019    Procedure: Cardiac Catheterization, Combined Right And Transseptal Left;  Surgeon: Xavi Alfaro Jr., MD;  Location: [facility] CATH LAB;  Service: Cardiology;;    EXTRACORPOREAL CIRCULATION  2004    FASCIOTOMY FOR COMPARTMENT SYNDROME Right 10/3/2020    Procedure: FASCIOTOMY, DECOMPRESSIVE, FOR COMPARTMENT SYNDROME- Right lower leg;  Surgeon: AMADO Lu II, MD;  Location: [facility] OR [address] [place] [address];  Service: Vascular;  Laterality: Right;  Debridement of right calf    HEART TRANSPLANT N/A 2/3/2019    Procedure: TRANSPLANT, HEART;  Surgeon: Gregorio Barriga MD;  [facility];  Service: Cardiovascular;  Laterality: N/A;    HEART TRANSPLANT N/A 9/26/2022    Procedure: TRANSPLANT, HEART;  Surgeon: Gregorio HULL  MD Nithya;  [address]  Service: Cardiovascular;  Laterality: N/A;  Re-do transplant    INCISION AND DRAINAGE Right 2/2/2021    Procedure: Incision and Drainage Right Leg;  Surgeon: AMADO Lu II, MD;  [address]  Service: Vascular;  Laterality: Right;    INSERTION OF DIALYSIS CATHETER  10/25/2021    Procedure: INSERTION, CATHETER, DIALYSIS- PEDIATRIC;  Surgeon: Xavi Alfaro Jr., MD;  Location: [facility] CATH LAB;  Service: Cardiology;;    INSERTION OF DIALYSIS CATHETER  12/30/2022    Procedure: INSERTION, CATHETER, DIALYSIS;  Surgeon: Xavi Alfaro Jr., MD;  Location: [facility] CATH LAB;  Service: Pediatric Cardiology;;    INSERTION, WIRELESS SENSOR, FOR PULMONARY ARTERIAL PRESSURE MONITORING  1/24/2023    Procedure: Insertion, Wireless Sensor, For Pulmonary Arterial Pressure Monitoring;  Surgeon: Claudia Roberts MD;  Location: [facility] CATH LAB;  Service: Cardiology;;    IRRIGATION OF MEDIASTINUM Left 10/15/2020    Procedure: IRRIGATION, left chest change of wound vac;  Surgeon: Kit Lackey MD;  Location: [address];  Service: Cardiovascular;  Laterality: Left;    PLACEMENT OF DIALYSIS ACCESS N/A 9/30/2022    Procedure: Insertion, Cathether, dialysis;  Surgeon: Claudia Roberts MD;  Location: [facility] CATH LAB;  Service: Cardiology;  Laterality: N/A;  pedi heart    PLACEMENT, TRIALYSIS CATH N/A 1/3/2023    Procedure: PLACEMENT, TRIALYSIS CATH;  Surgeon: Claudia Roberts MD;  Location: [facility] CATH LAB;  Service: Cardiology;  Laterality: N/A;    PLACEMENT, TRIALYSIS CATH N/A 3/2/2023    Procedure: Placement, Trialysis Cath;  Surgeon: Xavi Alfaro Jr., MD;  Location: [facility] CATH LAB;  Service: Cardiology;  Laterality: N/A;    REMOVAL OF CANNULA FOR EXTRACORPOREAL MEMBRANE OXYGENATION (ECMO) Left 9/27/2020    Procedure: REMOVAL, CANNULA, FOR ECMO;  Surgeon: Kit Lackey MD;  [address]  Service: Cardiovascular;  Laterality: Left;    REMOVAL  OF CANNULA FOR EXTRACORPOREAL MEMBRANE OXYGENATION (ECMO) Right 9/30/2020    Procedure: REMOVAL, CANNULA, FOR ECMO;  Surgeon: Kit Lackey MD;  Location: [facility] OR [facility] FLR;  Service: Cardiovascular;  Laterality: Right;    REPLACEMENT OF WOUND VACUUM-ASSISTED CLOSURE DEVICE Right 2/5/2021    Procedure: REPLACEMENT, WOUND VAC;  Surgeon: AMADO Lu II, MD;  Location: [facility] OR 2ND FLR;  Service: Cardiovascular;  Laterality: Right;    REPLACEMENT OF WOUND VACUUM-ASSISTED CLOSURE DEVICE Right 2/11/2021    Procedure: REPLACEMENT, WOUND VAC;  Surgeon: AMADO Lu II, MD;  Location: [facility] OR 2ND FLR;  Service: Cardiovascular;  Laterality: Right;    REPLACEMENT OF WOUND VACUUM-ASSISTED CLOSURE DEVICE Right 2/8/2021    Procedure: REPLACEMENT, WOUND VAC;  Surgeon: AMADO Lu II, MD;  Location: [facility] OR 2ND FLR;  Service: Cardiovascular;  Laterality: Right;    RIGHT HEART CATHETERIZATION Right 2/28/2023    Procedure: INSERTION, CATHETER, RIGHT HEART;  Surgeon: Xavi Alfaro Jr., MD;  Location: [facility] CATH LAB;  Service: Cardiology;  Laterality: Right;    RIGHT HEART CATHETERIZATION FOR CONGENITAL HEART DEFECT N/A 2/9/2019    Procedure: CATHETERIZATION, HEART, RIGHT, FOR CONGENITAL HEART DEFECT;  Surgeon: Claudia Roberts MD;  Location: [facility] CATH LAB;  Service: Cardiology;  Laterality: N/A;  ped heart    RIGHT HEART CATHETERIZATION FOR CONGENITAL HEART DEFECT N/A 9/22/2020    Procedure: CATHETERIZATION, HEART, RIGHT, FOR CONGENITAL HEART DEFECT;  Surgeon: Claudia Roberts MD;  Location: [facility] CATH LAB;  Service: Cardiology;  Laterality: N/A;    RIGHT HEART CATHETERIZATION FOR CONGENITAL HEART DEFECT N/A 10/6/2020    Procedure: CATHETERIZATION, HEART, RIGHT, FOR CONGENITAL HEART DEFECT;  Surgeon: Xavi Alfaro Jr., MD;  Location: [facility] CATH LAB;  Service: Cardiology;  Laterality: N/A;    TAPVR repair   2004    at [facility] N/A 10/14/2022    Procedure: Thoracentesis;  Surgeon: Lora  Surgeon;  Location: [facility];  Service: Anesthesiology;  Laterality: N/A;    VASCULAR CANNULATION FOR EXTRACORPOREAL MEMBRANE OXYGENATION (ECMO) N/A 9/23/2020    Procedure: CANNULATION, VASCULAR, FOR ECMO;  Surgeon: Kit Lackey MD;  [facility];  Service: Cardiovascular;  Laterality: N/A;    VASCULAR CANNULATION FOR EXTRACORPOREAL MEMBRANE OXYGENATION (ECMO) Left 9/24/2020    Procedure: CANNULATION, VASCULAR, FOR ECMO;  Surgeon: Kit Lackey MD;  Location: [facility] OR [facility] FLR;  Service: Cardiovascular;  Laterality: Left;    WOUND DEBRIDEMENT Right 10/9/2020    Procedure: DEBRIDEMENT, WOUND;  Surgeon: AMADO Lu II, MD;  Location: [facility] OR [facility] FLR;  Service: Cardiovascular;  Laterality: Right;    WOUND DEBRIDEMENT Left 9/30/2021    Procedure: DEBRIDEMENT, WOUND;  Surgeon: Kit Lackey MD;  [facility];  Service: Cardiothoracic;  Laterality: Left;     Family History       Problem Relation (Age of Onset)    Heart disease Paternal Grandfather          Tobacco Use    Smoking status: Never    Smokeless tobacco: Never   Substance and Sexual Activity    Alcohol use: Never    Drug use: Never    Sexual activity: Never     Review of Systems   Constitutional: Negative.    Musculoskeletal:  Positive for myalgias.   All other systems reviewed and are negative.  Objective:     Vital Signs (Most Recent):  Temp: 97.6 °F (36.4 °C) (04/04/23 0751)  Pulse: (!) 123 (04/04/23 1143)  Resp: 16 (04/04/23 1107)  BP: 136/75 (04/04/23 0751)  SpO2: 99 % (04/04/23 1143)   Vital Signs (24h Range):  Temp:  [97.6 °F (36.4 °C)] 97.6 °F (36.4 °C)  Pulse:  [123-140] 123  Resp:  [16-26] 16  SpO2:  [95 %-100 %] 99 %  BP: (136)/(75) 136/75     Weight: 58.9 kg (129 lb 13.6 oz)  Body mass index is 19.74 kg/m².    Physical Exam  Vitals and nursing note reviewed.   Constitutional:       Appearance: Normal appearance.   Cardiovascular:      Pulses:           Femoral pulses are 2+ on the right side.        Posterior tibial pulses are 2+ on the right side.   Pulmonary:      Effort: Pulmonary effort is normal.   Abdominal:      General: Abdomen is flat.   Musculoskeletal:         General: No swelling or deformity. Normal range of motion.      Right lower leg: No edema.      Left lower leg: No edema.   Skin:     General: Skin is warm and dry.      Capillary Refill: Capillary refill takes less than 2 seconds.   Neurological:      General: No focal deficit present.      Mental Status: He is alert and oriented to person, place, and time.       Significant Labs:  CBC:   Recent Labs   Lab 04/04/23  0846   WBC 1.05*   RBC 4.81   HGB 9.8*   HCT 34.4*   *   MCV 72*   MCH 20.4*   MCHC 28.5*     CMP:   Recent Labs   Lab 04/04/23  0852   *   CALCIUM 9.2   ALBUMIN 3.8   PROT 7.0      K 4.2   CO2 21*      BUN 16   CREATININE 0.9   ALKPHOS 154   ALT 16   AST 39   BILITOT 0.5       Significant Diagnostics:  U/S: Flow seen through AT and PT biphasic waveforms   WDL

## 2023-04-06 NOTE — PROGRESS NOTE ADULT - PROVIDER SPECIALTY LIST ADULT
Hospitalist Closure 3 Information: This tab is for additional flaps and grafts above and beyond our usual structured repairs.  Please note if you enter information here it will not currently bill and you will need to add the billing information manually.

## 2023-04-29 NOTE — CHART NOTE - NSCHARTNOTEFT_GEN_A_CORE
Spoke with HCP Anshu updating him with hospital course, letting him know that the patient is now downgraded from ICU and scheduled for PEG tube placement today. Still wishes for Full Code. Advised to come visit patient. All questions answered. 0 = swallows foods/liquids without difficulty

## 2023-06-07 NOTE — ED PROVIDER NOTE - NEUROLOGICAL, MLM
[Follow-Up Visit] : a follow-up visit for [Back Pain] : back pain [FreeTextEntry2] : Compression fracture of L1 Alert and oriented, no focal deficits, no motor or sensory deficits.

## 2023-07-06 NOTE — DIETITIAN INITIAL EVALUATION ADULT. - NUTRITION DIAGNOSTIC TERMINOLOGY #1
Diagnosis-- 89 yo man with left back / posterior trapezius (skin) basal cell carcinoma, S/P excision with positive margin. [id number]. S/P postop radiotherapy: 5400cGy/30fx, completed 5/3/23    Mr. Singh is seen 8 weeks since completion of therapy and 4 week since his last followup visit. In the interim the patient was seen at [facility] in [place] on 06/21/2023 with acute back pain.  Plain films of the lumbar spine did not show any acute damage. Patient was discharged that same day. The patient was seen in clinic today and he states that he is doing well. He has no further pain in the upper back skin region but still uses a lotion over the area twice day. Patient to reach the age of 89 in 2 weeks. Still playing golf weekly.     On exam, the patient does have radiation therapy changes and scarring in the treated region in the upper back. The skin nearly completely healed without any desquamation. There is no visible or palpable skin tumor.    Impression--Clinically in remission. Recovery 95% complete from skin desquamation secondary to radiotherapy.    Recommendations--patient was asked to avoid the area from getting sunburned this summer as the patient plays golf weekly. To use a moistering lotion over treated region twice a day. See Dr Bowman in December of this year. Discontinue follow-up in our clinic--Mr. Singh to call for questions if needed in the interim.    Total time spend to medical record review and seeing patient--125 min, actual time spent with patient--10 min.    Nutrient

## 2023-08-23 NOTE — DISCHARGE NOTE PROVIDER - NSDCHOSPICE_GEN_A_CORE
11.8   9.46  )-----------( 345      ( 23 Aug 2023 16:04 )             36.0    08-23      139  |  101  |  x   ----------------------------<  103<H>  3.8   |  29  |  0.61      Ca    10.0      23 Aug 2023 16:04 11.8   9.46  )-----------( 345      ( 23 Aug 2023 16:04 )             36.0    08-23      139  |  101  |  x   ----------------------------<  103<H>  3.8   |  29  |  0.61      Ca    10.0      23 Aug 2023 16:04    CT neck with contrast (8/23/23):  ACC: [id number] EXAM:  CT NECK SOFT TISSUE IC   ORDERED BY: MAHOGANY LOERA     PROCEDURE DATE:  08/23/2023          INTERPRETATION:  Exam: CT scan of the neck with contrast    CLINICAL INDICATION: 19-year-old with right mandibular swelling and   history of resected thyroglossal duct cyst    COMPARISON: None    TECHNIQUE: 3 mm images were taken through the neck in the axial plane   from the supraorbital region through the thoracic inlet following   intravenous administration of 80 cc of contrast; 20 cc were discarded.   Sagittal and coronal reformatted images were created    FINDINGS: Enhancing granulation tissue surrounding a small pocket of   fluid superficial to the right infrahyoid strap muscle and just below the   level of the hyoid bone. The small pocket of fluid has minimal peripheral   enhancement. Reticulated edema within the regional submental soft tissues.    2 small submental lymph nodes are noted.    Thyroid gland itself is normal. Normal submandibular glands. Normal   parotid glands. Bilateral enlarged jugulodigastric lymph nodes measure   approximately 1.5 cm in short axis.    No masses related to the aerodigestive tract.    Dense opacification of normal-appearing cervical vasculature. Normal   cervical spine. No air-fluid levels within. Paranasal sinuses. Orbital   contents are normal.    IMPRESSION: Presumed phlegmonous changes in the right submental region as   described above with small pocket of fluid superficial to the infrahyoid   strap muscle. Question of seroma versus infected seroma or residual   thyroglossal duct cyst.    --- End of Report ---            TETO RAVI MD; Attending Radiologist  This document has been electronically signed. Aug 23 2023  5:58PM No

## 2023-11-02 NOTE — ED ADULT NURSE NOTE - NSFALLRSKASSESASSIST_ED_ALL_ED
MEDICARE WELLNESS VISIT + NOTE    CHIEF COMPLAINT:  Paulina Velazquez presents for her Subsequent Annual Medicare Wellness Visit.   Her additional complaints or concerns are addressed below.      Patient Care Team:  Olga Lidia Mac MD as PCP - General (Internal Medicine)        Patient Active Problem List   Diagnosis   • Hyperlipidemia   • OAB (overactive bladder)   • Vaginal wall prolapse   • Hypertension   • Acid reflux   • Age-related osteoporosis without current pathological fracture   • Abnormal electrocardiogram (ECG) (EKG)   • LBBB (left bundle branch block)   • Sinus bradycardia   • Family history of hypertension in mother   • Aortic valve disease   • Nonrheumatic mitral (valve) insufficiency   • Nonrheumatic tricuspid (valve) insufficiency   • Diastolic dysfunction, left ventricle   • Encounter for screening laboratory testing for COVID-19 virus in asymptomatic patient   • Precordial pain   • Acute pain of left knee   • COVID-19 virus infection   • Primary open angle glaucoma (POAG) of both eyes   • Primary osteoarthritis of right hip         Past Medical History:   Diagnosis Date   • Anxiety    • Essential (primary) hypertension    • Hyperlipidemia    • Keratoconjunctivitis sicca, not specified as Sjogren's, bilateral 10/10/2022   • OAB (overactive bladder)    • Vaginal wall prolapse          Past Surgical History:   Procedure Laterality Date   • Colonoscopy     • Cystoscopy     • Eye surgery     • Hysterectomy           Social History     Tobacco Use   • Smoking status: Never   • Smokeless tobacco: Never   Vaping Use   • Vaping Use: never used   Substance Use Topics   • Alcohol use: No   • Drug use: No     Family History   Problem Relation Age of Onset   • Hypertension Mother    • Cancer Other    • Parkinsonism Other    • Rheumatoid Arthritis Neg Hx    • Systemic Lupus Erythematosus Neg Hx    • Other Neg Hx         Gout         Current Outpatient Medications   Medication Sig Dispense Refill   •  sulfamethoxazole-trimethoprim (BACTRIM DS) 800-160 MG per tablet Take 1 tablet by mouth in the morning and 1 tablet in the evening. Do all this for 5 days. 10 tablet 0   • lisinopril (ZESTRIL) 20 MG tablet TAKE 1 TABLET BY MOUTH DAILY 90 tablet 3   • latanoprost (XALATAN) 0.005 % ophthalmic solution Apply 1 drop to eye daily.     • Calcium Carb-Cholecalciferol 500-10 MG-MCG Tab Take 1 tablet by mouth daily.     • magnesium gluconate (MAGONATE) 500 MG tablet Take 500 mg by mouth in the morning and 500 mg in the evening.     • amLODIPine (NORVASC) 5 MG tablet TAKE 1 TABLET BY MOUTH DAILY 90 tablet 3   • Ascorbic Acid (vitamin C) 500 MG tablet Take 500 mg by mouth daily.     • vitamin B-12 (CYANOCOBALAMIN) 1000 MCG tablet Take 1,000 mcg by mouth daily.     • carboxymethylcellulose-glycerin 0.5-0.9 % ophthalmic solution Place 1 drop into both eyes daily.     • Omega 3 1000 MG capsule Take 1,000 mg by mouth daily.       No current facility-administered medications for this visit.        The following items on the Medicare Health Risk Assessment were found to be positive  1.) Do you have an Advance directive, living will, or power of  for health care document that contains your wishes for end of life care?: No     2.) Would you like additional information on advance directives?: Yes     3.) During the past 4 weeks, how would you rate your health?: Fair     7a.) Have you had a fall in the past year?: Yes     7c.) Do you worry about falling?: Yes     11b.) Bowel control problems: Sometimes         Vision and Hearing screens: Hearing screening was performed and reviewed.  Vision screening was not able to be completed today due to patient's pre-existing vision condition    Dr. Alex ophthalmology     Advance care planning documents on file - no    Cognitive/Functional Status: no evidence of cognitive dysfunction by direct observation    Opioid Review: Paulina is not taking opioid medications.    Recent PHQ 2/9 Score:     PHQ 2:  PHQ 2 Score Adult PHQ 2 Score Adult PHQ 2 Interpretation Little interest or pleasure in activity?   11/2/2023  10:12 AM 0 No further screening needed 0       PHQ 9:       DEPRESSION ASSESSMENT/PLAN:  Depression screening is negative no further plan needed.     Body mass index is 26.72 kg/m².    BMI ASSESSMENT/PLAN:  Patient is overweight.    20 minutes of physical activity a day        See Patient Instructions section.   No follow-ups on file.      OUTPATIENT PROGRESS NOTE    Subjective   Chief Complaint Office Visit and Medicare Wellness Visit    HPI     Patient is 76 years old female who is here today for Medicare wellness and follow-up on medical problems    Underlying medical conditions include well-controlled hypertension  She has glaucoma managed by ophthalmologist  She also has been seeing cardiology Dr. Bean    Last year was diagnosed with osteoporosis and patient would like to continue treatment with calcium vitamin D    She was at the urgent care 2 days ago, diagnosed with UTI, taking Bactrim  Feels better now, normal hematuria    Lives with his mother  Independent active female,  1 fall last year, she was dancing in the kitchen and tripped on the shoe      Medications  Medications were reviewed and updated today.    Histories  I have personally reviewed and updated the patient's past medical, past surgical, family and social histories during today's visit.    Review of Systems   Constitutional: Positive for fatigue.   HENT: Negative.    Respiratory: Negative.    Cardiovascular: Negative.    Gastrointestinal: Negative.    Genitourinary: Negative.    Musculoskeletal: Negative for arthralgias and joint swelling.   Skin: Negative.    Neurological: Negative.    Psychiatric/Behavioral: Negative.        Objective   Visit Vitals  /65 (BP Location: LUE - Left upper extremity, Patient Position: Sitting, Cuff Size: Regular)   Pulse 67   Temp 97.5 °F (36.4 °C) (Tympanic)   Resp 16   Ht 4' 9\" (1.448 m)    Wt 56 kg (123 lb 7.3 oz)   SpO2 99%   BMI 26.72 kg/m²     Physical Exam  Vitals reviewed.   Constitutional:       Appearance: Normal appearance.   HENT:      Head: Normocephalic and atraumatic.   Eyes:      Extraocular Movements: Extraocular movements intact.      Pupils: Pupils are equal, round, and reactive to light.   Cardiovascular:      Rate and Rhythm: Normal rate and regular rhythm.      Pulses: Normal pulses.      Heart sounds: Normal heart sounds.   Pulmonary:      Effort: Pulmonary effort is normal. No respiratory distress.      Breath sounds: Normal breath sounds.   Abdominal:      General: Bowel sounds are normal. There is no distension.      Palpations: Abdomen is soft.      Tenderness: There is no abdominal tenderness.   Musculoskeletal:         General: Normal range of motion.   Lymphadenopathy:      Cervical: No cervical adenopathy.   Skin:     General: Skin is warm.   Neurological:      General: No focal deficit present.      Mental Status: She is alert and oriented to person, place, and time.   Psychiatric:         Mood and Affect: Mood normal.         Behavior: Behavior normal.         Laboratory  I have reviewed the pertinent laboratory tests. These are the pertinent findings:    Walk In on 10/31/2023   Component Date Value Ref Range Status   • POCT Color 10/31/2023 Red   Final   • POCT Appearance 10/31/2023 Cloudy   Final   • POCT Glucose Urine 10/31/2023 Negative  Negative, Normal mg/dL Final   • POCT Bilirubin 10/31/2023 Small (A)  Negative, About 10 Joseluis/ul, 5-10 Joseluis/ul, 50 Joseluis/ul Final   • POCT Ketones 10/31/2023 Negative  Negative, 100 mg/dl mg/dL Final   • POCT Specific Gravity 10/31/2023 1.025  1.005, 1.010, 1.015, 1.020, 1.025, 1.030 Final   • POCT Occult Blood 10/31/2023 Large (A)  Negative Final   • POCT pH 10/31/2023 6.0  5.0, 6.0, 7.0, 5.5, 6.5 Final   • POCT Protein 10/31/2023 300 mg/dL (A)  Negative, 1000 mg/dl, 20 mg/dl, 40 mg/dl, 2000 mg/dl mg/dL Final   • POCT Urobilinogen  10/31/2023 1.0  1.0, 0.2, Normal mg/dL Final   • Urine Nitrite 10/31/2023 Positive (A)  Negative Final   • WBC (Leukocyte) Esterase POC 10/31/2023 Large (A)  Negative Final   Lab Services on 10/31/2023   Component Date Value Ref Range Status   • COLOR, URINALYSIS 10/31/2023 Red   Final   • APPEARANCE, URINALYSIS 10/31/2023 Turbid   Final   • GLUCOSE, URINALYSIS 10/31/2023 Negative  Negative mg/dL Final   • BILIRUBIN, URINALYSIS 10/31/2023 Negative  Negative Final   • KETONES, URINALYSIS 10/31/2023 Negative  Negative mg/dL Final   • SPECIFIC GRAVITY, URINALYSIS 10/31/2023 1.016  1.005 - 1.030 Final    Measured by refractometry   • OCCULT BLOOD, URINALYSIS 10/31/2023 Large (A)  Negative Final   • PH, URINALYSIS 10/31/2023 7.0  5.0 - 7.0 Final   • PROTEIN, URINALYSIS 10/31/2023 100 (A)  Negative mg/dL Final   • UROBILINOGEN, URINALYSIS 10/31/2023 0.2  0.2, 1.0 mg/dL Final   • NITRITE, URINALYSIS 10/31/2023 Negative  Negative Final   • LEUKOCYTE ESTERASE, URINALYSIS 10/31/2023 Large (A)  Negative Final   • SQUAMOUS EPITHELIAL, URINALYSIS 10/31/2023 None Seen  None Seen, 1 to 5 /hpf Final   • ERYTHROCYTES, URINALYSIS 10/31/2023 >100 (A)  None Seen, 1 to 2 /hpf Final   • LEUKOCYTES, URINALYSIS 10/31/2023 11 to 25 (A)  None Seen, 1 to 5 /hpf Final   • BACTERIA, URINALYSIS 10/31/2023 Few (A)  None Seen /hpf Final   • HYALINE CASTS, URINALYSIS 10/31/2023 None Seen  None Seen, 1 to 5 /lpf Final   • MUCUS 10/31/2023 Present   Final   Lab Services on 07/31/2023   Component Date Value Ref Range Status   • COLOR, URINALYSIS 07/31/2023 Colorless   Final   • APPEARANCE, URINALYSIS 07/31/2023 Clear   Final   • GLUCOSE, URINALYSIS 07/31/2023 Negative  Negative mg/dL Final   • BILIRUBIN, URINALYSIS 07/31/2023 Negative  Negative Final   • KETONES, URINALYSIS 07/31/2023 Negative  Negative mg/dL Final   • SPECIFIC GRAVITY, URINALYSIS 07/31/2023 1.007  1.005 - 1.030 Final    Measured by refractometry   • OCCULT BLOOD, URINALYSIS  07/31/2023 Negative  Negative Final   • PH, URINALYSIS 07/31/2023 6.5  5.0 - 7.0 Final   • PROTEIN, URINALYSIS 07/31/2023 Negative  Negative mg/dL Final   • UROBILINOGEN, URINALYSIS 07/31/2023 0.2  0.2, 1.0 mg/dL Final   • NITRITE, URINALYSIS 07/31/2023 Negative  Negative Final   • LEUKOCYTE ESTERASE, URINALYSIS 07/31/2023 Moderate (A)  Negative Final   • SQUAMOUS EPITHELIAL, URINALYSIS 07/31/2023 1 to 5  None Seen, 1 to 5 /hpf Final   • ERYTHROCYTES, URINALYSIS 07/31/2023 1 to 2  None Seen, 1 to 2 /hpf Final   • LEUKOCYTES, URINALYSIS 07/31/2023 1 to 5  None Seen, 1 to 5 /hpf Final   • BACTERIA, URINALYSIS 07/31/2023 None Seen  None Seen /hpf Final   • HYALINE CASTS, URINALYSIS 07/31/2023 None Seen  None Seen, 1 to 5 /lpf Final   • Urine, Bacterial Culture 07/31/2023 10,000 - 50,000 CFU/mL Mixed bacterial moris with no predominating type   Final   Office Visit on 05/24/2023   Component Date Value Ref Range Status   • COLOR, URINALYSIS 05/24/2023 Straw   Final   • APPEARANCE, URINALYSIS 05/24/2023 Clear   Final   • GLUCOSE, URINALYSIS 05/24/2023 Negative  Negative mg/dL Final   • BILIRUBIN, URINALYSIS 05/24/2023 Negative  Negative Final   • KETONES, URINALYSIS 05/24/2023 Negative  Negative mg/dL Final   • SPECIFIC GRAVITY, URINALYSIS 05/24/2023 1.026  1.005 - 1.030 Final    Measured by refractometry   • OCCULT BLOOD, URINALYSIS 05/24/2023 Small (A)  Negative Final   • PH, URINALYSIS 05/24/2023 5.5  5.0 - 7.0 Final   • PROTEIN, URINALYSIS 05/24/2023 Trace (A)  Negative mg/dL Final   • UROBILINOGEN, URINALYSIS 05/24/2023 0.2  0.2, 1.0 mg/dL Final   • NITRITE, URINALYSIS 05/24/2023 Negative  Negative Final   • LEUKOCYTE ESTERASE, URINALYSIS 05/24/2023 Large (A)  Negative Final   • SQUAMOUS EPITHELIAL, URINALYSIS 05/24/2023 1 to 5  None Seen, 1 to 5 /hpf Final   • ERYTHROCYTES, URINALYSIS 05/24/2023 3 to 5 (A)  None Seen, 1 to 2 /hpf Final   • LEUKOCYTES, URINALYSIS 05/24/2023 26 to 100 (A)  None Seen, 1 to 5 /hpf Final    • BACTERIA, URINALYSIS 05/24/2023 Few (A)  None Seen /hpf Final   • HYALINE CASTS, URINALYSIS 05/24/2023 None Seen  None Seen, 1 to 5 /lpf Final   • CALCIUM OXALATE CRYSTALS 05/24/2023 Present   Final   • MUCUS 05/24/2023 Present   Final   • Urine, Bacterial Culture 05/24/2023 10,000 - 50,000 CFU/mL Mixed bacterial moris with no predominating type   Final   Lab Services on 03/16/2023   Component Date Value Ref Range Status   • Fasting Status 03/16/2023 0  0 - 999 Hours Final   • Sodium 03/16/2023 137  135 - 145 mmol/L Final   • Potassium 03/16/2023 4.2  3.4 - 5.1 mmol/L Final   • Chloride 03/16/2023 104  97 - 110 mmol/L Final   • Carbon Dioxide 03/16/2023 30  21 - 32 mmol/L Final   • Anion Gap 03/16/2023 7  7 - 19 mmol/L Final   • Glucose 03/16/2023 91  70 - 99 mg/dL Final   • BUN 03/16/2023 15  6 - 20 mg/dL Final   • Creatinine 03/16/2023 0.62  0.51 - 0.95 mg/dL Final   • Glomerular Filtration Rate 03/16/2023 >90  >=60 Final    eGFR results = or >60 mL/min/1.73m2 = Normal kidney function. Estimated GFR calculated using the CKD-EPI-R (2021) equation that does not include race in the creatinine calculation.   • BUN/Cr 03/16/2023 24  7 - 25 Final   • Calcium 03/16/2023 9.5  8.4 - 10.2 mg/dL Final   • Bilirubin, Total 03/16/2023 0.3  0.2 - 1.0 mg/dL Final   • GOT/AST 03/16/2023 16  <=37 Units/L Final   • GPT/ALT 03/16/2023 16  <64 Units/L Final   • Alkaline Phosphatase 03/16/2023 53  45 - 117 Units/L Final   • Albumin 03/16/2023 3.7  3.6 - 5.1 g/dL Final   • Protein, Total 03/16/2023 7.7  6.4 - 8.2 g/dL Final   • Globulin 03/16/2023 4.0  2.0 - 4.0 g/dL Final   • A/G Ratio 03/16/2023 0.9 (L)  1.0 - 2.4 Final   • RBC Sedimentation Rate 03/16/2023 40 (H)  0 - 20 mm/hr Final   • C-Reactive Protein 03/16/2023 1.0  <=1.0 mg/dL Final   • WBC 03/16/2023 7.7  4.2 - 11.0 K/mcL Final   • RBC 03/16/2023 4.48  4.00 - 5.20 mil/mcL Final   • HGB 03/16/2023 13.5  12.0 - 15.5 g/dL Final   • HCT 03/16/2023 42.4  36.0 - 46.5 % Final    • MCV 03/16/2023 94.6  78.0 - 100.0 fl Final   • MCH 03/16/2023 30.1  26.0 - 34.0 pg Final   • MCHC 03/16/2023 31.8 (L)  32.0 - 36.5 g/dL Final   • RDW-CV 03/16/2023 13.2  11.0 - 15.0 % Final   • RDW-SD 03/16/2023 46.7  39.0 - 50.0 fL Final   • PLT 03/16/2023 337  140 - 450 K/mcL Final   • NRBC 03/16/2023 0  <=0 /100 WBC Final   • Neutrophil, Percent 03/16/2023 59  % Final   • Lymphocytes, Percent 03/16/2023 31  % Final   • Mono, Percent 03/16/2023 9  % Final   • Eosinophils, Percent 03/16/2023 1  % Final   • Basophils, Percent 03/16/2023 0  % Final   • Immature Granulocytes 03/16/2023 0  % Final   • Absolute Neutrophils 03/16/2023 4.5  1.8 - 7.7 K/mcL Final   • Absolute Lymphocytes 03/16/2023 2.4  1.0 - 4.0 K/mcL Final   • Absolute Monocytes 03/16/2023 0.7  0.3 - 0.9 K/mcL Final   • Absolute Eosinophils  03/16/2023 0.1  0.0 - 0.5 K/mcL Final   • Absolute Basophils 03/16/2023 0.0  0.0 - 0.3 K/mcL Final   • Absolute Immature Granulocytes 03/16/2023 0.0  0.0 - 0.2 K/mcL Final   Office Visit on 01/17/2023   Component Date Value Ref Range Status   • GRP A STREP 01/17/2023 Negative  Negative Final   • Internal Procedural Controls Accep* 01/17/2023 Yes   Final   • TEST LOT NUMBER 01/17/2023 [id number]   Final   • TEST LOT EXPIRATION DATE 01/17/2023 10.10.24   Final         Imaging  I have reviewed the pertinent imaging study reports. These are the pertinent findings:      Assessment & Plan   Diagnoses and associated orders for this visit:  1. Medicare annual wellness visit, subsequent  -     CBC with Automated Differential  -     Comprehensive Metabolic Panel  -     Lipid Panel With Reflex  -     Thyroid Stimulating Hormone  -     Glycohemoglobin  2. Mild pulmonary hypertension (CMD)  Comments:  Mildly increased pulmonary artery pressure, ECHO 2022.  Patient is asymptomatic.  Continue to monitor  3. Primary hypertension  -     Comprehensive Metabolic Panel  -     Lipid Panel With Reflex  4. Primary open angle glaucoma of  both eyes, unspecified glaucoma stage  5. Age-related osteoporosis without current pathological fracture  -     Vitamin D -25 Hydroxy  -     Magnesium  6. Impaired glucose metabolism  -     Glycohemoglobin  7. Vitamin D deficiency  -     Vitamin D -25 Hydroxy    Hypertension is well controlled  Patient will continue lisinopril without changes  Check creatinine and electrolytes    Osteoporosis, diagnosed last year  Recheck vitamin D level  Continue with calcium vitamin D supplements  Eat 5 servings of dairy daily    Recheck hemoglobin A1c  Continue follow-up with ophthalmologist for management of glaucoma and dry eyes    She had  Moderate hypertension on echocardiogram plasty  Patient is asymptomatic    Health maintenance  Blood work was ordered  Flu shot given today  Up-to-date with bone density  Up-to-date with colonoscopy, done September 2021  Mammogram done June 2023 normal      She will follow-up 6 months     no

## 2024-02-07 NOTE — DISCHARGE NOTE PROVIDER - HOSPITAL COURSE
None
77 y/o Male resident at [facility] with a PMHx of Gl bleed, dysphagia, unspecific arrythmia, COPD, Dementia, DM type II, Afib, DVT(on Eliquis), HTN, Osteomyelitis of left leg, PVD, prior PNA/past sepsis, recently discharged form  5/10 for sepsis/ UTI/ UGIB(gastric ulcer) and PEG tube, now   presents to ED BIBEMS sent in from [facility] for  vomiting burgundy colored, and fever of 102F at facility. Patient had dementia and all hx obtianed from chart. Currently with black post hemetemesis around mouth and pale. ROS is unattainable.          1-sepsis secondary to PNA. also probable UTI    -pna possibly aspiration? pt w/ dysphagia and cannot clear secretions well    -imaging suspicious for pna    -hx of esbl. treated with IV meropenem, ok to stop antibx today per ID     -ID consult appreciated    -ABG shows good oxygenation    -d/w Dr. Medina and pt is stable for discharge         2-UGIB w/ coffee ground hematemesis    -gi consult appreciated    -conservative approach will be best in this patient with advanced dementia    -no further bleeding since arrival to ED.    -protonix     -tube feeds restarted and tolerating     -6/14 w/ abd distention, PEG not working, checked abd xrays >> ileus. pt passed a lot of gas per staff. CT a/p neg for obstruction and PEG tube in right position. PEG tube was flushed and manipulated by GI and now PEG ok to use.    -ok to start anticoag per GI. eliquis started and no s/sx of bleeding. hgb has remained stable        3-afib/dvt    -resuming eliquis as above        4-hx of dysphagia    -aspiration precautions    -45 degree head elevations, especially since recent upper gib        T(C): 36.4 (06-17-19 @ 11:36), Max: 36.5 (06-17-19 @ 05:01)    HR: 88 (06-17-19 @ 11:36) (73 - 88)    BP: 102/90 (06-17-19 @ 11:36) (102/90 - 122/91)    RR: 18 (06-17-19 @ 11:36) (18 - 19)    SpO2: 100% (06-17-19 @ 11:36) (89% - 100%)        Gen - calm, opens eyes to verbal stimuli, in no acute distress    HEENT- PERRL, moist mucus membranes, OP clear    CV - RRR, No m/r/g, +pulses, no edema    Lungs - Good effort, Clear to auscultation bilaterally    Abdomen - Soft, nontender, nondistended +BS, No rebound/rigidity/guarding, PEG tube intact.     Ext - No cyanosis/clubbing.     Skin - L hip wound clean, no erythema, tiny wound dehiscence,  No jaundice    Psych- awake, calm, oriented to self, calls himself "Lefty".    Neuro- speech is incomprehensible at times. follows most commands. strength +5/5 in b/l UE.         Dispo: discharge to nursing home in stable condition        Final diagnosis, treatment plan, and follow-up recommendations were discussed and explained. All questions answered. The patient to seek urgent care upon discharge if worsening symptoms develop prior to scheduled follow-up. Time spent on discharge included time with the patient, and also coordinating discharge care as outlined below.        Total time spent: 50 min

## 2024-08-26 NOTE — PATIENT PROFILE ADULT - BRADEN NUTRITION
Last OV: 7/15/2024 DM   Last RX:    Next scheduled apt: 10/21/2024   3 months             Surescript requesting a refill  
(2) probably inadequate

## 2025-06-16 NOTE — CDI QUERY NOTE - NSCDINOTEDOCCLARIFICATION_GEN_A_CORE
Nurse follow up post 6/13/2025 Vyepti infusion - left message on patient's voicemail and provided office phone number for callback.     Message sent to patient's portal.   PLEASE INCLUDE MORE SPECIFIC DOCUMENTATION IN YOUR PROGRESS NOTE AND DISCHARGE SUMMARY.  The documentation in this patient's medical record requires additional clarification to ensure we accurately capture the patients diagnosis(es), treatment and/or severity of illness. Please document to the greatest level of specificity all corresponding diagnoses (either known or suspected) and/or treatment associated with the clinical information described below.